# Patient Record
Sex: FEMALE | Race: WHITE | NOT HISPANIC OR LATINO | Employment: OTHER | URBAN - METROPOLITAN AREA
[De-identification: names, ages, dates, MRNs, and addresses within clinical notes are randomized per-mention and may not be internally consistent; named-entity substitution may affect disease eponyms.]

---

## 2017-01-03 ENCOUNTER — LAB (OUTPATIENT)
Dept: INTERNAL MEDICINE | Facility: CLINIC | Age: 77
End: 2017-01-03

## 2017-01-03 DIAGNOSIS — E83.52 HYPERCALCEMIA: ICD-10-CM

## 2017-01-03 LAB
ALBUMIN SERPL-MCNC: 4.3 G/DL (ref 3.2–4.8)
ANION GAP SERPL CALCULATED.3IONS-SCNC: 5 MMOL/L (ref 3–11)
BUN BLD-MCNC: 23 MG/DL (ref 9–23)
BUN/CREAT SERPL: 28.8 (ref 7–25)
CALCIUM SPEC-SCNC: 11.2 MG/DL (ref 8.7–10.4)
CHLORIDE SERPL-SCNC: 104 MMOL/L (ref 99–109)
CO2 SERPL-SCNC: 30 MMOL/L (ref 20–31)
CREAT BLD-MCNC: 0.8 MG/DL (ref 0.6–1.3)
GFR SERPL CREATININE-BSD FRML MDRD: 70 ML/MIN/1.73
GLUCOSE BLD-MCNC: 102 MG/DL (ref 70–100)
PHOSPHATE SERPL-MCNC: 3.2 MG/DL (ref 2.4–5.1)
POTASSIUM BLD-SCNC: 4.6 MMOL/L (ref 3.5–5.5)
SODIUM BLD-SCNC: 139 MMOL/L (ref 132–146)

## 2017-01-03 PROCEDURE — 83970 ASSAY OF PARATHORMONE: CPT | Performed by: INTERNAL MEDICINE

## 2017-01-03 PROCEDURE — 80069 RENAL FUNCTION PANEL: CPT | Performed by: INTERNAL MEDICINE

## 2017-01-05 LAB — PTH-INTACT SERPL-MCNC: 78 PG/ML (ref 15–65)

## 2017-01-06 DIAGNOSIS — E03.9 ACQUIRED HYPOTHYROIDISM: Primary | ICD-10-CM

## 2017-01-06 DIAGNOSIS — D35.1 PARATHYROID ADENOMA: ICD-10-CM

## 2017-01-06 DIAGNOSIS — E21.0 PRIMARY HYPERPARATHYROIDISM (HCC): ICD-10-CM

## 2017-01-23 ENCOUNTER — PROCEDURE VISIT (OUTPATIENT)
Dept: GYNECOLOGIC ONCOLOGY | Facility: CLINIC | Age: 77
End: 2017-01-23

## 2017-01-23 VITALS
OXYGEN SATURATION: 98 % | HEIGHT: 60 IN | BODY MASS INDEX: 28.86 KG/M2 | RESPIRATION RATE: 16 BRPM | TEMPERATURE: 98.3 F | HEART RATE: 80 BPM | DIASTOLIC BLOOD PRESSURE: 68 MMHG | SYSTOLIC BLOOD PRESSURE: 133 MMHG | WEIGHT: 147 LBS

## 2017-01-23 DIAGNOSIS — N81.6 RECTOCELE: ICD-10-CM

## 2017-01-23 DIAGNOSIS — Z12.31 ENCOUNTER FOR SCREENING MAMMOGRAM FOR BREAST CANCER: ICD-10-CM

## 2017-01-23 DIAGNOSIS — Z01.419 WOMEN'S ANNUAL ROUTINE GYNECOLOGICAL EXAMINATION: Primary | ICD-10-CM

## 2017-01-23 PROCEDURE — G0101 CA SCREEN;PELVIC/BREAST EXAM: HCPCS | Performed by: NURSE PRACTITIONER

## 2017-01-23 NOTE — MR AVS SNAPSHOT
Leslee Wu   1/23/2017 10:00 AM   Procedure visit    Dept Phone:  [phone]   Encounter #:  [id number]    Provider:  SANDOVAL Nagy   Department:  [facility] GYNECOLOGIC ONCOLOGY                Your Full Care Plan              Your Updated Medication List          This list is accurate as of: 1/23/17 10:50 AM.  Always use your most recent med list.                acyclovir 200 MG capsule   Commonly known as:  ZOVIRAX       * amLODIPine 10 MG tablet   Commonly known as:  NORVASC   Take 1 tablet by mouth daily.       * amLODIPine 10 MG tablet   Commonly known as:  NORVASC   Take 1 tablet by mouth Daily.       * aspirin 325 MG tablet       * aspirin 325 MG tablet       * benazepril 20 MG tablet   Commonly known as:  LOTENSIN   Take 1 tablet by mouth daily.       * benazepril 20 MG tablet   Commonly known as:  LOTENSIN       diclofenac 75 MG EC tablet   Commonly known as:  VOLTAREN   Take 1 tablet by mouth 2 (Two) Times a Day.       * LORazepam 0.5 MG tablet   Commonly known as:  ATIVAN   Take 1 tablet by mouth Every 8 (Eight) Hours As Needed for anxiety.       * LORazepam 0.5 MG tablet   Commonly known as:  ATIVAN       * misoprostol 200 MCG tablet   Commonly known as:  CYTOTEC   TAKE ONE TABLET BY MOUTH TWICE DAILY       * misoprostol 200 MCG tablet   Commonly known as:  CYTOTEC   Take 1 tablet by mouth 2 (Two) Times a Day.       * OSTEO BI-FLEX JOINT SHIELD tablet       * OSTEO BI-FLEX JOINT SHIELD PO       * pantoprazole 40 MG EC tablet   Commonly known as:  PROTONIX       * pantoprazole 40 MG EC tablet   Commonly known as:  PROTONIX   Take 1 tablet by mouth Daily.       * SYNTHROID 75 MCG tablet   Generic drug:  levothyroxine   TAKE ONE TABLET BY MOUTH ONCE DAILY       * levothyroxine 75 MCG tablet   Commonly known as:  SYNTHROID, LEVOTHROID       * vitamin D3 5000 UNITS capsule capsule       * vitamin D3 5000 UNITS capsule capsule       * Notice:  This list has  18 medication(s) that are the same as other medications prescribed for you. Read the directions carefully, and ask your doctor or other care provider to review them with you.            You Were Diagnosed With        Codes Comments    Women's annual routine gynecological examination    -  Primary ICD-10-CM: Z01.419  ICD-9-CM: V72.31     Rectocele     ICD-10-CM: N81.6  ICD-9-CM: 618.04     Encounter for screening mammogram for breast cancer     ICD-10-CM: Z12.31  ICD-9-CM: V76.12       Instructions     None    Patient Instructions History      Upcoming Appointments     Visit Type Date Time Department    PAP SMEAR/PELVIC EXAM 2017 10:00 AM [facility]    FOLLOW UP + US 2017 10:30 AM [facility] END [facility]    FOLLOW UP 2017 10:00 AM [facility]    FOLLOW UP 10/10/2017 11:15 AM [facility]ARD [facility]      [employer] Signup     [employer] allows you to send messages to your doctor, view your test results, renew your prescriptions, schedule appointments, and more. To sign up, go to [email or web address] and click on the Sign Up Now link in the New User? box. Enter your [employer] Activation Code exactly as it appears below along with the last four digits of your Social Security Number and your Date of Birth () to complete the sign-up process. If you do not sign up before the expiration date, you must request a new code.    [employer] Activation Code: [email or web address]  Expires: 2017 10:48 AM    If you have questions, you can email [email or web address] or call [phone] to talk to our [employer] staff. Remember, [employer] is NOT to be used for urgent needs. For medical emergencies, dial 911.               Other Info from Your Visit           Your Appointments     2017 10:30 AM EDT   Follow up + US with Salima LONG MD   [facility] INTERNAL MEDICINE AND ENDOCRINOLOGY (--)    [address] [place] [id number]   [phone]            Kevin  "12, 2017 10:00 AM EDT   Follow Up with Eric Angulo MD   [facility] INTERNAL MEDICINE AND PEDIATRICS (--)    [address]  [facility] [address] [phone]           Arrive 15 minutes prior to appointment.            Oct 10, 2017 11:15 AM EDT   Follow Up with Zeb Solis MD   [facility] CARDIOLOGY (--)    [address]  [facility] [address] [phone]           Arrive 15 minutes prior to appointment.            Jan 24, 2018 10:00 AM EST   Pap Smear/Pelvic Exam with SANDOVAL Nagy   [facility] GYNECOLOGIC ONCOLOGY (--)    [address]  [facility] [address] [phone]              Allergies     Codeine      Lipitor [Atorvastatin]  Myalgia    Lortab  [Hydrocodone-acetaminophen]        Reason for Visit     Gynecologic Exam WITH NO COMPLAINTS      Vital Signs     Blood Pressure Pulse Temperature Respirations Height Weight    133/68 80 98.3 °F (36.8 °C) (Temporal Artery ) 16 60\" (152.4 cm) 147 lb (66.7 kg)    Oxygen Saturation Body Mass Index Smoking Status             98% 28.71 kg/m2 Former Smoker         Problems and Diagnoses Noted     Rectocele    Women's annual routine gynecological examination    Encounter for screening mammogram for breast cancer            "

## 2017-01-23 NOTE — PROGRESS NOTES
GYN ONCOLOGY ANNUAL WELL WOMAN VISIT      Leslee Wu  [id number]  1940      Chief Complaint: Gynecologic Exam (WITH NO COMPLAINTS)        History of present illness:  Leslee Wu is a 76 y.o. year old female who is here today for an annual exam.  The patient has a history of abnormal uterine bleeding as well as rectocele.  She has been doing well since her last visit with no significant changes in health history.  She continues to be very active including participating in water aerobics and using a stationary bike about 4 times per week.  She has had no vaginal bleeding or pelvic pain.  She her bowels and bladder function well.  She has no worsening symptoms of prolapse.  She has no difficulty with bowel movements or pelvic pressure.  She received notification from Dr. Khanna regarding the need for repeat screening colonoscopy.  She plans to schedule this next year.  Her mammogram is due to be repeated in April.  She sees Dr. Angulo for primary care.  Overall she feels well and has had a good healthy year since her last visit.    Cancer History:    No history exists.       Obstetric History:  OB History     No data available         Menstrual History:     No LMP recorded.          Past Medical History   Diagnosis Date   • Anemia    • Anxiety    • Atypical chest pain      Normal exercise Cardiolite stress test and echocardiogram in .   • Atypical chest pain    • Diverticulosis    • Diverticulosis      History of diverticulosis.   • Dyslipidemia    • GERD (gastroesophageal reflux disease)    • GERD without esophagitis    • Gout    • Gout      History of gout.   •  4 para 4    • Hypertension    • Hypothyroidism      On chronic replacement therapy.   • Hypothyroidism      Hypothyroidism/chronic replacement.   • Iron deficiency anemia      Iron deficiency anemia.   • Joint pain, knee    • Mild anxiety      Mild chronic anxiety.   • Nontoxic single thyroid nodule    • Osteoarthritis    • Pain, lower leg     • Parathyroid adenoma    • Pharyngitis    • Pulmonary nodule      CT scan of the chest of 08/14/2013 reports new noncalcified nodule in the right lung base measuring 8 x 5 mm and a stable noncalcified nodule in the left mid lung field measuring 4-5 mm, follow-up CT scan of 08/06/2014 reports interval resolution of the right basilar lung nodule and stable calcified nodule in the left upper lobe.   • Spondylolisthesis      Spondylolisthesis/degenerative disc disease.   • Tobacco use      History of brief tobacco use, in the patient’s 20s:    • Urinary tract infection    • Vaginal candidiasis        Past Surgical History   Procedure Laterality Date   • Total hip arthroplasty     • Tonsillectomy and adenoidectomy     • Back surgery     • Total knee arthroplasty Right    • Tonsillectomy and adenoidectomy     • Total hip arthroplasty Right      Right hip replacement.   • Total knee arthroplasty Left        MEDICATIONS: The current medication list was reviewed and reconciled.     Allergies:  is allergic to codeine; lipitor [atorvastatin]; and lortab  [hydrocodone-acetaminophen].    Family History   Problem Relation Age of Onset   • Coronary artery disease Mother    • Heart attack Mother    • Aortic aneurysm Father    • Heart disease Sister      Stent placement   • Heart disease Brother      CABG   • Heart disease Sister      Stent placement   • Other Father      ruptured AAA   • Other Sister      2 sisters who have had cardiac stent placement   • Other Brother      CABG       Health Maintenance:  Last mammogram was 4/21/2016. Last colonoscopy was 2008, with recommended follow-up in 7 year(s). Last DEXA was 4/2016. Last pap smear was 12/2013, results were  normal PAP..    Review of Systems   Constitutional: Negative for fatigue, fever and unexpected weight change.   HENT: Positive for postnasal drip. Negative for congestion, ear pain, hearing loss, sinus pressure and trouble swallowing.    Eyes: Negative for visual  disturbance.   Respiratory: Positive for cough. Negative for chest tightness, shortness of breath and wheezing.    Cardiovascular: Negative for chest pain, palpitations and leg swelling.   Gastrointestinal: Negative for abdominal distention, abdominal pain, constipation, diarrhea, nausea and vomiting.   Endocrine: Negative for cold intolerance, heat intolerance, polydipsia, polyphagia and polyuria.   Genitourinary: Negative for difficulty urinating, dyspareunia, dysuria, frequency, hematuria, pelvic pain, urgency, vaginal bleeding, vaginal discharge and vaginal pain.   Musculoskeletal: Positive for arthralgias. Negative for gait problem, joint swelling and myalgias.   Skin: Negative for color change, pallor and rash.   Neurological: Negative for dizziness, seizures, syncope, weakness, light-headedness, numbness and headaches.   Hematological: Negative for adenopathy. Does not bruise/bleed easily.   Psychiatric/Behavioral: Negative for agitation, confusion, sleep disturbance and suicidal ideas. The patient is not nervous/anxious.        Physical Exam  General Appearance:  alert, cooperative, no apparent distress, appears stated age and normal weight   Neurologic/Psychiatric: A&O x 3, gait steady, appropriate affect   HEENT:  Normocephalic, without obvious abnormality, mucous membranes moist   Neck: Supple, symmetrical, trachea midline, no adenopathy;  No thyromegaly, masses, or tenderness   Back:   Symmetric, no curvature, ROM normal, no CVA tenderness   Lungs:   Clear to auscultation bilaterally; respirations regular, even, and unlabored bilaterally   Heart:  Regular rate and rhythm, no murmurs appreciated   Breasts:  Symmetrical, no masses, no lesions and no nipple discharge   Abdomen:   Soft, non-tender, non-distended and no organomegaly   Lymph nodes: No cervical, supraclavicular, inguinal or axillary adenopathy noted   Extremities: Normal, atraumatic; no clubbing, cyanosis, or edema    Skin: No rashes, ulcers,  or suspicious lesions noted   Pelvic: External Genitalia  atrophic, without lesions, gapin introitus with bulge noted consistent with rectocele  Vagina  is pale, atrophic.   Cervix  without lesions, no discharge and prolapses with valsalva, small and stenotic  Uterus  normal size, mobile and nontender  Ovaries  without palpable masses or fullness  Parametria  smooth  Rectovaginal  Female rectovaginal: confirms no masses or bleeding and Hemoccult negative     ECOG Performance Status: 1 - Symptomatic but completely ambulatory    Procedure Note:  No notes on file    Assessment and Plan:    Leslee was seen today for gynecologic exam.    Diagnoses and all orders for this visit:    Women's annual routine gynecological examination    Rectocele    Encounter for screening mammogram for breast cancer  -     Mammo Screening Digital Tomosynthesis Bilateral With CAD; Future        The patient was instructed to call with vaginal bleeding, discharge, pelvic pain, change in bowel or bladder function, or any new symptoms for evaluation of her complaints.     Mamm due 4/2017. Screening exam ordered.  Pt received letter from Dr. Winn regarding scheduling repeat colonoscopy. She plans to do so in the next year.   Primary care follow up with Dr. Angulo  Encouraged the patient to stay active with water aerobics and stationary bike.  Pt is currently asymptomatic from prolapse/rectocele. Encouraged her to keep her bowels moving regularly.     Return in about 1 year (around 1/23/2018) for Annual Exam.      Rhea Benitez, SANDOVAL      Note: Speech recognition transcription software was used to dictate portions of this document.  An attempt at proofreading has been made though minor errors in transcription may still be present.  Please do not hesitate to call our office with any questions.

## 2017-04-06 ENCOUNTER — LAB (OUTPATIENT)
Dept: INTERNAL MEDICINE | Facility: CLINIC | Age: 77
End: 2017-04-06

## 2017-04-06 DIAGNOSIS — E21.0 PRIMARY HYPERPARATHYROIDISM (HCC): ICD-10-CM

## 2017-04-06 DIAGNOSIS — D35.1 PARATHYROID ADENOMA: ICD-10-CM

## 2017-04-06 DIAGNOSIS — E03.9 ACQUIRED HYPOTHYROIDISM: ICD-10-CM

## 2017-04-06 LAB
25(OH)D3 SERPL-MCNC: 45.3 NG/ML
ALBUMIN SERPL-MCNC: 4.3 G/DL (ref 3.2–4.8)
ANION GAP SERPL CALCULATED.3IONS-SCNC: 4 MMOL/L (ref 3–11)
BUN BLD-MCNC: 19 MG/DL (ref 9–23)
BUN/CREAT SERPL: 23.8 (ref 7–25)
CALCIUM SPEC-SCNC: 11.2 MG/DL (ref 8.7–10.4)
CHLORIDE SERPL-SCNC: 106 MMOL/L (ref 99–109)
CO2 SERPL-SCNC: 29 MMOL/L (ref 20–31)
CREAT BLD-MCNC: 0.8 MG/DL (ref 0.6–1.3)
GFR SERPL CREATININE-BSD FRML MDRD: 70 ML/MIN/1.73
GLUCOSE BLD-MCNC: 94 MG/DL (ref 70–100)
PHOSPHATE SERPL-MCNC: 3.4 MG/DL (ref 2.4–5.1)
POTASSIUM BLD-SCNC: 5.1 MMOL/L (ref 3.5–5.5)
SODIUM BLD-SCNC: 139 MMOL/L (ref 132–146)
T4 FREE SERPL-MCNC: 1.49 NG/DL (ref 0.89–1.76)
TSH SERPL DL<=0.05 MIU/L-ACNC: 0.82 MIU/ML (ref 0.35–5.35)

## 2017-04-06 PROCEDURE — 84439 ASSAY OF FREE THYROXINE: CPT | Performed by: INTERNAL MEDICINE

## 2017-04-06 PROCEDURE — 83970 ASSAY OF PARATHORMONE: CPT | Performed by: INTERNAL MEDICINE

## 2017-04-06 PROCEDURE — 84443 ASSAY THYROID STIM HORMONE: CPT | Performed by: INTERNAL MEDICINE

## 2017-04-06 PROCEDURE — 80069 RENAL FUNCTION PANEL: CPT | Performed by: INTERNAL MEDICINE

## 2017-04-06 PROCEDURE — 82306 VITAMIN D 25 HYDROXY: CPT | Performed by: INTERNAL MEDICINE

## 2017-04-09 LAB — PTH-INTACT SERPL-MCNC: 71 PG/ML (ref 15–65)

## 2017-04-26 ENCOUNTER — OFFICE VISIT (OUTPATIENT)
Dept: ENDOCRINOLOGY | Facility: CLINIC | Age: 77
End: 2017-04-26

## 2017-04-26 VITALS
WEIGHT: 146.4 LBS | HEIGHT: 60 IN | BODY MASS INDEX: 28.74 KG/M2 | SYSTOLIC BLOOD PRESSURE: 138 MMHG | OXYGEN SATURATION: 98 % | DIASTOLIC BLOOD PRESSURE: 76 MMHG | HEART RATE: 66 BPM

## 2017-04-26 DIAGNOSIS — M81.8 OTHER OSTEOPOROSIS WITHOUT CURRENT PATHOLOGICAL FRACTURE: ICD-10-CM

## 2017-04-26 DIAGNOSIS — E03.9 ACQUIRED HYPOTHYROIDISM: ICD-10-CM

## 2017-04-26 DIAGNOSIS — E04.1 SOLITARY THYROID NODULE: Primary | ICD-10-CM

## 2017-04-26 DIAGNOSIS — E04.1 NONTOXIC SINGLE THYROID NODULE: ICD-10-CM

## 2017-04-26 DIAGNOSIS — D35.1 PARATHYROID ADENOMA: ICD-10-CM

## 2017-04-26 PROCEDURE — 76536 US EXAM OF HEAD AND NECK: CPT | Performed by: INTERNAL MEDICINE

## 2017-04-26 PROCEDURE — 99213 OFFICE O/P EST LOW 20 MIN: CPT | Performed by: INTERNAL MEDICINE

## 2017-04-26 RX ORDER — LEVOTHYROXINE SODIUM 75 MCG
75 TABLET ORAL DAILY
Qty: 90 TABLET | Refills: 3
Start: 2017-04-26 | End: 2017-04-26 | Stop reason: SDUPTHER

## 2017-04-26 RX ORDER — LEVOTHYROXINE SODIUM 75 MCG
75 TABLET ORAL DAILY
Qty: 90 TABLET | Refills: 3 | Status: SHIPPED | OUTPATIENT
Start: 2017-04-26 | End: 2018-06-02 | Stop reason: SDUPTHER

## 2017-04-26 NOTE — PROGRESS NOTES
Thyroid Problem (F/u for thyoid nodule, ultrasound today. Pt stated no sx or concerns. )    Subjective   Leslee Wu is a 76 y.o. female is here today for follow-up and ultrasound.  Hypercalcemia and primary hyperparathyroidism. She has seen parathyroid surgeon and endocrinologist in 2000 and deferred surgery at that time.    BMD: T-score -1 at the hip and - 1.4 at the femoral neck, 1.5 at the spine (she is s/p spinal fusion). statistically significant 11.5% decrease in the bone mineral density of the left hip compared to the prior 2011 exam and a  23.6% decrease compared to the baseline 1998 exam. Imaging reviewed and discussed with the patient   The ten year fracture risk assessment is calculated at 12% for a major  osteoporotic fracture and 2.4% for a hip fracture.      Calcium and PTH remained stable over the last several years. Calcium is 11.2. Patient is asymptomatic.     History of thyroid nodules since 1995 and 2000, s/p 2 benign biopsies. She was seeing Dr Argueta who ordered yearly u/s.FNA in 1996 - hypocellular specimen      Hypothyroidism dx in 1995, on synthroid 75 mcg.       Patient denies having any new complaints. She is exercising regularly on the [place] bycycle. Eats healthy, takes Vit D 5000 every other day.   Had labs last week and presented for review.      History of Present Illness  Medications:    Current Outpatient Prescriptions:   •  acyclovir (ZOVIRAX) 200 MG capsule, , Disp: , Rfl:   •  amLODIPine (NORVASC) 10 MG tablet, Take 1 tablet by mouth Daily., Disp: 90 tablet, Rfl: 3  •  aspirin 325 MG tablet, Take 325 mg by mouth Daily., Disp: , Rfl:   •  benazepril (LOTENSIN) 20 MG tablet, Take 20 mg by mouth Daily., Disp: , Rfl:   •  Cholecalciferol (VITAMIN D3) 5000 UNITS capsule capsule, Take 5,000 Units by mouth Daily., Disp: , Rfl:   •  diclofenac (VOLTAREN) 75 MG EC tablet, Take 1 tablet by mouth 2 (Two) Times a Day., Disp: 180 tablet, Rfl: 3  •  LORazepam (ATIVAN) 0.5 MG tablet, Take 1  "tablet by mouth Every 8 (Eight) Hours As Needed for anxiety., Disp: 30 tablet, Rfl: 2  •  Misc Natural Products (OSTEO BI-FLEX JOINT SHIELD PO), Take  by mouth 2 (Two) Times a Day., Disp: , Rfl:   •  misoprostol (CYTOTEC) 200 MCG tablet, Take 1 tablet by mouth 2 (Two) Times a Day., Disp: 90 tablet, Rfl: 3  •  pantoprazole (PROTONIX) 40 MG EC tablet, Take 1 tablet by mouth daily., Disp: , Rfl:   •  SYNTHROID 75 MCG tablet, Take 1 tablet by mouth Daily., Disp: 90 tablet, Rfl: 3    The following portions of the patient's history were reviewed and updated as appropriate: allergies, current medications, past family history, past medical history, past social history, past surgical history and problem list.    Review of Systems   Constitutional: Positive for fatigue.   Cardiovascular: Positive for leg swelling (L>R).   Musculoskeletal: Positive for arthralgias (left knee pain) and back pain.   All other systems reviewed and are negative.      Objective   Blood pressure 138/76, pulse 66, height 60\" (152.4 cm), weight 146 lb 6.4 oz (66.4 kg), SpO2 98 %.   Physical Exam   Constitutional: She is oriented to person, place, and time. She appears well-developed and well-nourished.   HENT:   Head: Normocephalic and atraumatic.   Eyes: Conjunctivae are normal.   Neck: Normal range of motion. No muscular tenderness present. Carotid bruit is not present. Thyromegaly (left 1 cm hard easy movable nodule. ) present. No thyroid mass present.   The neck is supple and no assimetry visualized   Cardiovascular: Normal rate, regular rhythm and normal heart sounds.    Pulmonary/Chest: Effort normal and breath sounds normal.   Lymphadenopathy:     She has no cervical adenopathy.   Neurological: She is alert and oriented to person, place, and time.   Psychiatric: She has a normal mood and affect. Thought content normal.   Vitals reviewed.        Results for orders placed or performed in visit on 04/06/17   Renal Function Panel   Result Value Ref " Range    Glucose 94 70 - 100 mg/dL    BUN 19 9 - 23 mg/dL    Creatinine 0.80 0.60 - 1.30 mg/dL    Sodium 139 132 - 146 mmol/L    Potassium 5.1 3.5 - 5.5 mmol/L    Chloride 106 99 - 109 mmol/L    CO2 29.0 20.0 - 31.0 mmol/L    Calcium 11.2 (H) 8.7 - 10.4 mg/dL    Albumin 4.30 3.20 - 4.80 g/dL    Phosphorus 3.4 2.4 - 5.1 mg/dL    Anion Gap 4.0 3.0 - 11.0 mmol/L    BUN/Creatinine Ratio 23.8 7.0 - 25.0    eGFR Non African Amer 70 >60 mL/min/1.73   PTH, Intact   Result Value Ref Range    PTH, Intact 71 (H) 15 - 65 pg/mL   Vitamin D 25 Hydroxy   Result Value Ref Range    25 Hydroxy, Vitamin D 45.3 ng/ml   TSH   Result Value Ref Range    TSH 0.820 0.350 - 5.350 mIU/mL   T4, Free   Result Value Ref Range    Free T4 1.49 0.89 - 1.76 ng/dL             Thyroid ultrasound              Real time high resolution imaging of the thyroid gland was performed in transverse and longitudinal planes.      The right lobe measured 4.09 cm L x 1.67 cm AP x 1.47 cm in TV dimension.    The isthmus measured 0.25 cm in thickness.    The left thyroid lobe measured 4.16 cm L x 1.01 cm AP x 1.45 cm in TV dimension.    Thyroid gland is homogeneous and contains single nodule in the left lobe and small nodule  On the right .    Nodule 1   is located in the left mid lobe and measures 1.67 x 1.2 x 1.3 cm (L X AP X TV).  This nodule is cystic, homogeneous, hypoechoic with well-defined borders and egg-shell calcifications, and Grade I vascularity on Color Flow Doppler.  It has artifacts:  posterior acoustic enhancement    Nodule 2  is located in the right upper lobe and measures 0.33 x 0.23 x 0.31 cm (L X AP X TV).  This nodule is solid, homogeneous, hypoechoic with well-defined margins, and Grade II vascularity on Color Flow Doppler.  It has no artifacts    Posterior to the right thyroid lobe hypoechoic extrathyroidal structure is located inferiorly, representing parathyroid gland and measures 1.07 x 0.44 x and 0.61 L x AP x TV cm.  Doppler flow  representing polar artery is seen.       No pathologic lymph nodes were seen.      Assessment: Stable left calcified nodule. Slightly enlarged right inferior parathyroid adenoma.   Repeat ultrasound in 18 months.       Assessment/Plan:    Problem List Items Addressed This Visit        Endocrine    Acquired hypothyroidism    Overview     Description: A.  On chronic replacement therapy.         Relevant Medications    SYNTHROID 75 MCG tablet    Parathyroid adenoma    Nontoxic single thyroid nodule    Relevant Medications    SYNTHROID 75 MCG tablet      Other Visit Diagnoses     Solitary thyroid nodule    -  Primary    Relevant Medications    SYNTHROID 75 MCG tablet    Other Relevant Orders    US Thyroid (Completed)      Primary hyperparathyroidism - stable calcium and PTH over the last year 62-->71 in 1 year, calcium 10.8 --> 11.2 over the last 2 years. Parathyroid adenoma is slowly growing according to the u/s.    Patient is asymptomatic, no dx of osteoporosis (osteoprnia in BMD 4/2016), kidney stones or renal impairment.   I would continue to follow conservatively.   Ultrasound showed 1 cm right inferior parathyroid adenoma.       Continue with increased fluid intake, Vit D supplements 2000 daily . Conservative management is appropriate. If calcium increasing, will refer to surgery.       cont same Synthroid 75 µg dose, labs reviewed with the patient       Repeat u/s in fall 2018    F/u in 6-9 months with labs

## 2017-06-12 ENCOUNTER — OFFICE VISIT (OUTPATIENT)
Dept: INTERNAL MEDICINE | Facility: CLINIC | Age: 77
End: 2017-06-12

## 2017-06-12 VITALS
WEIGHT: 145 LBS | RESPIRATION RATE: 21 BRPM | HEART RATE: 74 BPM | DIASTOLIC BLOOD PRESSURE: 72 MMHG | SYSTOLIC BLOOD PRESSURE: 130 MMHG | BODY MASS INDEX: 28.32 KG/M2

## 2017-06-12 DIAGNOSIS — M19.90 ARTHRITIS: ICD-10-CM

## 2017-06-12 DIAGNOSIS — D35.1 PARATHYROID ADENOMA: ICD-10-CM

## 2017-06-12 DIAGNOSIS — I10 ESSENTIAL HYPERTENSION: Primary | ICD-10-CM

## 2017-06-12 DIAGNOSIS — E03.9 ACQUIRED HYPOTHYROIDISM: ICD-10-CM

## 2017-06-12 DIAGNOSIS — E21.3 HYPERPARATHYROIDISM (HCC): ICD-10-CM

## 2017-06-12 DIAGNOSIS — K21.9 GASTROESOPHAGEAL REFLUX DISEASE WITHOUT ESOPHAGITIS: ICD-10-CM

## 2017-06-12 PROCEDURE — 99214 OFFICE O/P EST MOD 30 MIN: CPT | Performed by: INTERNAL MEDICINE

## 2017-06-12 NOTE — PROGRESS NOTES
Subjective   Leslee Wu is a 76 y.o. female.     History of Present Illness   For follow up of  CAD no chest pain no sob.  HTN on meds, no headaches.  DJD is doing ok.  GERD is stable.  Hypothyroid is stable followed by mylene which is also following elevated calcium.      The following portions of the patient's history were reviewed and updated as appropriate: allergies, current medications, past medical history and problem list.    Review of Systems   Constitutional: Negative.    Respiratory: Negative.  Negative for cough, chest tightness and wheezing.    Cardiovascular: Negative.  Negative for chest pain, palpitations and leg swelling.   Gastrointestinal: Negative for abdominal distention and abdominal pain.   Musculoskeletal: Positive for arthralgias.       Objective   Physical Exam   Constitutional: She appears well-developed and well-nourished.   Neck: Normal range of motion. Neck supple.   Cardiovascular: Normal rate, regular rhythm and normal heart sounds.  Exam reveals no gallop and no friction rub.    No murmur heard.  Pulmonary/Chest: Effort normal and breath sounds normal. No respiratory distress. She has no wheezes. She has no rales. She exhibits no tenderness.   Abdominal: Soft. Bowel sounds are normal. She exhibits no distension and no mass. There is no tenderness.   Musculoskeletal: Normal range of motion.   Nursing note and vitals reviewed.      Assessment/Plan   Leslee was seen today for hypertension.    Diagnoses and all orders for this visit:    Essential hypertension    Gastroesophageal reflux disease without esophagitis    Acquired hypothyroidism    Hyperparathyroidism    Parathyroid adenoma    Arthritis     All problems are stable.  Same meds.  Recheck 6 months.

## 2017-09-05 RX ORDER — BENAZEPRIL HYDROCHLORIDE 20 MG/1
TABLET ORAL
Qty: 90 TABLET | Refills: 3 | Status: SHIPPED | OUTPATIENT
Start: 2017-09-05 | End: 2017-10-17 | Stop reason: SDUPTHER

## 2017-09-20 ENCOUNTER — HOSPITAL ENCOUNTER (OUTPATIENT)
Dept: MAMMOGRAPHY | Facility: HOSPITAL | Age: 77
Discharge: HOME | End: 2017-09-20
Admitting: NURSE PRACTITIONER

## 2017-09-20 DIAGNOSIS — Z12.31 ENCOUNTER FOR SCREENING MAMMOGRAM FOR BREAST CANCER: ICD-10-CM

## 2017-09-20 PROCEDURE — 77063 BREAST TOMOSYNTHESIS BI: CPT | Performed by: RADIOLOGY

## 2017-09-20 PROCEDURE — G0202 SCR MAMMO BI INCL CAD: HCPCS | Performed by: RADIOLOGY

## 2017-09-20 PROCEDURE — G0202 SCR MAMMO BI INCL CAD: HCPCS

## 2017-09-20 PROCEDURE — 77063 BREAST TOMOSYNTHESIS BI: CPT

## 2017-10-17 ENCOUNTER — OFFICE VISIT (OUTPATIENT)
Dept: CARDIOLOGY | Facility: CLINIC | Age: 77
End: 2017-10-17

## 2017-10-17 VITALS
HEART RATE: 84 BPM | DIASTOLIC BLOOD PRESSURE: 70 MMHG | SYSTOLIC BLOOD PRESSURE: 109 MMHG | HEIGHT: 61 IN | BODY MASS INDEX: 27.9 KG/M2 | WEIGHT: 147.8 LBS

## 2017-10-17 DIAGNOSIS — R07.89 ATYPICAL CHEST PAIN: ICD-10-CM

## 2017-10-17 DIAGNOSIS — I10 ESSENTIAL HYPERTENSION: Primary | ICD-10-CM

## 2017-10-17 PROCEDURE — 99212 OFFICE O/P EST SF 10 MIN: CPT | Performed by: INTERNAL MEDICINE

## 2017-10-17 RX ORDER — ASPIRIN 81 MG/1
81 TABLET ORAL DAILY
COMMUNITY

## 2017-10-17 RX ORDER — AMLODIPINE BESYLATE 10 MG/1
10 TABLET ORAL DAILY
Qty: 90 TABLET | Refills: 3 | Status: SHIPPED | OUTPATIENT
Start: 2017-10-17 | End: 2018-11-26 | Stop reason: SDUPTHER

## 2017-10-17 RX ORDER — BENAZEPRIL HYDROCHLORIDE 20 MG/1
20 TABLET ORAL DAILY
Qty: 90 TABLET | Refills: 3 | Status: SHIPPED | OUTPATIENT
Start: 2017-10-17 | End: 2018-11-26 | Stop reason: SDUPTHER

## 2017-10-17 NOTE — PROGRESS NOTES
"  OFFICE FOLLOW UP     Date of Encounter:10/17/2017     Name: Leslee Wu  : 1940  Address: [facility] [address] [facility] [address]  Phone: [phone]    PCP: Eric Angulo MD  [address] [place] [address] [address]  [facility] [address]    Leslee Wu is a 76 y.o. female.    Chief Complaint: f/u HTN, atypical chest pain     Problem List:   1. History of atypical chest pain:                        A. Echocardiogram, : LVEF 90%                        B. Abnormal calcium score, 2013.                        C. MPS, 2013: No inducible ischemia, LVEF 70%  2. Hypertension  3. Hypothyroidism/chronmic replacement  4. GERD  5. History of brief tobacco use, in patient's 20s:                        A. Noncalcified lung nodule found on chest CT, , at the left lung base and the mid left lung field.                        B. Evaluation by Dr. Ivan Mota, 2013 with serial CT scans.                        C. Chest CT, 2013: Noncalcified nodules smaller than prior studies.                        D. Essentially \"noaml\" CT of the chest, 2014.  6. Osteoarthrosis  7. Iron deficiency anemia.  8. Spondylolisthesis/degenerative disc disease.  9. Mild chronic anxiety.  10. History of diverticulosis  11. History of gout.  12. Surgical History:                        A. Back surgery/                        B. Right TKR.                        C. T&A.                        D. Right hip replacement.                        E. Left TKR.  Allergies:  Allergies   Allergen Reactions   • Codeine    • Lipitor [Atorvastatin] Myalgia   • Lortab  [Hydrocodone-Acetaminophen]      Current Medications:  •  acyclovir 200 MG capsule, As Needed.  •  amLODIPine 10 MG tablet,  Daily.  •  aspirin 81 MG EC tablet,  Daily.  •  benazepril 20 MG tablet, DAILY  •  diclofenac 75 MG EC tablet,  2 Times a Day  •  LORazepam 0.5 MG tablet, Take 1 tablet by mouth Every 8 Hours As Needed for anxiety  •  misoprostol 200 MCG " "tablet,  2 Times a Day.  •  pantoprazole 40 MG EC tablet, Take 1 tablet by mouth daily  •  SYNTHROID 75 MCG tablet, Daily.    History of Present Illness:         Mrs. Wu returns today for yearly follow-up. Since her last visit she reports she has been doing well and has remained asymptomatic from a cardiovascular standpoint. She reports her left knee is bothering her for some time and one of her \"screws is loose.\" She had a TKR in 2014 and notes she has had difficulty with her knee since that time. She is scheduled to see an orthopedist in November for further evaluation and possible need for surgical repair. She denies any chest pain, ZARAGOZA or syncope. Her blood pressure is well controlled. Dr. Pretty is following her for elevated calcium and PTH levels. She still exercises 4 times per week (water aerobics and bicycle) and reports no symptoms.      The following portions of the patient's history were reviewed and updated as appropriate: allergies, current medications and problem list.    HPI: Pertinent positives as listed in the HPI.  All other systems reviewed and negative.    Objective:  Vitals:    10/17/17 1134 10/17/17 1135   BP: 125/79 109/70   BP Location: Left arm Left arm   Patient Position: Sitting Standing   Pulse: 78 84   Weight: 147 lb 12.8 oz (67 kg)    Height: 61\" (154.9 cm)        Physical Exam:  GENERAL: Alert, cooperative, in no acute distress.   HEENT: Fundoscopic deferred, otherwise unremarkable.  NECK: No Jugular venous distention, adenopathy, or thyromegaly noted.   HEART: Regular rhythm, normal rate, and no murmurs, gallops, or rubs.   LUNGS: Clear to auscultation bilaterally. No wheezing, rales or ronchi.  ABDOMEN: Flat without evidence of organomegaly, masses, or tenderness.  NEUROLOGIC: No focal abnormalities involving strength or sensation are noted.   EXTREMITIES: No clubbing, cyanosis, or edema noted.     Diagnostic Data:    Lab Results   Component Value Date    GLUCOSE 94 04/06/2017    " CALCIUM 11.2 (H) 04/06/2017     04/06/2017    K 5.1 04/06/2017    CO2 29.0 04/06/2017     04/06/2017    BUN 19 04/06/2017    CREATININE 0.80 04/06/2017    EGFRIFNONA 70 04/06/2017    BCR 23.8 04/06/2017    ANIONGAP 4.0 04/06/2017     Lab Results   Component Value Date    TSH 0.820 04/06/2017       Procedures: N/A    Assessment and Plan:     1. She is doing well from a cardiovascular standpoint, and is not having any symptoms to suggest angina.   2. Her blood pressure is well controlled on current regimen.  3. She needs to see an orthopedic physician regarding her left knee and states she is scheduled for next month.  4. Return for follow-up in 1 year or sooner as needed.     Scribed for Zeb Solis MD by Gladis Pinon PA-C. 10/17/2017 12:09 PM.    I, Zeb Solis MD, [facility], personally performed the services described in this documentation as scribed by the above named individual in my presence, and it is both accurate and complete. At 5:40 PM on 10/17/2017

## 2017-10-26 ENCOUNTER — OFFICE VISIT (OUTPATIENT)
Dept: ENDOCRINOLOGY | Facility: CLINIC | Age: 77
End: 2017-10-26

## 2017-10-26 VITALS
DIASTOLIC BLOOD PRESSURE: 76 MMHG | WEIGHT: 147.2 LBS | HEIGHT: 61 IN | SYSTOLIC BLOOD PRESSURE: 122 MMHG | HEART RATE: 82 BPM | BODY MASS INDEX: 27.79 KG/M2 | OXYGEN SATURATION: 98 %

## 2017-10-26 DIAGNOSIS — E21.3 HYPERPARATHYROIDISM (HCC): ICD-10-CM

## 2017-10-26 DIAGNOSIS — D35.1 PARATHYROID ADENOMA: ICD-10-CM

## 2017-10-26 DIAGNOSIS — E04.1 NONTOXIC SINGLE THYROID NODULE: ICD-10-CM

## 2017-10-26 DIAGNOSIS — E04.1 SOLITARY THYROID NODULE: ICD-10-CM

## 2017-10-26 DIAGNOSIS — M81.8 OTHER OSTEOPOROSIS WITHOUT CURRENT PATHOLOGICAL FRACTURE: ICD-10-CM

## 2017-10-26 DIAGNOSIS — E03.9 ACQUIRED HYPOTHYROIDISM: Primary | ICD-10-CM

## 2017-10-26 LAB
25(OH)D3 SERPL-MCNC: 67.4 NG/ML
ALBUMIN SERPL-MCNC: 4.1 G/DL (ref 3.2–4.8)
ALBUMIN/GLOB SERPL: 1.6 G/DL (ref 1.5–2.5)
ALP SERPL-CCNC: 116 U/L (ref 25–100)
ALT SERPL W P-5'-P-CCNC: 16 U/L (ref 7–40)
ANION GAP SERPL CALCULATED.3IONS-SCNC: 8 MMOL/L (ref 3–11)
AST SERPL-CCNC: 19 U/L (ref 0–33)
BILIRUB SERPL-MCNC: 0.4 MG/DL (ref 0.3–1.2)
BUN BLD-MCNC: 17 MG/DL (ref 9–23)
BUN/CREAT SERPL: 21.3 (ref 7–25)
CALCIUM SPEC-SCNC: 10.7 MG/DL (ref 8.7–10.4)
CHLORIDE SERPL-SCNC: 104 MMOL/L (ref 99–109)
CO2 SERPL-SCNC: 26 MMOL/L (ref 20–31)
CREAT BLD-MCNC: 0.8 MG/DL (ref 0.6–1.3)
GFR SERPL CREATININE-BSD FRML MDRD: 70 ML/MIN/1.73
GLOBULIN UR ELPH-MCNC: 2.6 GM/DL
GLUCOSE BLD-MCNC: 88 MG/DL (ref 70–100)
POTASSIUM BLD-SCNC: 4.8 MMOL/L (ref 3.5–5.5)
PROT SERPL-MCNC: 6.7 G/DL (ref 5.7–8.2)
SODIUM BLD-SCNC: 138 MMOL/L (ref 132–146)
TSH SERPL DL<=0.05 MIU/L-ACNC: 0.85 MIU/ML (ref 0.35–5.35)

## 2017-10-26 PROCEDURE — 82306 VITAMIN D 25 HYDROXY: CPT | Performed by: INTERNAL MEDICINE

## 2017-10-26 PROCEDURE — 90686 IIV4 VACC NO PRSV 0.5 ML IM: CPT | Performed by: INTERNAL MEDICINE

## 2017-10-26 PROCEDURE — 83970 ASSAY OF PARATHORMONE: CPT | Performed by: INTERNAL MEDICINE

## 2017-10-26 PROCEDURE — 80053 COMPREHEN METABOLIC PANEL: CPT | Performed by: INTERNAL MEDICINE

## 2017-10-26 PROCEDURE — 84443 ASSAY THYROID STIM HORMONE: CPT | Performed by: INTERNAL MEDICINE

## 2017-10-26 PROCEDURE — 99214 OFFICE O/P EST MOD 30 MIN: CPT | Performed by: INTERNAL MEDICINE

## 2017-10-26 PROCEDURE — G0008 ADMIN INFLUENZA VIRUS VAC: HCPCS | Performed by: INTERNAL MEDICINE

## 2017-10-26 NOTE — PROGRESS NOTES
Hypothyroidism (F/u for hypothyroidism and pituitary adenoma. No new sx's or concerns) and Pituitary Adenoma    Subjective   Leslee Wu is a 76 y.o. female is here today for follow-up and ultrasound.  Hypercalcemia and primary hyperparathyroidism. She has seen parathyroid surgeon and endocrinologist in 2000 and deferred surgery at that time.    BMD: T-score -1 at the hip and - 1.4 at the femoral neck, 1.5 at the spine (she is s/p spinal fusion). statistically significant 11.5% decrease in the bone mineral density of the left hip compared to the prior 2011 exam and a  23.6% decrease compared to the baseline 1998 exam. Imaging reviewed and discussed with the patient   The ten year fracture risk assessment is calculated at 12% for a major  osteoporotic fracture and 2.4% for a hip fracture.      Calcium and PTH remained stable over the last several years. stable calcium and PTH over the last year 62-->71 in 1 year, calcium 10.8 --> 11.2 over the last 2 years. Parathyroid adenoma is slowly growing according to the u/s.  Parathyroid adenoma is seen by u/s - 1 cm right inferior parathyroid adenoma. Patient is asymptomatic.     History of thyroid nodules since 1995 and 2000, s/p 2 benign biopsies. She was seeing Dr Argueta who ordered yearly u/s.FNA in 1996 - hypocellular specimen      Hypothyroidism dx in 1995, on synthroid 75 mcg.       Patient denies having any new complaints. She is exercising regularly on the stationary bycycle. Eats healthy, takes Vit D 2000 every day. Left knee pain - planned for knee surgery         History of Present Illness  Medications:    Current Outpatient Prescriptions:   •  acyclovir (ZOVIRAX) 200 MG capsule, Take 200 mg by mouth As Needed., Disp: , Rfl:   •  amLODIPine (NORVASC) 10 MG tablet, Take 1 tablet by mouth Daily., Disp: 90 tablet, Rfl: 3  •  aspirin 81 MG EC tablet, Take 81 mg by mouth Daily., Disp: , Rfl:   •  benazepril (LOTENSIN) 20 MG tablet, Take 1 tablet by mouth Daily., Disp:  "90 tablet, Rfl: 3  •  Cholecalciferol (VITAMIN D3) 5000 UNITS capsule capsule, Take 5,000 Units by mouth Daily., Disp: , Rfl:   •  diclofenac (VOLTAREN) 75 MG EC tablet, Take 1 tablet by mouth 2 (Two) Times a Day., Disp: 180 tablet, Rfl: 3  •  LORazepam (ATIVAN) 0.5 MG tablet, Take 1 tablet by mouth Every 8 (Eight) Hours As Needed for anxiety., Disp: 30 tablet, Rfl: 2  •  Misc Natural Products (OSTEO BI-FLEX JOINT SHIELD PO), Take  by mouth 2 (Two) Times a Day., Disp: , Rfl:   •  misoprostol (CYTOTEC) 200 MCG tablet, Take 1 tablet by mouth 2 (Two) Times a Day., Disp: 90 tablet, Rfl: 3  •  pantoprazole (PROTONIX) 40 MG EC tablet, Take 1 tablet by mouth daily., Disp: , Rfl:   •  SYNTHROID 75 MCG tablet, Take 1 tablet by mouth Daily., Disp: 90 tablet, Rfl: 3    The following portions of the patient's history were reviewed and updated as appropriate: allergies, current medications, past family history, past medical history, past social history, past surgical history and problem list.    Review of Systems   Cardiovascular: Leg swelling: L>R.   Musculoskeletal: Positive for back pain.   All other systems reviewed and are negative.      Objective   Blood pressure 122/76, pulse 82, height 61\" (154.9 cm), weight 147 lb 3.2 oz (66.8 kg), SpO2 98 %, not currently breastfeeding.   Physical Exam   Constitutional: She is oriented to person, place, and time. She appears well-developed and well-nourished.   HENT:   Head: Normocephalic and atraumatic.   Eyes: Conjunctivae are normal.   Neck: Normal range of motion. No muscular tenderness present. Carotid bruit is not present. Thyromegaly (left 1 cm hard easy movable nodule. ) present. No thyroid mass present.   The neck is supple and no assimetry visualized   Cardiovascular: Normal rate, regular rhythm and normal heart sounds.    Pulmonary/Chest: Effort normal and breath sounds normal.   Lymphadenopathy:     She has no cervical adenopathy.   Neurological: She is alert and oriented " to person, place, and time.   Psychiatric: She has a normal mood and affect. Thought content normal.   Vitals reviewed.        Results for orders placed or performed in visit on 04/06/17   Renal Function Panel   Result Value Ref Range    Glucose 94 70 - 100 mg/dL    BUN 19 9 - 23 mg/dL    Creatinine 0.80 0.60 - 1.30 mg/dL    Sodium 139 132 - 146 mmol/L    Potassium 5.1 3.5 - 5.5 mmol/L    Chloride 106 99 - 109 mmol/L    CO2 29.0 20.0 - 31.0 mmol/L    Calcium 11.2 (H) 8.7 - 10.4 mg/dL    Albumin 4.30 3.20 - 4.80 g/dL    Phosphorus 3.4 2.4 - 5.1 mg/dL    Anion Gap 4.0 3.0 - 11.0 mmol/L    BUN/Creatinine Ratio 23.8 7.0 - 25.0    eGFR Non African Amer 70 >60 mL/min/1.73   PTH, Intact   Result Value Ref Range    PTH, Intact 71 (H) 15 - 65 pg/mL   Vitamin D 25 Hydroxy   Result Value Ref Range    25 Hydroxy, Vitamin D 45.3 ng/ml   TSH   Result Value Ref Range    TSH 0.820 0.350 - 5.350 mIU/mL   T4, Free   Result Value Ref Range    Free T4 1.49 0.89 - 1.76 ng/dL             Thyroid ultrasound 4/2017              Real time high resolution imaging of the thyroid gland was performed in transverse and longitudinal planes.      The right lobe measured 4.09 cm L x 1.67 cm AP x 1.47 cm in TV dimension.    The isthmus measured 0.25 cm in thickness.    The left thyroid lobe measured 4.16 cm L x 1.01 cm AP x 1.45 cm in TV dimension.    Thyroid gland is homogeneous and contains single nodule in the left lobe and small nodule  On the right .    Nodule 1   is located in the left mid lobe and measures 1.67 x 1.2 x 1.3 cm (L X AP X TV).  This nodule is cystic, homogeneous, hypoechoic with well-defined borders and egg-shell calcifications, and Grade I vascularity on Color Flow Doppler.  It has artifacts:  posterior acoustic enhancement    Nodule 2  is located in the right upper lobe and measures 0.33 x 0.23 x 0.31 cm (L X AP X TV).  This nodule is solid, homogeneous, hypoechoic with well-defined margins, and Grade II vascularity on Color  Flow Doppler.  It has no artifacts    Posterior to the right thyroid lobe hypoechoic extrathyroidal structure is located inferiorly, representing parathyroid gland and measures 1.07 x 0.44 x and 0.61 L x AP x TV cm.  Doppler flow representing polar artery is seen.       No pathologic lymph nodes were seen.      Assessment: Stable left calcified nodule. Slightly enlarged right inferior parathyroid adenoma.   Repeat ultrasound in 18 months.       Assessment/Plan:    Problem List Items Addressed This Visit        Endocrine    Hyperparathyroidism    Acquired hypothyroidism - Primary    Overview     Description: A.  On chronic replacement therapy.         Parathyroid adenoma    Nontoxic single thyroid nodule      Primary hyperparathyroidism - stable calcium and PTH over the last year 62-->71 in 1 year, calcium 10.8 --> 11.2 over the last 2 years. Parathyroid adenoma is slowly growing according to the u/s, 1 cm in size.      Patient is asymptomatic, no dx of osteoporosis (osteoprnia in BMD 4/2016), kidney stones or renal impairment.   Ultrasound showed 1 cm right inferior parathyroid adenoma.       Continue with increased fluid intake, Vit D supplements 2000 daily . Conservative management is appropriate. If calcium increasing, will refer to surgery.       cont same Synthroid 75 µg dose, labs reviewed with the patient       Repeat u/s in fall 2018    Flu vaccine administered today.     F/u in 6-9 months with labs

## 2017-10-27 LAB — PTH-INTACT SERPL-MCNC: 76 PG/ML (ref 15–65)

## 2017-12-12 ENCOUNTER — OFFICE VISIT (OUTPATIENT)
Dept: INTERNAL MEDICINE | Facility: CLINIC | Age: 77
End: 2017-12-12

## 2017-12-12 VITALS
WEIGHT: 156 LBS | SYSTOLIC BLOOD PRESSURE: 124 MMHG | DIASTOLIC BLOOD PRESSURE: 74 MMHG | HEIGHT: 61 IN | BODY MASS INDEX: 29.45 KG/M2 | RESPIRATION RATE: 17 BRPM | HEART RATE: 76 BPM

## 2017-12-12 DIAGNOSIS — Z79.899 DRUG THERAPY: ICD-10-CM

## 2017-12-12 DIAGNOSIS — E03.9 ACQUIRED HYPOTHYROIDISM: ICD-10-CM

## 2017-12-12 DIAGNOSIS — E21.3 HYPERPARATHYROIDISM (HCC): ICD-10-CM

## 2017-12-12 DIAGNOSIS — E78.5 DYSLIPIDEMIA: ICD-10-CM

## 2017-12-12 DIAGNOSIS — I10 ESSENTIAL HYPERTENSION: Primary | ICD-10-CM

## 2017-12-12 DIAGNOSIS — M15.9 PRIMARY OSTEOARTHRITIS INVOLVING MULTIPLE JOINTS: ICD-10-CM

## 2017-12-12 DIAGNOSIS — D35.1 PARATHYROID ADENOMA: ICD-10-CM

## 2017-12-12 DIAGNOSIS — F41.9 ANXIETY: ICD-10-CM

## 2017-12-12 LAB
ARTICHOKE IGE QN: 97 MG/DL (ref 0–130)
CHOLEST SERPL-MCNC: 156 MG/DL (ref 0–200)
DEPRECATED RDW RBC AUTO: 46.4 FL (ref 37–54)
ERYTHROCYTE [DISTWIDTH] IN BLOOD BY AUTOMATED COUNT: 14.1 % (ref 11.3–14.5)
HCT VFR BLD AUTO: 40.6 % (ref 34.5–44)
HDLC SERPL-MCNC: 44 MG/DL (ref 40–60)
HGB BLD-MCNC: 12.7 G/DL (ref 11.5–15.5)
MCH RBC QN AUTO: 28.1 PG (ref 27–31)
MCHC RBC AUTO-ENTMCNC: 31.3 G/DL (ref 32–36)
MCV RBC AUTO: 89.8 FL (ref 80–99)
PLATELET # BLD AUTO: 341 10*3/MM3 (ref 150–450)
PMV BLD AUTO: 11.2 FL (ref 6–12)
RBC # BLD AUTO: 4.52 10*6/MM3 (ref 3.89–5.14)
TRIGL SERPL-MCNC: 123 MG/DL (ref 0–150)
WBC NRBC COR # BLD: 6.1 10*3/MM3 (ref 3.5–10.8)

## 2017-12-12 PROCEDURE — 99214 OFFICE O/P EST MOD 30 MIN: CPT | Performed by: INTERNAL MEDICINE

## 2017-12-12 PROCEDURE — 80061 LIPID PANEL: CPT | Performed by: INTERNAL MEDICINE

## 2017-12-12 PROCEDURE — 85027 COMPLETE CBC AUTOMATED: CPT | Performed by: INTERNAL MEDICINE

## 2017-12-12 PROCEDURE — 36415 COLL VENOUS BLD VENIPUNCTURE: CPT | Performed by: INTERNAL MEDICINE

## 2017-12-12 RX ORDER — LORAZEPAM 0.5 MG/1
0.5 TABLET ORAL EVERY 8 HOURS PRN
Qty: 30 TABLET | Refills: 2 | Status: SHIPPED | OUTPATIENT
Start: 2017-12-12 | End: 2019-06-20 | Stop reason: SDUPTHER

## 2017-12-12 NOTE — PROGRESS NOTES
Subjective   Leslee Wu is a 77 y.o. female.     History of Present Illness   For follow up of  HTN on meds, no headache or sob.  CAD no chest pain.  Hyperlipidemia on meds, no muscle aches.  Hypothyroid stable on meds and hyperparathyroidism stable followed by endo.  Labs were good.  Anxiety on meds doing well.  Having trouble with left knee.  Will need replacement of prosthesis.    The following portions of the patient's history were reviewed and updated as appropriate: allergies, current medications, past family history, past medical history, past social history, past surgical history and problem list.    Review of Systems   Constitutional: Negative.  Negative for appetite change, fatigue and fever.   HENT: Negative.  Negative for congestion, ear pain, hearing loss, rhinorrhea, sinus pressure, sore throat, tinnitus and trouble swallowing.    Eyes: Negative.  Negative for redness and visual disturbance.   Respiratory: Negative.  Negative for cough, chest tightness, shortness of breath and wheezing.    Cardiovascular: Negative.  Negative for chest pain, palpitations and leg swelling.   Gastrointestinal: Negative.  Negative for abdominal distention, abdominal pain, blood in stool, constipation, diarrhea, nausea and vomiting.   Endocrine: Negative.  Negative for polydipsia, polyphagia and polyuria.   Genitourinary: Negative.  Negative for difficulty urinating, dysuria, flank pain, frequency, menstrual problem, pelvic pain, vaginal bleeding and vaginal discharge.   Musculoskeletal: Negative.  Negative for arthralgias, back pain, gait problem, joint swelling and neck pain.        Knee pain as noted.   Skin: Negative for rash.   Allergic/Immunologic: Negative.  Negative for environmental allergies.   Neurological: Negative.  Negative for dizziness, tremors, seizures, weakness, numbness and headaches.   Hematological: Negative.  Negative for adenopathy.   Psychiatric/Behavioral: Negative for agitation, confusion,  dysphoric mood and sleep disturbance. The patient is nervous/anxious.        Objective   Physical Exam   Constitutional: She is oriented to person, place, and time. She appears well-developed and well-nourished.   HENT:   Head: Normocephalic and atraumatic.   Right Ear: External ear normal.   Left Ear: External ear normal.   Nose: Nose normal.   Mouth/Throat: Oropharynx is clear and moist.   Eyes: Conjunctivae and EOM are normal. Pupils are equal, round, and reactive to light.   Fundoscopic exam:       The right eye shows no AV nicking and no hemorrhage.        The left eye shows no AV nicking and no hemorrhage.   Neck: Normal range of motion. Neck supple. No thyromegaly present.   Cardiovascular: Normal rate, regular rhythm, normal heart sounds and intact distal pulses.  Exam reveals no gallop and no friction rub.    No murmur heard.  Carotids normal.   Pulmonary/Chest: Effort normal and breath sounds normal. No respiratory distress. She has no wheezes. She has no rales. She exhibits no tenderness. Right breast exhibits no inverted nipple, no mass, no skin change and no tenderness. Left breast exhibits no inverted nipple, no mass, no skin change and no tenderness. Breasts are symmetrical.   Abdominal: Soft. Bowel sounds are normal. She exhibits no distension and no mass. There is no tenderness.   Musculoskeletal: Normal range of motion. She exhibits no edema.   Needs cane to walk because of knee.   Lymphadenopathy:     She has no cervical adenopathy.   Neurological: She is alert and oriented to person, place, and time. She has normal reflexes. No cranial nerve deficit.   Skin: Skin is warm and dry.   Psychiatric: She has a normal mood and affect. Her behavior is normal. Judgment and thought content normal.   Nursing note and vitals reviewed.      Assessment/Plan   Leslee was seen today for hypertension.    Diagnoses and all orders for this visit:    Essential hypertension    Acquired  hypothyroidism    Hyperparathyroidism    Parathyroid adenoma    Primary osteoarthritis involving multiple joints    Anxiety  -     LORazepam (ATIVAN) 0.5 MG tablet; Take 1 tablet by mouth Every 8 (Eight) Hours As Needed for Anxiety.    Dyslipidemia  -     Lipid Panel    Drug therapy  -     CBC (No Diff)    All problems are stable.  Labs as ordered.  Same meds.  Recheck 6 months.

## 2018-01-22 RX ORDER — PANTOPRAZOLE SODIUM 40 MG/1
TABLET, DELAYED RELEASE ORAL
Qty: 90 TABLET | Refills: 3 | OUTPATIENT
Start: 2018-01-22

## 2018-02-01 ENCOUNTER — TELEPHONE (OUTPATIENT)
Dept: INTERNAL MEDICINE | Facility: CLINIC | Age: 78
End: 2018-02-01

## 2018-02-01 ENCOUNTER — OFFICE VISIT (OUTPATIENT)
Dept: INTERNAL MEDICINE | Facility: CLINIC | Age: 78
End: 2018-02-01

## 2018-02-01 VITALS
DIASTOLIC BLOOD PRESSURE: 64 MMHG | HEART RATE: 80 BPM | BODY MASS INDEX: 29.48 KG/M2 | SYSTOLIC BLOOD PRESSURE: 116 MMHG | RESPIRATION RATE: 17 BRPM | TEMPERATURE: 99 F | WEIGHT: 156 LBS

## 2018-02-01 DIAGNOSIS — A08.4 VIRAL GASTROENTERITIS: Primary | ICD-10-CM

## 2018-02-01 PROCEDURE — 99213 OFFICE O/P EST LOW 20 MIN: CPT | Performed by: INTERNAL MEDICINE

## 2018-02-01 NOTE — PROGRESS NOTES
Subjective   Leslee Wu is a 77 y.o. female.     History of Present Illness   3 day history of diarrhea and cramping.  No recent antibiotics, no fever no nausea, no pain but for cramping.  She though she was better than recurrent last night.]    The following portions of the patient's history were reviewed and updated as appropriate: allergies, current medications, past medical history and problem list.    Review of Systems   Constitutional: Positive for fatigue. Negative for fever.   Respiratory: Negative.  Negative for cough, chest tightness, shortness of breath and wheezing.    Cardiovascular: Negative.  Negative for chest pain, palpitations and leg swelling.   Gastrointestinal: Negative for abdominal distention, abdominal pain and diarrhea.   Genitourinary: Negative.        Objective   Physical Exam   Constitutional: She appears well-developed and well-nourished.   Cardiovascular: Normal rate and regular rhythm.    Abdominal: Soft. She exhibits no distension. There is no tenderness.   Increased bowel sounds.   Nursing note and vitals reviewed.      Assessment/Plan   Leslee was seen today for diarrhea.    Diagnoses and all orders for this visit:    Viral gastroenteritis    Recommended bland diet.  Immodium A/D after each diarrheal stool.  Call if not better in a few days.

## 2018-02-01 NOTE — TELEPHONE ENCOUNTER
----- Message from Huong Gan sent at 2/1/2018 11:40 AM EST -----  [phone]  PT HAS HAD DIARRHEA SINCE Monday AND WOULD LIKE TO BE SEEN TODAY CALL PT TO DISCUSS   [facility]

## 2018-02-26 ENCOUNTER — TELEPHONE (OUTPATIENT)
Dept: INTERNAL MEDICINE | Facility: CLINIC | Age: 78
End: 2018-02-26

## 2018-02-26 NOTE — TELEPHONE ENCOUNTER
----- Message from Annita Landry V sent at 2/26/2018 11:38 AM EST -----  PT HAS A REALLY BAD COLD THAT ISN'T GOING AWAY. ASKING IF A ZPACK CAN BE CALL IN FOR HER.    SHE CAN BE REACHED [phone]    [address] [phone] [facility] [phone] FX

## 2018-03-24 DIAGNOSIS — M15.9 PRIMARY OSTEOARTHRITIS INVOLVING MULTIPLE JOINTS: ICD-10-CM

## 2018-03-26 RX ORDER — DICLOFENAC SODIUM 75 MG/1
TABLET, DELAYED RELEASE ORAL
Qty: 180 TABLET | Refills: 3 | Status: SHIPPED | OUTPATIENT
Start: 2018-03-26 | End: 2018-07-30 | Stop reason: ALTCHOICE

## 2018-03-26 RX ORDER — MISOPROSTOL 200 UG/1
TABLET ORAL
Qty: 90 TABLET | Refills: 3 | Status: SHIPPED | OUTPATIENT
Start: 2018-03-26 | End: 2018-07-30 | Stop reason: ALTCHOICE

## 2018-05-01 ENCOUNTER — HOSPITAL ENCOUNTER (OUTPATIENT)
Dept: GENERAL RADIOLOGY | Facility: HOSPITAL | Age: 78
Discharge: HOME | End: 2018-05-01
Attending: INTERNAL MEDICINE | Admitting: INTERNAL MEDICINE

## 2018-05-01 ENCOUNTER — OFFICE VISIT (OUTPATIENT)
Dept: INTERNAL MEDICINE | Facility: CLINIC | Age: 78
End: 2018-05-01

## 2018-05-01 VITALS
RESPIRATION RATE: 19 BRPM | HEART RATE: 84 BPM | WEIGHT: 144 LBS | DIASTOLIC BLOOD PRESSURE: 68 MMHG | SYSTOLIC BLOOD PRESSURE: 122 MMHG | BODY MASS INDEX: 27.21 KG/M2

## 2018-05-01 DIAGNOSIS — Z79.899 DRUG THERAPY: ICD-10-CM

## 2018-05-01 DIAGNOSIS — I10 ESSENTIAL HYPERTENSION: Primary | ICD-10-CM

## 2018-05-01 DIAGNOSIS — I25.10 CORONARY ARTERY DISEASE INVOLVING NATIVE CORONARY ARTERY OF NATIVE HEART WITHOUT ANGINA PECTORIS: ICD-10-CM

## 2018-05-01 DIAGNOSIS — K21.9 GASTROESOPHAGEAL REFLUX DISEASE WITHOUT ESOPHAGITIS: ICD-10-CM

## 2018-05-01 DIAGNOSIS — R07.89 ATYPICAL CHEST PAIN: ICD-10-CM

## 2018-05-01 DIAGNOSIS — R31.9 URINARY TRACT INFECTION WITH HEMATURIA, SITE UNSPECIFIED: ICD-10-CM

## 2018-05-01 DIAGNOSIS — Z86.2 PERSONAL HISTORY OF DISEASES OF THE BLOOD AND BLOOD-FORMING ORGANS AND CERTAIN DISORDERS INVOLVING THE IMMUNE MECHANISM (CODE): ICD-10-CM

## 2018-05-01 DIAGNOSIS — E03.9 ACQUIRED HYPOTHYROIDISM: ICD-10-CM

## 2018-05-01 DIAGNOSIS — Z01.818 PRE-OPERATIVE EXAM: ICD-10-CM

## 2018-05-01 DIAGNOSIS — M15.9 PRIMARY OSTEOARTHRITIS INVOLVING MULTIPLE JOINTS: ICD-10-CM

## 2018-05-01 DIAGNOSIS — N39.0 URINARY TRACT INFECTION WITH HEMATURIA, SITE UNSPECIFIED: ICD-10-CM

## 2018-05-01 DIAGNOSIS — M19.90 ARTHRITIS: ICD-10-CM

## 2018-05-01 DIAGNOSIS — Z86.79 PERSONAL HISTORY OF OTHER DISEASES OF THE CIRCULATORY SYSTEM (CODE): ICD-10-CM

## 2018-05-01 DIAGNOSIS — R73.9 ELEVATED BLOOD SUGAR: ICD-10-CM

## 2018-05-01 DIAGNOSIS — F41.9 ANXIETY: ICD-10-CM

## 2018-05-01 LAB
ANION GAP SERPL CALCULATED.3IONS-SCNC: 4 MMOL/L (ref 3–11)
APTT PPP: 29.1 SECONDS (ref 24–31)
BUN BLD-MCNC: 17 MG/DL (ref 9–23)
BUN/CREAT SERPL: 21.3 (ref 7–25)
CALCIUM SPEC-SCNC: 10.6 MG/DL (ref 8.7–10.4)
CHLORIDE SERPL-SCNC: 103 MMOL/L (ref 99–109)
CLARITY, POC: ABNORMAL
CO2 SERPL-SCNC: 28 MMOL/L (ref 20–31)
COLOR UR: YELLOW
CREAT BLD-MCNC: 0.8 MG/DL (ref 0.6–1.3)
DEPRECATED RDW RBC AUTO: 47.4 FL (ref 37–54)
ERYTHROCYTE [DISTWIDTH] IN BLOOD BY AUTOMATED COUNT: 14.4 % (ref 11.3–14.5)
EXPIRATION DATE: ABNORMAL
GFR SERPL CREATININE-BSD FRML MDRD: 69 ML/MIN/1.73
GLUCOSE BLD-MCNC: 99 MG/DL (ref 70–100)
GLUCOSE UR STRIP-MCNC: NEGATIVE MG/DL
HBA1C MFR BLD: 5.8 % (ref 4.8–5.6)
HCT VFR BLD AUTO: 39.3 % (ref 34.5–44)
HGB BLD-MCNC: 12.3 G/DL (ref 11.5–15.5)
INR PPP: 1.02 (ref 0.91–1.09)
KETONES UR QL: NEGATIVE
LEUKOCYTE EST, POC: ABNORMAL
Lab: ABNORMAL
MCH RBC QN AUTO: 27.7 PG (ref 27–31)
MCHC RBC AUTO-ENTMCNC: 31.3 G/DL (ref 32–36)
MCV RBC AUTO: 88.5 FL (ref 80–99)
NITRITE UR-MCNC: POSITIVE MG/ML
PH UR: 6 [PH] (ref 5–8)
PLATELET # BLD AUTO: 323 10*3/MM3 (ref 150–450)
PMV BLD AUTO: 11.2 FL (ref 6–12)
POTASSIUM BLD-SCNC: 4.6 MMOL/L (ref 3.5–5.5)
PROT UR STRIP-MCNC: NEGATIVE MG/DL
PROT/CREAT UR: 50 MG/G CREA
PROTHROMBIN TIME: 10.7 SECONDS (ref 9.6–11.5)
RBC # BLD AUTO: 4.44 10*6/MM3 (ref 3.89–5.14)
RBC # UR STRIP: ABNORMAL /UL
SODIUM BLD-SCNC: 135 MMOL/L (ref 132–146)
SP GR UR: 1 (ref 1–1.03)
WBC NRBC COR # BLD: 6.16 10*3/MM3 (ref 3.5–10.8)

## 2018-05-01 PROCEDURE — 81002 URINALYSIS NONAUTO W/O SCOPE: CPT | Performed by: INTERNAL MEDICINE

## 2018-05-01 PROCEDURE — 93000 ELECTROCARDIOGRAM COMPLETE: CPT | Performed by: INTERNAL MEDICINE

## 2018-05-01 PROCEDURE — 85027 COMPLETE CBC AUTOMATED: CPT | Performed by: INTERNAL MEDICINE

## 2018-05-01 PROCEDURE — 85730 THROMBOPLASTIN TIME PARTIAL: CPT | Performed by: INTERNAL MEDICINE

## 2018-05-01 PROCEDURE — 80048 BASIC METABOLIC PNL TOTAL CA: CPT | Performed by: INTERNAL MEDICINE

## 2018-05-01 PROCEDURE — 83036 HEMOGLOBIN GLYCOSYLATED A1C: CPT | Performed by: INTERNAL MEDICINE

## 2018-05-01 PROCEDURE — 36415 COLL VENOUS BLD VENIPUNCTURE: CPT | Performed by: INTERNAL MEDICINE

## 2018-05-01 PROCEDURE — 99214 OFFICE O/P EST MOD 30 MIN: CPT | Performed by: INTERNAL MEDICINE

## 2018-05-01 PROCEDURE — 85610 PROTHROMBIN TIME: CPT | Performed by: INTERNAL MEDICINE

## 2018-05-01 PROCEDURE — 71046 X-RAY EXAM CHEST 2 VIEWS: CPT

## 2018-05-01 PROCEDURE — 87086 URINE CULTURE/COLONY COUNT: CPT | Performed by: INTERNAL MEDICINE

## 2018-05-01 PROCEDURE — 87186 SC STD MICRODIL/AGAR DIL: CPT | Performed by: INTERNAL MEDICINE

## 2018-05-01 PROCEDURE — 87077 CULTURE AEROBIC IDENTIFY: CPT | Performed by: INTERNAL MEDICINE

## 2018-05-01 NOTE — PROGRESS NOTES
Subjective   Leslee Wu is a 77 y.o. female.     History of Present Illness   For preoperative for left knee redo replacement.  And for follow up of  CAD no chest pain of significance, sob or palpitations.  HTN on meds, no headaches.  Hyperlipidemia on meds, no muscle aches.  Hypothyroid on replacement no sx.  GERD is stable on meds.  Anxiety is stable.        The following portions of the patient's history were reviewed and updated as appropriate: allergies, current medications, past medical history and problem list.    Review of Systems   Constitutional: Negative.    HENT: Negative.    Eyes: Negative.    Respiratory: Negative.  Negative for cough, chest tightness and shortness of breath.    Cardiovascular: Negative.  Negative for chest pain (rare fleeting atypical pains.), palpitations and leg swelling.   Gastrointestinal: Negative.  Negative for abdominal distention and abdominal pain.   Genitourinary: Negative.    Musculoskeletal:        Left knee pain as noted.       Objective   Physical Exam   Constitutional: She appears well-developed and well-nourished.   Neck: Normal range of motion. Neck supple.   Cardiovascular: Normal rate, regular rhythm and normal heart sounds.  Exam reveals no gallop and no friction rub.    No murmur heard.  Pulmonary/Chest: Effort normal and breath sounds normal. No respiratory distress. She has no wheezes. She has no rales.   Abdominal: Soft. Bowel sounds are normal. She exhibits no distension. There is no tenderness.   Musculoskeletal: She exhibits no edema.   Nursing note and vitals reviewed.        Assessment/Plan   Leslee was seen today for pre-op exam.    Diagnoses and all orders for this visit:    Essential hypertension  -     Basic Metabolic Panel    Coronary artery disease involving native coronary artery of native heart without angina pectoris    Gastroesophageal reflux disease without esophagitis    Acquired hypothyroidism    Atypical chest pain    Arthritis    Primary  osteoarthritis involving multiple joints    Anxiety    Pre-operative exam  -     Protime-INR  -     aPTT  -     POC Urinalysis Dipstick, Multipro  -     XR Chest PA & Lateral    Drug therapy  -     CBC (No Diff)    Personal history of other diseases of the circulatory system (CODE)   -     Protime-INR    Personal history of diseases of the blood and blood-forming organs and certain disorders involving the immune mechanism (CODE)   -     aPTT    Elevated blood sugar  -     Hemoglobin A1c    Urinary tract infection with hematuria, site unspecified  -     Urine Culture - Urine, Urine, Clean Catch    All problems are stable  Apparently had an abnormal urinalysis and will check the culture.  ECG was normal.  Labs as ordered.  Same meds.  She is cleared for surgery under general anesthesia if urine culture is ok.

## 2018-05-01 NOTE — PROGRESS NOTES
Procedure     ECG 12 Lead  Date/Time: 5/1/2018 2:51 PM  Performed by: DEIRDRE VIDALES  Authorized by: DEIRDRE VIDALES   Comparison: not compared with previous ECG   Rhythm: sinus rhythm  Rate: normal  Conduction: conduction normal  ST Segments: ST segments normal  T Waves: T waves normal  QRS axis: normal  Other: no other findings  Clinical impression: normal ECG

## 2018-05-02 ENCOUNTER — TELEPHONE (OUTPATIENT)
Dept: CARDIOLOGY | Facility: CLINIC | Age: 78
End: 2018-05-02

## 2018-05-02 ENCOUNTER — TELEPHONE (OUTPATIENT)
Dept: INTERNAL MEDICINE | Facility: CLINIC | Age: 78
End: 2018-05-02

## 2018-05-02 NOTE — TELEPHONE ENCOUNTER
PATIENT IS SCHEDULED TO HAVE KNEE REPLACEMENT SURGERY ON MAY 29 2018 AND SHE HAS A SCHEDULED APPOINTMENT ON MAY 30 2018 TO SEE DR MEHTA AND WOULD LIKE TO KNOW IF SHE COULD COME IN BEFORE MAY 29 2018 TO SEE DR MEHTA INSTEAD? THE PATIENT WOULD LIKE TO GET A CALL BACK IN REGARDS TO THIS MATTER [phone]

## 2018-05-02 NOTE — TELEPHONE ENCOUNTER
05/02/18    Leslee Wu  1940    **Needs cardiac clearance**    Doctor:  Jasiel Wells MD  Procedure/surgery:  Knee replacement    Date of procedure/surgery:  5/29/18    Cardiovascular symptoms: Pt reports sharp, comes and goes quickly chest pain in left side of chest - atypical    Hold:   ASA -  DO NOT HOLD    EF: 70% - 2013 by MPS  Last Echo/stress:  2013  Last stents:   N/a - abnormal calcium score with normal MPS    Device if present: n/a     Provider phone: [phone]  Provider fax:  [phone]     Pt requesting appt with [facility] prior to clearance. Pt aware she will be placed on the cancellation list. [place]

## 2018-05-03 LAB
BACTERIA SPEC AEROBE CULT: ABNORMAL
BACTERIA SPEC AEROBE CULT: ABNORMAL

## 2018-05-04 DIAGNOSIS — N30.90 CYSTITIS: Primary | ICD-10-CM

## 2018-05-04 RX ORDER — SULFAMETHOXAZOLE AND TRIMETHOPRIM 800; 160 MG/1; MG/1
1 TABLET ORAL 2 TIMES DAILY
Qty: 10 TABLET | Refills: 0 | Status: SHIPPED | OUTPATIENT
Start: 2018-05-04 | End: 2018-05-09 | Stop reason: SDUPTHER

## 2018-05-09 DIAGNOSIS — N30.90 CYSTITIS: ICD-10-CM

## 2018-05-09 RX ORDER — SULFAMETHOXAZOLE AND TRIMETHOPRIM 800; 160 MG/1; MG/1
TABLET ORAL
Qty: 10 TABLET | Refills: 0 | Status: SHIPPED | OUTPATIENT
Start: 2018-05-09 | End: 2018-07-30

## 2018-05-14 ENCOUNTER — LAB (OUTPATIENT)
Dept: INTERNAL MEDICINE | Facility: CLINIC | Age: 78
End: 2018-05-14

## 2018-05-14 DIAGNOSIS — N30.90 CYSTITIS: ICD-10-CM

## 2018-05-14 LAB
BACTERIA UR QL AUTO: NORMAL /HPF
HYALINE CASTS UR QL AUTO: NORMAL /LPF
RBC # UR: NORMAL /HPF
REF LAB TEST METHOD: NORMAL
SQUAMOUS #/AREA URNS HPF: NORMAL /HPF
WBC UR QL AUTO: NORMAL /HPF

## 2018-05-14 PROCEDURE — 87086 URINE CULTURE/COLONY COUNT: CPT | Performed by: INTERNAL MEDICINE

## 2018-05-14 PROCEDURE — 81015 MICROSCOPIC EXAM OF URINE: CPT | Performed by: INTERNAL MEDICINE

## 2018-05-15 ENCOUNTER — TELEPHONE (OUTPATIENT)
Dept: INTERNAL MEDICINE | Facility: CLINIC | Age: 78
End: 2018-05-15

## 2018-05-15 NOTE — TELEPHONE ENCOUNTER
Called and obtain the fax number for dr. valentin office. I faxed over  results for pt.  Fax # [phone].

## 2018-05-16 LAB — BACTERIA SPEC AEROBE CULT: NORMAL

## 2018-06-02 RX ORDER — LEVOTHYROXINE SODIUM 75 MCG
TABLET ORAL
Qty: 90 TABLET | Refills: 3 | Status: SHIPPED | OUTPATIENT
Start: 2018-06-02 | End: 2018-09-20

## 2018-07-30 ENCOUNTER — OFFICE VISIT (OUTPATIENT)
Dept: INTERNAL MEDICINE | Facility: CLINIC | Age: 78
End: 2018-07-30

## 2018-07-30 VITALS
DIASTOLIC BLOOD PRESSURE: 74 MMHG | WEIGHT: 137 LBS | HEART RATE: 70 BPM | TEMPERATURE: 98 F | RESPIRATION RATE: 22 BRPM | SYSTOLIC BLOOD PRESSURE: 114 MMHG | BODY MASS INDEX: 25.89 KG/M2

## 2018-07-30 DIAGNOSIS — K57.30 DIVERTICULOSIS OF LARGE INTESTINE WITHOUT HEMORRHAGE: ICD-10-CM

## 2018-07-30 DIAGNOSIS — K57.33 DIVERTICULITIS OF LARGE INTESTINE WITH BLEEDING, UNSPECIFIED COMPLICATION STATUS: ICD-10-CM

## 2018-07-30 DIAGNOSIS — M15.9 PRIMARY OSTEOARTHRITIS INVOLVING MULTIPLE JOINTS: Primary | ICD-10-CM

## 2018-07-30 PROCEDURE — 99214 OFFICE O/P EST MOD 30 MIN: CPT | Performed by: INTERNAL MEDICINE

## 2018-07-30 RX ORDER — CIPROFLOXACIN 500 MG/1
500 TABLET, FILM COATED ORAL EVERY 12 HOURS SCHEDULED
Qty: 14 TABLET | Refills: 0 | Status: SHIPPED | OUTPATIENT
Start: 2018-07-30 | End: 2018-09-20

## 2018-07-30 RX ORDER — MELOXICAM 7.5 MG/1
7.5 TABLET ORAL DAILY
Qty: 30 TABLET | Refills: 5 | Status: SHIPPED | OUTPATIENT
Start: 2018-07-30 | End: 2019-01-26 | Stop reason: SDUPTHER

## 2018-07-30 NOTE — PROGRESS NOTES
Subjective   Leslee Wu is a 77 y.o. female.     History of Present Illness   She was coming home from [place] yesterday and became bloated and sick to her stomach.  She became nauseated and vomited.  When she got home she began having diarrhea and had some bright red blood.  This diarrhea continued for one day and the bleeding continued as well.  Now the diarrhea has lessened but when she has a bowel movement is still with some blood.  No dizziness or vomiting.  Still has decreased appetite.    The following portions of the patient's history were reviewed and updated as appropriate: allergies, current medications, past medical history and problem list.    Review of Systems   Constitutional: Negative for fatigue and fever.   Respiratory: Negative.  Negative for cough, chest tightness, shortness of breath and wheezing.    Cardiovascular: Negative.  Negative for chest pain, palpitations and leg swelling.   Gastrointestinal: Positive for abdominal distention, abdominal pain (mild yesterday, not today.), blood in stool and nausea.   Neurological: Negative for dizziness and light-headedness.       Objective   Physical Exam   Constitutional: She appears well-developed and well-nourished.   No orthostasis.   Cardiovascular: Normal rate, regular rhythm and normal heart sounds.    Pulmonary/Chest: Effort normal and breath sounds normal.   Abdominal: Soft. Bowel sounds are normal. She exhibits no distension and no mass. There is no tenderness. There is no guarding.   Nursing note and vitals reviewed.        Assessment/Plan   Leslee was seen today for rectal bleeding.    Diagnoses and all orders for this visit:    Primary osteoarthritis involving multiple joints  -     meloxicam (MOBIC) 7.5 MG tablet; Take 1 tablet by mouth Daily.    Diverticulosis of large intestine without hemorrhage    Diverticulitis of large intestine with bleeding, unspecified complication status    Other orders  -     ciprofloxacin (CIPRO) 500 MG  tablet; Take 1 tablet by mouth Every 12 (Twelve) Hours.    Most likely this is acute diverticulitis with some bleeding.  Will treat with cipro.  Told her to call if not better in 48 hours. If bleeding becomes worse, she will go to the ER.

## 2018-07-31 ENCOUNTER — TELEPHONE (OUTPATIENT)
Dept: INTERNAL MEDICINE | Facility: CLINIC | Age: 78
End: 2018-07-31

## 2018-07-31 RX ORDER — PROMETHAZINE HYDROCHLORIDE 25 MG/1
25 TABLET ORAL 4 TIMES DAILY PRN
Qty: 30 TABLET | Refills: 1 | Status: SHIPPED | OUTPATIENT
Start: 2018-07-31 | End: 2018-10-18

## 2018-07-31 NOTE — TELEPHONE ENCOUNTER
----- Message from Kay Roche sent at 7/30/2018  4:15 PM EDT -----  Contact: SELF  MANUEL FREDRICK CALLING TO SEE IF SHE CAN GET PHENERGAN CALLED IN TO THE [facility]. MANUEL CAN BE REACHED -[phone]

## 2018-09-20 ENCOUNTER — OFFICE VISIT (OUTPATIENT)
Dept: ENDOCRINOLOGY | Facility: CLINIC | Age: 78
End: 2018-09-20

## 2018-09-20 VITALS
WEIGHT: 139 LBS | BODY MASS INDEX: 27.29 KG/M2 | DIASTOLIC BLOOD PRESSURE: 60 MMHG | HEART RATE: 81 BPM | SYSTOLIC BLOOD PRESSURE: 100 MMHG | OXYGEN SATURATION: 98 % | HEIGHT: 60 IN

## 2018-09-20 DIAGNOSIS — D35.1 PARATHYROID ADENOMA: ICD-10-CM

## 2018-09-20 DIAGNOSIS — E83.52 HYPERCALCEMIA: ICD-10-CM

## 2018-09-20 DIAGNOSIS — E21.3 HYPERPARATHYROIDISM (HCC): Primary | ICD-10-CM

## 2018-09-20 DIAGNOSIS — E03.9 ACQUIRED HYPOTHYROIDISM: ICD-10-CM

## 2018-09-20 LAB
25(OH)D3 SERPL-MCNC: 64.2 NG/ML
ALBUMIN SERPL-MCNC: 4.1 G/DL (ref 3.2–4.8)
ANION GAP SERPL CALCULATED.3IONS-SCNC: 7 MMOL/L (ref 3–11)
BUN BLD-MCNC: 15 MG/DL (ref 9–23)
BUN/CREAT SERPL: 17.2 (ref 7–25)
CALCIUM SPEC-SCNC: 10.7 MG/DL (ref 8.7–10.4)
CHLORIDE SERPL-SCNC: 103 MMOL/L (ref 99–109)
CO2 SERPL-SCNC: 25 MMOL/L (ref 20–31)
CREAT BLD-MCNC: 0.87 MG/DL (ref 0.6–1.3)
GFR SERPL CREATININE-BSD FRML MDRD: 63 ML/MIN/1.73
GLUCOSE BLD-MCNC: 98 MG/DL (ref 70–100)
PHOSPHATE SERPL-MCNC: 2.9 MG/DL (ref 2.4–5.1)
POTASSIUM BLD-SCNC: 4.9 MMOL/L (ref 3.5–5.5)
PTH-INTACT SERPL-MCNC: 109.8 PG/ML (ref 11–80)
SODIUM BLD-SCNC: 135 MMOL/L (ref 132–146)
TSH SERPL DL<=0.05 MIU/L-ACNC: 0.65 MIU/ML (ref 0.35–5.35)

## 2018-09-20 PROCEDURE — 83970 ASSAY OF PARATHORMONE: CPT | Performed by: INTERNAL MEDICINE

## 2018-09-20 PROCEDURE — 80069 RENAL FUNCTION PANEL: CPT | Performed by: INTERNAL MEDICINE

## 2018-09-20 PROCEDURE — 99213 OFFICE O/P EST LOW 20 MIN: CPT | Performed by: INTERNAL MEDICINE

## 2018-09-20 PROCEDURE — 84443 ASSAY THYROID STIM HORMONE: CPT | Performed by: INTERNAL MEDICINE

## 2018-09-20 PROCEDURE — 82306 VITAMIN D 25 HYDROXY: CPT | Performed by: INTERNAL MEDICINE

## 2018-09-20 RX ORDER — LEVOTHYROXINE SODIUM 0.07 MG/1
75 TABLET ORAL DAILY
Qty: 90 TABLET | Refills: 3 | Status: SHIPPED | OUTPATIENT
Start: 2018-09-20 | End: 2019-12-06 | Stop reason: SDUPTHER

## 2018-09-20 NOTE — PROGRESS NOTES
Hypothyroidism (Follow UP)    Subjective   Leslee Wu is a 77 y.o. female is here today for follow-up and ultrasound.  Hypercalcemia and primary hyperparathyroidism. She has seen parathyroid surgeon and endocrinologist in 2000 and deferred surgery at that time.    BMD: T-score -1 at the hip and - 1.4 at the femoral neck, 1.5 at the spine (she is s/p spinal fusion). statistically significant 11.5% decrease in the bone mineral density of the left hip compared to the prior 2011 exam and a  23.6% decrease compared to the baseline 1998 exam. Imaging reviewed and discussed with the patient   The ten year fracture risk assessment is calculated at 12% for a major  osteoporotic fracture and 2.4% for a hip fracture.      Calcium and PTH remained stable over the last several years. stable calcium and PTH over the last year 62-->71 in 1 year, calcium 10.8 --> 11.2 over the last 2 years. Parathyroid adenoma is slowly growing according to the u/s.  Parathyroid adenoma is seen by u/s - 1 cm right inferior parathyroid adenoma. Patient is asymptomatic.     History of thyroid nodules since 1995 and 2000, s/p 2 benign biopsies. She was seeing Dr Argueta who ordered yearly u/s.FNA in 1996 - hypocellular specimen      Hypothyroidism dx in 1995, on synthroid 75 mcg.       Patient denies having any new complaints. She is exercising regularly on the stationary bycycle. Eats healthy, takes Vit D 2000 every day. Left knee surgery went well. Calcium inpatient was 10.6.         History of Present Illness  Medications:    Current Outpatient Prescriptions:   •  acyclovir (ZOVIRAX) 200 MG capsule, Take 200 mg by mouth As Needed., Disp: , Rfl:   •  amLODIPine (NORVASC) 10 MG tablet, Take 1 tablet by mouth Daily., Disp: 90 tablet, Rfl: 3  •  aspirin 81 MG EC tablet, Take 81 mg by mouth Daily., Disp: , Rfl:   •  benazepril (LOTENSIN) 20 MG tablet, Take 1 tablet by mouth Daily., Disp: 90 tablet, Rfl: 3  •  Cholecalciferol (VITAMIN D3) 5000 UNITS  "capsule capsule, Take 2,000 Units by mouth Daily., Disp: , Rfl:   •  LORazepam (ATIVAN) 0.5 MG tablet, Take 1 tablet by mouth Every 8 (Eight) Hours As Needed for Anxiety., Disp: 30 tablet, Rfl: 2  •  meloxicam (MOBIC) 7.5 MG tablet, Take 1 tablet by mouth Daily., Disp: 30 tablet, Rfl: 5  •  Misc Natural Products (OSTEO BI-FLEX JOINT SHIELD PO), Take  by mouth 2 (Two) Times a Day., Disp: , Rfl:   •  pantoprazole (PROTONIX) 40 MG EC tablet, Take 1 tablet by mouth daily., Disp: , Rfl:   •  promethazine (PHENERGAN) 25 MG tablet, Take 1 tablet by mouth 4 (Four) Times a Day As Needed for Nausea or Vomiting., Disp: 30 tablet, Rfl: 1  •  SYNTHROID 75 MCG tablet, TAKE ONE TABLET BY MOUTH ONCE DAILY, Disp: 90 tablet, Rfl: 3    The following portions of the patient's history were reviewed and updated as appropriate: allergies, current medications, past family history, past medical history, past social history, past surgical history and problem list.    Review of Systems   Musculoskeletal: Positive for arthralgias (knee pain ) and back pain.   All other systems reviewed and are negative.      Objective   Blood pressure 100/60, pulse 81, height 152.4 cm (60\"), weight 63 kg (139 lb), SpO2 98 %, not currently breastfeeding.   Physical Exam   Constitutional: She is oriented to person, place, and time. She appears well-developed and well-nourished.   HENT:   Head: Normocephalic and atraumatic.   Eyes: Conjunctivae are normal.   Neck: Normal range of motion. No muscular tenderness present. Carotid bruit is not present. Thyromegaly (left 1 cm hard easy movable nodule. ) present. No thyroid mass present.   The neck is supple and no assimetry visualized   Cardiovascular: Normal rate, regular rhythm and normal heart sounds.    Pulmonary/Chest: Effort normal and breath sounds normal.   Lymphadenopathy:     She has no cervical adenopathy.   Neurological: She is alert and oriented to person, place, and time.   Psychiatric: She has a normal " mood and affect. Thought content normal.   Vitals reviewed.        Results for orders placed or performed in visit on 05/14/18   Urine Culture - Urine, Urine, Clean Catch   Result Value Ref Range    Urine Culture 40,000-50,000 CFU/mL Normal Urogenital Janki    Urinalysis, Microscopic Only - Urine, Clean Catch   Result Value Ref Range    RBC, UA 0-2 None Seen, 0-2 /HPF    WBC, UA 0-2 None Seen, 0-2 /HPF    Bacteria, UA None Seen None Seen, Trace /HPF    Squamous Epithelial Cells, UA 0-2 None Seen, 0-2 /HPF    Hyaline Casts, UA 0-6 0 - 6 /LPF    Methodology Automated Microscopy              Thyroid ultrasound 4/2017              Real time high resolution imaging of the thyroid gland was performed in transverse and longitudinal planes.      The right lobe measured 4.09 cm L x 1.67 cm AP x 1.47 cm in TV dimension.    The isthmus measured 0.25 cm in thickness.    The left thyroid lobe measured 4.16 cm L x 1.01 cm AP x 1.45 cm in TV dimension.    Thyroid gland is homogeneous and contains single nodule in the left lobe and small nodule  On the right .    Nodule 1   is located in the left mid lobe and measures 1.67 x 1.2 x 1.3 cm (L X AP X TV).  This nodule is cystic, homogeneous, hypoechoic with well-defined borders and egg-shell calcifications, and Grade I vascularity on Color Flow Doppler.  It has artifacts:  posterior acoustic enhancement    Nodule 2  is located in the right upper lobe and measures 0.33 x 0.23 x 0.31 cm (L X AP X TV).  This nodule is solid, homogeneous, hypoechoic with well-defined margins, and Grade II vascularity on Color Flow Doppler.  It has no artifacts    Posterior to the right thyroid lobe hypoechoic extrathyroidal structure is located inferiorly, representing parathyroid gland and measures 1.07 x 0.44 x and 0.61 L x AP x TV cm.  Doppler flow representing polar artery is seen.       No pathologic lymph nodes were seen.      Assessment: Stable left calcified nodule. Slightly enlarged right  inferior parathyroid adenoma.   Repeat ultrasound in 18 months.       Assessment/Plan:    Problem List Items Addressed This Visit        Endocrine    Hyperparathyroidism (CMS/[facility]) - Primary    Acquired hypothyroidism    Overview     Description: A.  On chronic replacement therapy.            Other    Hypercalcemia      Primary hyperparathyroidism - stable calcium and PTH over the last year 62-->71 in 1 year, calcium 10.8 --> 11.2 -->10.6 over the last 2 years. Parathyroid adenoma is slowly growing according to the u/s, 1 cm in size.      Patient is asymptomatic, no dx of osteoporosis (osteopenia in BMD 4/2016), kidney stones or renal impairment.   Ultrasound showed 1 cm right inferior parathyroid adenoma.       Continue with increased fluid intake, Vit D supplements 2000 daily . Conservative management is appropriate. If calcium increasing, will refer to surgery. Patient is agreeable to the plan.       cont same Synthroid 75 µg dose, change to levothyroxine 75 and reassess the symptoms on generic.     Labs today.     Repeat u/s in 5 months      F/u in 5-6 months with labs

## 2018-09-21 ENCOUNTER — TRANSCRIBE ORDERS (OUTPATIENT)
Dept: ADMINISTRATIVE | Facility: HOSPITAL | Age: 78
End: 2018-09-21

## 2018-09-21 DIAGNOSIS — Z12.31 VISIT FOR SCREENING MAMMOGRAM: Primary | ICD-10-CM

## 2018-09-25 DIAGNOSIS — E04.1 NONTOXIC SINGLE THYROID NODULE: ICD-10-CM

## 2018-09-25 DIAGNOSIS — D35.1 PARATHYROID ADENOMA: ICD-10-CM

## 2018-09-25 DIAGNOSIS — E83.52 HYPERCALCEMIA: ICD-10-CM

## 2018-09-25 DIAGNOSIS — E21.3 HYPERPARATHYROIDISM (HCC): Primary | ICD-10-CM

## 2018-09-25 DIAGNOSIS — E55.9 VITAMIN D DEFICIENCY: ICD-10-CM

## 2018-09-25 DIAGNOSIS — E03.9 ACQUIRED HYPOTHYROIDISM: ICD-10-CM

## 2018-10-18 ENCOUNTER — OFFICE VISIT (OUTPATIENT)
Dept: CARDIOLOGY | Facility: CLINIC | Age: 78
End: 2018-10-18

## 2018-10-18 VITALS
HEIGHT: 61 IN | SYSTOLIC BLOOD PRESSURE: 114 MMHG | WEIGHT: 141 LBS | HEART RATE: 96 BPM | BODY MASS INDEX: 26.62 KG/M2 | DIASTOLIC BLOOD PRESSURE: 70 MMHG

## 2018-10-18 DIAGNOSIS — I10 ESSENTIAL HYPERTENSION: Primary | ICD-10-CM

## 2018-10-18 PROCEDURE — 99212 OFFICE O/P EST SF 10 MIN: CPT | Performed by: INTERNAL MEDICINE

## 2018-10-18 NOTE — PROGRESS NOTES
"  OFFICE FOLLOW UP     Date of Encounter:10/18/2018     Name: Leslee Wu  : 1940  Address: [facility] [address] [facility] [address]  Home Phone:[phone]    PCP: Eric Angulo MD  [address]  [facility] [address]    Leslee Wu is a 77 y.o. female.      Chief Complaint: Follow up of HTN    Problem List:   1.   History of atypical chest pain:                        A. Echocardiogram, : LVEF 90%                        B. Abnormal calcium score, 2013.                        C. MPS, 2013: No inducible ischemia, LVEF 70%  2.   Hypertension  3.   Hypothyroidism/chronmic replacement  4.   GERD  5.   History of brief tobacco use, in patient's 20s:                        A. Noncalcified lung nodule found on chest CT, , at the left lung base and the mid left lung field.                        B. Evaluation by Dr. Ivan Mota, 2013 with serial CT scans.                        C. Chest CT, 2013: Noncalcified nodules smaller than prior studies.                        D. Essentially \"normal\" CT of the chest, 2014.  6.   Osteoarthrosis  7.   Iron deficiency anemia.  8.   Spondylolisthesis/degenerative disc disease.  9.   Mild chronic anxiety.  10. History of diverticulosis  11. History of gout.  12. Surgical History:                        A. Back surgery/                        B. Right TKR.                        C. T&A.                        D. Right hip replacement.                        E. Left TKR.    Allergies   Allergen Reactions   • Codeine    • Lipitor [Atorvastatin] Myalgia   • Lortab  [Hydrocodone-Acetaminophen]        Current Medications:  •  acyclovir 200 mg by mouth As Needed  •  amLODIPine 10 MG by mouth Daily.  •  aspirin 81 MG EC by mouth Daily  •  benazepril 20 MG by mouth Daily.  •  Cholecalciferol 2,000 Units by mouth Daily.  •  levothyroxine 75 MCG by mouth Daily.  •  LORazepam 0.5 mg by mouth As Needed for Anxiety.  •  meloxicam " "7.5 MG by mouth Daily.  •  OSTEO BI-FLEX JOINT SHIELD by mouth Daily  •  pantoprazole 40 MG EC by mouth daily.    History of Present Illness:   Leslee remains active and asymptomatic.              The following portions of the patient's history were reviewed and updated as appropriate: allergies, current medications and problem list.    ROS: Pertinent positives as listed in the HPI.  All other systems reviewed and negative.    Objective:    Vitals:    10/18/18 1125 10/18/18 1127   BP: 125/78 114/70   BP Location: Right arm Right arm   Patient Position: Sitting Standing   Pulse: 93 96   Weight: 64 kg (141 lb)    Height: 154.9 cm (61\")        Physical Exam:  GENERAL: Alert, cooperative, in no acute distress.   HEENT: Normocephalic, no adenopathy, no jugular venous distention  HEART: No discrete PMI is noted. Regular rhythm, normal rate, and no murmurs, gallops, or rubs.   LUNGS: Clear to auscultation bilaterally. No wheezing, rales or ronchi.  ABDOMEN: Soft, bowel sounds present, non-tender   NEUROLOGIC: No focal abnormalities involving strength or sensation are noted.   EXTREMITIES: No clubbing, cyanosis, or edema noted. Peripheral pulses are present.    Diagnostic Data:    Lab Results   Component Value Date    TSH 0.651 09/20/2018     Lab Results   Component Value Date    GLUCOSE 98 09/20/2018    BUN 15 09/20/2018    CREATININE 0.87 09/20/2018    EGFRIFNONA 63 09/20/2018    BCR 17.2 09/20/2018    K 4.9 09/20/2018    CO2 25.0 09/20/2018    CALCIUM 10.7 (H) 09/20/2018    ALBUMIN 4.10 09/20/2018    AST 19 10/26/2017    ALT 16 10/26/2017     Lab Results   Component Value Date    HGBA1C 5.80 (H) 05/01/2018     Lab Results   Component Value Date    CHOL 156 12/12/2017    TRIG 123 12/12/2017    HDL 44 12/12/2017    LDL 97 12/12/2017       Procedures      Assessment and Plan:   1.  HTN: At 2017 AHA target.  2.  Cholesterol is \"OK\" considering risk.    She requests followup with me on an annual basis.    I, Zeb Solis, " MD, FAC, [facility], personally performed the services described in this documentation as scribed by the above named individual in my presence, and it is both accurate and complete. At 12:53 PM on 10/18/2018       I will see Leslee Wu back in one year or sooner on an as needed basis.        Scribed for Zeb Solis MD by Maribell Hernández RN. 10/18/2018 11:37 AM.        EMR Dragon/Transcription Disclaimer:  Much of this encounter note is an electronic transcription/translation of spoken language to printed text.  The electronic translation of spoken language may permit erroneous, or at times, nonsensical words or phrases to be inadvertently transcribed.  Although I have reviewed the note for such errors, some may still exist.

## 2018-10-19 RX ORDER — ACYCLOVIR 200 MG/1
CAPSULE ORAL
Qty: 20 CAPSULE | Refills: 1 | Status: SHIPPED | OUTPATIENT
Start: 2018-10-19 | End: 2019-06-20

## 2018-11-02 ENCOUNTER — HOSPITAL ENCOUNTER (OUTPATIENT)
Dept: MAMMOGRAPHY | Facility: HOSPITAL | Age: 78
Discharge: HOME | End: 2018-11-02
Attending: INTERNAL MEDICINE | Admitting: INTERNAL MEDICINE

## 2018-11-02 DIAGNOSIS — Z12.31 VISIT FOR SCREENING MAMMOGRAM: ICD-10-CM

## 2018-11-02 PROCEDURE — 77067 SCR MAMMO BI INCL CAD: CPT | Performed by: RADIOLOGY

## 2018-11-02 PROCEDURE — 77063 BREAST TOMOSYNTHESIS BI: CPT | Performed by: RADIOLOGY

## 2018-11-02 PROCEDURE — 77067 SCR MAMMO BI INCL CAD: CPT

## 2018-11-02 PROCEDURE — 77063 BREAST TOMOSYNTHESIS BI: CPT

## 2018-11-26 RX ORDER — BENAZEPRIL HYDROCHLORIDE 20 MG/1
TABLET ORAL
Qty: 90 TABLET | Refills: 3 | Status: SHIPPED | OUTPATIENT
Start: 2018-11-26 | End: 2019-09-02 | Stop reason: SDUPTHER

## 2018-11-26 RX ORDER — AMLODIPINE BESYLATE 10 MG/1
TABLET ORAL
Qty: 90 TABLET | Refills: 3 | Status: SHIPPED | OUTPATIENT
Start: 2018-11-26 | End: 2019-12-06 | Stop reason: SDUPTHER

## 2018-12-18 ENCOUNTER — OFFICE VISIT (OUTPATIENT)
Dept: INTERNAL MEDICINE | Facility: CLINIC | Age: 78
End: 2018-12-18

## 2018-12-18 VITALS
RESPIRATION RATE: 17 BRPM | HEART RATE: 86 BPM | SYSTOLIC BLOOD PRESSURE: 124 MMHG | WEIGHT: 140 LBS | DIASTOLIC BLOOD PRESSURE: 68 MMHG | BODY MASS INDEX: 26.45 KG/M2

## 2018-12-18 DIAGNOSIS — I10 ESSENTIAL HYPERTENSION: ICD-10-CM

## 2018-12-18 DIAGNOSIS — E78.5 DYSLIPIDEMIA: ICD-10-CM

## 2018-12-18 DIAGNOSIS — M15.9 PRIMARY OSTEOARTHRITIS INVOLVING MULTIPLE JOINTS: ICD-10-CM

## 2018-12-18 DIAGNOSIS — E03.9 ACQUIRED HYPOTHYROIDISM: ICD-10-CM

## 2018-12-18 DIAGNOSIS — I25.10 CORONARY ARTERY DISEASE INVOLVING NATIVE CORONARY ARTERY OF NATIVE HEART WITHOUT ANGINA PECTORIS: Primary | ICD-10-CM

## 2018-12-18 DIAGNOSIS — K21.9 GASTROESOPHAGEAL REFLUX DISEASE WITHOUT ESOPHAGITIS: ICD-10-CM

## 2018-12-18 LAB
ALBUMIN SERPL-MCNC: 4.42 G/DL (ref 3.2–4.8)
ALBUMIN/GLOB SERPL: 2.1 G/DL (ref 1.5–2.5)
ALP SERPL-CCNC: 115 U/L (ref 25–100)
ALT SERPL W P-5'-P-CCNC: 13 U/L (ref 7–40)
ANION GAP SERPL CALCULATED.3IONS-SCNC: 7 MMOL/L (ref 3–11)
ARTICHOKE IGE QN: 83 MG/DL (ref 0–130)
AST SERPL-CCNC: 21 U/L (ref 0–33)
BILIRUB SERPL-MCNC: 0.4 MG/DL (ref 0.3–1.2)
BUN BLD-MCNC: 17 MG/DL (ref 9–23)
BUN/CREAT SERPL: 20.7 (ref 7–25)
CALCIUM SPEC-SCNC: 11 MG/DL (ref 8.7–10.4)
CHLORIDE SERPL-SCNC: 107 MMOL/L (ref 99–109)
CHOLEST SERPL-MCNC: 148 MG/DL (ref 0–200)
CO2 SERPL-SCNC: 26 MMOL/L (ref 20–31)
CREAT BLD-MCNC: 0.82 MG/DL (ref 0.6–1.3)
GFR SERPL CREATININE-BSD FRML MDRD: 67 ML/MIN/1.73
GLOBULIN UR ELPH-MCNC: 2.1 GM/DL
GLUCOSE BLD-MCNC: 92 MG/DL (ref 70–100)
HDLC SERPL-MCNC: 58 MG/DL (ref 40–60)
POTASSIUM BLD-SCNC: 4.5 MMOL/L (ref 3.5–5.5)
PROT SERPL-MCNC: 6.5 G/DL (ref 5.7–8.2)
SODIUM BLD-SCNC: 140 MMOL/L (ref 132–146)
TRIGL SERPL-MCNC: 80 MG/DL (ref 0–150)

## 2018-12-18 PROCEDURE — 80053 COMPREHEN METABOLIC PANEL: CPT | Performed by: INTERNAL MEDICINE

## 2018-12-18 PROCEDURE — 80061 LIPID PANEL: CPT | Performed by: INTERNAL MEDICINE

## 2018-12-18 PROCEDURE — 36415 COLL VENOUS BLD VENIPUNCTURE: CPT | Performed by: INTERNAL MEDICINE

## 2018-12-18 PROCEDURE — 99214 OFFICE O/P EST MOD 30 MIN: CPT | Performed by: INTERNAL MEDICINE

## 2018-12-18 NOTE — PROGRESS NOTES
Subjective   Leslee Wu is a 78 y.o. female.     History of Present Illness   For follow up of  CAD no chest pain, sob or palpitations.  HTN no headaches.  DJD is about the same and improving in her knee.  Hyperlipidemia on meds,no muscle aches.  Hypothyroid on replacement.    The following portions of the patient's history were reviewed and updated as appropriate: allergies, current medications, past medical history and problem list.    Review of Systems   Constitutional: Negative.  Negative for fatigue and fever.   Respiratory: Negative.  Negative for cough, chest tightness, shortness of breath and wheezing.    Cardiovascular: Negative.  Negative for chest pain, palpitations and leg swelling.   Gastrointestinal: Negative.  Negative for abdominal distention and abdominal pain.   Musculoskeletal: Positive for arthralgias.       Objective   Physical Exam   Constitutional: She appears well-developed and well-nourished.   Neck: Normal range of motion.   Cardiovascular: Normal rate, regular rhythm and normal heart sounds. Exam reveals no gallop and no friction rub.   No murmur heard.  Pulmonary/Chest: Effort normal and breath sounds normal. No stridor. No respiratory distress. She has no wheezes. She has no rales.   Abdominal: Soft. Bowel sounds are normal. She exhibits no distension and no mass. There is no tenderness. There is no guarding.   Musculoskeletal: She exhibits no edema.   Nursing note and vitals reviewed.        Assessment/Plan   Leslee was seen today for essential hypertension.    Diagnoses and all orders for this visit:    Coronary artery disease involving native coronary artery of native heart without angina pectoris    Essential hypertension    Gastroesophageal reflux disease without esophagitis    Acquired hypothyroidism    Primary osteoarthritis involving multiple joints    Dyslipidemia  -     Comprehensive Metabolic Panel  -     Lipid Panel    All problems are stable.  Labs as ordered.  Same  meds.  Recheck 6 months.

## 2019-01-26 DIAGNOSIS — M15.9 PRIMARY OSTEOARTHRITIS INVOLVING MULTIPLE JOINTS: ICD-10-CM

## 2019-01-28 RX ORDER — PANTOPRAZOLE SODIUM 40 MG/1
TABLET, DELAYED RELEASE ORAL
Qty: 90 TABLET | Refills: 3 | Status: SHIPPED | OUTPATIENT
Start: 2019-01-28 | End: 2020-01-24

## 2019-01-28 RX ORDER — MELOXICAM 7.5 MG/1
TABLET ORAL
Qty: 30 TABLET | Refills: 5 | Status: SHIPPED | OUTPATIENT
Start: 2019-01-28 | End: 2019-07-31 | Stop reason: SDUPTHER

## 2019-03-27 ENCOUNTER — OFFICE VISIT (OUTPATIENT)
Dept: ENDOCRINOLOGY | Facility: CLINIC | Age: 79
End: 2019-03-27

## 2019-03-27 VITALS
DIASTOLIC BLOOD PRESSURE: 70 MMHG | SYSTOLIC BLOOD PRESSURE: 110 MMHG | BODY MASS INDEX: 27.17 KG/M2 | OXYGEN SATURATION: 98 % | HEIGHT: 61 IN | WEIGHT: 143.9 LBS | HEART RATE: 82 BPM

## 2019-03-27 DIAGNOSIS — E55.9 VITAMIN D DEFICIENCY: ICD-10-CM

## 2019-03-27 DIAGNOSIS — E04.1 NONTOXIC SINGLE THYROID NODULE: Primary | ICD-10-CM

## 2019-03-27 DIAGNOSIS — E21.3 HYPERPARATHYROIDISM (HCC): ICD-10-CM

## 2019-03-27 DIAGNOSIS — E83.52 HYPERCALCEMIA: ICD-10-CM

## 2019-03-27 DIAGNOSIS — E03.9 ACQUIRED HYPOTHYROIDISM: ICD-10-CM

## 2019-03-27 DIAGNOSIS — D35.1 PARATHYROID ADENOMA: ICD-10-CM

## 2019-03-27 LAB
25(OH)D3 SERPL-MCNC: 66.4 NG/ML
ALBUMIN SERPL-MCNC: 4.17 G/DL (ref 3.2–4.8)
ANION GAP SERPL CALCULATED.3IONS-SCNC: 6 MMOL/L (ref 3–11)
BUN BLD-MCNC: 15 MG/DL (ref 9–23)
BUN/CREAT SERPL: 17.2 (ref 7–25)
CALCIUM SPEC-SCNC: 11.2 MG/DL (ref 8.7–10.4)
CHLORIDE SERPL-SCNC: 108 MMOL/L (ref 99–109)
CO2 SERPL-SCNC: 27 MMOL/L (ref 20–31)
CREAT BLD-MCNC: 0.87 MG/DL (ref 0.6–1.3)
GFR SERPL CREATININE-BSD FRML MDRD: 63 ML/MIN/1.73
GLUCOSE BLD-MCNC: 72 MG/DL (ref 70–100)
PHOSPHATE SERPL-MCNC: 2.7 MG/DL (ref 2.4–5.1)
POTASSIUM BLD-SCNC: 5.3 MMOL/L (ref 3.5–5.5)
SODIUM BLD-SCNC: 141 MMOL/L (ref 132–146)
TSH SERPL DL<=0.05 MIU/L-ACNC: 0.95 MIU/ML (ref 0.35–5.35)

## 2019-03-27 PROCEDURE — 84443 ASSAY THYROID STIM HORMONE: CPT | Performed by: INTERNAL MEDICINE

## 2019-03-27 PROCEDURE — 83970 ASSAY OF PARATHORMONE: CPT | Performed by: INTERNAL MEDICINE

## 2019-03-27 PROCEDURE — 99213 OFFICE O/P EST LOW 20 MIN: CPT | Performed by: INTERNAL MEDICINE

## 2019-03-27 PROCEDURE — 76536 US EXAM OF HEAD AND NECK: CPT | Performed by: INTERNAL MEDICINE

## 2019-03-27 PROCEDURE — 80069 RENAL FUNCTION PANEL: CPT | Performed by: INTERNAL MEDICINE

## 2019-03-27 PROCEDURE — 82310 ASSAY OF CALCIUM: CPT | Performed by: INTERNAL MEDICINE

## 2019-03-27 PROCEDURE — 82306 VITAMIN D 25 HYDROXY: CPT | Performed by: INTERNAL MEDICINE

## 2019-03-27 NOTE — PROGRESS NOTES
THyroid Nodule (Follow UP) and Hyperparathyroidism    Subjective   Leslee Wu is a 78 y.o. female is here today for follow-up and ultrasound.  Hypercalcemia and primary hyperparathyroidism. She has seen parathyroid surgeon and endocrinologist in 2000 and deferred surgery at that time.    BMD: T-score -1 at the hip and - 1.4 at the femoral neck, 1.5 at the spine (she is s/p spinal fusion). statistically significant 11.5% decrease in the bone mineral density of the left hip compared to the prior 2011 exam and a  23.6% decrease compared to the baseline 1998 exam. Imaging reviewed and discussed with the patient   The ten year fracture risk assessment is calculated at 12% for a major  osteoporotic fracture and 2.4% for a hip fracture.   Patient denies having any new complaints. She is exercising regularly on the stationary bycycle. Eats healthy, takes Vit D 2000 every day.        Calcium and PTH remained stable over the last several years. stable calcium and PTH over the last year 62-->71 in 1 year, calcium 10.8 --> 11.2 over the last 2 years. Parathyroid adenoma is slowly growing according to the u/s.  Parathyroid adenoma is seen by u/s - 1 cm right inferior parathyroid adenoma. Patient is asymptomatic.     History of thyroid nodules since 1995 and 2000, s/p 2 benign biopsies. She was seeing Dr Argueta who ordered yearly u/s.FNA in 1996 - hypocellular specimen      Hypothyroidism dx in 1995, on synthroid 75 mcg.         Medications:    Current Outpatient Medications:   •  acyclovir (ZOVIRAX) 200 MG capsule, TAKE ONE CAPSULE FIVE TIMES A DAY FOR 4 DAYS, Disp: 20 capsule, Rfl: 1  •  amLODIPine (NORVASC) 10 MG tablet, TAKE ONE TABLET BY MOUTH ONCE DAILY, Disp: 90 tablet, Rfl: 3  •  aspirin 81 MG EC tablet, Take 81 mg by mouth Daily., Disp: , Rfl:   •  benazepril (LOTENSIN) 20 MG tablet, TAKE ONE TABLET BY MOUTH ONCE DAILY, Disp: 90 tablet, Rfl: 3  •  Cholecalciferol (VITAMIN D3) 5000 UNITS capsule capsule, Take 2,000 Units  "by mouth Daily., Disp: , Rfl:   •  levothyroxine (SYNTHROID, LEVOTHROID) 75 MCG tablet, Take 1 tablet by mouth Daily., Disp: 90 tablet, Rfl: 3  •  LORazepam (ATIVAN) 0.5 MG tablet, Take 1 tablet by mouth Every 8 (Eight) Hours As Needed for Anxiety. (Patient taking differently: Take 0.5 mg by mouth As Needed for Anxiety.), Disp: 30 tablet, Rfl: 2  •  meloxicam (MOBIC) 7.5 MG tablet, TAKE 1 TABLET BY MOUTH ONCE DAILY, Disp: 30 tablet, Rfl: 5  •  Misc Natural Products (OSTEO BI-FLEX JOINT SHIELD PO), Take  by mouth Daily., Disp: , Rfl:   •  pantoprazole (PROTONIX) 40 MG EC tablet, TAKE ONE TABLET BY MOUTH ONCE DAILY, Disp: 90 tablet, Rfl: 3    The following portions of the patient's history were reviewed and updated as appropriate: allergies, current medications, past family history, past medical history, past social history, past surgical history and problem list.    Review of Systems   HENT: Positive for postnasal drip.    Musculoskeletal: Positive for arthralgias (knee pain ) and back pain.   All other systems reviewed and are negative.      Objective   Blood pressure 110/70, pulse 82, height 154.9 cm (61\"), weight 65.3 kg (143 lb 14.4 oz), SpO2 98 %, not currently breastfeeding.   Physical Exam   Constitutional: She is oriented to person, place, and time. She appears well-developed and well-nourished.   HENT:   Head: Normocephalic and atraumatic.   Eyes: Conjunctivae are normal.   Neck: No muscular tenderness present. Carotid bruit is not present. Thyromegaly (left 1 cm hard easy movable nodule. ) present. No thyroid mass present.   The neck is supple and no assimetry visualized   Cardiovascular: Normal rate, regular rhythm and normal heart sounds.   Pulmonary/Chest: Effort normal and breath sounds normal.   Musculoskeletal: She exhibits no deformity.   Lymphadenopathy:     She has no cervical adenopathy.   Neurological: She is alert and oriented to person, place, and time.   Psychiatric: She has a normal mood and " affect. Thought content normal.   Vitals reviewed.        Results for orders placed or performed in visit on 12/18/18   Comprehensive Metabolic Panel   Result Value Ref Range    Glucose 92 70 - 100 mg/dL    BUN 17 9 - 23 mg/dL    Creatinine 0.82 0.60 - 1.30 mg/dL    Sodium 140 132 - 146 mmol/L    Potassium 4.5 3.5 - 5.5 mmol/L    Chloride 107 99 - 109 mmol/L    CO2 26.0 20.0 - 31.0 mmol/L    Calcium 11.0 (H) 8.7 - 10.4 mg/dL    Total Protein 6.5 5.7 - 8.2 g/dL    Albumin 4.42 3.20 - 4.80 g/dL    ALT (SGPT) 13 7 - 40 U/L    AST (SGOT) 21 0 - 33 U/L    Alkaline Phosphatase 115 (H) 25 - 100 U/L    Total Bilirubin 0.4 0.3 - 1.2 mg/dL    eGFR Non African Amer 67 >60 mL/min/1.73    Globulin 2.1 gm/dL    A/G Ratio 2.1 1.5 - 2.5 g/dL    BUN/Creatinine Ratio 20.7 7.0 - 25.0    Anion Gap 7.0 3.0 - 11.0 mmol/L   Lipid Panel   Result Value Ref Range    Total Cholesterol 148 0 - 200 mg/dL    Triglycerides 80 0 - 150 mg/dL    HDL Cholesterol 58 40 - 60 mg/dL    LDL Cholesterol  83 0 - 130 mg/dL             Thyroid ultrasound 03/27/19              Real time high resolution imaging of the thyroid gland was performed in transverse and longitudinal planes.      The right lobe measured 3.96 cm L x 1.16 cm AP x 1.58 cm in TV dimension.    The isthmus measured 0.23 cm in thickness.    The left thyroid lobe measured 3.89 cm L x 0.94 cm AP x 1.55 cm in TV dimension.    Thyroid gland is homogeneous and contains single nodule in the left lobe and small nodule  on the right .    Nodule 1   is located in the left mid lobe and measures 1.72 x 1.2 x 1.26 cm (L X AP X TV).  This nodule is cystic, homogeneous, hypoechoic with well-defined borders and egg-shell calcifications, and Grade I vascularity on Color Flow Doppler.  It has artifacts:  posterior acoustic enhancement    Nodule 2  is located in the right upper lobe and measures 0.51 x 0.33 x 0.4 cm (L X AP X TV).  This nodule is solid, homogeneous, hypoechoic with well-defined margins, and  Grade II vascularity on Color Flow Doppler.  It has no artifacts    Posterior to the right thyroid lobe hypoechoic extrathyroidal structure is located inferiorly, representing parathyroid gland and measures 1.04 x 0.55 x and 0.54 L x AP x TV cm.  Doppler flow representing polar artery is seen.       No pathologic lymph nodes were seen.      Assessment: Stable left calcified nodule. right inferior parathyroid adenoma.   Repeat ultrasound in 18 months.       Assessment/Plan:    Problem List Items Addressed This Visit        Endocrine    Hyperparathyroidism (CMS/HCC)    Acquired hypothyroidism    Overview     Description: A.  On chronic replacement therapy.         Parathyroid adenoma    Nontoxic single thyroid nodule - Primary    Relevant Orders    US Thyroid (Completed)       Other    Hypercalcemia      Primary hyperparathyroidism - stable calcium and PTH over the last year 62-->71 in 1 year, calcium 10.8 --> 11 over the last 2 years. Parathyroid adenoma is slowly growing according to the u/s, 1 cm in size.    Ultrasound showed 1 cm right inferior parathyroid adenoma.     Patient is asymptomatic, has osteopenia, no kidney stones or renal impairment. She prefers conservative approach, but agreeable to surgery if indicated    Repeat labs today     Continue with conservative management: increased fluid intake, Vit D supplements 2000 daily . If calcium increasing, will refer to surgery. Patient is agreeable to the plan.       Cont same Synthroid 75 µg dose, change to levothyroxine 75 and reassess the symptoms on generic.     Labs today.  Ultrasound performed and compared to previous images.      F/u in 5-6 months with labs

## 2019-03-29 LAB
CALCIUM SERPL-MCNC: 11.2 MG/DL (ref 8.7–10.3)
INTACT PTH: ABNORMAL
PTH-INTACT SERPL-MCNC: 87 PG/ML (ref 15–65)

## 2019-03-30 PROCEDURE — 99283 EMERGENCY DEPT VISIT LOW MDM: CPT

## 2019-03-31 ENCOUNTER — APPOINTMENT (OUTPATIENT)
Dept: CT IMAGING | Facility: HOSPITAL | Age: 79
End: 2019-03-31

## 2019-03-31 ENCOUNTER — HOSPITAL ENCOUNTER (EMERGENCY)
Facility: HOSPITAL | Age: 79
Discharge: HOME | End: 2019-03-31
Attending: EMERGENCY MEDICINE | Admitting: EMERGENCY MEDICINE

## 2019-03-31 VITALS
OXYGEN SATURATION: 99 % | DIASTOLIC BLOOD PRESSURE: 74 MMHG | BODY MASS INDEX: 26.43 KG/M2 | WEIGHT: 140 LBS | SYSTOLIC BLOOD PRESSURE: 129 MMHG | HEIGHT: 61 IN | TEMPERATURE: 98.1 F | RESPIRATION RATE: 18 BRPM | HEART RATE: 74 BPM

## 2019-03-31 DIAGNOSIS — S09.90XA INJURY OF HEAD, INITIAL ENCOUNTER: ICD-10-CM
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
## 2019-03-31 DIAGNOSIS — S01.01XA SCALP LACERATION, INITIAL ENCOUNTER: ICD-10-CM

## 2019-03-31 PROCEDURE — 70450 CT HEAD/BRAIN W/O DYE: CPT

## 2019-03-31 RX ORDER — LIDOCAINE HYDROCHLORIDE AND EPINEPHRINE 10; 10 MG/ML; UG/ML
10 INJECTION, SOLUTION INFILTRATION; PERINEURAL ONCE
Status: COMPLETED | OUTPATIENT
Start: 2019-03-31 | End: 2019-03-31

## 2019-03-31 RX ADMIN — LIDOCAINE HYDROCHLORIDE,EPINEPHRINE BITARTRATE 10 ML: 10; .01 INJECTION, SOLUTION INFILTRATION; PERINEURAL at 01:35

## 2019-04-07 NOTE — PROGRESS NOTES
Chief Complaint   Patient presents with   • Fall     Patient had a fall a week ago.She had to get staples put in the side of her head.        Subjective     History of Present Illness   Patient comes to clinic today for follow-up after having a fall at home.  Patient was seen at [facility] emergency room 3/31/19 after she fell off of a step stool while reaching up into a cabinet.  She fell backwards into the floor but did not lose consciousness.  Patient received 3 staples in the emergency room and was to follow-up for removal of her staples.  Patient is done well overall.  She has not had any headache, but she has felt fatigued.  She relates the fatigue to recent fall.    [facility] patient is here today follow-up on recent ER visit and [facility]      3/30/19 the pt stepped up on stool to change time of microwave then stepped down and hit bottom then hit head when fell on the way down.  ER note and CT head    She was seen at the [facility] ER. She received 3 staples of head and ct head normal.  She has a bruise on R hip.  She is concerned about Na+level.  She had fall years ago and this was because her Na+ was low.       Has had R hip replacement and has bruising noted on her right hip from the fall.    Patient request gynecology for cystocele.    The following portions of the patient's history were reviewed and updated as appropriate: allergies, current medications, past family history, past medical history, past social history, past surgical history and problem list.    Review of Systems   Constitutional: Negative for activity change, appetite change, chills, fatigue and fever.   HENT: Negative for congestion, postnasal drip and sore throat.    Eyes: Negative for visual disturbance.   Respiratory: Negative for cough and shortness of breath.    Cardiovascular: Negative for chest pain, palpitations and leg swelling.   Gastrointestinal: Negative for abdominal pain, constipation, diarrhea, nausea and GERD.    Genitourinary:        Cystocele   Musculoskeletal: Negative for arthralgias and myalgias.        Right hip bruise   Skin: Negative for rash.        Laceration with staples to her scalp   Allergic/Immunologic: Negative for environmental allergies.   Neurological: Negative for dizziness.   Psychiatric/Behavioral: Negative for sleep disturbance.   All other systems reviewed and are negative.      Objective   Physical Exam   Constitutional: She is oriented to person, place, and time. She appears well-developed and well-nourished.   HENT:   Head: Normocephalic and atraumatic.   Right Ear: External ear normal.   Left Ear: External ear normal.   Nose: Nose normal.   Mouth/Throat: Oropharynx is clear and moist.   Neck: No thyromegaly present.   Cardiovascular: Normal rate, regular rhythm and intact distal pulses.   Pulmonary/Chest: Effort normal and breath sounds normal.   Abdominal: Soft. Bowel sounds are normal. She exhibits no distension. There is no tenderness.   Musculoskeletal: She exhibits no edema.   Lymphadenopathy:     She has no cervical adenopathy.   Neurological: She is alert and oriented to person, place, and time.   Skin: Skin is warm and dry. Capillary refill takes 2 to 3 seconds.   Staples x3 noted at the scalp crown of her head healing without sign of infection including erythema discharge swelling or tenderness.  Right hip with mild purple bruising noted   Psychiatric: She has a normal mood and affect. Her behavior is normal.   Nursing note and vitals reviewed.           Assessment/Plan   Leslee was seen today for fall.    Diagnoses and all orders for this visit:    Cystocele with prolapse  -     Ambulatory Referral to Gynecology    Fatigue, unspecified type  -     POC Urinalysis Dipstick, Automated  -     Basic Metabolic Panel    Falls, subsequent encounter  -     POC Urinalysis Dipstick, Automated  -     Basic Metabolic Panel          Return monday, for 2 appts- fri for staple removal with  wellness.  RTC/call  If symptoms worsen  Meds MOA and SE's reviewed and pt v/u

## 2019-04-09 ENCOUNTER — OFFICE VISIT (OUTPATIENT)
Dept: INTERNAL MEDICINE | Facility: CLINIC | Age: 79
End: 2019-04-09

## 2019-04-09 VITALS
HEART RATE: 79 BPM | BODY MASS INDEX: 27.21 KG/M2 | SYSTOLIC BLOOD PRESSURE: 110 MMHG | OXYGEN SATURATION: 96 % | TEMPERATURE: 97.9 F | WEIGHT: 144 LBS | DIASTOLIC BLOOD PRESSURE: 72 MMHG

## 2019-04-09 DIAGNOSIS — R82.998 LEUKOCYTES IN URINE: ICD-10-CM

## 2019-04-09 DIAGNOSIS — R53.83 FATIGUE, UNSPECIFIED TYPE: ICD-10-CM

## 2019-04-09 DIAGNOSIS — N81.4 CYSTOCELE WITH PROLAPSE: Primary | ICD-10-CM
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
## 2019-04-09 LAB
ANION GAP SERPL CALCULATED.3IONS-SCNC: 9.9 MMOL/L
BILIRUB BLD-MCNC: NEGATIVE MG/DL
BUN BLD-MCNC: 21 MG/DL (ref 8–23)
BUN/CREAT SERPL: 25.6 (ref 7–25)
CALCIUM SPEC-SCNC: 10.9 MG/DL (ref 8.6–10.5)
CHLORIDE SERPL-SCNC: 103 MMOL/L (ref 98–107)
CLARITY, POC: CLEAR
CO2 SERPL-SCNC: 27.1 MMOL/L (ref 22–29)
COLOR UR: YELLOW
CREAT BLD-MCNC: 0.82 MG/DL (ref 0.57–1)
EXPIRATION DATE: ABNORMAL
GFR SERPL CREATININE-BSD FRML MDRD: 67 ML/MIN/1.73
GLUCOSE BLD-MCNC: 99 MG/DL (ref 65–99)
GLUCOSE UR STRIP-MCNC: NEGATIVE MG/DL
KETONES UR QL: NEGATIVE
LEUKOCYTE EST, POC: ABNORMAL
Lab: ABNORMAL
NITRITE UR-MCNC: NEGATIVE MG/ML
PH UR: 6 [PH] (ref 5–8)
POTASSIUM BLD-SCNC: 5 MMOL/L (ref 3.5–5.2)
PROT UR STRIP-MCNC: NEGATIVE MG/DL
RBC # UR STRIP: NEGATIVE /UL
SODIUM BLD-SCNC: 140 MMOL/L (ref 136–145)
SP GR UR: 1.01 (ref 1–1.03)
UROBILINOGEN UR QL: NORMAL

## 2019-04-09 PROCEDURE — 87086 URINE CULTURE/COLONY COUNT: CPT | Performed by: NURSE PRACTITIONER

## 2019-04-09 PROCEDURE — 80048 BASIC METABOLIC PNL TOTAL CA: CPT | Performed by: NURSE PRACTITIONER

## 2019-04-09 PROCEDURE — 81003 URINALYSIS AUTO W/O SCOPE: CPT | Performed by: NURSE PRACTITIONER

## 2019-04-09 PROCEDURE — 99213 OFFICE O/P EST LOW 20 MIN: CPT | Performed by: NURSE PRACTITIONER

## 2019-04-10 LAB — BACTERIA SPEC AEROBE CULT: NORMAL

## 2019-04-15 ENCOUNTER — OFFICE VISIT (OUTPATIENT)
Dept: INTERNAL MEDICINE | Facility: CLINIC | Age: 79
End: 2019-04-15

## 2019-04-15 VITALS
BODY MASS INDEX: 29.03 KG/M2 | HEART RATE: 82 BPM | SYSTOLIC BLOOD PRESSURE: 128 MMHG | RESPIRATION RATE: 18 BRPM | TEMPERATURE: 97 F | DIASTOLIC BLOOD PRESSURE: 70 MMHG | WEIGHT: 144 LBS | HEIGHT: 59 IN | OXYGEN SATURATION: 98 %

## 2019-04-15 DIAGNOSIS — Z48.02: ICD-10-CM

## 2019-04-15 DIAGNOSIS — Z00.00 INITIAL MEDICARE ANNUAL WELLNESS VISIT: Primary | ICD-10-CM

## 2019-04-15 DIAGNOSIS — S01.01XD SCALP LACERATION, SUBSEQUENT ENCOUNTER: ICD-10-CM

## 2019-04-15 DIAGNOSIS — Z78.0 POST-MENOPAUSE: ICD-10-CM

## 2019-04-15 PROCEDURE — G0438 PPPS, INITIAL VISIT: HCPCS | Performed by: NURSE PRACTITIONER

## 2019-04-15 NOTE — PATIENT INSTRUCTIONS
MyPlate from USDA  MyPlate is an outline of a general healthy diet based on the 2010 Dietary Guidelines for Americans, from the U.S. Department of Agriculture (USDA). It sets guidelines for how much food you should eat from each food group based on your age, sex, and level of physical activity.  What are tips for following MyPlate?  To follow MyPlate recommendations:  · Eat a wide variety of fruits and vegetables, grains, and protein foods.  · Serve smaller portions and eat less food throughout the day.  · Limit portion sizes to avoid overeating.  · Enjoy your food.  · Get at least 150 minutes of exercise every week. This is about 30 minutes each day, 5 or more days per week.    It can be difficult to have every meal look like MyPlate. Think about MyPlate as eating guidelines for an entire day, rather than each individual meal.  Fruits and Vegetables  · Make half of your plate fruits and vegetables.  · Eat many different colors of fruits and vegetables each day.  · For a 2,000 calorie daily food plan, eat:  ? 2½ cups of vegetables every day.  ? 2 cups of fruit every day.  · 1 cup is equal to:  ? 1 cup raw or cooked vegetables.  ? 1 cup raw fruit.  ? 1 medium-sized orange, apple, or banana.  ? 1 cup 100% fruit or vegetable juice.  ? 2 cups raw leafy greens, such as lettuce, spinach, or kale.  ? ½ cup dried fruit.  Grains  · One fourth of your plate should be grains.  · Make at least half of the grains you eat each day whole grains.  · For a 2,000 calorie daily food plan, eat 6 oz of grains every day.  · 1 oz is equal to:  ? 1 slice bread.  ? 1 cup cereal.  ? ½ cup cooked rice, cereal, or pasta.  Protein  · One fourth of your plate should be protein.  · Eat a wide variety of protein foods, including meat, poultry, fish, eggs, beans, nuts, and tofu.  · For a 2,000 calorie daily food plan, eat 5½ oz of protein every day.  · 1 oz is equal to:  ? 1 oz meat, poultry, or fish.  ? ¼ cup cooked beans.  ? 1 egg.  ? ½ oz nuts  or seeds.  ? 1 Tbsp peanut butter.  Dairy  · Drink fat-free or low-fat (1%) milk.  · Eat or drink dairy as a side to meals.  · For a 2,000 calorie daily food plan, eat or drink 3 cups of dairy every day.  · 1 cup is equal to:  ? 1 cup milk, yogurt, cottage cheese, or soy milk (soy beverage).  ? 2 oz processed cheese.  ? 1½ oz natural cheese.  Fats, oils, salt, and sugars  · Only small amounts of oils are recommended.  · Avoid foods that are high in calories and low in nutritional value (empty calories), like foods high in fat or added sugars.  · Choose foods that are low in salt (sodium). Choose foods that have less than 140 milligrams (mg) of sodium per serving.  · Drink water instead of sugary drinks. Drink enough water each day to keep your urine pale yellow.  Where to find support  · Work with your health care provider or a nutrition specialist (dietitian) to develop a customized eating plan that is right for you.  · Download an eduardo (mobile application) to help you track your daily food intake.  Where to find more information  · Go to ChooseMyPlate.gov for more information.  · Learn more and log your daily food intake according to MyPlate using USDA's SuperTracker: www.[employer].usda.gov  Summary  · MyPlate is a general guideline for healthy eating from the USDA. It is based on the 2010 Dietary Guidelines for Americans.  · In general, fruits and vegetables should take up ½ of your plate, grains should take up ¼ of your plate, and protein should take up ¼ of your plate.  This information is not intended to replace advice given to you by your health care provider. Make sure you discuss any questions you have with your health care provider.  Document Released: 01/06/2009 Document Revised: 03/19/2018 Document Reviewed: 03/19/2018  [employer] Interactive Patient Education © 2019 [employer]    Medicare Wellness  Personal Prevention Plan of Service     Date of Office Visit:  04/15/2019  Encounter Provider:  Josephine  SANDOVAL Reina  Place of Service:  [facility]  Patient Name: Leslee Wu  :  1940    As part of the Medicare Wellness portion of your visit today, we are providing you with this personalized preventive plan of services (PPPS). This plan is based upon recommendations of the [place] Preventive Services Task Force (USPSTF) and the Advisory Committee on Immunization Practices (ACIP).    This lists the preventive care services that should be considered, and provides dates of when you are due. Items listed as completed are up-to-date and do not require any further intervention.    Health Maintenance   Topic Date Due   • TDAP/TD VACCINES (1 - Tdap) 10/31/1958   • ZOSTER VACCINE (2 of 3) 2011   • PNEUMOCOCCAL VACCINES (65+ LOW/MEDIUM RISK) (2 of 2 - PPSV23) 2016   • INFLUENZA VACCINE  2019   • MEDICARE ANNUAL WELLNESS  04/15/2020   • MAMMOGRAM  2020   • DXA SCAN  2021       Orders Placed This Encounter   Procedures   • DEXA Bone Density Axial     Standing Status:   Future     Number of Occurrences:   1     Standing Expiration Date:   4/15/2020     Order Specific Question:   Reason for Exam:     Answer:   post menopausal       Return in about 1 year (around 4/15/2020) for Medicare Wellness.

## 2019-04-15 NOTE — PROGRESS NOTES
Initial Medicare Wellness Visit   The ABC's of the Annual Wellness Visit    Chief Complaint   Patient presents with   • Medicare Wellness-Initial Visit       HPI:  Leslee Wu, -1940, is a 78 y.o. female who presents for an Initial Medicare Wellness Visit.  Patient also needs staple removal x3 from recent head injury when she fell off a stool.  Stitches placed in the ER at [facility] 2 weeks ago.  Patient was seen in office 1 week ago for a fall and evaluation was completed patient was not due to have staples removed until this week.    Recent Hospitalizations:  No hospitalization(s) within the last year..    Current Medical Providers:  Patient Care Team:  Salima Pretty MD as PCP - General (Endocrinology)  Eric Angulo MD as PCP - Claims Attributed  Eric Angulo MD Christensen, Christian Paul, MD as Consulting Physician (Orthopedic Surgery)    Health Habits and Functional and Cognitive Screening and Depression Screening:  Functional & Cognitive Status 4/15/2019   Do you have difficulty preparing food and eating? No   Do you have difficulty bathing yourself, getting dressed or grooming yourself? No   Do you have difficulty using the toilet? No   Do you have difficulty moving around from place to place? No   Do you have trouble with steps or getting out of a bed or a chair? No   In the past year have you fallen or experienced a near fall? Yes   Current Diet Well Balanced Diet   Dental Exam Up to date   Eye Exam Up to date   Exercise (times per week) 4 times per week   Current Exercise Activities Include Walking   Do you need help using the phone?  No   Are you deaf or do you have serious difficulty hearing?  No   Do you need help with transportation? No   Do you need help shopping? No   Do you need help preparing meals?  No   Do you need help with housework?  No   Do you need help with laundry? No   Do you need help taking your medications? No   Do you need help managing money? No   Do  you ever drive or ride in a car without wearing a seat belt? No   Have you felt unusual stress, anger or loneliness in the last month? No   Who do you live with? Spouse   If you need help, do you have trouble finding someone available to you? No   Have you been bothered in the last four weeks by sexual problems? No   Do you have difficulty concentrating, remembering or making decisions? No       Compared to one year ago, the patient feels her physical health is the same and her mental health is the same.    Depression Screen:  PHQ-2/PHQ-9 Depression Screening 4/15/2019   Little interest or pleasure in doing things 0   Feeling down, depressed, or hopeless 0   Total Score 0         Past Medical/Family/Social History:  The following portions of the patient's history were reviewed and updated as appropriate: allergies, current medications, past family history, past medical history, past social history, past surgical history and problem list.    Allergies   Allergen Reactions   • Codeine    • Lipitor [Atorvastatin] Myalgia   • Lortab  [Hydrocodone-Acetaminophen]          Current Outpatient Medications:   •  acyclovir (ZOVIRAX) 200 MG capsule, TAKE ONE CAPSULE FIVE TIMES A DAY FOR 4 DAYS, Disp: 20 capsule, Rfl: 1  •  amLODIPine (NORVASC) 10 MG tablet, TAKE ONE TABLET BY MOUTH ONCE DAILY, Disp: 90 tablet, Rfl: 3  •  aspirin 81 MG EC tablet, Take 81 mg by mouth Daily., Disp: , Rfl:   •  benazepril (LOTENSIN) 20 MG tablet, TAKE ONE TABLET BY MOUTH ONCE DAILY, Disp: 90 tablet, Rfl: 3  •  Cholecalciferol (VITAMIN D3) 5000 UNITS capsule capsule, Take 2,000 Units by mouth Daily., Disp: , Rfl:   •  levothyroxine (SYNTHROID, LEVOTHROID) 75 MCG tablet, Take 1 tablet by mouth Daily., Disp: 90 tablet, Rfl: 3  •  LORazepam (ATIVAN) 0.5 MG tablet, Take 1 tablet by mouth Every 8 (Eight) Hours As Needed for Anxiety. (Patient taking differently: Take 0.5 mg by mouth As Needed for Anxiety.), Disp: 30 tablet, Rfl: 2  •  meloxicam (MOBIC)  7.5 MG tablet, TAKE 1 TABLET BY MOUTH ONCE DAILY, Disp: 30 tablet, Rfl: 5  •  Misc Natural Products (OSTEO BI-FLEX JOINT SHIELD PO), Take  by mouth Daily., Disp: , Rfl:   •  pantoprazole (PROTONIX) 40 MG EC tablet, TAKE ONE TABLET BY MOUTH ONCE DAILY, Disp: 90 tablet, Rfl: 3    Aspirin use counseling: Taking ASA appropriately as indicated  Reviewed risk of aspirin including gastritis and gastrointestinal bleed.  Symptoms could include coffee ground emesis, dark tarry stools, and abdominal pain.  If symptoms occur was advised to contact the office.  Advised to take encteric coated  aspirin with food.       Current medication list contains high risk medications. No harmful drug interactions have been identified.  Plan of action Reviewed risk of hypotension with blood pressure medications as well as use of Mobic while taking aspirin in regards to gastritis and GI bleed.  Patient verbalizes understanding.    Family History   Problem Relation Age of Onset   • Coronary artery disease Mother    • Heart attack Mother    • Aortic aneurysm Father    • Other Father         ruptured AAA   • Heart disease Sister         Stent placement   • Heart disease Brother         CABG   • Heart disease Sister         Stent placement   • Other Sister         2 sisters who have had cardiac stent placement   • Other Brother         CABG   • Breast cancer Maternal Aunt 35   • Breast cancer Paternal Aunt 70   • Multiple myeloma Brother    • Ovarian cancer Neg Hx        Social History     Tobacco Use   • Smoking status: Former Smoker     Types: Cigarettes     Last attempt to quit: 1960     Years since quittin.3   • Smokeless tobacco: Never Used   • Tobacco comment: Smoked briefly in her 20's   Substance Use Topics   • Alcohol use: No       Past Surgical History:   Procedure Laterality Date   • BACK SURGERY     • TONSILLECTOMY AND ADENOIDECTOMY     • TONSILLECTOMY AND ADENOIDECTOMY     • TOTAL HIP ARTHROPLASTY     • TOTAL HIP ARTHROPLASTY  "Right     Right hip replacement.   • TOTAL KNEE ARTHROPLASTY Right    • TOTAL KNEE ARTHROPLASTY Left        Patient Active Problem List   Diagnosis   • Anxiety   • Dyslipidemia   • Gastroesophageal reflux disease without esophagitis   • Gout   • Hypercalcemia   • Hyperparathyroidism (CMS/HCC)   • Essential hypertension   • Acquired hypothyroidism   • Knee pain   • Parathyroid adenoma   • Lung mass   • Anemia   • Diverticulosis of large intestine without hemorrhage   • Former smoker   • Nontoxic single thyroid nodule   • Sore throat   • Atypical chest pain   • Tobacco use   • Primary osteoarthritis involving multiple joints   • Iron deficiency anemia   • Spondylolisthesis   • Mild anxiety   • Arthritis   • Women's annual routine gynecological examination   • Rectocele   • Coronary artery disease involving native coronary artery of native heart without angina pectoris   • Vitamin D deficiency       Review of Systems   Skin:        Staples right posterior head injury/laceration       Objective     Vitals:    04/15/19 1057   BP: 128/70   BP Location: Right arm   Patient Position: Sitting   Cuff Size: Adult   Pulse: 82   Resp: 18   Temp: 97 °F (36.1 °C)   TempSrc: Temporal   SpO2: 98%   Weight: 65.3 kg (144 lb)   Height: 150.1 cm (59.1\")   PainSc: 0-No pain       Patient's Body mass index is 28.99 kg/m². BMI is above normal parameters. Recommendations include: educational material.      No exam data present    The patient has no evidence of cognitve impairment.     Physical Exam   Skin: Skin is warm and dry.   3 staples removed from the right posterior anterior region of patient's had patient tolerated well.  Laceration well approximated and healed.       Recent Lab Results:     Lab Results   Component Value Date    CHOL 148 12/18/2018    TRIG 80 12/18/2018    HDL 58 12/18/2018         Assessment/Plan   Age-appropriate Screening Schedule:  Refer to the list below for future screening recommendations based on patient's " age, sex and/or medical conditions.      Health Maintenance   Topic Date Due   • TDAP/TD VACCINES (1 - Tdap) 10/31/1958   • ZOSTER VACCINE (2 of 3) 02/23/2011   • PNEUMOCOCCAL VACCINES (65+ LOW/MEDIUM RISK) (2 of 2 - PPSV23) 12/11/2016   • INFLUENZA VACCINE  08/01/2019   • MAMMOGRAM  11/02/2020   • DXA SCAN  04/16/2021       Medicare Risks and Personalized Health Plan:  Fall Screen Questionaire is abnormal suggesting increased fall risk;   Participation in an exercise program for gait training, balance training and strength      CMS-Preventive Services Quick Reference  Medicare Preventive Services Addressed:  Annual Wellness Visit (AWV)  Bone Density Measurements    Advance Care Planning:  Patient has an advance directive - a copy has not been provided. Have asked the patient to send this to us to add to record    Diagnoses and all orders for this visit:    1. Initial Medicare annual wellness visit (Primary)    2. Post-menopause  -     DEXA Bone Density Axial; Future    3. Scalp laceration, subsequent encounter    4. Encounter for removal of staples    Initial Medicare wellness visit completed as noted above.  Patient to return to clinic in 1 year for repeat subsequent wellness.  Scalp laceration with staples intact without signs of infection staples are removed without difficulty.    An After Visit Summary and PPPS with all of these plans were given to the patient.                  Follow Up:  Return in about 1 year (around 4/15/2020) for Medicare Wellness.

## 2019-04-16 ENCOUNTER — HOSPITAL ENCOUNTER (OUTPATIENT)
Dept: BONE DENSITY | Facility: HOSPITAL | Age: 79
Discharge: HOME | End: 2019-04-16
Admitting: NURSE PRACTITIONER

## 2019-04-16 DIAGNOSIS — Z78.0 POST-MENOPAUSE: ICD-10-CM

## 2019-04-16 PROCEDURE — 77080 DXA BONE DENSITY AXIAL: CPT

## 2019-06-20 ENCOUNTER — OFFICE VISIT (OUTPATIENT)
Dept: INTERNAL MEDICINE | Facility: CLINIC | Age: 79
End: 2019-06-20

## 2019-06-20 VITALS
HEART RATE: 86 BPM | SYSTOLIC BLOOD PRESSURE: 122 MMHG | RESPIRATION RATE: 19 BRPM | WEIGHT: 143 LBS | BODY MASS INDEX: 28.78 KG/M2 | DIASTOLIC BLOOD PRESSURE: 76 MMHG

## 2019-06-20 DIAGNOSIS — F41.9 ANXIETY: ICD-10-CM

## 2019-06-20 DIAGNOSIS — E03.9 ACQUIRED HYPOTHYROIDISM: ICD-10-CM

## 2019-06-20 DIAGNOSIS — I25.10 CORONARY ARTERY DISEASE INVOLVING NATIVE CORONARY ARTERY OF NATIVE HEART WITHOUT ANGINA PECTORIS: Primary | ICD-10-CM

## 2019-06-20 DIAGNOSIS — M15.9 PRIMARY OSTEOARTHRITIS INVOLVING MULTIPLE JOINTS: ICD-10-CM

## 2019-06-20 DIAGNOSIS — I10 ESSENTIAL HYPERTENSION: ICD-10-CM

## 2019-06-20 PROCEDURE — 99214 OFFICE O/P EST MOD 30 MIN: CPT | Performed by: INTERNAL MEDICINE

## 2019-06-20 RX ORDER — LORAZEPAM 0.5 MG/1
0.5 TABLET ORAL EVERY 8 HOURS PRN
Qty: 30 TABLET | Refills: 2 | Status: SHIPPED | OUTPATIENT
Start: 2019-06-20 | End: 2019-06-20 | Stop reason: SDUPTHER

## 2019-06-20 RX ORDER — LORAZEPAM 0.5 MG/1
0.5 TABLET ORAL EVERY 8 HOURS PRN
Qty: 30 TABLET | Refills: 2 | Status: SHIPPED | OUTPATIENT
Start: 2019-06-20 | End: 2020-08-04

## 2019-06-20 NOTE — PROGRESS NOTES
Subjective   Leslee Wu is a 78 y.o. female.     History of Present Illness   CAD no chest pain, sob or palpitations.  HTN on meds, no headachers.  Hyperlipidemia on no meds.  GERD stable on meds.  DJD stable right now.  Anxiety not great, her  is not doing well.    The following portions of the patient's history were reviewed and updated as appropriate: allergies, current medications, past medical history, past social history and problem list.       Review of Systems   Constitutional: Negative.  Negative for fatigue and fever.   Eyes: Negative.    Respiratory: Negative.  Negative for cough, chest tightness, shortness of breath and wheezing.    Cardiovascular: Negative.  Negative for chest pain, palpitations and leg swelling.   Gastrointestinal: Negative.  Negative for abdominal distention and abdominal pain.   Genitourinary: Negative.    Musculoskeletal: Positive for arthralgias.       Objective   Physical Exam   Constitutional: She appears well-developed and well-nourished.   Neck: Normal range of motion. Neck supple. No JVD present.   Cardiovascular: Normal rate and regular rhythm. Exam reveals no gallop and no friction rub.   Murmur (small systolic murmur) heard.  Pulmonary/Chest: Effort normal and breath sounds normal. No respiratory distress. She has no wheezes. She has no rales. She exhibits no tenderness.   Abdominal: Soft. Bowel sounds are normal. She exhibits no distension and no mass. There is no guarding.   Musculoskeletal: She exhibits no edema.   Nursing note and vitals reviewed.        Assessment/Plan   Leslee was seen today for essential hypertension.    Diagnoses and all orders for this visit:    Coronary artery disease involving native coronary artery of native heart without angina pectoris  Stable no change.    Essential hypertension  Stable on meds, no change.    Acquired hypothyroidism  Stable on no meds.    Primary osteoarthritis involving multiple joints  Doing better, no  change.    Anxiety  -     Discontinue: LORazepam (ATIVAN) 0.5 MG tablet; Take 1 tablet by mouth Every 8 (Eight) Hours As Needed for Anxiety.  -     LORazepam (ATIVAN) 0.5 MG tablet; Take 1 tablet by mouth Every 8 (Eight) Hours As Needed for Anxiety.  All problems are stable.  Same meds.  Recheck 6 months.

## 2019-07-31 DIAGNOSIS — M15.9 PRIMARY OSTEOARTHRITIS INVOLVING MULTIPLE JOINTS: ICD-10-CM

## 2019-07-31 RX ORDER — MELOXICAM 7.5 MG/1
TABLET ORAL
Qty: 30 TABLET | Refills: 5 | OUTPATIENT
Start: 2019-07-31 | End: 2019-10-17

## 2019-09-03 RX ORDER — BENAZEPRIL HYDROCHLORIDE 20 MG/1
TABLET ORAL
Qty: 90 TABLET | Refills: 0 | Status: SHIPPED | OUTPATIENT
Start: 2019-09-03 | End: 2020-02-06

## 2019-10-07 ENCOUNTER — TRANSCRIBE ORDERS (OUTPATIENT)
Dept: ADMINISTRATIVE | Facility: HOSPITAL | Age: 79
End: 2019-10-07

## 2019-10-07 DIAGNOSIS — Z12.31 VISIT FOR SCREENING MAMMOGRAM: Primary | ICD-10-CM

## 2019-10-09 ENCOUNTER — OFFICE VISIT (OUTPATIENT)
Dept: OBSTETRICS AND GYNECOLOGY | Facility: CLINIC | Age: 79
End: 2019-10-09

## 2019-10-09 VITALS
DIASTOLIC BLOOD PRESSURE: 78 MMHG | SYSTOLIC BLOOD PRESSURE: 130 MMHG | WEIGHT: 147.2 LBS | BODY MASS INDEX: 29.68 KG/M2 | HEIGHT: 59 IN

## 2019-10-09 DIAGNOSIS — Z01.419 ENCOUNTER FOR GYNECOLOGICAL EXAMINATION WITHOUT ABNORMAL FINDING: ICD-10-CM

## 2019-10-09 DIAGNOSIS — Z78.0 MENOPAUSE: Primary | ICD-10-CM

## 2019-10-09 DIAGNOSIS — N81.6 RECTOCELE: ICD-10-CM

## 2019-10-09 PROCEDURE — G0101 CA SCREEN;PELVIC/BREAST EXAM: HCPCS | Performed by: OBSTETRICS & GYNECOLOGY

## 2019-10-09 NOTE — PROGRESS NOTES
Chief Complaint   Patient presents with   • Gynecologic Exam       Leslee Wu is a 78 y.o. year old  presenting to be seen for her annual exam.  This patient is menopausal and does not use estrogen/progestin replacement therapy.  She denies menopausal symptoms.  She has no history of postmenopausal bleeding.  She has had 4 vaginal deliveries.  She has been told that she may have a rectocele.  She does occasionally have to strain with bowel movements.  She does not notice pelvic pressure or bulging.  She denies urinary symptoms.  She has a history of osteopenia of the hip, femoral neck, and radius.  She has had no atraumatic fractures.  Her serum calcium is elevated.  She has osteoarthritis and has had right hip replacement and bilateral knee replacements.    SCREENING TESTS    Year 2012   Age                         PAP                         HPV high risk                         Mammogram       benign                  MJ score                         Breast MRI                         Lipids                         Vitamin D                         Colonoscopy                         DEXA  Frax (hip/any)        osteopenia                 Ovarian Screen                             She exercises regularly: no.  She wears her seat belt: yes.  She has concerns about domestic violence: no.  She has noticed changes in height: no    GYN screening history:  · Last mammogram: was done on approximately 2018 and the result was: Birads I (Normal).  · Last DEXA: was done on approximately 2019 and the results were: osteopenia of hips, osteopenia of spine and osteopenia of the radius.    No Additional Complaints Reported    The following portions of the patient's history were reviewed and updated as appropriate:vital signs and   She  has a past medical history of Anemia, Anxiety, Atypical chest pain,  Atypical chest pain, Diverticulosis, Diverticulosis, Dyslipidemia, GERD (gastroesophageal reflux disease), GERD without esophagitis, Gout, Gout,  4 para 4, Hypertension, Hypothyroidism, Hypothyroidism, Iron deficiency anemia, Joint pain, knee, Mild anxiety, Nontoxic single thyroid nodule, Osteoarthritis, Pain, lower leg, Parathyroid adenoma, Pharyngitis, Pulmonary nodule, Spondylolisthesis, Tobacco use, Urinary tract infection, and Vaginal candidiasis.  She does not have any pertinent problems on file.  She  has a past surgical history that includes Total hip arthroplasty; tonsillectomy and adenoidectomy; Back surgery; Total knee arthroplasty (Right); Tonsillectomy and adenoidectomy; Total hip arthroplasty (Right); Total knee arthroplasty (Left); and Total Knee Arthroplasty Revision (Left).  Her family history includes Aortic aneurysm in her father; Breast cancer (age of onset: 35) in her maternal aunt; Breast cancer (age of onset: 70) in her paternal aunt; Coronary artery disease in her mother; Heart attack in her mother; Heart disease in her brother, sister, and sister; Multiple myeloma in her brother; Other in her brother, father, and sister.  She  reports that she quit smoking about 59 years ago. Her smoking use included cigarettes. She has never used smokeless tobacco. She reports that she does not drink alcohol or use drugs.  Current Outpatient Medications   Medication Sig Dispense Refill   • amLODIPine (NORVASC) 10 MG tablet TAKE ONE TABLET BY MOUTH ONCE DAILY 90 tablet 3   • aspirin 81 MG EC tablet Take 81 mg by mouth Daily.     • benazepril (LOTENSIN) 20 MG tablet TAKE 1 TABLET BY MOUTH ONCE DAILY 90 tablet 0   • Cholecalciferol (VITAMIN D3) 5000 UNITS capsule capsule Take 2,000 Units by mouth Daily.     • levothyroxine (SYNTHROID, LEVOTHROID) 75 MCG tablet Take 1 tablet by mouth Daily. 90 tablet 3   • LORazepam (ATIVAN) 0.5 MG tablet Take 1 tablet by mouth Every 8 (Eight) Hours As Needed for  "Anxiety. 30 tablet 2   • meloxicam (MOBIC) 7.5 MG tablet TAKE 1 TABLET BY MOUTH ONCE DAILY 30 tablet 5   • Misc Natural Products (OSTEO BI-FLEX JOINT SHIELD PO) Take  by mouth Daily.     • pantoprazole (PROTONIX) 40 MG EC tablet TAKE ONE TABLET BY MOUTH ONCE DAILY 90 tablet 3     No current facility-administered medications for this visit.      She is allergic to codeine; lipitor [atorvastatin]; and lortab  [hydrocodone-acetaminophen]..    Review of Systems  A comprehensive review of systems was taken.  Constitutional: negative for fever, chills, activity change, appetite change, fatigue and unexpected weight change.  Respiratory: negative  Cardiovascular: negative  Gastrointestinal: positive for constipation  Genitourinary:negative  Musculoskeletal:positive for back pain, neck pain and stiff joints  Behavioral/Psych: negative       /78   Ht 150.1 cm (59.1\")   Wt 66.8 kg (147 lb 3.2 oz)   LMP  (LMP Unknown)   Breastfeeding? No   BMI 29.63 kg/m²     Physical Exam    General:  alert; cooperative; well developed; well nourished   Skin:  No suspicious lesions seen   Thyroid: normal to inspection and palpation   Lungs:  clear to auscultation bilaterally   Heart:  regular rate and rhythm, S1, S2 normal, no murmur, click, rub or gallop   Breasts:  Examined in supine position  Symmetric without masses or skin dimpling  Nipples normal without inversion, lesions or discharge  There are no palpable axillary nodes   Abdomen: soft, non-tender; no masses  no umbilical or inguinal hernias are present  no hepato-splenomegaly   Pelvis: Clinical staff was present for exam  External genitalia:  normal appearance of the external genitalia including Bartholin's and [place]'s glands.  Vaginal:  atrophic mucosal changes are present;  Cervix:  normal appearance.  Uterus:  normal size, shape and consistency. anteverted;  Adnexa:  non palpable bilaterally.  Rectal:  anus visually normal appearing. recto-vaginal exam unremarkable and " confirms findings;  Cystocele GRADE 1  Rectocele GRADE 2  Uterine GRADE 1     Lab Review   CBC results, CMP results and TSH results    Imaging  Mammogram results         ASSESSMENT  Problems Addressed this Visit        Digestive    Rectocele       Genitourinary    Menopause - Primary      Other Visit Diagnoses     Encounter for gynecological examination without abnormal finding                  Substance History:   reports that she quit smoking about 59 years ago. Her smoking use included cigarettes. She has never used smokeless tobacco.   reports that she does not drink alcohol.   reports that she does not use drugs.    Substance use counseling is not indicated based on patient history.      PLAN    · Medications prescribed this encounter:  No orders of the defined types were placed in this encounter.  · I have given the patient information about fall prevention due to her osteopenia  · I have recommended monthly self breast assessment and annual breast imaging  · I have discussed the pelvic organ prolapse findings with the patient including a grade 2 rectocele, a grade 1 midline cystocele, and grade 1 uterine prolapse.  I have counseled her that if these conditions worsen she will require surgical intervention.  At the present time she is asymptomatic and desires to be observed.  · Calcium, 600 mg/ Vit. D, 400 IU daily; regular weight-bearing exercise  · Follow up: 12 month(s)  *Please note that portions of this documentation may have been completed with a voice recognition program.  Efforts were made to edit this dictation, but occasional words may have been mistranscribed.       This note was electronically signed.    ALLA Felipe MD  October 9, 2019  10:05 AM

## 2019-10-17 ENCOUNTER — TELEPHONE (OUTPATIENT)
Dept: INTERNAL MEDICINE | Facility: CLINIC | Age: 79
End: 2019-10-17

## 2019-10-17 ENCOUNTER — APPOINTMENT (OUTPATIENT)
Dept: CARDIOLOGY | Facility: HOSPITAL | Age: 79
End: 2019-10-17

## 2019-10-17 ENCOUNTER — HOSPITAL ENCOUNTER (EMERGENCY)
Facility: HOSPITAL | Age: 79
Discharge: HOME | End: 2019-10-17
Attending: EMERGENCY MEDICINE | Admitting: EMERGENCY MEDICINE

## 2019-10-17 VITALS
OXYGEN SATURATION: 97 % | SYSTOLIC BLOOD PRESSURE: 144 MMHG | RESPIRATION RATE: 16 BRPM | WEIGHT: 140 LBS | DIASTOLIC BLOOD PRESSURE: 82 MMHG | TEMPERATURE: 98.7 F | HEIGHT: 60 IN | HEART RATE: 89 BPM | BODY MASS INDEX: 27.48 KG/M2

## 2019-10-17 DIAGNOSIS — I10 ELEVATED BLOOD PRESSURE READING WITH DIAGNOSIS OF HYPERTENSION: ICD-10-CM

## 2019-10-17 DIAGNOSIS — I82.492 ACUTE DEEP VEIN THROMBOSIS (DVT) OF OTHER SPECIFIED VEIN OF LEFT LOWER EXTREMITY (HCC): ICD-10-CM

## 2019-10-17 DIAGNOSIS — I82.812 THROMBOSIS OF LEFT SAPHENOUS VEIN: Primary | ICD-10-CM

## 2019-10-17 LAB
BH CV LOW VAS LEFT LESSER SAPH VESSEL: 1
BH CV LOWER VASCULAR LEFT COMMON FEMORAL AUGMENT: NORMAL
BH CV LOWER VASCULAR LEFT COMMON FEMORAL COMPRESS: NORMAL
BH CV LOWER VASCULAR LEFT COMMON FEMORAL PHASIC: NORMAL
BH CV LOWER VASCULAR LEFT COMMON FEMORAL SPONT: NORMAL
BH CV LOWER VASCULAR LEFT DISTAL FEMORAL AUGMENT: NORMAL
BH CV LOWER VASCULAR LEFT DISTAL FEMORAL COMPRESS: NORMAL
BH CV LOWER VASCULAR LEFT DISTAL FEMORAL PHASIC: NORMAL
BH CV LOWER VASCULAR LEFT DISTAL FEMORAL SPONT: NORMAL
BH CV LOWER VASCULAR LEFT GASTRONEMIUS COMPRESS: NORMAL
BH CV LOWER VASCULAR LEFT GREATER SAPH AK COMPRESS: NORMAL
BH CV LOWER VASCULAR LEFT GREATER SAPH BK COMPRESS: NORMAL
BH CV LOWER VASCULAR LEFT LESSER SAPH COMPRESS: NORMAL
BH CV LOWER VASCULAR LEFT MID FEMORAL AUGMENT: NORMAL
BH CV LOWER VASCULAR LEFT MID FEMORAL COMPRESS: NORMAL
BH CV LOWER VASCULAR LEFT MID FEMORAL PHASIC: NORMAL
BH CV LOWER VASCULAR LEFT MID FEMORAL SPONT: NORMAL
BH CV LOWER VASCULAR LEFT PERONEAL COMPRESS: NORMAL
BH CV LOWER VASCULAR LEFT POPLITEAL AUGMENT: NORMAL
BH CV LOWER VASCULAR LEFT POPLITEAL COMPRESS: NORMAL
BH CV LOWER VASCULAR LEFT POPLITEAL PHASIC: NORMAL
BH CV LOWER VASCULAR LEFT POPLITEAL SPONT: NORMAL
BH CV LOWER VASCULAR LEFT POSTERIOR TIBIAL COMPRESS: NORMAL
BH CV LOWER VASCULAR LEFT PROFUNDA FEMORAL AUGMENT: NORMAL
BH CV LOWER VASCULAR LEFT PROFUNDA FEMORAL COMPRESS: NORMAL
BH CV LOWER VASCULAR LEFT PROFUNDA FEMORAL PHASIC: NORMAL
BH CV LOWER VASCULAR LEFT PROFUNDA FEMORAL SPONT: NORMAL
BH CV LOWER VASCULAR LEFT PROXIMAL FEMORAL AUGMENT: NORMAL
BH CV LOWER VASCULAR LEFT PROXIMAL FEMORAL COMPRESS: NORMAL
BH CV LOWER VASCULAR LEFT PROXIMAL FEMORAL PHASIC: NORMAL
BH CV LOWER VASCULAR LEFT PROXIMAL FEMORAL SPONT: NORMAL
BH CV LOWER VASCULAR LEFT SAPHENOFEMORAL JUNCTION AUGMENT: NORMAL
BH CV LOWER VASCULAR LEFT SAPHENOFEMORAL JUNCTION COMPRESS: NORMAL
BH CV LOWER VASCULAR LEFT SAPHENOFEMORAL JUNCTION PHASIC: NORMAL
BH CV LOWER VASCULAR LEFT SAPHENOFEMORAL JUNCTION SPONT: NORMAL
BH CV LOWER VASCULAR RIGHT COMMON FEMORAL AUGMENT: NORMAL
BH CV LOWER VASCULAR RIGHT COMMON FEMORAL COMPRESS: NORMAL
BH CV LOWER VASCULAR RIGHT COMMON FEMORAL PHASIC: NORMAL
BH CV LOWER VASCULAR RIGHT COMMON FEMORAL SPONT: NORMAL

## 2019-10-17 PROCEDURE — 99283 EMERGENCY DEPT VISIT LOW MDM: CPT

## 2019-10-17 PROCEDURE — 93971 EXTREMITY STUDY: CPT | Performed by: INTERNAL MEDICINE

## 2019-10-17 PROCEDURE — 93971 EXTREMITY STUDY: CPT

## 2019-10-17 RX ADMIN — APIXABAN 10 MG: 5 TABLET, FILM COATED ORAL at 18:15

## 2019-10-17 NOTE — TELEPHONE ENCOUNTER
Pt has been having leg/calf pain that has persisted for the last week. She is having trouble walking and would like to know if there is a spot today or tomorrow that we can fit her in. She does not want to see anyone other than Dr. Angulo and his next office visit is 10/22/2019.    Leslee can be reached at [phone].

## 2019-10-17 NOTE — ED PROVIDER NOTES
Subjective   The patient presents to the emergency department with swelling and aching pain of the left lower extremity.  Patient reports the pain started spontaneously within the last 24 hours.  Patient reports pain which is aching in the back of the left knee.  Patient called primary care and was referred here for evaluation for possible DVT.  The patient denies history of DVT.  No recent immobilization, surgeries, coagulopathy, or recent hormone therapy.  Patient denies any recent long travel.  The patient does have a history of multiple joint replacements of the knees and left hip but no recent surgery.  Patient denies any chest pain or shortness of breath.  No other acute symptoms or complaints reported.        History provided by:  Patient and relative      Review of Systems   Constitutional: Negative.    Respiratory: Negative.    Cardiovascular: Positive for leg swelling. Negative for chest pain.   Gastrointestinal: Negative.    Musculoskeletal:        LLE pain/swelling.    Skin: Negative.    Neurological: Negative.    Hematological: Does not bruise/bleed easily.   Psychiatric/Behavioral: Negative.    All other systems reviewed and are negative.      Past Medical History:   Diagnosis Date   • Anemia    • Anxiety    • Atypical chest pain     Normal exercise Cardiolite stress test and echocardiogram in .   • Atypical chest pain    • Diverticulosis    • Diverticulosis     History of diverticulosis.   • Dyslipidemia    • GERD (gastroesophageal reflux disease)    • GERD without esophagitis    • Gout    • Gout     History of gout.   •  4 para 4    • Hypertension    • Hypothyroidism     On chronic replacement therapy.   • Hypothyroidism     Hypothyroidism/chronic replacement.   • Iron deficiency anemia     Iron deficiency anemia.   • Joint pain, knee    • Mild anxiety     Mild chronic anxiety.   • Nontoxic single thyroid nodule    • Osteoarthritis    • Pain, lower leg    • Parathyroid adenoma    •  Pharyngitis    • Pulmonary nodule     CT scan of the chest of 2013 reports new noncalcified nodule in the right lung base measuring 8 x 5 mm and a stable noncalcified nodule in the left mid lung field measuring 4-5 mm, follow-up CT scan of 2014 reports interval resolution of the right basilar lung nodule and stable calcified nodule in the left upper lobe.   • Spondylolisthesis     Spondylolisthesis/degenerative disc disease.   • Tobacco use     History of brief tobacco use, in the patient’s 20s:    • Urinary tract infection    • Vaginal candidiasis        Allergies   Allergen Reactions   • Codeine    • Lipitor [Atorvastatin] Myalgia   • Lortab  [Hydrocodone-Acetaminophen]        Past Surgical History:   Procedure Laterality Date   • BACK SURGERY     • TONSILLECTOMY AND ADENOIDECTOMY     • TONSILLECTOMY AND ADENOIDECTOMY     • TOTAL HIP ARTHROPLASTY     • TOTAL HIP ARTHROPLASTY Right     Right hip replacement.   • TOTAL KNEE ARTHROPLASTY Right    • TOTAL KNEE ARTHROPLASTY Left    • TOTAL KNEE ARTHROPLASTY REVISION Left        Family History   Problem Relation Age of Onset   • Coronary artery disease Mother    • Heart attack Mother    • Aortic aneurysm Father    • Other Father         ruptured AAA   • Heart disease Sister         Stent placement   • Heart disease Brother         CABG   • Heart disease Sister         Stent placement   • Other Sister         2 sisters who have had cardiac stent placement   • Other Brother         CABG   • Breast cancer Maternal Aunt 35   • Breast cancer Paternal Aunt 70   • Multiple myeloma Brother    • Ovarian cancer Neg Hx        Social History     Socioeconomic History   • Marital status:      Spouse name: Not on file   • Number of children: Not on file   • Years of education: Not on file   • Highest education level: Not on file   Tobacco Use   • Smoking status: Former Smoker     Types: Cigarettes     Last attempt to quit: 1960     Years since quittin.8   •  Smokeless tobacco: Never Used   • Tobacco comment: Smoked briefly in her 20's   Substance and Sexual Activity   • Alcohol use: No   • Drug use: No   • Sexual activity: Defer   Social History Narrative    ** Merged History Encounter **                Objective   Physical Exam   Constitutional: She is oriented to person, place, and time. She appears well-developed and well-nourished. No distress.   Cardiovascular: Normal rate, regular rhythm, normal heart sounds and intact distal pulses.   Pulmonary/Chest: Effort normal and breath sounds normal. No respiratory distress.   Musculoskeletal: Normal range of motion. She exhibits edema.   Mild edema noted in the left ankle with mild tenderness in the popliteal region but no significant findings otherwise.  No phlegmasia.  No palpable cords.  Neurovascularly intact.   Neurological: She is alert and oriented to person, place, and time.   Skin: Skin is warm and dry. Capillary refill takes less than 2 seconds.   Psychiatric: She has a normal mood and affect. Her behavior is normal.   Nursing note and vitals reviewed.      Procedures           ED Course  ED Course as of Oct 17 1807   Thu Oct 17, 2019   1725 Per report from echo, the patient has clot of the saphenous vein.  I am currently calling at this time for clarification of location and proximity to the saphenofemoral junction. Duplex Venous Lower Extremity - Left CAR [RS]   1726 Update from the vascular technician advises that the clot was within about 2 cm of the saphenofemoral junction.  Thus, this will be managed as a DVT. Duplex Venous Lower Extremity - Left CAR [RS]   1801 I evaluated talk with the patient.  She is in no distress.  Findings of proximal saphenous DVT.  Patient qualifies for the 10 mg loading doses and then 5 mg twice a day.  I did discuss the potential for bleeding risk as well as follow-up and return instructions with the patient and family that are at bedside. I had a discussion with the  patient/family regarding diagnosis, diagnostic results, treatment plan, and medications.  The patient/family indicated understanding of these instructions.  I spent adequate time at the bedside proceeding discharge necessary to personally discuss the aftercare instructions, giving patient education, providing explanations of the results of our evaluations/findings, and my decision making to assure that the patient/family understand the plan of care.  Time was allotted to answer questions at that time and throughout the ED course.  Emphasis was placed on timely follow-up after discharge.  I also discussed the potential for the development of an acute emergent condition requiring further evaluation, admission, or even surgical intervention. I discussed that we found nothing during the visit today indicating the need for further workup, admission, or the presence of an unstable medical condition.  I encouraged the patient to return to the emergency department immediately for ANY concerns, worsening, new complaints, or if symptoms persist and unable to seek follow-up in a timely fashion.  The patient/family expressed understanding and agreement with this plan.   [RS]      ED Course User Index  [RS] Bryce Cordero MD                  MDM  Number of Diagnoses or Management Options  Acute deep vein thrombosis (DVT) of other specified vein of left lower extremity (CMS/HCC):   Elevated blood pressure reading with diagnosis of hypertension:   Thrombosis of left saphenous vein:      Amount and/or Complexity of Data Reviewed  Tests in the radiology section of CPT®: reviewed  Discussion of test results with the performing providers: yes        Final diagnoses:   Thrombosis of left saphenous vein   Acute deep vein thrombosis (DVT) of other specified vein of left lower extremity (CMS/HCC)   Elevated blood pressure reading with diagnosis of hypertension              Bryce Cordero MD  10/17/19 9513

## 2019-10-17 NOTE — TELEPHONE ENCOUNTER
Pt advised to see ED due to high risk of blood clot per Nelly Carty. Leslee thanked us and had a good verbal understanding.

## 2019-10-20 PROBLEM — N81.10 PELVIC ORGAN PROLAPSE QUANTIFICATION STAGE 1 CYSTOCELE: Status: ACTIVE | Noted: 2019-10-20

## 2019-10-22 ENCOUNTER — OFFICE VISIT (OUTPATIENT)
Dept: INTERNAL MEDICINE | Facility: CLINIC | Age: 79
End: 2019-10-22

## 2019-10-22 VITALS
RESPIRATION RATE: 20 BRPM | BODY MASS INDEX: 28.51 KG/M2 | DIASTOLIC BLOOD PRESSURE: 78 MMHG | SYSTOLIC BLOOD PRESSURE: 128 MMHG | HEART RATE: 82 BPM | WEIGHT: 146 LBS

## 2019-10-22 DIAGNOSIS — I25.10 CORONARY ARTERY DISEASE INVOLVING NATIVE CORONARY ARTERY OF NATIVE HEART WITHOUT ANGINA PECTORIS: Primary | ICD-10-CM

## 2019-10-22 DIAGNOSIS — I80.02 THROMBOPHLEBITIS OF SUPERFICIAL VEINS OF LEFT LOWER EXTREMITY: ICD-10-CM

## 2019-10-22 PROCEDURE — 99213 OFFICE O/P EST LOW 20 MIN: CPT | Performed by: INTERNAL MEDICINE

## 2019-10-22 NOTE — PROGRESS NOTES
Subjective   Leslee Wu is a 78 y.o. female.     History of Present Illness   Went to the ER last week with swelling and pain in the left leg mainly below the knee.  They found she had a superficial thrombophlebitis in the saphenous vein right above the knee.  She has been started on eliquis and is some better.  All the rest is ok.    The following portions of the patient's history were reviewed and updated as appropriate: allergies, current medications, past medical history, past social history and problem list.    Review of Systems   Constitutional: Negative.  Negative for fatigue and fever.   Respiratory: Negative.  Negative for cough, chest tightness, shortness of breath and wheezing.    Cardiovascular: Positive for leg swelling (as noted.). Negative for chest pain and palpitations.   Gastrointestinal: Negative for abdominal distention and abdominal pain.       Objective   Physical Exam   Constitutional: She appears well-developed and well-nourished.   Neck: Normal range of motion. Neck supple.   Cardiovascular: Normal rate, regular rhythm and normal heart sounds. Exam reveals no gallop and no friction rub.   No murmur heard.  Pulmonary/Chest: Effort normal and breath sounds normal. No respiratory distress. She has no wheezes. She has no rales.   Abdominal: She exhibits no distension and no mass. There is no tenderness. There is no guarding.   Musculoskeletal: Tenderness: mild left calf and mildly tender posteriorly.   Neurological: She is alert.   Nursing note and vitals reviewed.        Assessment/Plan   Leslee was seen today for er follow up.    Diagnoses and all orders for this visit:    Coronary artery disease involving native coronary artery of native heart without angina pectoris  Stable, no change.    Thrombophlebitis of superficial veins of left lower extremity  Stable on therapy.  I believe she should remain on eliquis for at least 2 months.  Discussed with her.  Recheck here 2 months.

## 2019-10-24 ENCOUNTER — OFFICE VISIT (OUTPATIENT)
Dept: CARDIOLOGY | Facility: CLINIC | Age: 79
End: 2019-10-24

## 2019-10-24 VITALS
DIASTOLIC BLOOD PRESSURE: 65 MMHG | WEIGHT: 146 LBS | HEIGHT: 60 IN | BODY MASS INDEX: 28.66 KG/M2 | SYSTOLIC BLOOD PRESSURE: 109 MMHG | HEART RATE: 87 BPM

## 2019-10-24 DIAGNOSIS — I10 ESSENTIAL HYPERTENSION: ICD-10-CM

## 2019-10-24 DIAGNOSIS — I80.02 THROMBOPHLEBITIS OF SUPERFICIAL VEINS OF LEFT LOWER EXTREMITY: Primary | ICD-10-CM

## 2019-10-24 PROCEDURE — 99213 OFFICE O/P EST LOW 20 MIN: CPT | Performed by: INTERNAL MEDICINE

## 2019-10-24 NOTE — PROGRESS NOTES
"  OFFICE FOLLOW UP     Date of Encounter:10/24/2019     Name: Leslee Wu  : 1940  Address: [facility] [address] [facility] [address]    PCP: Salima Pretty MD  [address]  [facility] [address]    Leslee Wu is a 78 y.o. female.    Chief Complaint: Follow up of HTN, HLD, LLE DVT     Problem List:   1.   History of atypical chest pain  A. Echocardiogram, : LVEF 90%  B. Abnormal calcium score, 2013.  C. MPS, 2013: No inducible ischemia, LVEF 70%  2.   Hypertension  3.   Hypothyroidism/chronmic replacement  4.   GERD  5.   History of brief tobacco use, in patient's 20s:  A. Noncalcified lung nodule found on chest CT, , at the left lung base and the mid left lung field.  B. Evaluation by Dr. Ivan Mota, 2013 with serial CT scans.  C. Chest CT, 2013: Noncalcified nodules smaller than prior studies.  D. Essentially \"normal\" CT of the chest, 2014.  6.   Osteoarthrosis  7.   Iron deficiency anemia.  8.   Spondylolisthesis/degenerative disc disease.  9.   Mild chronic anxiety.  10. History of diverticulosis  11. History of gout.  12. Left lower extremity ?DVT 2019    A. LE venous duplex 10/17/19: discordant data superficial vs DVT   12. Surgical History:                        A. Back surgery/                        B. Right TKR.                        C. T&A.                        D. Right hip replacement.                        E. Left TKR.     Allergies:  Allergies   Allergen Reactions   • Codeine    • Lipitor [Atorvastatin] Myalgia   • Lortab  [Hydrocodone-Acetaminophen]      Current Medications:  •  amLODIPine (NORVASC) 10 MG tablet, TAKE ONE TABLET BY MOUTH ONCE DAILY  •  apixaban (ELIQUIS) 5 MG tablet tablet, 10mg PO BID for 1 week, then 5mg PO BID  •  aspirin 81 MG EC tablet, Take 81 mg by mouth Daily., Disp: , Rfl:   •  benazepril (LOTENSIN) 20 MG tablet, TAKE 1 TABLET BY MOUTH ONCE DAILY, Disp: 90 tablet, Rfl: 0  •  Cholecalciferol (VITAMIN " "D3) 50 MCG (2000 UT) capsule, Take 2,000 Units by mouth Daily., Disp: , Rfl:   •  levothyroxine (SYNTHROID, LEVOTHROID) 75 MCG tablet, Take 1 tablet by mouth Daily., Disp: 90 tablet, Rfl: 3  •  LORazepam (ATIVAN) 0.5 MG tablet, Take 1 tablet by mouth Every 8 (Eight) Hours As Needed for Anxiety. (Patient taking differently: Take 0.5 mg by mouth As Needed for Anxiety.), Disp: 30 tablet, Rfl: 2  •  Misc Natural Products (OSTEO BI-FLEX JOINT SHIELD PO), Take  by mouth Daily., Disp: , Rfl:   •  pantoprazole (PROTONIX) 40 MG EC tablet, TAKE ONE TABLET BY MOUTH ONCE DAILY, Disp: 90 tablet, Rfl: 3    History of Present Illness:            Mrs. Wu presents for scheduled follow up. About 1 week ago she presented to the ED with left calf pain and swelling in her left leg. She was diagnosed with a DVT and placed on Eliquis. She reports the pain is better, however she continues to have some calf pain and LLE edema. Venous duplex report has been reviewed and this is discordant for DVT vs superficial thrombophlebitis.     The following portions of the patient's history were reviewed and updated as appropriate: allergies, current medications and problem list.    ROS: Pertinent positives as listed in the HPI.  All other systems reviewed and negative.    Objective:  Vitals:    10/24/19 1137 10/24/19 1138   BP: 129/76 109/65   BP Location: Left arm Left arm   Patient Position: Sitting Standing   Pulse: 78 87   Weight: 66.2 kg (146 lb)    Height: 152.4 cm (60\")      Physical Exam:  GENERAL: Alert, cooperative, in no acute distress.   HEENT: Normocephalic, no adenopathy, no jugular venous distention  HEART: No discrete PMI is noted. Regular rhythm, normal rate, and no murmurs, gallops, or rubs.   LUNGS: Clear to auscultation bilaterally. No wheezing, rales or ronchi.  ABDOMEN: Soft, bowel sounds present, non-tender   NEUROLOGIC: No focal abnormalities involving strength or sensation are noted.   EXTREMITIES: No clubbing, cyanosis, LLE " edema noted compared to RLE.     Diagnostic Data:      Procedures      Assessment and Plan:     1. ?LLE DVT: recent venous duplex from 10/17 reviewed and is discordant for DVT vs superficial phlebitis. We will refer her for a LLE venous duplex to be done in Dr. Miles's office. She will remain on Eliquis in the meantime and we will call her with these results.   2. HTN: well controlled. Continue current regimen.   3. Follow up in 1 year or sooner as needed. We will call her with results of her venous duplex in the meantime and further recommendations.     Scribed for Zeb Solis MD by Gladis Adams PA-C. 10/24/2019  4:50 PM

## 2019-11-07 ENCOUNTER — TELEPHONE (OUTPATIENT)
Dept: CARDIOLOGY | Facility: CLINIC | Age: 79
End: 2019-11-07

## 2019-11-11 RX ORDER — ACYCLOVIR 200 MG/1
CAPSULE ORAL
Qty: 20 CAPSULE | Refills: 1 | Status: SHIPPED | OUTPATIENT
Start: 2019-11-11 | End: 2020-08-04

## 2019-11-15 ENCOUNTER — TELEPHONE (OUTPATIENT)
Dept: INTERNAL MEDICINE | Facility: CLINIC | Age: 79
End: 2019-11-15

## 2019-11-15 ENCOUNTER — APPOINTMENT (OUTPATIENT)
Dept: CARDIOLOGY | Facility: HOSPITAL | Age: 79
End: 2019-11-15

## 2019-11-15 ENCOUNTER — HOSPITAL ENCOUNTER (EMERGENCY)
Facility: HOSPITAL | Age: 79
Discharge: HOME | End: 2019-11-15
Attending: EMERGENCY MEDICINE | Admitting: EMERGENCY MEDICINE

## 2019-11-15 VITALS
HEIGHT: 63 IN | SYSTOLIC BLOOD PRESSURE: 145 MMHG | RESPIRATION RATE: 16 BRPM | WEIGHT: 140 LBS | DIASTOLIC BLOOD PRESSURE: 67 MMHG | HEART RATE: 78 BPM | OXYGEN SATURATION: 100 % | TEMPERATURE: 98.2 F | BODY MASS INDEX: 24.8 KG/M2

## 2019-11-15 DIAGNOSIS — S86.812A STRAIN OF CALF MUSCLE, LEFT, INITIAL ENCOUNTER: Primary | ICD-10-CM

## 2019-11-15 PROCEDURE — 93971 EXTREMITY STUDY: CPT | Performed by: INTERNAL MEDICINE

## 2019-11-15 PROCEDURE — 93971 EXTREMITY STUDY: CPT

## 2019-11-15 PROCEDURE — 99282 EMERGENCY DEPT VISIT SF MDM: CPT

## 2019-11-15 RX ORDER — OXYCODONE HYDROCHLORIDE AND ACETAMINOPHEN 5; 325 MG/1; MG/1
1 TABLET ORAL EVERY 6 HOURS PRN
Qty: 8 TABLET | Refills: 0 | Status: SHIPPED | OUTPATIENT
Start: 2019-11-15 | End: 2019-11-20

## 2019-11-15 NOTE — TELEPHONE ENCOUNTER
Tell her it is almost impossible that she has another blood clot since she is on eliquis.  I can see her on Monday,  If she want to be seen today she can go to urgent care.

## 2019-11-15 NOTE — ED PROVIDER NOTES
Subjective   Leslee Wu is an 79 y.o. female who presents to the ED with c/o leg pain. On 10/17/2019 the patient was found to have a blood clot in her left lower extremity via an ultrasound. She was prescribed Eliquis and initially improved but recently she has had increased swelling in her left calf and increased pain in her left lower extremity exacerbated by ambulating. The patient had an ultrasound performed today but she has not gotten the results back yet. She has a past medical history of anemia, dyslipidemia, pulmonary nodule, hypertension, and mild anxiety. The patient has had a total knee arthroplasty on both of her knees and a right sided total hip arthroplasty. There are no other acute complaints at this time.        History provided by:  Patient, parent and relative  Leg Pain   Location:  Leg  Time since incident:  1 week  Injury: no    Leg location:  L leg and L lower leg  Pain details:     Quality:  Aching    Radiates to:  Does not radiate    Severity:  Moderate    Onset quality:  Gradual    Duration:  1 week    Timing:  Constant    Progression:  Worsening  Chronicity:  Recurrent  Dislocation: no    Foreign body present:  No foreign bodies  Tetanus status:  Unknown  Prior injury to area:  No  Relieved by:  Nothing  Worsened by:  Bearing weight (ambulating)  Ineffective treatments:  Rest  Associated symptoms: stiffness and swelling    Associated symptoms: no back pain, no decreased ROM, no fatigue, no fever, no itching, no muscle weakness, no neck pain, no numbness and no tingling    Risk factors: recent illness    Risk factors: no frequent fractures and no obesity    Risk factors comment:  Recently diagnosed with a blood clot in her left lower extremity      Review of Systems   Constitutional: Negative for fatigue and fever.   Cardiovascular: Positive for leg swelling. Negative for chest pain and palpitations.   Musculoskeletal: Positive for stiffness. Negative for back pain and neck pain.         Patient complains of pain in her left lower extremity, more specifically her left calf.   Skin: Negative for itching.   All other systems reviewed and are negative.      Past Medical History:   Diagnosis Date   • Anemia    • Anxiety    • Atypical chest pain     Normal exercise Cardiolite stress test and echocardiogram in .   • Atypical chest pain    • Diverticulosis    • Diverticulosis     History of diverticulosis.   • Dyslipidemia    • GERD (gastroesophageal reflux disease)    • GERD without esophagitis    • Gout    • Gout     History of gout.   •  4 para 4    • Hypertension    • Hypothyroidism     On chronic replacement therapy.   • Hypothyroidism     Hypothyroidism/chronic replacement.   • Iron deficiency anemia     Iron deficiency anemia.   • Joint pain, knee    • Mild anxiety     Mild chronic anxiety.   • Nontoxic single thyroid nodule    • Osteoarthritis    • Pain, lower leg    • Parathyroid adenoma    • Pharyngitis    • Pulmonary nodule     CT scan of the chest of 2013 reports new noncalcified nodule in the right lung base measuring 8 x 5 mm and a stable noncalcified nodule in the left mid lung field measuring 4-5 mm, follow-up CT scan of 2014 reports interval resolution of the right basilar lung nodule and stable calcified nodule in the left upper lobe.   • Spondylolisthesis     Spondylolisthesis/degenerative disc disease.   • Tobacco use     History of brief tobacco use, in the patient’s 20s:    • Urinary tract infection    • Vaginal candidiasis        Allergies   Allergen Reactions   • Codeine    • Lipitor [Atorvastatin] Myalgia   • Lortab  [Hydrocodone-Acetaminophen]        Past Surgical History:   Procedure Laterality Date   • BACK SURGERY     • TONSILLECTOMY AND ADENOIDECTOMY     • TONSILLECTOMY AND ADENOIDECTOMY     • TOTAL HIP ARTHROPLASTY     • TOTAL HIP ARTHROPLASTY Right     Right hip replacement.   • TOTAL KNEE ARTHROPLASTY Right    • TOTAL KNEE ARTHROPLASTY Left    • TOTAL  KNEE ARTHROPLASTY REVISION Left        Family History   Problem Relation Age of Onset   • Coronary artery disease Mother    • Heart attack Mother    • Aortic aneurysm Father    • Other Father         ruptured AAA   • Heart disease Sister         Stent placement   • Heart disease Brother         CABG   • Heart disease Sister         Stent placement   • Other Sister         2 sisters who have had cardiac stent placement   • Other Brother         CABG   • Breast cancer Maternal Aunt 35   • Breast cancer Paternal Aunt 70   • Multiple myeloma Brother    • Ovarian cancer Neg Hx        Social History     Socioeconomic History   • Marital status:      Spouse name: Not on file   • Number of children: Not on file   • Years of education: Not on file   • Highest education level: Not on file   Tobacco Use   • Smoking status: Former Smoker     Types: Cigarettes     Last attempt to quit: 1960     Years since quittin.9   • Smokeless tobacco: Never Used   • Tobacco comment: Smoked briefly in her 20's   Substance and Sexual Activity   • Alcohol use: No   • Drug use: No   • Sexual activity: Defer   Social History Narrative    ** Merged History Encounter **              Objective   Physical Exam   Constitutional: She is oriented to person, place, and time. She appears well-developed and well-nourished. No distress.   HENT:   Head: Normocephalic and atraumatic.   Eyes: Conjunctivae are normal. No scleral icterus.   Neck: Normal range of motion. Neck supple.   Cardiovascular: Normal rate, regular rhythm and normal heart sounds.   No murmur heard.  Distal pulses are palpable.   Pulmonary/Chest: Effort normal and breath sounds normal. No respiratory distress.   Musculoskeletal: Normal range of motion. She exhibits tenderness. She exhibits no edema.        Left lower leg: She exhibits tenderness. She exhibits no deformity.   Mild tenderness to palpation of the left gastrocnemius.  No defect, ecchymosis, or hematoma.    Neurological: She is alert and oriented to person, place, and time.   Skin: Skin is warm and dry. No ecchymosis noted. No erythema.   Well formed scar over the left patella.  No redness or induration of the left lower extremity.   Psychiatric: She has a normal mood and affect. Her behavior is normal.   Nursing note and vitals reviewed.      Procedures         ED Course  ED Course as of Nov 16 0046   Fri Nov 15, 2019   1728 Request # : [id number]  ADELITA query complete. Treatment plan to include limited course of prescribed  controlled substance. Risks including addiction, benefits, and alternatives presented to patient.    [DAVID]      ED Course User Index  [DAVID] Ivan Garcia PA       Recent Results (from the past 24 hour(s))   Duplex Venous Lower Extremity - Left CAR    Collection Time: 11/15/19  4:50 PM   Result Value Ref Range    BSA 1.7 m^2     CV ECHO ISELA - BZI_BMI 24.8 kilograms/m^2     CV ECHO ISELA - BSA(HAYCOCK) 1.7 m^2     CV ECHO ISELA - BZI_METRIC_WEIGHT 63.5 kg     CV ECHO ISLEA - BZI_METRIC_HEIGHT 160.0 cm    Right Profunda Femoral Spont Y     Right Profunda Femoral Phasic Y     Right Profunda Femoral Augment Y     Left Common Femoral Spont Y     Left Common Femoral Phasic Y     Left Common Femoral Augment Y     Left Common Femoral Compress C     Left Saphenofemoral Junction Spont Y     Left Saphenofemoral Junction Compress C     Left Profunda Femoral Spont Y     Left Profunda Femoral Phasic Y     Left Profunda Femoral Augment Y     Left Profunda Femoral Compress C     Left Proximal Femoral Spont Y     Left Proximal Femoral Phasic Y     Left Proximal Femoral Augment Y     Left Proximal Femoral Compress C     Left Mid Femoral Spont Y     Left Mid Femoral Phasic Y     Left Mid Femoral Augment Y     Left Mid Femoral Compress C     Left Distal Femoral Spont Y     Left Distal Femoral Phasic Y     Left Distal Femoral Augment Y     Left Distal Femoral Compress C     Left Popliteal Spont Y     Left  "Popliteal Phasic Y     Left Popliteal Augment Y     Left Popliteal Compress C     Left Posterior Tibial Compress C     Left Peroneal Compress C     Left GastronemiusSoleal Compress C     Left Greater Saph AK Compress C     Left Greater Saph BK Compress C     Left Lesser Saph Compress C     Right Common Femoral Compress C     Right Common Femoral Augment Y     Right Common Femoral Phasic Y     Right Common Femoral Spont Y      Note: In addition to lab results from this visit, the labs listed above may include labs taken at another facility or during a different encounter within the last 24 hours. Please correlate lab times with ED admission and discharge times for further clarification of the services performed during this visit.    No orders to display     Vitals:    11/15/19 1342 11/15/19 1532   BP: 145/67    BP Location: Left arm    Patient Position: Sitting    Pulse: 78    Resp: 16    Temp: 98.2 °F (36.8 °C)    TempSrc: Oral    SpO2: 100%    Weight: 63.5 kg (140 lb) 63.5 kg (140 lb)   Height: 160 cm (63\") 160 cm (63\")     Medications - No data to display  ECG/EMG Results (last 24 hours)     ** No results found for the last 24 hours. **        No orders to display                     MDM  Number of Diagnoses or Management Options  Strain of calf muscle, left, initial encounter: new and requires workup     Amount and/or Complexity of Data Reviewed  Tests in the radiology section of CPT®: ordered and reviewed  Discuss the patient with other providers: yes        Final diagnoses:   Strain of calf muscle, left, initial encounter       Documentation assistance provided by adrianna Abbasi.  Information recorded by the adrianna was done at my direction and has been verified and validated by me.     Shaji Abbasi  11/15/19 [id number]       Ivan Garcia PA  11/16/19 0046    "

## 2019-11-15 NOTE — TELEPHONE ENCOUNTER
Patient called and stated she thinks she has a cyst behind her knee. Patient also has concerns about her leg swelling. Patient is worried she has another blood clot. Patient stated that she had one a few weeks ago. Patient wanted to get in to be seen today. However we have no openings. I spoke with the nurse who recommended the patient to go to the Emergency Room. I told patient our recommendation. Patient understood. Patient still wanted to be scheduled with Dr. Angulo. Patient is now scheduled to see him on Monday.

## 2019-11-16 LAB
BH CV ECHO MEAS - BSA(HAYCOCK): 1.7 M^2
BH CV ECHO MEAS - BSA: 1.7 M^2
BH CV ECHO MEAS - BZI_BMI: 24.8 KILOGRAMS/M^2
BH CV ECHO MEAS - BZI_METRIC_HEIGHT: 160 CM
BH CV ECHO MEAS - BZI_METRIC_WEIGHT: 63.5 KG
BH CV LOWER VASCULAR LEFT COMMON FEMORAL AUGMENT: NORMAL
BH CV LOWER VASCULAR LEFT COMMON FEMORAL COMPRESS: NORMAL
BH CV LOWER VASCULAR LEFT COMMON FEMORAL PHASIC: NORMAL
BH CV LOWER VASCULAR LEFT COMMON FEMORAL SPONT: NORMAL
BH CV LOWER VASCULAR LEFT DISTAL FEMORAL AUGMENT: NORMAL
BH CV LOWER VASCULAR LEFT DISTAL FEMORAL COMPRESS: NORMAL
BH CV LOWER VASCULAR LEFT DISTAL FEMORAL PHASIC: NORMAL
BH CV LOWER VASCULAR LEFT DISTAL FEMORAL SPONT: NORMAL
BH CV LOWER VASCULAR LEFT GASTRONEMIUS COMPRESS: NORMAL
BH CV LOWER VASCULAR LEFT GREATER SAPH AK COMPRESS: NORMAL
BH CV LOWER VASCULAR LEFT GREATER SAPH BK COMPRESS: NORMAL
BH CV LOWER VASCULAR LEFT LESSER SAPH COMPRESS: NORMAL
BH CV LOWER VASCULAR LEFT MID FEMORAL AUGMENT: NORMAL
BH CV LOWER VASCULAR LEFT MID FEMORAL COMPRESS: NORMAL
BH CV LOWER VASCULAR LEFT MID FEMORAL PHASIC: NORMAL
BH CV LOWER VASCULAR LEFT MID FEMORAL SPONT: NORMAL
BH CV LOWER VASCULAR LEFT PERONEAL COMPRESS: NORMAL
BH CV LOWER VASCULAR LEFT POPLITEAL AUGMENT: NORMAL
BH CV LOWER VASCULAR LEFT POPLITEAL COMPRESS: NORMAL
BH CV LOWER VASCULAR LEFT POPLITEAL PHASIC: NORMAL
BH CV LOWER VASCULAR LEFT POPLITEAL SPONT: NORMAL
BH CV LOWER VASCULAR LEFT POSTERIOR TIBIAL COMPRESS: NORMAL
BH CV LOWER VASCULAR LEFT PROFUNDA FEMORAL AUGMENT: NORMAL
BH CV LOWER VASCULAR LEFT PROFUNDA FEMORAL COMPRESS: NORMAL
BH CV LOWER VASCULAR LEFT PROFUNDA FEMORAL PHASIC: NORMAL
BH CV LOWER VASCULAR LEFT PROFUNDA FEMORAL SPONT: NORMAL
BH CV LOWER VASCULAR LEFT PROXIMAL FEMORAL AUGMENT: NORMAL
BH CV LOWER VASCULAR LEFT PROXIMAL FEMORAL COMPRESS: NORMAL
BH CV LOWER VASCULAR LEFT PROXIMAL FEMORAL PHASIC: NORMAL
BH CV LOWER VASCULAR LEFT PROXIMAL FEMORAL SPONT: NORMAL
BH CV LOWER VASCULAR LEFT SAPHENOFEMORAL JUNCTION COMPRESS: NORMAL
BH CV LOWER VASCULAR LEFT SAPHENOFEMORAL JUNCTION SPONT: NORMAL
BH CV LOWER VASCULAR RIGHT COMMON FEMORAL AUGMENT: NORMAL
BH CV LOWER VASCULAR RIGHT COMMON FEMORAL COMPRESS: NORMAL
BH CV LOWER VASCULAR RIGHT COMMON FEMORAL PHASIC: NORMAL
BH CV LOWER VASCULAR RIGHT COMMON FEMORAL SPONT: NORMAL
BH CV LOWER VASCULAR RIGHT PROFUNDA FEMORAL AUGMENT: NORMAL
BH CV LOWER VASCULAR RIGHT PROFUNDA FEMORAL PHASIC: NORMAL
BH CV LOWER VASCULAR RIGHT PROFUNDA FEMORAL SPONT: NORMAL

## 2019-11-18 ENCOUNTER — TELEPHONE (OUTPATIENT)
Dept: ENDOCRINOLOGY | Facility: CLINIC | Age: 79
End: 2019-11-18

## 2019-11-18 ENCOUNTER — OFFICE VISIT (OUTPATIENT)
Dept: INTERNAL MEDICINE | Facility: CLINIC | Age: 79
End: 2019-11-18

## 2019-11-18 VITALS
HEART RATE: 88 BPM | BODY MASS INDEX: 24.8 KG/M2 | DIASTOLIC BLOOD PRESSURE: 68 MMHG | RESPIRATION RATE: 19 BRPM | SYSTOLIC BLOOD PRESSURE: 146 MMHG | WEIGHT: 140 LBS

## 2019-11-18 DIAGNOSIS — I80.02 THROMBOPHLEBITIS OF SUPERFICIAL VEINS OF LEFT LOWER EXTREMITY: Primary | ICD-10-CM

## 2019-11-18 DIAGNOSIS — Z23 NEED FOR INFLUENZA VACCINATION: ICD-10-CM

## 2019-11-18 PROCEDURE — 99213 OFFICE O/P EST LOW 20 MIN: CPT | Performed by: INTERNAL MEDICINE

## 2019-11-18 PROCEDURE — G0008 ADMIN INFLUENZA VIRUS VAC: HCPCS | Performed by: INTERNAL MEDICINE

## 2019-11-18 PROCEDURE — 90653 IIV ADJUVANT VACCINE IM: CPT | Performed by: INTERNAL MEDICINE

## 2019-11-18 RX ORDER — MELOXICAM 7.5 MG/1
7.5 TABLET ORAL DAILY
Refills: 5 | COMMUNITY
Start: 2019-11-07 | End: 2020-01-24

## 2019-11-18 NOTE — TELEPHONE ENCOUNTER
Patient states that she had a missed call from the office but no VM was left. Patient wants call back to discuss what call was in regards to.

## 2019-11-18 NOTE — PROGRESS NOTES
Subjective   Leslee Wu is a 79 y.o. female.     History of Present Illness   A few weeks ago the patient went to the ER because of pain and swelling in her left leg.  She was found to have  Superficial thrombophlebitis and has been on eliquis.  Recently she became concerned that she had recurrence with increased pain and swelling in the left calf.  She went to the ER where a repeat ultrasound showed no clots at all.  She is still hurting some and is to see the orthopedist tomorrow.    The following portions of the patient's history were reviewed and updated as appropriate: allergies, current medications, past medical history, past social history and problem list.    Review of Systems   Constitutional: Negative.    Respiratory: Negative for cough, chest tightness, shortness of breath and wheezing.    Cardiovascular: Positive for leg swelling. Negative for chest pain and palpitations.   Gastrointestinal: Negative.  Negative for abdominal distention and abdominal pain.       Objective   Physical Exam   Cardiovascular: Normal rate.   Pulmonary/Chest: Effort normal and breath sounds normal.   Musculoskeletal: She exhibits edema (trace swelling left leg.  Mild pain in left calf.).   Neurological: She is alert.   Nursing note and vitals reviewed.        Assessment/Plan   Leslee was seen today for pain behind knee.    Diagnoses and all orders for this visit:    Thrombophlebitis of superficial veins of left lower extremity  I discussed the fact that it could take some more time for the pain to subside but that there is no danger since the clot is gone.    Need for influenza vaccination  -     Fluad Tri 65yr+    Reassured.  15 minutes spent in face to face discussion and evaluation.

## 2019-11-20 ENCOUNTER — OFFICE VISIT (OUTPATIENT)
Dept: ENDOCRINOLOGY | Facility: CLINIC | Age: 79
End: 2019-11-20

## 2019-11-20 VITALS
WEIGHT: 148.2 LBS | DIASTOLIC BLOOD PRESSURE: 64 MMHG | SYSTOLIC BLOOD PRESSURE: 110 MMHG | HEIGHT: 63 IN | OXYGEN SATURATION: 99 % | BODY MASS INDEX: 26.26 KG/M2 | HEART RATE: 65 BPM

## 2019-11-20 DIAGNOSIS — E55.9 VITAMIN D DEFICIENCY: Primary | ICD-10-CM

## 2019-11-20 DIAGNOSIS — D35.1 PARATHYROID ADENOMA: ICD-10-CM

## 2019-11-20 DIAGNOSIS — E03.9 ACQUIRED HYPOTHYROIDISM: ICD-10-CM

## 2019-11-20 PROCEDURE — 82306 VITAMIN D 25 HYDROXY: CPT | Performed by: INTERNAL MEDICINE

## 2019-11-20 PROCEDURE — 99213 OFFICE O/P EST LOW 20 MIN: CPT | Performed by: INTERNAL MEDICINE

## 2019-11-20 PROCEDURE — 84443 ASSAY THYROID STIM HORMONE: CPT | Performed by: INTERNAL MEDICINE

## 2019-11-20 PROCEDURE — 84439 ASSAY OF FREE THYROXINE: CPT | Performed by: INTERNAL MEDICINE

## 2019-11-20 PROCEDURE — 80053 COMPREHEN METABOLIC PANEL: CPT | Performed by: INTERNAL MEDICINE

## 2019-11-20 PROCEDURE — 83970 ASSAY OF PARATHORMONE: CPT | Performed by: INTERNAL MEDICINE

## 2019-11-20 NOTE — PROGRESS NOTES
Hypothyroidism (Follow UP) and Hyperparathyroidism    Subjective   Leslee Wu is a 79 y.o. female is here today for follow-up and ultrasound.  Hypercalcemia and primary hyperparathyroidism. She has seen parathyroid surgeon and endocrinologist in 2000 and deferred surgery at that time.    BMD: T-score -1 at the hip and - 1.4 at the femoral neck, 1.5 at the spine (she is s/p spinal fusion). statistically significant 11.5% decrease in the bone mineral density of the left hip compared to the prior 2011 exam and a  23.6% decrease compared to the baseline 1998 exam. Imaging reviewed and discussed with the patient   The ten year fracture risk assessment is calculated at 12% for a major  osteoporotic fracture and 2.4% for a hip fracture.      Calcium and PTH remained stable over the last several years. stable calcium and PTH over the last year 62-->71 in 1 year, calcium 10.8 --> 11.2 over the last 2 years. Parathyroid adenoma is slowly growing according to the u/s.  Parathyroid adenoma is seen by u/s - 1 cm right inferior parathyroid adenoma. Patient is asymptomatic.     History of thyroid nodules since 1995 and 2000, s/p 2 benign biopsies. She was seeing Dr Argueta who ordered yearly u/s.FNA in 1996 - hypocellular specimen      Hypothyroidism dx in 1995, on synthroid 75 mcg.       Patient denies having any new complaints. She is exercising regularly on the stationary bycycle. Eats healthy, takes Vit D 2000 every day. Left knee surgery went well. Calcium inpatient was 10.6.       K/o left leg pain - DVT dx in 10/2019 - started Eliquis.    Hypothyroidism   Associated symptoms include arthralgias (knee pain ).     Medications:    Current Outpatient Medications:   •  acyclovir (ZOVIRAX) 200 MG capsule, TAKE ONE CAPSULE FIVE TIMES A DAY FOR 4 DAYS, Disp: 20 capsule, Rfl: 1  •  amLODIPine (NORVASC) 10 MG tablet, TAKE ONE TABLET BY MOUTH ONCE DAILY, Disp: 90 tablet, Rfl: 3  •  apixaban (ELIQUIS) 5 MG tablet tablet, 10mg PO BID for  "1 week, then 5mg PO BID, Disp: 74 tablet, Rfl: 0  •  aspirin 81 MG EC tablet, Take 81 mg by mouth Daily., Disp: , Rfl:   •  benazepril (LOTENSIN) 20 MG tablet, TAKE 1 TABLET BY MOUTH ONCE DAILY, Disp: 90 tablet, Rfl: 0  •  Cholecalciferol (VITAMIN D3) 50 MCG (2000 UT) capsule, Take 2,000 Units by mouth Daily., Disp: , Rfl:   •  levothyroxine (SYNTHROID, LEVOTHROID) 75 MCG tablet, Take 1 tablet by mouth Daily., Disp: 90 tablet, Rfl: 3  •  LORazepam (ATIVAN) 0.5 MG tablet, Take 1 tablet by mouth Every 8 (Eight) Hours As Needed for Anxiety. (Patient taking differently: Take 0.5 mg by mouth As Needed for Anxiety.), Disp: 30 tablet, Rfl: 2  •  meloxicam (MOBIC) 7.5 MG tablet, Take 7.5 mg by mouth Daily., Disp: , Rfl: 5  •  Misc Natural Products (OSTEO BI-FLEX JOINT SHIELD PO), Take  by mouth Daily., Disp: , Rfl:   •  pantoprazole (PROTONIX) 40 MG EC tablet, TAKE ONE TABLET BY MOUTH ONCE DAILY, Disp: 90 tablet, Rfl: 3    The following portions of the patient's history were reviewed and updated as appropriate: allergies, current medications, past family history, past medical history, past social history, past surgical history and problem list.    Review of Systems   Musculoskeletal: Positive for arthralgias (knee pain ) and back pain.   All other systems reviewed and are negative.      Objective   Blood pressure 110/64, pulse 65, height 160 cm (63\"), weight 67.2 kg (148 lb 3.2 oz), SpO2 99 %, not currently breastfeeding.   Physical Exam   Constitutional: She is oriented to person, place, and time. She appears well-developed and well-nourished.   HENT:   Head: Normocephalic and atraumatic.   Eyes: Conjunctivae are normal.   Neck: Normal range of motion. No muscular tenderness present. Carotid bruit is not present. Thyromegaly (left 1 cm hard easy movable nodule. ) present. No thyroid mass present.   The neck is supple and no assimetry visualized   Cardiovascular: Normal rate, regular rhythm and normal heart sounds. "   Pulmonary/Chest: Effort normal and breath sounds normal.   Lymphadenopathy:     She has no cervical adenopathy.   Neurological: She is alert and oriented to person, place, and time.   Psychiatric: She has a normal mood and affect. Thought content normal.   Vitals reviewed.        Results for orders placed or performed in visit on 11/20/19   TSH   Result Value Ref Range    TSH 1.260 0.270 - 4.200 uIU/mL   Comprehensive Metabolic Panel   Result Value Ref Range    Glucose 83 65 - 99 mg/dL    BUN 14 8 - 23 mg/dL    Creatinine 0.87 0.57 - 1.00 mg/dL    Sodium 140 136 - 145 mmol/L    Potassium 4.7 3.5 - 5.2 mmol/L    Chloride 104 98 - 107 mmol/L    CO2 25.1 22.0 - 29.0 mmol/L    Calcium 11.2 (H) 8.6 - 10.5 mg/dL    Total Protein 7.4 6.0 - 8.5 g/dL    Albumin 4.30 3.50 - 5.20 g/dL    ALT (SGPT) 14 1 - 33 U/L    AST (SGOT) 19 1 - 32 U/L    Alkaline Phosphatase 102 39 - 117 U/L    Total Bilirubin 0.2 0.2 - 1.2 mg/dL    eGFR Non African Amer 63 >60 mL/min/1.73    Globulin 3.1 gm/dL    A/G Ratio 1.4 g/dL    BUN/Creatinine Ratio 16.1 7.0 - 25.0    Anion Gap 10.9 5.0 - 15.0 mmol/L   PTH, Intact   Result Value Ref Range    PTH, Intact 77.2 (H) 15.0 - 65.0 pg/mL   Vitamin D 25 Hydroxy   Result Value Ref Range    25 Hydroxy, Vitamin D 61.1 30.0 - 100.0 ng/ml   T4, Free   Result Value Ref Range    Free T4 1.57 0.93 - 1.70 ng/dL             Thyroid ultrasound 4/2017              Real time high resolution imaging of the thyroid gland was performed in transverse and longitudinal planes.      The right lobe measured 4.09 cm L x 1.67 cm AP x 1.47 cm in TV dimension.    The isthmus measured 0.25 cm in thickness.    The left thyroid lobe measured 4.16 cm L x 1.01 cm AP x 1.45 cm in TV dimension.    Thyroid gland is homogeneous and contains single nodule in the left lobe and small nodule  On the right .    Nodule 1   is located in the left mid lobe and measures 1.67 x 1.2 x 1.3 cm (L X AP X TV).  This nodule is cystic, homogeneous,  hypoechoic with well-defined borders and egg-shell calcifications, and Grade I vascularity on Color Flow Doppler.  It has artifacts:  posterior acoustic enhancement    Nodule 2  is located in the right upper lobe and measures 0.33 x 0.23 x 0.31 cm (L X AP X TV).  This nodule is solid, homogeneous, hypoechoic with well-defined margins, and Grade II vascularity on Color Flow Doppler.  It has no artifacts    Posterior to the right thyroid lobe hypoechoic extrathyroidal structure is located inferiorly, representing parathyroid gland and measures 1.07 x 0.44 x and 0.61 L x AP x TV cm.  Doppler flow representing polar artery is seen.       No pathologic lymph nodes were seen.      Assessment: Stable left calcified nodule. Slightly enlarged right inferior parathyroid adenoma.   Repeat ultrasound in 18 months.       Assessment/Plan:    Problem List Items Addressed This Visit        Digestive    Vitamin D deficiency - Primary    Relevant Orders    Vitamin D 25 Hydroxy (Completed)       Endocrine    Acquired hypothyroidism    Overview     Description: A.  On chronic replacement therapy.         Relevant Orders    TSH (Completed)    T4, Free (Completed)    Parathyroid adenoma    Relevant Orders    Comprehensive Metabolic Panel (Completed)    PTH, Intact (Completed)      Primary hyperparathyroidism - stable calcium and PTH over the last year 62-->71-->77.2 in 1 year, calcium 10.8 --> 11.2 -->10.6-->11.1 over the last 2 years. Parathyroid adenoma is slowly growing according to the u/s, 1 cm in size.      Patient is asymptomatic, no dx of osteoporosis (osteopenia in BMD 4/2016), kidney stones or renal impairment.   Ultrasound showed 1 cm right inferior parathyroid adenoma.       Continue with increased fluid intake, Vit D supplements decreased to 1000 daily . Conservative management is appropriate. If calcium increasing, will refer to surgery. Patient is agreeable to the plan.       Cont  levothyroxine 75 and reassess the  symptoms on generic.     Labs today.         F/u in 6-8 months with labs

## 2019-11-21 LAB
25(OH)D3 SERPL-MCNC: 61.1 NG/ML (ref 30–100)
ALBUMIN SERPL-MCNC: 4.3 G/DL (ref 3.5–5.2)
ALBUMIN/GLOB SERPL: 1.4 G/DL
ALP SERPL-CCNC: 102 U/L (ref 39–117)
ALT SERPL W P-5'-P-CCNC: 14 U/L (ref 1–33)
ANION GAP SERPL CALCULATED.3IONS-SCNC: 10.9 MMOL/L (ref 5–15)
AST SERPL-CCNC: 19 U/L (ref 1–32)
BILIRUB SERPL-MCNC: 0.2 MG/DL (ref 0.2–1.2)
BUN BLD-MCNC: 14 MG/DL (ref 8–23)
BUN/CREAT SERPL: 16.1 (ref 7–25)
CALCIUM SPEC-SCNC: 11.2 MG/DL (ref 8.6–10.5)
CHLORIDE SERPL-SCNC: 104 MMOL/L (ref 98–107)
CO2 SERPL-SCNC: 25.1 MMOL/L (ref 22–29)
CREAT BLD-MCNC: 0.87 MG/DL (ref 0.57–1)
GFR SERPL CREATININE-BSD FRML MDRD: 63 ML/MIN/1.73
GLOBULIN UR ELPH-MCNC: 3.1 GM/DL
GLUCOSE BLD-MCNC: 83 MG/DL (ref 65–99)
POTASSIUM BLD-SCNC: 4.7 MMOL/L (ref 3.5–5.2)
PROT SERPL-MCNC: 7.4 G/DL (ref 6–8.5)
PTH-INTACT SERPL-MCNC: 77.2 PG/ML (ref 15–65)
SODIUM BLD-SCNC: 140 MMOL/L (ref 136–145)
T4 FREE SERPL-MCNC: 1.57 NG/DL (ref 0.93–1.7)
TSH SERPL DL<=0.05 MIU/L-ACNC: 1.26 UIU/ML (ref 0.27–4.2)

## 2019-11-27 ENCOUNTER — TELEPHONE (OUTPATIENT)
Dept: INTERNAL MEDICINE | Facility: CLINIC | Age: 79
End: 2019-11-27

## 2019-12-06 ENCOUNTER — TELEPHONE (OUTPATIENT)
Dept: INTERNAL MEDICINE | Facility: CLINIC | Age: 79
End: 2019-12-06

## 2019-12-06 ENCOUNTER — DOCUMENTATION (OUTPATIENT)
Dept: ENDOCRINOLOGY | Facility: CLINIC | Age: 79
End: 2019-12-06

## 2019-12-06 RX ORDER — AMLODIPINE BESYLATE 10 MG/1
TABLET ORAL
Qty: 90 TABLET | Refills: 3 | Status: SHIPPED | OUTPATIENT
Start: 2019-12-06 | End: 2020-11-12

## 2019-12-06 RX ORDER — LEVOTHYROXINE SODIUM 0.07 MG/1
TABLET ORAL
Qty: 90 TABLET | Refills: 3 | Status: SHIPPED | OUTPATIENT
Start: 2019-12-06 | End: 2020-08-04 | Stop reason: SDUPTHER

## 2019-12-06 NOTE — TELEPHONE ENCOUNTER
Is there a way you can put the results all on one page.  It does not allow me to do that.  I would need to print individually.  If not, I will send them to her individually

## 2019-12-16 ENCOUNTER — OFFICE VISIT (OUTPATIENT)
Dept: INTERNAL MEDICINE | Facility: CLINIC | Age: 79
End: 2019-12-16

## 2019-12-16 VITALS
RESPIRATION RATE: 19 BRPM | DIASTOLIC BLOOD PRESSURE: 62 MMHG | BODY MASS INDEX: 26.57 KG/M2 | HEART RATE: 18 BPM | SYSTOLIC BLOOD PRESSURE: 122 MMHG | WEIGHT: 150 LBS

## 2019-12-16 DIAGNOSIS — E03.9 ACQUIRED HYPOTHYROIDISM: ICD-10-CM

## 2019-12-16 DIAGNOSIS — E78.2 MIXED HYPERLIPIDEMIA: ICD-10-CM

## 2019-12-16 DIAGNOSIS — I25.10 CORONARY ARTERY DISEASE INVOLVING NATIVE CORONARY ARTERY OF NATIVE HEART WITHOUT ANGINA PECTORIS: Primary | ICD-10-CM

## 2019-12-16 DIAGNOSIS — E21.3 HYPERPARATHYROIDISM (HCC): ICD-10-CM

## 2019-12-16 DIAGNOSIS — M15.9 PRIMARY OSTEOARTHRITIS INVOLVING MULTIPLE JOINTS: ICD-10-CM

## 2019-12-16 DIAGNOSIS — K21.9 GASTROESOPHAGEAL REFLUX DISEASE WITHOUT ESOPHAGITIS: ICD-10-CM

## 2019-12-16 DIAGNOSIS — I80.02 THROMBOPHLEBITIS OF SUPERFICIAL VEINS OF LEFT LOWER EXTREMITY: ICD-10-CM

## 2019-12-16 DIAGNOSIS — I10 ESSENTIAL HYPERTENSION: ICD-10-CM

## 2019-12-16 PROCEDURE — 99214 OFFICE O/P EST MOD 30 MIN: CPT | Performed by: INTERNAL MEDICINE

## 2019-12-16 PROCEDURE — 36415 COLL VENOUS BLD VENIPUNCTURE: CPT | Performed by: INTERNAL MEDICINE

## 2019-12-16 PROCEDURE — 80061 LIPID PANEL: CPT | Performed by: INTERNAL MEDICINE

## 2019-12-16 PROCEDURE — 85027 COMPLETE CBC AUTOMATED: CPT | Performed by: INTERNAL MEDICINE

## 2019-12-16 NOTE — PROGRESS NOTES
Subjective   Leslee Wu is a 79 y.o. female.     History of Present Illness   For follow up of  CAD, no chest pain, sob or palpitations.  HTN no headaches.  Recent superficial thrombophlebitis improving.  One more month of eliquis.  Hypothyroid on replacement, no sx.  Hyperparathyroidism followed by endo is stable.  Gerd is stable.    The following portions of the patient's history were reviewed and updated as appropriate: allergies, current medications, past family history, past medical history, past social history, past surgical history and problem list.    Review of Systems   Constitutional: Negative.  Negative for fatigue and fever.   Eyes: Negative.  Negative for visual disturbance.   Respiratory: Negative.  Negative for choking, shortness of breath and wheezing.    Cardiovascular: Negative.  Negative for chest pain, palpitations and leg swelling.   Gastrointestinal: Negative.  Negative for abdominal distention and abdominal pain.   Genitourinary: Negative.    Musculoskeletal: Positive for arthralgias.       Objective   Physical Exam   Constitutional: She appears well-developed and well-nourished.   Neck: Normal range of motion. Neck supple.   Cardiovascular: Normal rate, regular rhythm and normal heart sounds. Exam reveals no gallop and no friction rub.   No murmur heard.  Pulmonary/Chest: Effort normal and breath sounds normal. No respiratory distress. She has no wheezes. She has no rales.   Abdominal: Soft. Bowel sounds are normal. She exhibits no distension and no mass. There is no tenderness. There is no guarding.   Musculoskeletal: Normal range of motion. She exhibits no edema.   Neurological: She is alert.   Nursing note and vitals reviewed.        Assessment/Plan   Leslee was seen today for essential hypertension.    Diagnoses and all orders for this visit:    Coronary artery disease involving native coronary artery of native heart without angina pectoris  Stable, no change.    Hyperparathyroidism  (CMS/HCC)  Followed by endo, no change.    Acquired hypothyroidism  Stable, no change.    Primary osteoarthritis involving multiple joints  Stable, no change.    Mixed hyperlipidemia  -     CBC (No Diff)  -     Lipid Panel    Essential hypertension  Stable, no change.    Thrombophlebitis of superficial veins of left lower extremity  Stable, no change.    All problems are stable.  Labs as noted.  Recheck 6 months.

## 2019-12-17 LAB
CHOLEST SERPL-MCNC: 142 MG/DL (ref 0–200)
DEPRECATED RDW RBC AUTO: 41.6 FL (ref 37–54)
ERYTHROCYTE [DISTWIDTH] IN BLOOD BY AUTOMATED COUNT: 13.2 % (ref 12.3–15.4)
HCT VFR BLD AUTO: 33 % (ref 34–46.6)
HDLC SERPL-MCNC: 59 MG/DL (ref 40–60)
HGB BLD-MCNC: 10.2 G/DL (ref 12–15.9)
LDLC SERPL CALC-MCNC: 72 MG/DL (ref 0–100)
LDLC/HDLC SERPL: 1.21 {RATIO}
MCH RBC QN AUTO: 26.9 PG (ref 26.6–33)
MCHC RBC AUTO-ENTMCNC: 30.9 G/DL (ref 31.5–35.7)
MCV RBC AUTO: 87.1 FL (ref 79–97)
PLATELET # BLD AUTO: 346 10*3/MM3 (ref 140–450)
PMV BLD AUTO: 11.6 FL (ref 6–12)
RBC # BLD AUTO: 3.79 10*6/MM3 (ref 3.77–5.28)
TRIGL SERPL-MCNC: 57 MG/DL (ref 0–150)
VLDLC SERPL-MCNC: 11.4 MG/DL (ref 5–40)
WBC NRBC COR # BLD: 5.59 10*3/MM3 (ref 3.4–10.8)

## 2019-12-20 ENCOUNTER — HOSPITAL ENCOUNTER (OUTPATIENT)
Dept: MAMMOGRAPHY | Facility: HOSPITAL | Age: 79
Discharge: HOME | End: 2019-12-20
Admitting: INTERNAL MEDICINE

## 2019-12-20 DIAGNOSIS — Z12.31 VISIT FOR SCREENING MAMMOGRAM: ICD-10-CM

## 2019-12-20 PROCEDURE — 77067 SCR MAMMO BI INCL CAD: CPT

## 2019-12-20 PROCEDURE — 77067 SCR MAMMO BI INCL CAD: CPT | Performed by: RADIOLOGY

## 2019-12-20 PROCEDURE — 77063 BREAST TOMOSYNTHESIS BI: CPT

## 2019-12-20 PROCEDURE — 77063 BREAST TOMOSYNTHESIS BI: CPT | Performed by: RADIOLOGY

## 2020-01-24 RX ORDER — PANTOPRAZOLE SODIUM 40 MG/1
TABLET, DELAYED RELEASE ORAL
Qty: 90 TABLET | Refills: 0 | Status: SHIPPED | OUTPATIENT
Start: 2020-01-24 | End: 2020-02-06

## 2020-01-24 RX ORDER — MELOXICAM 7.5 MG/1
TABLET ORAL
Qty: 30 TABLET | Refills: 0 | Status: SHIPPED | OUTPATIENT
Start: 2020-01-24 | End: 2020-02-06

## 2020-02-06 RX ORDER — BENAZEPRIL HYDROCHLORIDE 20 MG/1
TABLET ORAL
Qty: 90 TABLET | Refills: 3 | Status: SHIPPED | OUTPATIENT
Start: 2020-02-06 | End: 2021-02-23

## 2020-02-06 RX ORDER — MELOXICAM 7.5 MG/1
TABLET ORAL
Qty: 30 TABLET | Refills: 0 | Status: SHIPPED | OUTPATIENT
Start: 2020-02-06 | End: 2020-03-16

## 2020-02-06 RX ORDER — PANTOPRAZOLE SODIUM 40 MG/1
TABLET, DELAYED RELEASE ORAL
Qty: 90 TABLET | Refills: 0 | Status: SHIPPED | OUTPATIENT
Start: 2020-02-06 | End: 2020-07-13

## 2020-02-24 ENCOUNTER — OFFICE VISIT (OUTPATIENT)
Dept: INTERNAL MEDICINE | Facility: CLINIC | Age: 80
End: 2020-02-24

## 2020-02-24 VITALS
TEMPERATURE: 97.9 F | BODY MASS INDEX: 26.04 KG/M2 | HEART RATE: 75 BPM | WEIGHT: 147 LBS | OXYGEN SATURATION: 100 % | RESPIRATION RATE: 16 BRPM | DIASTOLIC BLOOD PRESSURE: 58 MMHG | SYSTOLIC BLOOD PRESSURE: 100 MMHG

## 2020-02-24 DIAGNOSIS — N30.01 ACUTE CYSTITIS WITH HEMATURIA: Primary | ICD-10-CM

## 2020-02-24 LAB
BILIRUB BLD-MCNC: NEGATIVE MG/DL
CLARITY, POC: ABNORMAL
COLOR UR: YELLOW
EXPIRATION DATE: ABNORMAL
GLUCOSE UR STRIP-MCNC: NEGATIVE MG/DL
KETONES UR QL: NEGATIVE
LEUKOCYTE EST, POC: ABNORMAL
Lab: ABNORMAL
NITRITE UR-MCNC: POSITIVE MG/ML
PH UR: 5 [PH] (ref 5–8)
PROT UR STRIP-MCNC: NEGATIVE MG/DL
RBC # UR STRIP: ABNORMAL /UL
SP GR UR: 1.01 (ref 1–1.03)
UROBILINOGEN UR QL: NORMAL

## 2020-02-24 PROCEDURE — 99213 OFFICE O/P EST LOW 20 MIN: CPT | Performed by: NURSE PRACTITIONER

## 2020-02-24 PROCEDURE — 81003 URINALYSIS AUTO W/O SCOPE: CPT | Performed by: NURSE PRACTITIONER

## 2020-02-24 PROCEDURE — 87077 CULTURE AEROBIC IDENTIFY: CPT | Performed by: NURSE PRACTITIONER

## 2020-02-24 PROCEDURE — 87186 SC STD MICRODIL/AGAR DIL: CPT | Performed by: NURSE PRACTITIONER

## 2020-02-24 PROCEDURE — 87086 URINE CULTURE/COLONY COUNT: CPT | Performed by: NURSE PRACTITIONER

## 2020-02-24 RX ORDER — CEFDINIR 300 MG/1
300 CAPSULE ORAL 2 TIMES DAILY
Qty: 20 CAPSULE | Refills: 0 | Status: SHIPPED | OUTPATIENT
Start: 2020-02-24 | End: 2020-06-23 | Stop reason: SDDI

## 2020-02-24 NOTE — PROGRESS NOTES
Chief Complaint   Patient presents with   • Urinary Tract Infection     x2mth frequent urination/ w odor thinks may be blood thinner        Subjective     History of Present Illness   Patient is here today with complaints of urinary frequency over the last week.  She is also malodorous urine over the last month.  Denies hematuria CVA tenderness/flank plain.  Denies fever nausea vomiting change of appetite.  Bowel movements are regular.    The following portions of the patient's history were reviewed and updated as appropriate: allergies, current medications, past family history, past medical history, past social history, past surgical history and problem list.      Current Outpatient Medications:   •  acyclovir (ZOVIRAX) 200 MG capsule, TAKE ONE CAPSULE FIVE TIMES A DAY FOR 4 DAYS, Disp: 20 capsule, Rfl: 1  •  amLODIPine (NORVASC) 10 MG tablet, TAKE 1 TABLET BY MOUTH ONCE DAILY, Disp: 90 tablet, Rfl: 3  •  apixaban (ELIQUIS) 5 MG tablet tablet, 10mg PO BID for 1 week, then 5mg PO BID, Disp: 74 tablet, Rfl: 0  •  aspirin 81 MG EC tablet, Take 81 mg by mouth Daily., Disp: , Rfl:   •  benazepril (LOTENSIN) 20 MG tablet, TAKE 1 TABLET BY MOUTH ONCE DAILY, Disp: 90 tablet, Rfl: 3  •  Cholecalciferol (VITAMIN D3) 50 MCG (2000 UT) capsule, Take 2,000 Units by mouth Daily., Disp: , Rfl:   •  levothyroxine (SYNTHROID, LEVOTHROID) 75 MCG tablet, TAKE 1 TABLET BY MOUTH ONCE DAILY, Disp: 90 tablet, Rfl: 3  •  LORazepam (ATIVAN) 0.5 MG tablet, Take 1 tablet by mouth Every 8 (Eight) Hours As Needed for Anxiety. (Patient taking differently: Take 0.5 mg by mouth As Needed for Anxiety.), Disp: 30 tablet, Rfl: 2  •  meloxicam (MOBIC) 7.5 MG tablet, TAKE 1 TABLET BY MOUTH ONCE DAILY, Disp: 30 tablet, Rfl: 0  •  Misc Natural Products (OSTEO BI-FLEX JOINT SHIELD PO), Take  by mouth Daily., Disp: , Rfl:   •  pantoprazole (PROTONIX) 40 MG EC tablet, TAKE 1 TABLET BY MOUTH ONCE DAILY, Disp: 90 tablet, Rfl: 0  •  cefdinir (OMNICEF) 300 MG  capsule, Take 1 capsule by mouth 2 (Two) Times a Day., Disp: 20 capsule, Rfl: 0    Vitals:    02/24/20 1530   BP: 100/58   Pulse: 75   Resp: 16   Temp: 97.9 °F (36.6 °C)   SpO2: 100%       Body mass index is 26.04 kg/m².        Review of Systems   Constitutional: Negative.    Gastrointestinal: Negative.    Genitourinary: Positive for dysuria, frequency and urgency. Negative for decreased urine volume, difficulty urinating, hematuria, pelvic pressure, urinary incontinence and vaginal discharge.        Malodorous urine urinary frequency   Musculoskeletal: Negative.    Skin: Negative.    Psychiatric/Behavioral: Negative.        Objective   Physical Exam   Constitutional: She is oriented to person, place, and time. She appears well-developed and well-nourished.   HENT:   Head: Normocephalic and atraumatic.   Cardiovascular: Normal rate and regular rhythm.   Pulmonary/Chest: Effort normal and breath sounds normal.   Abdominal: Soft. Bowel sounds are normal. She exhibits no distension and no mass. There is no tenderness. There is no rebound and no guarding. No hernia.   Neurological: She is alert and oriented to person, place, and time.   Skin: Skin is warm and dry. Capillary refill takes 2 to 3 seconds.   Psychiatric: She has a normal mood and affect. Her behavior is normal.   Nursing note and vitals reviewed.          Results for orders placed or performed in visit on 02/24/20   POC Urinalysis Dipstick, Automated   Result Value Ref Range    Color Yellow Yellow, Straw, Dark Yellow, Amparo    Clarity, UA Cloudy (A) Clear    Specific Gravity  1.010 1.005 - 1.030    pH, Urine 5.0 5.0 - 8.0    Leukocytes 500 Deyanira/ul (A) Negative    Nitrite, UA Positive (A) Negative    Protein, POC Negative Negative mg/dL    Glucose, UA Negative Negative, 1000 mg/dL (3+) mg/dL    Ketones, UA Negative Negative    Urobilinogen, UA Normal Normal    Bilirubin Negative Negative    Blood, UA 50 Harish/ul (A) Negative    Lot Number [id number]      Expiration Date 7-31-20         Assessment/Plan   Leslee was seen today for urinary tract infection.    Diagnoses and all orders for this visit:    Acute cystitis with hematuria  -     POC Urinalysis Dipstick, Automated  -     cefdinir (OMNICEF) 300 MG capsule; Take 1 capsule by mouth 2 (Two) Times a Day.  -     Urine Culture - Urine, Urine, Clean Catch        Return if symptoms worsen or fail to improve.  RTC/call  If symptoms worsen  Meds MOA and SE's reviewed and pt v/u

## 2020-02-27 ENCOUNTER — TELEPHONE (OUTPATIENT)
Dept: ENDOCRINOLOGY | Facility: CLINIC | Age: 80
End: 2020-02-27

## 2020-02-27 LAB — BACTERIA SPEC AEROBE CULT: ABNORMAL

## 2020-02-27 NOTE — TELEPHONE ENCOUNTER
Pt called and stated she is taking medication for a UTI right now and wants to know if its okay to go to [employer]. Please advise [phone]

## 2020-03-16 RX ORDER — MELOXICAM 7.5 MG/1
TABLET ORAL
Qty: 30 TABLET | Refills: 0 | Status: SHIPPED | OUTPATIENT
Start: 2020-03-16 | End: 2020-04-27

## 2020-04-27 RX ORDER — MELOXICAM 7.5 MG/1
TABLET ORAL
Qty: 30 TABLET | Refills: 1 | Status: SHIPPED | OUTPATIENT
Start: 2020-04-27 | End: 2020-06-25

## 2020-06-15 NOTE — PROGRESS NOTES
Adult New Patient Visit:  Patient Care Team:  Lynda Mcclure MD as PCP - General (Internal Medicine)  Salima Pretty MD as PCP - Claims Attributed  Eric Angulo MD Christensen, Christian Paul as Consulting Physician (Orthopedic Surgery)  Hermilo Felipe MD as Consulting Physician (Gynecology)  Salima Pretty MD as Consulting Physician (Endocrinology)    Chief Complaint   Patient presents with   • [facility] Care       Leslee Wu is a 79 y.o. female who presents to [facility]. Previous PCP was Dr. Angulo who will be retiring from this practice.    Overdue for [facility]    HPI Issues discussed today:    1.  Hypertension/DVT:  On amlodipine 10 mg daily, benazepril 20 mg daily, aspirin 81 mg daily.  Had unprovoked DVT versus thrombophlebitis in October 2019, started on Eliquis and stopped in Feb per cardiology.  Sees cardiology.  Next appointment 10/24/2020 (Dr. Solis).  BP sometimes 150s over 90s at home.  She denies headache, chest pain, shortness breath, palpitations, PND, lower extremity edema.    2.  Hyperparathyroidism/hypothyroidism/prediabetes/Vit D deficiency:  Sees Dr. Oliver.  Next 7/28/2020.  On levothyroxine 75 mcg daily and vit d 1000 units daily.  TSH   Date Value Ref Range Status   11/20/2019 1.260 0.270 - 4.200 uIU/mL Final     Free T4   Date Value Ref Range Status   11/20/2019 1.57 0.93 - 1.70 ng/dL Final     Lab Results   Component Value Date    HGBA1C 5.80 (H) 05/01/2018       4.  Chronic pain:  Secondary to arthritis.  Takes meloxicam 7.5mg daily. Sx controlled.    5.  GERD:  Takes Protonix 40 mg daily. Under control.    6.  Anxiety:  On Ativan 0.5 mg written every 8 hours as needed but takes qHS PRN (rare use).  Per EMR, last prescribed 6/20/2019 by Dr. Angulo.    7. Anemia:  Chronic per patient.  Stable.  Not on any supplements.  Denies any symptoms of excess blood loss.  Lab Results   Component Value Date    WBC 5.59 12/16/2019    HGB 10.2 (L) 12/16/2019    HCT 33.0 (L)  2019    MCV 87.1 2019     2019     8. Dysuria:  Mentions a few weeks of intermittent dysuria and vulvovaginal itching without discharge, bleeding.  No fevers or chills.  No suprapubic or flank pain.  She has history of UTI would like to be tested for this again.    Review of Systems   Constitutional: Negative for activity change, appetite change and fever.   HENT: Negative for congestion, sneezing and sore throat.    Eyes: Negative for discharge and visual disturbance.   Respiratory: Negative for cough and shortness of breath.    Cardiovascular: Negative for chest pain and palpitations.   Gastrointestinal: Negative for abdominal distention, abdominal pain, blood in stool, constipation, nausea and vomiting.   Endocrine: Negative for cold intolerance and heat intolerance.   Genitourinary: Positive for dysuria.   Musculoskeletal: Positive for arthralgias (chronic/stable). Negative for neck stiffness.   Skin: Negative for rash.   Allergic/Immunologic: Negative for environmental allergies and food allergies.   Neurological: Negative for headache.   Hematological: Negative for adenopathy.   Psychiatric/Behavioral: Negative for depressed mood.        History  Past Medical History:   Diagnosis Date   • Anemia    • Anxiety    • Atypical chest pain     Normal exercise Cardiolite stress test and echocardiogram in .   • Atypical chest pain    • Atypical chest pain     History of atypical chest pain: Echocardiogram, ; LVEF 90%. Abnormal calcium score, 2013. MPS, 2013: No inducible ischemia, LVEF 70%.   • Diverticulosis    • Diverticulosis     History of diverticulosis.   • Diverticulosis of large intestine without hemorrhage 10/21/2016   • Dyslipidemia    • Former smoker 2016   • GERD (gastroesophageal reflux disease)    • GERD without esophagitis    • Gout    • Gout     History of gout.   •  4 para 4    • Hypertension    • Hypothyroidism     On chronic replacement therapy.    • Hypothyroidism     Hypothyroidism/chronic replacement.   • Iron deficiency anemia     Iron deficiency anemia.   • Joint pain, knee    • Menopause 10/9/2019   • Mild anxiety     Mild chronic anxiety.   • Nontoxic single thyroid nodule    • Osteoarthritis    • Pain, lower leg    • Parathyroid adenoma    • Pelvic organ prolapse quantification stage 1 cystocele 10/20/2019    Follows with Dr. Felipe GYN evaluation 10/9 no specific treatment at this time.   • Pharyngitis    • Pulmonary nodule     CT scan of the chest of 08/14/2013 reports new noncalcified nodule in the right lung base measuring 8 x 5 mm and a stable noncalcified nodule in the left mid lung field measuring 4-5 mm, follow-up CT scan of 08/06/2014 reports interval resolution of the right basilar lung nodule and stable calcified nodule in the left upper lobe.   • Rectocele 1/23/2017   • Spondylolisthesis     Spondylolisthesis/degenerative disc disease.   • Tobacco use     History of brief tobacco use, in the patient’s 20s:    • Tobacco use     History of brief tobacco use, in the patient’s 20s: Noncalcified lung nodule found on chest CT, June 2013, at the left lung base and the mid left lung field.  Evaluation by Dr. Ivan Mota, August 2013 with serial CT scans. Chest CT, November 2013: Noncalcified nodules smaller than prior studies. Essentially “normal” CT of the chest, 08/06/2014.    • Urinary tract infection    • Vaginal candidiasis       Past Surgical History:   Procedure Laterality Date   • BACK SURGERY     • TONSILLECTOMY AND ADENOIDECTOMY     • TONSILLECTOMY AND ADENOIDECTOMY     • TOTAL HIP ARTHROPLASTY     • TOTAL HIP ARTHROPLASTY Right     Right hip replacement.   • TOTAL KNEE ARTHROPLASTY Right    • TOTAL KNEE ARTHROPLASTY Left    • TOTAL KNEE ARTHROPLASTY REVISION Left       Allergies   Allergen Reactions   • Codeine    • Lipitor [Atorvastatin] Myalgia   • Lortab  [Hydrocodone-Acetaminophen]       Family History   Problem Relation Age of Onset    • Coronary artery disease Mother    • Heart attack Mother    • Aortic aneurysm Father    • Other Father         ruptured AAA   • Heart disease Sister         Stent placement   • Heart disease Brother         CABG   • Heart disease Sister         Stent placement   • Other Sister         2 sisters who have had cardiac stent placement   • Other Brother         CABG   • Breast cancer Maternal Aunt 35   • Breast cancer Paternal Aunt 70   • Multiple myeloma Brother    • Ovarian cancer Neg Hx       Social History     Socioeconomic History   • Marital status:      Spouse name: Not on file   • Number of children: Not on file   • Years of education: Not on file   • Highest education level: Not on file   Tobacco Use   • Smoking status: Former Smoker     Types: Cigarettes     Last attempt to quit: 1960     Years since quittin.5   • Smokeless tobacco: Never Used   • Tobacco comment: Smoked briefly in her 20's   Substance and Sexual Activity   • Alcohol use: No   • Drug use: No   • Sexual activity: Defer   Social History Narrative    ** Merged History Encounter **             Current Outpatient Medications:   •  acyclovir (ZOVIRAX) 200 MG capsule, TAKE ONE CAPSULE FIVE TIMES A DAY FOR 4 DAYS, Disp: 20 capsule, Rfl: 1  •  amLODIPine (NORVASC) 10 MG tablet, TAKE 1 TABLET BY MOUTH ONCE DAILY, Disp: 90 tablet, Rfl: 3  •  aspirin 81 MG EC tablet, Take 81 mg by mouth Daily., Disp: , Rfl:   •  benazepril (LOTENSIN) 20 MG tablet, TAKE 1 TABLET BY MOUTH ONCE DAILY, Disp: 90 tablet, Rfl: 3  •  Cholecalciferol (VITAMIN D3) 50 MCG (2000 UT) capsule, Take 2,000 Units by mouth Daily., Disp: , Rfl:   •  levothyroxine (SYNTHROID, LEVOTHROID) 75 MCG tablet, TAKE 1 TABLET BY MOUTH ONCE DAILY, Disp: 90 tablet, Rfl: 3  •  LORazepam (ATIVAN) 0.5 MG tablet, Take 1 tablet by mouth Every 8 (Eight) Hours As Needed for Anxiety. (Patient taking differently: Take 0.5 mg by mouth As Needed for Anxiety.), Disp: 30 tablet, Rfl: 2  •  meloxicam  "(MOBIC) 7.5 MG tablet, Take 1 tablet by mouth once daily, Disp: 30 tablet, Rfl: 1  •  Misc Natural Products (OSTEO BI-FLEX JOINT SHIELD PO), Take  by mouth Daily., Disp: , Rfl:   •  pantoprazole (PROTONIX) 40 MG EC tablet, TAKE 1 TABLET BY MOUTH ONCE DAILY, Disp: 90 tablet, Rfl: 0    Health Maintenance   Topic Date Due   • TDAP/TD VACCINES (1 - Tdap) 10/31/1951   • Pneumococcal Vaccine Once at 65 Years Old  10/31/2004   • ZOSTER VACCINE (2 of 3) 02/23/2011   • MEDICARE ANNUAL WELLNESS  04/15/2020   • INFLUENZA VACCINE  08/01/2020   • LIPID PANEL  12/16/2020   • DXA SCAN  04/16/2021   • MAMMOGRAM  12/20/2021       Immunizations  Td/Tdap(Booster Q 10 yrs):  Not covered unless 2/2 trauma.  Pneumovax: Discuss at [facility] [place]  Shringrix:  Discuss at [facility] [place]  Immunization History   Administered Date(s) Administered   • FLUAD TRI 65YR+ 11/18/2019   • Flu Mist 12/12/2013, 11/04/2014, 12/11/2015   • Flu Vaccine Quad PF >18YRS 10/26/2017   • Flu Vaccine Quad PF >36MO 10/26/2016, 10/26/2017   • Fluzone High Dose =>65 Years ([employer] ONLY) 12/11/2015   • Meningococcal Polysaccharide 03/15/2013   • Pneumococcal Conjugate 13-Valent (PCV13) 12/11/2015   • Zostavax 12/29/2010   • influenza Split 10/26/2016       Hemoglobin A1C   Date Value Ref Range Status   05/01/2018 5.80 (H) 4.80 - 5.60 % Final     Lab Results   Component Value Date    CHOL 142 12/16/2019    CHLPL 177 12/11/2015    TRIG 57 12/16/2019    HDL 59 12/16/2019    LDL 72 12/16/2019       Colorectal Screening:  N/A  Pap:  N/A  Mammogram:  N/A  PSA(Over age 50):  N/A  US Aorta (For male smokers, age 65):  N/A  CT for Smoker (Age 55-75, 30pk yr):  N/A  Bone Density/DEXA:   Hep C:  N/A    Vitals:    06/23/20 1012   BP: 108/62   BP Location: Right arm   Patient Position: Sitting   Cuff Size: Adult   Pulse: 76   Resp: 18   Temp: 97.6 °F (36.4 °C)   TempSrc: Temporal   SpO2: 99%   Weight: 66.1 kg (145 lb 12.8 oz)   Height: 160 cm (63\")   PainSc: 0-No pain         Physical Exam "   Constitutional: She is oriented to person, place, and time. She appears well-developed and well-nourished. No distress.   HENT:   Head: Normocephalic and atraumatic.   Right Ear: Tympanic membrane and external ear normal.   Left Ear: Tympanic membrane and external ear normal.   Nose: Nose normal.   Mouth/Throat: Uvula is midline and oropharynx is clear and moist. No oropharyngeal exudate.   Eyes: Conjunctivae are normal. Right eye exhibits no discharge. Left eye exhibits no discharge.   Neck: Normal range of motion. Neck supple.   Cardiovascular: Normal rate, regular rhythm and normal heart sounds. Exam reveals no gallop and no friction rub.   No murmur heard.  Pulmonary/Chest: Effort normal and breath sounds normal. No stridor. No respiratory distress. She has no wheezes. She has no rales.   Abdominal: Soft. Bowel sounds are normal. She exhibits no distension and no mass. There is no tenderness. There is no rebound and no guarding.   Musculoskeletal: She exhibits no edema.   Ambulates with steady gait.   Neurological: She is alert and oriented to person, place, and time.   Skin: Skin is warm and dry. Capillary refill takes less than 2 seconds. She is not diaphoretic.   Psychiatric: She has a normal mood and affect.   Vitals reviewed.       Assessment and Plan: 79 y.o. female here to establish care  Essential hypertension  Stable. Continue amlodipine, benazepril. F/u cardiology as scheduled.  Continue home BP logs and if still persistently elevated may need med adjustments.    Coronary artery disease involving native coronary artery of native heart without angina pectoris  Stable. F/u w/ cardiology as scheduled.    Mixed hyperlipidemia  Stable w/o meds. Continue ASA 81. Cardiology f/u as scheduled.  FLP today.    Gastroesophageal reflux disease without esophagitis  Stable on Protonix.  Continue. Recommend avoiding trigger foods like acidic or spicy foods, head of bed elevation, do not lie flat immediately after  meals etc.     Vitamin D deficiency  Stable vitamin D.  Continue.    Acquired hypothyroidism  Stable on levothyroxine per endocrinology.  Continue.  TSH and free T4 today.    Hyperparathyroidism (CMS/HCC)  Stable.  Follow-up with endocrinology as scheduled.    Prediabetes  Stable.  Repeat A1c today.    Arthritis  Stable on meloxicam.  Continue.    Iron deficiency anemia  Stable, chronic.  Repeat CBC today as well as iron labs.  Call for any new signs/symptoms of bleeding.    Anxiety  Well-controlled on rare lorazepam use.  Discussed with patient that per current office policy she would need an office visit every 3 months, CSA and UDS yearly in the [facility] every 3 months.  This is regardless of whether or not she uses the medication intermittently.  Patient given CSA to review on her own and she will call me if she wishes to proceed.  Informed her that I would not be able to refill until the above are done.  If she does not feel comfortable following through with this, unfortunately she would need to find another primary care provider and/or get a psychiatry referral.    Dysuria  Given chronicity, intermittent symptoms and not associated with suprapubic, flank pain, fever/chills etc. doubt UTI.  However check UA and send for urine culture per patient request.  If negative and symptoms persist will need to return for further work-up.      Return in about 3 months (around 9/23/2020) for Medicare Wellness Visit, As needed if no improvement or new symptoms.    Lynda Mcclure MD  6/23/2020

## 2020-06-23 ENCOUNTER — OFFICE VISIT (OUTPATIENT)
Dept: INTERNAL MEDICINE | Facility: CLINIC | Age: 80
End: 2020-06-23

## 2020-06-23 VITALS
WEIGHT: 145.8 LBS | OXYGEN SATURATION: 99 % | HEART RATE: 76 BPM | HEIGHT: 63 IN | DIASTOLIC BLOOD PRESSURE: 62 MMHG | RESPIRATION RATE: 18 BRPM | BODY MASS INDEX: 25.83 KG/M2 | SYSTOLIC BLOOD PRESSURE: 108 MMHG | TEMPERATURE: 97.6 F

## 2020-06-23 DIAGNOSIS — R73.03 PREDIABETES: ICD-10-CM

## 2020-06-23 DIAGNOSIS — I25.10 CORONARY ARTERY DISEASE INVOLVING NATIVE CORONARY ARTERY OF NATIVE HEART WITHOUT ANGINA PECTORIS: ICD-10-CM

## 2020-06-23 DIAGNOSIS — E55.9 VITAMIN D DEFICIENCY: ICD-10-CM

## 2020-06-23 DIAGNOSIS — E03.9 ACQUIRED HYPOTHYROIDISM: ICD-10-CM

## 2020-06-23 DIAGNOSIS — K21.9 GASTROESOPHAGEAL REFLUX DISEASE WITHOUT ESOPHAGITIS: ICD-10-CM

## 2020-06-23 DIAGNOSIS — I10 ESSENTIAL HYPERTENSION: Primary | ICD-10-CM

## 2020-06-23 DIAGNOSIS — Z79.899 HIGH RISK MEDICATION USE: ICD-10-CM

## 2020-06-23 DIAGNOSIS — F41.9 ANXIETY: ICD-10-CM

## 2020-06-23 DIAGNOSIS — D50.9 IRON DEFICIENCY ANEMIA, UNSPECIFIED IRON DEFICIENCY ANEMIA TYPE: ICD-10-CM

## 2020-06-23 DIAGNOSIS — E21.3 HYPERPARATHYROIDISM (HCC): ICD-10-CM

## 2020-06-23 DIAGNOSIS — E78.2 MIXED HYPERLIPIDEMIA: ICD-10-CM

## 2020-06-23 DIAGNOSIS — M19.90 ARTHRITIS: ICD-10-CM

## 2020-06-23 DIAGNOSIS — R30.0 DYSURIA: ICD-10-CM

## 2020-06-23 PROBLEM — I80.02 THROMBOPHLEBITIS OF SUPERFICIAL VEINS OF LEFT LOWER EXTREMITY: Status: RESOLVED | Noted: 2019-10-22 | Resolved: 2020-06-23

## 2020-06-23 PROBLEM — N81.6 RECTOCELE: Status: RESOLVED | Noted: 2017-01-23 | Resolved: 2020-06-23

## 2020-06-23 PROBLEM — N81.10 PELVIC ORGAN PROLAPSE QUANTIFICATION STAGE 1 CYSTOCELE: Status: RESOLVED | Noted: 2019-10-20 | Resolved: 2020-06-23

## 2020-06-23 PROBLEM — Z78.0 MENOPAUSE: Status: RESOLVED | Noted: 2019-10-09 | Resolved: 2020-06-23

## 2020-06-23 LAB
ALBUMIN SERPL-MCNC: 3.9 G/DL (ref 3.5–5.2)
ALBUMIN/GLOB SERPL: 1.4 G/DL
ALP SERPL-CCNC: 85 U/L (ref 39–117)
ALT SERPL W P-5'-P-CCNC: 8 U/L (ref 1–33)
ANION GAP SERPL CALCULATED.3IONS-SCNC: 7.4 MMOL/L (ref 5–15)
AST SERPL-CCNC: 15 U/L (ref 1–32)
BILIRUB BLD-MCNC: NEGATIVE MG/DL
BILIRUB SERPL-MCNC: 0.3 MG/DL (ref 0.2–1.2)
BUN BLD-MCNC: 13 MG/DL (ref 8–23)
BUN/CREAT SERPL: 14.6 (ref 7–25)
CALCIUM SPEC-SCNC: 10.7 MG/DL (ref 8.6–10.5)
CHLORIDE SERPL-SCNC: 106 MMOL/L (ref 98–107)
CHOLEST SERPL-MCNC: 128 MG/DL (ref 0–200)
CLARITY, POC: CLEAR
CO2 SERPL-SCNC: 23.6 MMOL/L (ref 22–29)
COLOR UR: YELLOW
CREAT BLD-MCNC: 0.89 MG/DL (ref 0.57–1)
EXPIRATION DATE: ABNORMAL
GFR SERPL CREATININE-BSD FRML MDRD: 61 ML/MIN/1.73
GLOBULIN UR ELPH-MCNC: 2.7 GM/DL
GLUCOSE BLD-MCNC: 94 MG/DL (ref 65–99)
GLUCOSE UR STRIP-MCNC: NEGATIVE MG/DL
HBA1C MFR BLD: 5.61 % (ref 4.8–5.6)
HDLC SERPL-MCNC: 48 MG/DL (ref 40–60)
IRON 24H UR-MRATE: 31 MCG/DL (ref 37–145)
IRON SATN MFR SERPL: 7 % (ref 20–50)
KETONES UR QL: NEGATIVE
LDLC SERPL CALC-MCNC: 65 MG/DL (ref 0–100)
LDLC/HDLC SERPL: 1.36 {RATIO}
LEUKOCYTE EST, POC: ABNORMAL
Lab: ABNORMAL
NITRITE UR-MCNC: NEGATIVE MG/ML
PH UR: 5 [PH] (ref 5–8)
POTASSIUM BLD-SCNC: 4.3 MMOL/L (ref 3.5–5.2)
PROT SERPL-MCNC: 6.6 G/DL (ref 6–8.5)
PROT UR STRIP-MCNC: NEGATIVE MG/DL
RBC # UR STRIP: NEGATIVE /UL
SODIUM BLD-SCNC: 137 MMOL/L (ref 136–145)
SP GR UR: 1.01 (ref 1–1.03)
TIBC SERPL-MCNC: 472 MCG/DL (ref 298–536)
TRANSFERRIN SERPL-MCNC: 317 MG/DL (ref 200–360)
TRIGL SERPL-MCNC: 73 MG/DL (ref 0–150)
UROBILINOGEN UR QL: NORMAL
VLDLC SERPL-MCNC: 14.6 MG/DL (ref 5–40)

## 2020-06-23 PROCEDURE — 82728 ASSAY OF FERRITIN: CPT | Performed by: INTERNAL MEDICINE

## 2020-06-23 PROCEDURE — 84443 ASSAY THYROID STIM HORMONE: CPT | Performed by: INTERNAL MEDICINE

## 2020-06-23 PROCEDURE — 84466 ASSAY OF TRANSFERRIN: CPT | Performed by: INTERNAL MEDICINE

## 2020-06-23 PROCEDURE — 87077 CULTURE AEROBIC IDENTIFY: CPT | Performed by: INTERNAL MEDICINE

## 2020-06-23 PROCEDURE — 99214 OFFICE O/P EST MOD 30 MIN: CPT | Performed by: INTERNAL MEDICINE

## 2020-06-23 PROCEDURE — 80061 LIPID PANEL: CPT | Performed by: INTERNAL MEDICINE

## 2020-06-23 PROCEDURE — 83540 ASSAY OF IRON: CPT | Performed by: INTERNAL MEDICINE

## 2020-06-23 PROCEDURE — 85027 COMPLETE CBC AUTOMATED: CPT | Performed by: INTERNAL MEDICINE

## 2020-06-23 PROCEDURE — 84439 ASSAY OF FREE THYROXINE: CPT | Performed by: INTERNAL MEDICINE

## 2020-06-23 PROCEDURE — 36415 COLL VENOUS BLD VENIPUNCTURE: CPT | Performed by: INTERNAL MEDICINE

## 2020-06-23 PROCEDURE — 81003 URINALYSIS AUTO W/O SCOPE: CPT | Performed by: INTERNAL MEDICINE

## 2020-06-23 PROCEDURE — 83036 HEMOGLOBIN GLYCOSYLATED A1C: CPT | Performed by: INTERNAL MEDICINE

## 2020-06-23 PROCEDURE — 80053 COMPREHEN METABOLIC PANEL: CPT | Performed by: INTERNAL MEDICINE

## 2020-06-23 PROCEDURE — 87186 SC STD MICRODIL/AGAR DIL: CPT | Performed by: INTERNAL MEDICINE

## 2020-06-23 PROCEDURE — 87086 URINE CULTURE/COLONY COUNT: CPT | Performed by: INTERNAL MEDICINE

## 2020-06-23 NOTE — ASSESSMENT & PLAN NOTE
Given chronicity, intermittent symptoms and not associated with suprapubic, flank pain, fever/chills etc. doubt UTI.  However check UA and send for urine culture per patient request.  If negative and symptoms persist will need to return for further work-up.

## 2020-06-23 NOTE — ASSESSMENT & PLAN NOTE
Stable on Protonix.  Continue. Recommend avoiding trigger foods like acidic or spicy foods, head of bed elevation, do not lie flat immediately after meals etc.

## 2020-06-23 NOTE — ASSESSMENT & PLAN NOTE
Well-controlled on rare lorazepam use.  Discussed with patient that per current office policy she would need an office visit every 3 months, CSA and UDS yearly in the [facility] every 3 months.  This is regardless of whether or not she uses the medication intermittently.  Patient given CSA to review on her own and she will call me if she wishes to proceed.  Informed her that I would not be able to refill until the above are done.  If she does not feel comfortable following through with this, unfortunately she would need to find another primary care provider and/or get a psychiatry referral.

## 2020-06-23 NOTE — ASSESSMENT & PLAN NOTE
Stable, chronic.  Repeat CBC today as well as iron labs.  Call for any new signs/symptoms of bleeding.

## 2020-06-23 NOTE — ASSESSMENT & PLAN NOTE
Stable. Continue amlodipine, benazepril. F/u cardiology as scheduled.  Continue home BP logs and if still persistently elevated may need med adjustments.

## 2020-06-24 ENCOUNTER — TELEPHONE (OUTPATIENT)
Dept: INTERNAL MEDICINE | Facility: CLINIC | Age: 80
End: 2020-06-24

## 2020-06-24 DIAGNOSIS — D50.9 IRON DEFICIENCY ANEMIA, UNSPECIFIED IRON DEFICIENCY ANEMIA TYPE: Primary | ICD-10-CM

## 2020-06-24 LAB
DEPRECATED RDW RBC AUTO: 42.5 FL (ref 37–54)
ERYTHROCYTE [DISTWIDTH] IN BLOOD BY AUTOMATED COUNT: 14.7 % (ref 12.3–15.4)
FERRITIN SERPL-MCNC: 14.4 NG/ML (ref 13–150)
HCT VFR BLD AUTO: 31.4 % (ref 34–46.6)
HGB BLD-MCNC: 9.9 G/DL (ref 12–15.9)
MCH RBC QN AUTO: 25.3 PG (ref 26.6–33)
MCHC RBC AUTO-ENTMCNC: 31.5 G/DL (ref 31.5–35.7)
MCV RBC AUTO: 80.3 FL (ref 79–97)
PLATELET # BLD AUTO: 335 10*3/MM3 (ref 140–450)
PMV BLD AUTO: 12.3 FL (ref 6–12)
RBC # BLD AUTO: 3.91 10*6/MM3 (ref 3.77–5.28)
T4 FREE SERPL-MCNC: 1.34 NG/DL (ref 0.93–1.7)
TSH SERPL DL<=0.05 MIU/L-ACNC: 1.34 UIU/ML (ref 0.27–4.2)
WBC NRBC COR # BLD: 4.28 10*3/MM3 (ref 3.4–10.8)

## 2020-06-24 RX ORDER — FERROUS SULFATE 325(65) MG
325 TABLET ORAL
Qty: 90 TABLET | Refills: 0 | Status: SHIPPED | OUTPATIENT
Start: 2020-06-24 | End: 2020-09-30 | Stop reason: SDDI

## 2020-06-24 NOTE — TELEPHONE ENCOUNTER
Patient returned call. Informed of providers message. Patient requested copy of lab results be mailed to her. I have printed and mailed copy.     Notes recorded by Jemima Saha CMA on 6/24/2020 at 11:57 AM EDT  Tried to call patient with results no answer LVM TO CB  ------    Notes recorded by Lynda Mcclure MD on 6/24/2020 at 7:44 AM EDT  Anemia is worsening and her iron is low.  I am concerned about a potential source of a subtle GI bleed.  I would like her to stop by at her convenience to  a stool testing kit.  If that is positive for blood, we will have to refer her to GI.  I would also like her to start an iron supplement, Rx sent.    Thyroid function is normal.  Liver and kidney function are normal.  Cholesterol is normal.  A1c (diabetes test) remains borderline elevated in the prediabetes range.  She does not need medication for this.  Instead she should try to follow as healthy diet as she can and stay as active as possible.    Her urine culture is pending.  We will call her when that results.

## 2020-06-25 LAB — BACTERIA SPEC AEROBE CULT: ABNORMAL

## 2020-06-25 RX ORDER — MELOXICAM 7.5 MG/1
TABLET ORAL
Qty: 30 TABLET | Refills: 0 | Status: SHIPPED | OUTPATIENT
Start: 2020-06-25 | End: 2020-07-08

## 2020-06-25 RX ORDER — LEVOFLOXACIN 250 MG/1
250 TABLET ORAL DAILY
Qty: 3 TABLET | Refills: 0 | Status: SHIPPED | OUTPATIENT
Start: 2020-06-25 | End: 2020-09-30 | Stop reason: SDDI

## 2020-06-29 ENCOUNTER — LAB (OUTPATIENT)
Dept: INTERNAL MEDICINE | Facility: CLINIC | Age: 80
End: 2020-06-29

## 2020-06-29 ENCOUNTER — TELEPHONE (OUTPATIENT)
Dept: INTERNAL MEDICINE | Facility: CLINIC | Age: 80
End: 2020-06-29

## 2020-06-29 DIAGNOSIS — K21.9 GASTROESOPHAGEAL REFLUX DISEASE WITHOUT ESOPHAGITIS: Primary | ICD-10-CM

## 2020-06-29 DIAGNOSIS — R19.5 OCCULT BLOOD POSITIVE STOOL: ICD-10-CM

## 2020-06-29 DIAGNOSIS — D50.9 IRON DEFICIENCY ANEMIA, UNSPECIFIED IRON DEFICIENCY ANEMIA TYPE: ICD-10-CM

## 2020-06-29 LAB
EXPIRATION DATE 2: ABNORMAL
EXPIRATION DATE 3: ABNORMAL
EXPIRATION DATE: ABNORMAL
GASTROCULT GAST QL: POSITIVE
HEMOCCULT SP2 STL QL: POSITIVE
HEMOCCULT SP3 STL QL: POSITIVE
Lab: ABNORMAL

## 2020-06-29 PROCEDURE — 82274 ASSAY TEST FOR BLOOD FECAL: CPT | Performed by: INTERNAL MEDICINE

## 2020-06-29 NOTE — TELEPHONE ENCOUNTER
Patient called back and was read message. She states she would like a referral to Dr. Guanaco Winn [phone].          Notes recorded by Huong Earl MA on 6/29/2020 at 3:36 PM EDT  [facility] office number given  ------    Notes recorded by Lynda Mcclure MD on 6/29/2020 at 3:26 PM EDT  Her stool was positive for blood.  I recommend a referral to GI for colonoscopy and EGD to look for any potential sources of bleeding (i.e. polyps, ulcer, precancerous or cancerous lesions).  If she is willing to proceed, I will place referral.  She should continue on her iron supplement in the meantime.

## 2020-07-08 ENCOUNTER — OFFICE VISIT (OUTPATIENT)
Dept: INTERNAL MEDICINE | Facility: CLINIC | Age: 80
End: 2020-07-08

## 2020-07-08 VITALS
TEMPERATURE: 98.4 F | BODY MASS INDEX: 27.42 KG/M2 | HEIGHT: 61 IN | OXYGEN SATURATION: 97 % | DIASTOLIC BLOOD PRESSURE: 68 MMHG | SYSTOLIC BLOOD PRESSURE: 108 MMHG | WEIGHT: 145.25 LBS | RESPIRATION RATE: 18 BRPM | HEART RATE: 77 BPM

## 2020-07-08 DIAGNOSIS — Z00.00 MEDICARE ANNUAL WELLNESS VISIT, SUBSEQUENT: Primary | ICD-10-CM

## 2020-07-08 DIAGNOSIS — R82.998 LEUKOCYTES IN URINE: ICD-10-CM

## 2020-07-08 DIAGNOSIS — Z23 IMMUNIZATION DUE: ICD-10-CM

## 2020-07-08 DIAGNOSIS — D64.9 ANEMIA, UNSPECIFIED TYPE: ICD-10-CM

## 2020-07-08 DIAGNOSIS — N89.8 VAGINAL IRRITATION: ICD-10-CM

## 2020-07-08 DIAGNOSIS — Z87.440 HISTORY OF UTI: ICD-10-CM

## 2020-07-08 LAB
BASOPHILS # BLD AUTO: 0.1 10*3/MM3 (ref 0–0.2)
BASOPHILS NFR BLD AUTO: 1.4 % (ref 0–1.5)
BILIRUB BLD-MCNC: NEGATIVE MG/DL
CLARITY, POC: CLEAR
COLOR UR: YELLOW
DEPRECATED RDW RBC AUTO: 47.3 FL (ref 37–54)
EOSINOPHIL # BLD AUTO: 0.07 10*3/MM3 (ref 0–0.4)
EOSINOPHIL NFR BLD AUTO: 0.9 % (ref 0.3–6.2)
ERYTHROCYTE [DISTWIDTH] IN BLOOD BY AUTOMATED COUNT: 15.9 % (ref 12.3–15.4)
EXPIRATION DATE: ABNORMAL
GLUCOSE UR STRIP-MCNC: NEGATIVE MG/DL
HCT VFR BLD AUTO: 38.2 % (ref 34–46.6)
HGB BLD-MCNC: 11.7 G/DL (ref 12–15.9)
IMM GRANULOCYTES # BLD AUTO: 0.03 10*3/MM3 (ref 0–0.05)
IMM GRANULOCYTES NFR BLD AUTO: 0.4 % (ref 0–0.5)
KETONES UR QL: NEGATIVE
LEUKOCYTE EST, POC: ABNORMAL
LYMPHOCYTES # BLD AUTO: 1.01 10*3/MM3 (ref 0.7–3.1)
LYMPHOCYTES NFR BLD AUTO: 13.7 % (ref 19.6–45.3)
Lab: ABNORMAL
MCH RBC QN AUTO: 25.4 PG (ref 26.6–33)
MCHC RBC AUTO-ENTMCNC: 30.6 G/DL (ref 31.5–35.7)
MCV RBC AUTO: 82.9 FL (ref 79–97)
MONOCYTES # BLD AUTO: 0.51 10*3/MM3 (ref 0.1–0.9)
MONOCYTES NFR BLD AUTO: 6.9 % (ref 5–12)
NEUTROPHILS NFR BLD AUTO: 5.67 10*3/MM3 (ref 1.7–7)
NEUTROPHILS NFR BLD AUTO: 76.7 % (ref 42.7–76)
NITRITE UR-MCNC: NEGATIVE MG/ML
NRBC BLD AUTO-RTO: 0 /100 WBC (ref 0–0.2)
PH UR: 5 [PH] (ref 5–8)
PLATELET # BLD AUTO: 369 10*3/MM3 (ref 140–450)
PMV BLD AUTO: 12.1 FL (ref 6–12)
PROT UR STRIP-MCNC: NEGATIVE MG/DL
RBC # BLD AUTO: 4.61 10*6/MM3 (ref 3.77–5.28)
RBC # UR STRIP: NEGATIVE /UL
SP GR UR: 1.01 (ref 1–1.03)
UROBILINOGEN UR QL: NORMAL
WBC # BLD AUTO: 7.39 10*3/MM3 (ref 3.4–10.8)

## 2020-07-08 PROCEDURE — 36415 COLL VENOUS BLD VENIPUNCTURE: CPT | Performed by: NURSE PRACTITIONER

## 2020-07-08 PROCEDURE — 87086 URINE CULTURE/COLONY COUNT: CPT | Performed by: NURSE PRACTITIONER

## 2020-07-08 PROCEDURE — 85025 COMPLETE CBC W/AUTO DIFF WBC: CPT | Performed by: NURSE PRACTITIONER

## 2020-07-08 PROCEDURE — G0439 PPPS, SUBSEQ VISIT: HCPCS | Performed by: NURSE PRACTITIONER

## 2020-07-08 PROCEDURE — 90732 PPSV23 VACC 2 YRS+ SUBQ/IM: CPT | Performed by: NURSE PRACTITIONER

## 2020-07-08 PROCEDURE — 81003 URINALYSIS AUTO W/O SCOPE: CPT | Performed by: NURSE PRACTITIONER

## 2020-07-08 PROCEDURE — G0009 ADMIN PNEUMOCOCCAL VACCINE: HCPCS | Performed by: NURSE PRACTITIONER

## 2020-07-08 NOTE — PROGRESS NOTES
The ABCs of the Annual Wellness Visit  Subsequent Medicare Wellness Visit    Chief Complaint   Patient presents with   • Medicare Wellness-subsequent     would like to have colonoscopy expedited/ would like to have iron re-checked and urine       Subjective   History of Present Illness:  Leslee Wu is a 79 y.o. female who presents for a Subsequent Medicare Wellness Visit.  Patient has recent anemia colonoscopy with EGD are pending.  Patient scheduled 2020.  She would like to see [facility] and she plans to call to set this up.  She is having fatigue and would like to have her CBC checked.  She complains of vaginal irritation had recent UTI no other symptoms would like her urinalysis checked today.         HEALTH RISK ASSESSMENT    Recent Hospitalizations:  No hospitalization(s) within the last year.    Current Medical Providers:  Patient Care Team:  Lynda Mcclure MD as PCP - General (Internal Medicine)  Salima Pretty MD as PCP - Claims Attributed  Eric Angulo MD Christensen, Christian Paul as Consulting Physician (Orthopedic Surgery)  Hermilo Felipe MD as Consulting Physician (Gynecology)  Salima Pretty MD as Consulting Physician (Endocrinology)    Smoking Status:  Social History     Tobacco Use   Smoking Status Former Smoker   • Types: Cigarettes   • Last attempt to quit: 1960   • Years since quittin.5   Smokeless Tobacco Never Used   Tobacco Comment    Smoked briefly in her 20's       Alcohol Consumption:  Social History     Substance and Sexual Activity   Alcohol Use No       Depression Screen:   PHQ-2/PHQ-9 Depression Screening 2020   Little interest or pleasure in doing things 0   Feeling down, depressed, or hopeless 0   Total Score 0       Fall Risk Screen:  STEADI Fall Risk Assessment was completed, and patient is at LOW risk for falls.Assessment completed on:2020    Health Habits and Functional and Cognitive Screening:  Functional &  Cognitive Status 7/8/2020   Do you have difficulty preparing food and eating? No   Do you have difficulty bathing yourself, getting dressed or grooming yourself? No   Do you have difficulty using the toilet? No   Do you have difficulty moving around from place to place? No   Do you have trouble with steps or getting out of a bed or a chair? No   Current Diet Well Balanced Diet   Dental Exam Up to date   Eye Exam Up to date   Exercise (times per week) 7 times per week   Current Exercise Activities Include Walking   Do you need help using the phone?  No   Are you deaf or do you have serious difficulty hearing?  Yes   Do you need help with transportation? No   Do you need help shopping? No   Do you need help preparing meals?  No   Do you need help with housework?  No   Do you need help with laundry? No   Do you need help taking your medications? No   Do you need help managing money? No   Do you ever drive or ride in a car without wearing a seat belt? No   Have you felt unusual stress, anger or loneliness in the last month? No   Who do you live with? Spouse   If you need help, do you have trouble finding someone available to you? No   Have you been bothered in the last four weeks by sexual problems? No   Do you have difficulty concentrating, remembering or making decisions? No         Does the patient have evidence of cognitive impairment? No    Asprin use counseling:Taking ASA appropriately as indicated    Age-appropriate Screening Schedule:  Refer to the list below for future screening recommendations based on patient's age, sex and/or medical conditions. Orders for these recommended tests are listed in the plan section. The patient has been provided with a written plan.    Health Maintenance   Topic Date Due   • TDAP/TD VACCINES (1 - Tdap) 10/31/1951   • ZOSTER VACCINE (2 of 3) 02/23/2011   • INFLUENZA VACCINE  08/01/2020   • DXA SCAN  04/16/2021   • LIPID PANEL  06/23/2021   • MAMMOGRAM  12/20/2021          The  following portions of the patient's history were reviewed and updated as appropriate: allergies, current medications, past family history, past medical history, past social history, past surgical history and problem list.    Outpatient Medications Prior to Visit   Medication Sig Dispense Refill   • acyclovir (ZOVIRAX) 200 MG capsule TAKE ONE CAPSULE FIVE TIMES A DAY FOR 4 DAYS 20 capsule 1   • amLODIPine (NORVASC) 10 MG tablet TAKE 1 TABLET BY MOUTH ONCE DAILY 90 tablet 3   • aspirin 81 MG EC tablet Take 81 mg by mouth Daily.     • benazepril (LOTENSIN) 20 MG tablet TAKE 1 TABLET BY MOUTH ONCE DAILY 90 tablet 3   • Cholecalciferol (VITAMIN D3) 50 MCG (2000 UT) capsule Take 2,000 Units by mouth Daily.     • ferrous sulfate 325 (65 FE) MG tablet Take 1 tablet by mouth Daily With Breakfast. 90 tablet 0   • levoFLOXacin (Levaquin) 250 MG tablet Take 1 tablet by mouth Daily. 3 tablet 0   • levothyroxine (SYNTHROID, LEVOTHROID) 75 MCG tablet TAKE 1 TABLET BY MOUTH ONCE DAILY 90 tablet 3   • LORazepam (ATIVAN) 0.5 MG tablet Take 1 tablet by mouth Every 8 (Eight) Hours As Needed for Anxiety. (Patient taking differently: Take 0.5 mg by mouth As Needed for Anxiety.) 30 tablet 2   • Misc Natural Products (OSTEO BI-FLEX JOINT SHIELD PO) Take  by mouth Daily.     • pantoprazole (PROTONIX) 40 MG EC tablet TAKE 1 TABLET BY MOUTH ONCE DAILY 90 tablet 0   • meloxicam (MOBIC) 7.5 MG tablet Take 1 tablet by mouth once daily 30 tablet 0     No facility-administered medications prior to visit.        Patient Active Problem List   Diagnosis   • Anxiety   • Gastroesophageal reflux disease without esophagitis   • Hyperparathyroidism (CMS/HCC)   • Essential hypertension   • Acquired hypothyroidism   • Parathyroid adenoma   • Nontoxic single thyroid nodule   • Iron deficiency anemia   • Spondylolisthesis   • Arthritis   • Coronary artery disease involving native coronary artery of native heart without angina pectoris   • Vitamin D deficiency  "  • Mixed hyperlipidemia   • Prediabetes   • High risk medication use   • Dysuria       Advanced Care Planning:  ACP discussion was held with the patient during this visit. Patient has an advance directive (not in EMR), copy requested.    Review of Systems   Constitutional: Positive for fatigue. Negative for activity change, appetite change, chills, diaphoresis and fever.   HENT: Negative for congestion, sinus pressure, sore throat and trouble swallowing.    Gastrointestinal: Negative for abdominal pain, constipation, nausea and vomiting.   Endocrine: Negative for polydipsia, polyphagia and polyuria.   Genitourinary: Positive for vaginal pain. Negative for difficulty urinating, dysuria and flank pain.   Musculoskeletal: Negative for arthralgias.   Skin: Negative for rash.   Allergic/Immunologic: Negative for environmental allergies.   Neurological: Negative for dizziness and headaches.   Psychiatric/Behavioral: Negative for sleep disturbance.   All other systems reviewed and are negative.      Compared to one year ago, the patient feels her physical health is the same.  Compared to one year ago, the patient feels her mental health is the same.    Reviewed chart for potential of high risk medication in the elderly: yes  Reviewed chart for potential of harmful drug interactions in the elderly:yes    Objective         Vitals:    07/08/20 0953   BP: 108/68   BP Location: Right arm   Patient Position: Sitting   Cuff Size: Adult   Pulse: 77   Resp: 18   Temp: 98.4 °F (36.9 °C)   TempSrc: Temporal   SpO2: 97%   Weight: 65.9 kg (145 lb 4 oz)   Height: 154.9 cm (61\")   PainSc: 0-No pain   Finger Rub Hearing{Test (right ear):failed  Finger Rub Hearing{Test (left ear):failed        Body mass index is 27.44 kg/m².  Discussed the patient's BMI with her. The BMI is in the acceptable range.    Physical Exam   Constitutional: She is oriented to person, place, and time. She appears well-developed and well-nourished.   HENT:   Head: " Normocephalic and atraumatic.   Neck: No thyromegaly present.   Cardiovascular: Normal rate and regular rhythm.   Pulmonary/Chest: Effort normal and breath sounds normal.   Abdominal: Soft. Bowel sounds are normal.   Musculoskeletal: She exhibits no edema.   Lymphadenopathy:     She has no cervical adenopathy.   Neurological: She is alert and oriented to person, place, and time.   Skin: Skin is warm and dry. Capillary refill takes 2 to 3 seconds.   Psychiatric: She has a normal mood and affect. Her behavior is normal.   Nursing note and vitals reviewed.      Lab Results   Component Value Date    TRIG 73 06/23/2020    HDL 48 06/23/2020    LDL 65 06/23/2020    VLDL 14.6 06/23/2020    HGBA1C 5.61 (H) 06/23/2020        Assessment/Plan   Medicare Risks and Personalized Health Plan  CMS Preventative Services Quick Reference  Advance Directive Discussion  Fall Risk    The above risks/problems have been discussed with the patient.  Pertinent information has been shared with the patient in the After Visit Summary.  Follow up plans and orders are seen below in the Assessment/Plan Section.    Diagnoses and all orders for this visit:    1. Medicare annual wellness visit, subsequent (Primary)    2. Anemia, unspecified type  -     CBC & Differential  -     CBC Auto Differential    3. History of UTI  -     POC Urinalysis Dipstick, Automated    4. Vaginal irritation  -     POC Urinalysis Dipstick, Automated    5. Immunization due  -     Pneumococcal Polysaccharide Vaccine 23-Valent Greater Than or Equal To 3yo Subcutaneous / IM    6. Leukocytes in urine  -     Urine Culture - Urine, Urine, Clean Catch      Follow Up:  Return if symptoms worsen or fail to improve.     An After Visit Summary and PPPS were given to the patient.

## 2020-07-09 LAB — BACTERIA SPEC AEROBE CULT: NO GROWTH

## 2020-07-13 ENCOUNTER — TELEPHONE (OUTPATIENT)
Dept: INTERNAL MEDICINE | Facility: CLINIC | Age: 80
End: 2020-07-13

## 2020-07-13 RX ORDER — PANTOPRAZOLE SODIUM 40 MG/1
TABLET, DELAYED RELEASE ORAL
Qty: 90 TABLET | Refills: 0 | Status: SHIPPED | OUTPATIENT
Start: 2020-07-13 | End: 2020-07-14 | Stop reason: SDUPTHER

## 2020-07-13 NOTE — TELEPHONE ENCOUNTER
PT CALLED TO GET HER TEST RESULTS FROM July 8. PT WOULD LIKE TO HAVE SOMEONE CALL YOU BACK AND TO SEND A COPY OF THE RESULTS.    PT'S CONTACT [phone]

## 2020-07-14 ENCOUNTER — TELEPHONE (OUTPATIENT)
Dept: INTERNAL MEDICINE | Facility: CLINIC | Age: 80
End: 2020-07-14

## 2020-07-14 RX ORDER — PANTOPRAZOLE SODIUM 40 MG/1
40 TABLET, DELAYED RELEASE ORAL 2 TIMES DAILY
Qty: 30 TABLET | Refills: 1 | Status: SHIPPED | OUTPATIENT
Start: 2020-07-14 | End: 2020-08-25

## 2020-07-14 NOTE — TELEPHONE ENCOUNTER
Based on her recent EGD report, GI would like her to increase her pantoprazole to 40 mg twice daily for 8 weeks.  I have sent a new prescription to [facility].  After the 8 weeks, she can try to back down to once daily.

## 2020-07-20 NOTE — PATIENT INSTRUCTIONS
Medicare Wellness  Personal Prevention Plan of Service     Date of Office Visit:  2020  Encounter Provider:  SANDOVAL Lucas  Place of Service:  [facility]  Patient Name: Leslee Wu  :  1940    As part of the Medicare Wellness portion of your visit today, we are providing you with this personalized preventive plan of services (PPPS). This plan is based upon recommendations of the [place] Preventive Services Task Force (USPSTF) and the Advisory Committee on Immunization Practices (ACIP).    This lists the preventive care services that should be considered, and provides dates of when you are due. Items listed as completed are up-to-date and do not require any further intervention.    Health Maintenance   Topic Date Due   • TDAP/TD VACCINES (1 - Tdap) 10/31/1950   • ZOSTER VACCINE (2 of 3) 2011   • MEDICARE ANNUAL WELLNESS  04/15/2020   • INFLUENZA VACCINE  2020   • DXA SCAN  2021   • LIPID PANEL  2021   • MAMMOGRAM  2021   • Pneumococcal Vaccine Once at 65 Years Old  Completed       Orders Placed This Encounter   Procedures   • Urine Culture - Urine, Urine, Clean Catch   • Pneumococcal Polysaccharide Vaccine 23-Valent Greater Than or Equal To 1yo Subcutaneous / IM   • CBC Auto Differential   • POC Urinalysis Dipstick, Automated   • CBC & Differential     Order Specific Question:   Manual Differential     Answer:   No       No follow-ups on file.

## 2020-08-04 ENCOUNTER — OFFICE VISIT (OUTPATIENT)
Dept: ENDOCRINOLOGY | Facility: CLINIC | Age: 80
End: 2020-08-04

## 2020-08-04 VITALS
WEIGHT: 146.2 LBS | HEART RATE: 74 BPM | BODY MASS INDEX: 27.6 KG/M2 | OXYGEN SATURATION: 98 % | HEIGHT: 61 IN | SYSTOLIC BLOOD PRESSURE: 110 MMHG | DIASTOLIC BLOOD PRESSURE: 70 MMHG

## 2020-08-04 DIAGNOSIS — E83.52 HYPERCALCEMIA: ICD-10-CM

## 2020-08-04 DIAGNOSIS — E03.9 ACQUIRED HYPOTHYROIDISM: Primary | ICD-10-CM

## 2020-08-04 DIAGNOSIS — E04.1 NONTOXIC SINGLE THYROID NODULE: ICD-10-CM

## 2020-08-04 DIAGNOSIS — D35.1 PARATHYROID ADENOMA: ICD-10-CM

## 2020-08-04 PROCEDURE — 99213 OFFICE O/P EST LOW 20 MIN: CPT | Performed by: INTERNAL MEDICINE

## 2020-08-04 RX ORDER — LEVOTHYROXINE SODIUM 0.07 MG/1
75 TABLET ORAL DAILY
Qty: 90 TABLET | Refills: 3 | Status: SHIPPED | OUTPATIENT
Start: 2020-08-04 | End: 2021-02-04 | Stop reason: SDUPTHER

## 2020-08-04 NOTE — PROGRESS NOTES
Hyperparathyroidism (Follow Up) and Vitamin D Deficiency    Subjective   Leslee Wu is a 79 y.o. female is here today for follow-up and ultrasound.  Hypercalcemia and primary hyperparathyroidism. She has seen parathyroid surgeon and endocrinologist in 2000 and deferred surgery at that time.    BMD: T-score -1 at the hip and - 1.4 at the femoral neck, 1.5 at the spine (she is s/p spinal fusion). statistically significant 11.5% decrease in the bone mineral density of the left hip compared to the prior 2011 exam and a  23.6% decrease compared to the baseline 1998 exam. Imaging reviewed and discussed with the patient   The ten year fracture risk assessment is calculated at 12% for a major  osteoporotic fracture and 2.4% for a hip fracture.      Calcium and PTH remained stable over the last several years. stable calcium and PTH over the last year 62-->71 in 1 year, calcium 10.8 --> 11.2 over the last 2 years. Parathyroid adenoma is slowly growing according to the u/s - 1 cm right inferior parathyroid adenoma. Patient is asymptomatic.     History of thyroid nodules since 1995 and 2000, s/p 2 benign biopsies.       Hypothyroidism dx in 1995, on synthroid 75 mcg.       Patient denies having any new complaints. She is exercising regularly on the stationary bycycle. Eats healthy, takes Vit D 2000 every day. Left knee surgery went well. Calcium inpatient was 10.6.         Medications:    Current Outpatient Medications:   •  amLODIPine (NORVASC) 10 MG tablet, TAKE 1 TABLET BY MOUTH ONCE DAILY, Disp: 90 tablet, Rfl: 3  •  aspirin 81 MG EC tablet, Take 81 mg by mouth Daily., Disp: , Rfl:   •  benazepril (LOTENSIN) 20 MG tablet, TAKE 1 TABLET BY MOUTH ONCE DAILY, Disp: 90 tablet, Rfl: 3  •  Cholecalciferol (VITAMIN D3) 50 MCG (2000 UT) capsule, Take 1,000 Units by mouth Daily., Disp: , Rfl:   •  levoFLOXacin (Levaquin) 250 MG tablet, Take 1 tablet by mouth Daily., Disp: 3 tablet, Rfl: 0  •  levothyroxine (SYNTHROID, LEVOTHROID)  "75 MCG tablet, TAKE 1 TABLET BY MOUTH ONCE DAILY, Disp: 90 tablet, Rfl: 3  •  Misc Natural Products (OSTEO BI-FLEX JOINT SHIELD PO), Take  by mouth Daily., Disp: , Rfl:   •  pantoprazole (PROTONIX) 40 MG EC tablet, Take 1 tablet by mouth 2 (two) times a day., Disp: 30 tablet, Rfl: 1  •  ferrous sulfate 325 (65 FE) MG tablet, Take 1 tablet by mouth Daily With Breakfast., Disp: 90 tablet, Rfl: 0    The following portions of the patient's history were reviewed and updated as appropriate: allergies, current medications, past family history, past medical history, past social history, past surgical history and problem list.    Review of Systems   Musculoskeletal: Positive for arthralgias (knee pain ) and back pain.   All other systems reviewed and are negative.      Objective   Blood pressure 110/70, pulse 74, height 154.9 cm (61\"), weight 66.3 kg (146 lb 3.2 oz), SpO2 98 %, not currently breastfeeding.   Physical Exam   Constitutional: She is oriented to person, place, and time. She appears well-developed and well-nourished.   HENT:   Head: Normocephalic and atraumatic.   Eyes: Conjunctivae are normal.   Neck: Normal range of motion. No muscular tenderness present. Carotid bruit is not present. Thyromegaly (left 1 cm hard easy movable nodule. ) present. No thyroid mass present.   The neck is supple and no assimetry visualized   Cardiovascular: Normal rate, regular rhythm and normal heart sounds.   Pulmonary/Chest: Effort normal and breath sounds normal.   Lymphadenopathy:     She has no cervical adenopathy.   Neurological: She is alert and oriented to person, place, and time.   Psychiatric: She has a normal mood and affect. Thought content normal.   Vitals reviewed.        Results for orders placed or performed in visit on 07/08/20   Urine Culture - Urine, Urine, Clean Catch   Result Value Ref Range    Urine Culture No growth    CBC Auto Differential   Result Value Ref Range    WBC 7.39 3.40 - 10.80 10*3/mm3    RBC 4.61 " 3.77 - 5.28 10*6/mm3    Hemoglobin 11.7 (L) 12.0 - 15.9 g/dL    Hematocrit 38.2 34.0 - 46.6 %    MCV 82.9 79.0 - 97.0 fL    MCH 25.4 (L) 26.6 - 33.0 pg    MCHC 30.6 (L) 31.5 - 35.7 g/dL    RDW 15.9 (H) 12.3 - 15.4 %    RDW-SD 47.3 37.0 - 54.0 fl    MPV 12.1 (H) 6.0 - 12.0 fL    Platelets 369 140 - 450 10*3/mm3    Neutrophil % 76.7 (H) 42.7 - 76.0 %    Lymphocyte % 13.7 (L) 19.6 - 45.3 %    Monocyte % 6.9 5.0 - 12.0 %    Eosinophil % 0.9 0.3 - 6.2 %    Basophil % 1.4 0.0 - 1.5 %    Immature Grans % 0.4 0.0 - 0.5 %    Neutrophils, Absolute 5.67 1.70 - 7.00 10*3/mm3    Lymphocytes, Absolute 1.01 0.70 - 3.10 10*3/mm3    Monocytes, Absolute 0.51 0.10 - 0.90 10*3/mm3    Eosinophils, Absolute 0.07 0.00 - 0.40 10*3/mm3    Basophils, Absolute 0.10 0.00 - 0.20 10*3/mm3    Immature Grans, Absolute 0.03 0.00 - 0.05 10*3/mm3    nRBC 0.0 0.0 - 0.2 /100 WBC   POC Urinalysis Dipstick, Automated   Result Value Ref Range    Color Yellow Yellow, Straw, Dark Yellow, Amparo    Clarity, UA Clear (A) Clear    Specific Gravity  1.015 1.005 - 1.030    pH, Urine 5.0 5.0 - 8.0    Leukocytes 500 Deyanira/ul (A) Negative    Nitrite, UA Negative Negative    Protein, POC Negative Negative mg/dL    Glucose, UA Negative Negative, 1000 mg/dL (3+) mg/dL    Ketones, UA Negative Negative    Urobilinogen, UA Normal Normal    Bilirubin Negative Negative    Blood, UA Negative Negative    Lot Number [id number]     Expiration Date 11/30/20              Thyroid ultrasound 4/2017              Real time high resolution imaging of the thyroid gland was performed in transverse and longitudinal planes.      The right lobe measured 4.09 cm L x 1.67 cm AP x 1.47 cm in TV dimension.    The isthmus measured 0.25 cm in thickness.    The left thyroid lobe measured 4.16 cm L x 1.01 cm AP x 1.45 cm in TV dimension.    Thyroid gland is homogeneous and contains single nodule in the left lobe and small nodule  On the right .    Nodule 1   is located in the left mid lobe and  measures 1.67 x 1.2 x 1.3 cm (L X AP X TV).  This nodule is cystic, homogeneous, hypoechoic with well-defined borders and egg-shell calcifications, and Grade I vascularity on Color Flow Doppler.  It has artifacts:  posterior acoustic enhancement    Nodule 2  is located in the right upper lobe and measures 0.33 x 0.23 x 0.31 cm (L X AP X TV).  This nodule is solid, homogeneous, hypoechoic with well-defined margins, and Grade II vascularity on Color Flow Doppler.  It has no artifacts    Posterior to the right thyroid lobe hypoechoic extrathyroidal structure is located inferiorly, representing parathyroid gland and measures 1.07 x 0.44 x and 0.61 L x AP x TV cm.  Doppler flow representing polar artery is seen.       No pathologic lymph nodes were seen.      Assessment: Stable left calcified nodule. Slightly enlarged right inferior parathyroid adenoma.   Repeat ultrasound in 18 months.       Assessment/Plan:    Problem List Items Addressed This Visit        Endocrine    Acquired hypothyroidism - Primary    Overview     Description: A.  On chronic replacement therapy.         Parathyroid adenoma    Nontoxic single thyroid nodule      Primary hyperparathyroidism - stable calcium and PTH over the last year 62-->71-->77.2 in 1 year, calcium 10.8 --> 11.2 -->10.6-->11.1->10.7 over the last 2 years. Parathyroid adenoma is slowly growing according to the u/s, 1 cm in size.      Patient is asymptomatic, no dx of osteoporosis (osteopenia in BMD 4/2016), kidney stones or renal impairment.   Ultrasound showed 1 cm right inferior parathyroid adenoma.       Continue with increased fluid intake, Vit D supplements decreased to 1000 daily . Conservative management is appropriate. If calcium increasing, will refer to surgery. Patient is agreeable to the plan.       Cont  levothyroxine 75 and reassess the symptoms on generic.   Refilled meds        F/u in 6-8 months with labs

## 2020-08-25 RX ORDER — PANTOPRAZOLE SODIUM 40 MG/1
TABLET, DELAYED RELEASE ORAL
Qty: 30 TABLET | Refills: 0 | Status: SHIPPED | OUTPATIENT
Start: 2020-08-25 | End: 2020-09-09

## 2020-09-09 RX ORDER — PANTOPRAZOLE SODIUM 40 MG/1
TABLET, DELAYED RELEASE ORAL
Qty: 30 TABLET | Refills: 0 | Status: SHIPPED | OUTPATIENT
Start: 2020-09-09 | End: 2020-09-26

## 2020-09-22 NOTE — PROGRESS NOTES
OFFICE PROGRESS NOTE    Chief Complaint   Patient presents with   • Follow-up     3 month f/u      Last MCW 7/8/20    HPI: 79 y.o. female here for:    1.  Hypertension:  On amlodipine 10 mg daily, benazepril 20 mg daily, aspirin 81 mg daily.  Had unprovoked DVT versus thrombophlebitis in October 2019, started on Eliquis and stopped in Feb per cardiology.  Sees cardiology.  Next appointment 10/24/2020 (Dr. Solis).  She denies headache, chest pain, shortness breath, palpitations, PND, lower extremity edema.  At [facility] last week, provider there thought they heard of murmur.  Patient has not had this diagnosis before.  She wants me to take a listen.  She denies any symptoms as above.     2.  Hyperparathyroidism/hypothyroidism/prediabetes/Vit D deficiency:  Sees Dr. Oliver. Next 2/4/21.   On levothyroxine 75 mcg daily and vit d 1000 units daily.  TSH   Date Value Ref Range Status   06/23/2020 1.340 0.270 - 4.200 uIU/mL Final     Free T4   Date Value Ref Range Status   06/23/2020 1.34 0.93 - 1.70 ng/dL Final     Lab Results   Component Value Date    HGBA1C 5.61 (H) 06/23/2020     4.  Chronic pain:  Secondary to arthritis.  Stopped taking meloxicam 7.5mg daily due to GERD. Taking 2 extra strength Tylenol daily for her pain with good control.    5.  GERD:  Takes Protonix 40 mg BID. 7/10/2020 EGD showed some chronic inflammation for which 8 weeks of twice daily PPI was recommended. Wondering if she can wean back down to daily.     6.  Anxiety:  On Ativan 0.5 mg written every 8 hours as needed but takes qHS PRN (rare use).  Per EMR, last prescribed 6/20/2019 by Dr. Angulo.  Discussed practice policy of annual UDS/SARAH, Chay every 3 months and office visit every 3 months to continue refills for this at her new patient 6/23/2020 visit.  Patient did not wish to complete this at that point and wished to review it. Anxiety is doing fine without meds.     7. Anemia:  Chronic per patient.  Stable.  Stopped iron a few days ago and  "would like her labs repeated. Denies any symptoms of excess blood loss.  Colonoscopy 7/15/2020 showed some anal irritation and extensive diverticulosis thought to be contributing to intermittent blood in her stools.  Patient has not noticed any gross blood in her stools.  Lab Results   Component Value Date    WBC 7.39 07/08/2020    HGB 11.7 (L) 07/08/2020    HCT 38.2 07/08/2020    MCV 82.9 07/08/2020     07/08/2020     8. UTI sx:  1 wk dysuria, itching, frequency. Hx UTI so would like urine checked. No F/C, flank pain.    Review of Systems  Constitutional: Negative for activity change, appetite change and fever.   HENT: Negative for congestion, sneezing and sore throat.    Eyes: Negative for discharge and visual disturbance.   Respiratory: Negative for cough and shortness of breath.    Cardiovascular: Negative for chest pain and palpitations.   Gastrointestinal: Negative for abdominal distention, abdominal pain, blood in stool, constipation, nausea and vomiting.   Endocrine: Negative for cold intolerance and heat intolerance.   Genitourinary: Positive for dysuria.   Musculoskeletal: Positive for arthralgias (chronic/stable). Negative for neck stiffness.   Skin: Negative for rash.   Allergic/Immunologic: Negative for environmental allergies and food allergies.   Neurological: Negative for headache.   Hematological: Negative for adenopathy.   Psychiatric/Behavioral: Negative for depressed mood.     The following portions of the patient's history were reviewed and updated as appropriate: allergies, current medications, past family history, past medical history, past social history, past surgical history and problem list.      Physical Exam:  Vitals:    09/30/20 0927   BP: 120/72   BP Location: Right arm   Patient Position: Sitting   Cuff Size: Adult   Pulse: 68   Resp: 18   Temp: 96.9 °F (36.1 °C)   TempSrc: Temporal   SpO2: 98%   Weight: 66.3 kg (146 lb 3.2 oz)   Height: 154.9 cm (61\")       Physical Exam "   Constitutional: She is oriented to person, place, and time. She appears well-developed and well-nourished. No distress.   HENT:   Head: Normocephalic and atraumatic.   Right Ear: Tympanic membrane and external ear normal.   Left Ear: Tympanic membrane and external ear normal.   Nose: Nose normal.   Mouth/Throat: Uvula is midline and oropharynx is clear and moist. No oropharyngeal exudate.   Eyes: Conjunctivae are normal. Right eye exhibits no discharge. Left eye exhibits no discharge.   Neck: Normal range of motion. Neck supple.   Cardiovascular: Normal rate, regular rhythm and normal heart sounds. Exam reveals no gallop and no friction rub.   No murmur heard.  Pulmonary/Chest: Effort normal and breath sounds normal. No stridor. No respiratory distress. She has no wheezes. She has no rales.   Abdominal: Soft. Bowel sounds are normal. She exhibits no distension and no mass. There is no tenderness. There is no rebound and no guarding.   Musculoskeletal: She exhibits no edema.   Ambulates with steady gait.   Neurological: She is alert and oriented to person, place, and time.   Skin: Skin is warm and dry. Capillary refill takes less than 2 seconds. She is not diaphoretic.   Psychiatric: She has a normal mood and affect.   Vitals reviewed.    Brief Urine Lab Results  (Last result in the past 365 days)      Color   Clarity   Blood   Leuk Est   Nitrite   Protein   CREAT   Urine HCG        09/30/20 1040 Yellow Clear 50 Harish/ul 500 Deyanira/ul Positive Negative               Assesment and Plan: 79 y.o. female here for:  Coronary artery disease involving native coronary artery of native heart without angina pectoris  Stable.  Follow-up with cardiology as scheduled.  No murmur heard on exam today.    Essential hypertension  Stable.  Continue amlodipine, benazepril.  Follow-up with cardiology as scheduled.    Mixed hyperlipidemia  Stable without medications.  Continue aspirin 81.  Follow-up with cardiology as  scheduled.    Gastroesophageal reflux disease without esophagitis  Stable on Protonix twice daily.  Okay to try to wean to daily as tolerated but would go back to twice daily dosing for any recurrent retrosternal burning, indigestion symptoms.  Minimize use of NSAIDs as above.  Reviewed lifestyle precautions.    Vitamin D deficiency  Stable on vitamin D.  Continue.  Follow-up with Endo as scheduled.    Acquired hypothyroidism  Stable on levothyroxine per endocrinology.  Continue.    Hyperparathyroidism (CMS/HCC)  Stable.  Follow-up with endocrine as scheduled.    Prediabetes  Stable.  A1c with next visit.    Arthritis  Stable on 2 extra stent Tylenol daily.  Continue.  May increase to 3 times daily as needed as tolerated.  Avoid NSAIDs.    Acute UTI  UA positive as above.  Based on previous culture from June 2020, Rx Levaquin 250 mg daily x3 days and will call patient if needs to be adjusted based on culture results.  If symptoms do not improve, ongoing/recurrent/frequent UTIs or other concerns needs re-eval.    Iron deficiency anemia  Stable, chronic.  Endoscopy work-up as above.  CBC today along with iron labs as she has discontinued therapy.    Anxiety  Doing well without medication.  She will let me know if that changes.  Discussed again that if she wishes to continue lorazepam office policy requires annual UDS/CSA and visit every 3 months along with Chay every 3 months.  Alternatively we could discuss either maintenance medications like an SSRI or SNRI, BuSpar versus Wellbutrin or as needed like hydroxyzine although would use caution with hydroxyzine due to age and anticholinergic side effects.      Return in about 4 months (around 1/30/2021) for Follow-up, As needed if no improvement or new symptoms.    Lynda Mcclure MD  9/30/2020

## 2020-09-25 ENCOUNTER — TELEPHONE (OUTPATIENT)
Dept: URGENT CARE | Facility: CLINIC | Age: 80
End: 2020-09-25

## 2020-09-25 NOTE — TELEPHONE ENCOUNTER
Scheduled referral appointment with [place]  [address] for Monday 9/28 at 1:40 pm with Dr. Cleveland Nicholson. Spoke with Pt given referral appointment info. Pt verbalized understanding no further questions.

## 2020-09-26 RX ORDER — PANTOPRAZOLE SODIUM 40 MG/1
TABLET, DELAYED RELEASE ORAL
Qty: 30 TABLET | Refills: 2 | Status: SHIPPED | OUTPATIENT
Start: 2020-09-26 | End: 2020-12-16

## 2020-09-30 ENCOUNTER — OFFICE VISIT (OUTPATIENT)
Dept: INTERNAL MEDICINE | Facility: CLINIC | Age: 80
End: 2020-09-30

## 2020-09-30 VITALS
BODY MASS INDEX: 27.6 KG/M2 | OXYGEN SATURATION: 98 % | HEIGHT: 61 IN | HEART RATE: 68 BPM | DIASTOLIC BLOOD PRESSURE: 72 MMHG | WEIGHT: 146.2 LBS | RESPIRATION RATE: 18 BRPM | TEMPERATURE: 96.9 F | SYSTOLIC BLOOD PRESSURE: 120 MMHG

## 2020-09-30 DIAGNOSIS — R73.03 PREDIABETES: ICD-10-CM

## 2020-09-30 DIAGNOSIS — E03.9 ACQUIRED HYPOTHYROIDISM: ICD-10-CM

## 2020-09-30 DIAGNOSIS — M19.90 ARTHRITIS: ICD-10-CM

## 2020-09-30 DIAGNOSIS — D50.9 IRON DEFICIENCY ANEMIA, UNSPECIFIED IRON DEFICIENCY ANEMIA TYPE: ICD-10-CM

## 2020-09-30 DIAGNOSIS — I25.10 CORONARY ARTERY DISEASE INVOLVING NATIVE CORONARY ARTERY OF NATIVE HEART WITHOUT ANGINA PECTORIS: ICD-10-CM

## 2020-09-30 DIAGNOSIS — I10 ESSENTIAL HYPERTENSION: ICD-10-CM

## 2020-09-30 DIAGNOSIS — K21.9 GASTROESOPHAGEAL REFLUX DISEASE WITHOUT ESOPHAGITIS: ICD-10-CM

## 2020-09-30 DIAGNOSIS — E78.2 MIXED HYPERLIPIDEMIA: ICD-10-CM

## 2020-09-30 DIAGNOSIS — N39.0 ACUTE UTI: Primary | ICD-10-CM

## 2020-09-30 DIAGNOSIS — E21.3 HYPERPARATHYROIDISM (HCC): ICD-10-CM

## 2020-09-30 DIAGNOSIS — E55.9 VITAMIN D DEFICIENCY: ICD-10-CM

## 2020-09-30 PROBLEM — R30.0 DYSURIA: Status: RESOLVED | Noted: 2020-06-23 | Resolved: 2020-09-30

## 2020-09-30 LAB
BASOPHILS # BLD AUTO: 0.08 10*3/MM3 (ref 0–0.2)
BASOPHILS NFR BLD AUTO: 1.4 % (ref 0–1.5)
BILIRUB BLD-MCNC: NEGATIVE MG/DL
CLARITY, POC: CLEAR
COLOR UR: YELLOW
DEPRECATED RDW RBC AUTO: 42.1 FL (ref 37–54)
EOSINOPHIL # BLD AUTO: 0.14 10*3/MM3 (ref 0–0.4)
EOSINOPHIL NFR BLD AUTO: 2.4 % (ref 0.3–6.2)
ERYTHROCYTE [DISTWIDTH] IN BLOOD BY AUTOMATED COUNT: 13 % (ref 12.3–15.4)
EXPIRATION DATE: ABNORMAL
GLUCOSE UR STRIP-MCNC: NEGATIVE MG/DL
HCT VFR BLD AUTO: 43.9 % (ref 34–46.6)
HGB BLD-MCNC: 13.8 G/DL (ref 12–15.9)
IMM GRANULOCYTES # BLD AUTO: 0.02 10*3/MM3 (ref 0–0.05)
IMM GRANULOCYTES NFR BLD AUTO: 0.3 % (ref 0–0.5)
IRON 24H UR-MRATE: 79 MCG/DL (ref 37–145)
IRON SATN MFR SERPL: 19 % (ref 20–50)
KETONES UR QL: NEGATIVE
LEUKOCYTE EST, POC: ABNORMAL
LYMPHOCYTES # BLD AUTO: 1.13 10*3/MM3 (ref 0.7–3.1)
LYMPHOCYTES NFR BLD AUTO: 19.3 % (ref 19.6–45.3)
Lab: ABNORMAL
MCH RBC QN AUTO: 28.3 PG (ref 26.6–33)
MCHC RBC AUTO-ENTMCNC: 31.4 G/DL (ref 31.5–35.7)
MCV RBC AUTO: 90.1 FL (ref 79–97)
MONOCYTES # BLD AUTO: 0.62 10*3/MM3 (ref 0.1–0.9)
MONOCYTES NFR BLD AUTO: 10.6 % (ref 5–12)
NEUTROPHILS NFR BLD AUTO: 3.87 10*3/MM3 (ref 1.7–7)
NEUTROPHILS NFR BLD AUTO: 66 % (ref 42.7–76)
NITRITE UR-MCNC: POSITIVE MG/ML
NRBC BLD AUTO-RTO: 0 /100 WBC (ref 0–0.2)
PH UR: 5 [PH] (ref 5–8)
PLATELET # BLD AUTO: 313 10*3/MM3 (ref 140–450)
PMV BLD AUTO: 11.8 FL (ref 6–12)
PROT UR STRIP-MCNC: NEGATIVE MG/DL
RBC # BLD AUTO: 4.87 10*6/MM3 (ref 3.77–5.28)
RBC # UR STRIP: ABNORMAL /UL
SP GR UR: 1 (ref 1–1.03)
TIBC SERPL-MCNC: 425 MCG/DL (ref 298–536)
TRANSFERRIN SERPL-MCNC: 285 MG/DL (ref 200–360)
UROBILINOGEN UR QL: NORMAL
WBC # BLD AUTO: 5.86 10*3/MM3 (ref 3.4–10.8)

## 2020-09-30 PROCEDURE — 36415 COLL VENOUS BLD VENIPUNCTURE: CPT | Performed by: INTERNAL MEDICINE

## 2020-09-30 PROCEDURE — 84466 ASSAY OF TRANSFERRIN: CPT | Performed by: INTERNAL MEDICINE

## 2020-09-30 PROCEDURE — 87086 URINE CULTURE/COLONY COUNT: CPT | Performed by: INTERNAL MEDICINE

## 2020-09-30 PROCEDURE — 87077 CULTURE AEROBIC IDENTIFY: CPT | Performed by: INTERNAL MEDICINE

## 2020-09-30 PROCEDURE — 81003 URINALYSIS AUTO W/O SCOPE: CPT | Performed by: INTERNAL MEDICINE

## 2020-09-30 PROCEDURE — 85025 COMPLETE CBC W/AUTO DIFF WBC: CPT | Performed by: INTERNAL MEDICINE

## 2020-09-30 PROCEDURE — 99214 OFFICE O/P EST MOD 30 MIN: CPT | Performed by: INTERNAL MEDICINE

## 2020-09-30 PROCEDURE — 87186 SC STD MICRODIL/AGAR DIL: CPT | Performed by: INTERNAL MEDICINE

## 2020-09-30 PROCEDURE — 83540 ASSAY OF IRON: CPT | Performed by: INTERNAL MEDICINE

## 2020-09-30 RX ORDER — LEVOFLOXACIN 250 MG/1
250 TABLET ORAL DAILY
Qty: 3 TABLET | Refills: 0 | Status: SHIPPED | OUTPATIENT
Start: 2020-09-30 | End: 2020-10-03

## 2020-09-30 NOTE — ASSESSMENT & PLAN NOTE
UA positive as above.  Based on previous culture from June 2020, Rx Levaquin 250 mg daily x3 days and will call patient if needs to be adjusted based on culture results.  If symptoms do not improve, ongoing/recurrent/frequent UTIs or other concerns needs re-eval.

## 2020-09-30 NOTE — ASSESSMENT & PLAN NOTE
Stable on 2 extra stent Tylenol daily.  Continue.  May increase to 3 times daily as needed as tolerated.  Avoid NSAIDs.

## 2020-09-30 NOTE — ASSESSMENT & PLAN NOTE
Stable, chronic.  Endoscopy work-up as above.  CBC today along with iron labs as she has discontinued therapy.

## 2020-09-30 NOTE — ASSESSMENT & PLAN NOTE
Stable on Protonix twice daily.  Okay to try to wean to daily as tolerated but would go back to twice daily dosing for any recurrent retrosternal burning, indigestion symptoms.  Minimize use of NSAIDs as above.  Reviewed lifestyle precautions.

## 2020-09-30 NOTE — ASSESSMENT & PLAN NOTE
Doing well without medication.  She will let me know if that changes.  Discussed again that if she wishes to continue lorazepam office policy requires annual UDS/CSA and visit every 3 months along with Chay every 3 months.  Alternatively we could discuss either maintenance medications like an SSRI or SNRI, BuSpar versus Wellbutrin or as needed like hydroxyzine although would use caution with hydroxyzine due to age and anticholinergic side effects.

## 2020-10-02 LAB — BACTERIA SPEC AEROBE CULT: ABNORMAL

## 2020-10-20 ENCOUNTER — OFFICE VISIT (OUTPATIENT)
Dept: OBSTETRICS AND GYNECOLOGY | Facility: CLINIC | Age: 80
End: 2020-10-20

## 2020-10-20 VITALS
HEIGHT: 61 IN | WEIGHT: 147.2 LBS | BODY MASS INDEX: 27.79 KG/M2 | SYSTOLIC BLOOD PRESSURE: 116 MMHG | DIASTOLIC BLOOD PRESSURE: 66 MMHG

## 2020-10-20 DIAGNOSIS — K64.4 EXTERNAL HEMORRHOIDS: ICD-10-CM

## 2020-10-20 DIAGNOSIS — N81.6 RECTOCELE: ICD-10-CM

## 2020-10-20 DIAGNOSIS — M85.89 OSTEOPENIA OF MULTIPLE SITES: ICD-10-CM

## 2020-10-20 DIAGNOSIS — N81.10 PELVIC ORGAN PROLAPSE QUANTIFICATION STAGE 1 CYSTOCELE: ICD-10-CM

## 2020-10-20 DIAGNOSIS — Z78.0 MENOPAUSE: Primary | ICD-10-CM

## 2020-10-20 PROCEDURE — 99214 OFFICE O/P EST MOD 30 MIN: CPT | Performed by: OBSTETRICS & GYNECOLOGY

## 2020-10-20 NOTE — PROGRESS NOTES
Chief Complaint   Patient presents with   • Gynecologic Exam   • Urinary Incontinence     occasional.  cystocele.   • Fecal Incontinence     occasional.  rectocele.       Leslee Wu is a 79 y.o. year old  presenting to be seen because of gynecologic follow-up for the presence of a rectocele, cystocele, and slight uterine prolapse.  This patient has previously refused surgical intervention.  She has had 4 vaginal deliveries.  She has occasional urinary incontinence.  She does have some trouble expelling stool.  She has complaints of external hemorrhoids.  She has had one episode of cystitis in the past 12 months.  She has had replacement of both knees and her right hip.  She denies menopausal symptoms.    Social History     Substance and Sexual Activity   Sexual Activity Defer   • Birth control/protection: Post-menopausal     SCREENING TESTS    Year 2012 2016 2017   Age                         PAP                         HPV high risk                         Mammogram        benign                 MJ score                         Breast MRI                         Lipids                         Vitamin D                         Colonoscopy                         DEXA  Frax (hip/any)        osteopenia                 Ovarian Screen                             No Additional Complaints Reported    The following portions of the patient's history were reviewed and updated as appropriate:vital signs and   She  has a past medical history of Anemia, Anxiety, Atypical chest pain, Atypical chest pain, Atypical chest pain, Diverticulosis, Diverticulosis, Diverticulosis of large intestine without hemorrhage (10/21/2016), Dyslipidemia, Former smoker (2016), GERD (gastroesophageal reflux disease), GERD without esophagitis, Gout, Gout,  4 para 4, Hypertension, Hypothyroidism, Hypothyroidism, Iron deficiency  anemia, Joint pain, knee, Menopause (10/9/2019), Mild anxiety, Nontoxic single thyroid nodule, Osteoarthritis, Pain, lower leg, Parathyroid adenoma, Pelvic organ prolapse quantification stage 1 cystocele (10/20/2019), Pharyngitis, Pulmonary nodule, Rectocele (1/23/2017), Spondylolisthesis, Tobacco use, Tobacco use, Urinary tract infection, and Vaginal candidiasis.  She does not have any pertinent problems on file.  She  has a past surgical history that includes Total hip arthroplasty; tonsillectomy and adenoidectomy; Back surgery; Total knee arthroplasty (Right); Tonsillectomy and adenoidectomy; Total hip arthroplasty (Right); Total knee arthroplasty (Left); and Total Knee Arthroplasty Revision (Left).  Her family history includes Aortic aneurysm in her father; Breast cancer (age of onset: 35) in her maternal aunt; Breast cancer (age of onset: 70) in her paternal aunt; Coronary artery disease in her mother; Heart attack in her mother; Heart disease in her brother, sister, and sister; Multiple myeloma in her brother; Other in her brother, father, and sister.  She  reports that she quit smoking about 60 years ago. Her smoking use included cigarettes. She has never used smokeless tobacco. She reports that she does not drink alcohol or use drugs.  Current Outpatient Medications   Medication Sig Dispense Refill   • amLODIPine (NORVASC) 10 MG tablet TAKE 1 TABLET BY MOUTH ONCE DAILY 90 tablet 3   • aspirin 81 MG EC tablet Take 81 mg by mouth Daily.     • benazepril (LOTENSIN) 20 MG tablet TAKE 1 TABLET BY MOUTH ONCE DAILY 90 tablet 3   • Cholecalciferol (VITAMIN D3) 50 MCG (2000 UT) capsule Take 1,000 Units by mouth Daily.     • hydrocortisone 2.5 % ointment Apply a thin coat to the rectum daily 30 g 2   • levothyroxine (SYNTHROID, LEVOTHROID) 75 MCG tablet Take 1 tablet by mouth Daily. 90 tablet 3   • Misc Natural Products (OSTEO BI-FLEX JOINT SHIELD PO) Take  by mouth Daily.     • pantoprazole (PROTONIX) 40 MG EC tablet  "Take 1 tablet by mouth twice daily 30 tablet 2   • VITAMIN D PO Vitamin D       No current facility-administered medications for this visit.      She is allergic to codeine; lipitor [atorvastatin]; and lortab  [hydrocodone-acetaminophen]..        Review of Systems  A review of systems was done.  She denies cough, fever, shortness of breath, and loss of her sense of taste or smell.  Constitutional: negative for fever, chills, activity change, appetite change, fatigue and unexpected weight change.  Respiratory: negative  Cardiovascular: negative  Gastrointestinal: positive for constipation  Genitourinary:positive for pelvic pressure  Musculoskeletal:positive for back pain  Behavioral/Psych: negative          /66   Ht 154.9 cm (61\")   Wt 66.8 kg (147 lb 3.2 oz)   LMP  (LMP Unknown)   Breastfeeding No   BMI 27.81 kg/m²     Physical Exam    General:  alert; cooperative; well developed; well nourished   Skin:  No suspicious lesions seen   Thyroid: normal to inspection and palpation   Lungs:  clear to auscultation bilaterally   Heart:  regular rate and rhythm, S1, S2 normal, no murmur, click, rub or gallop   Breasts:  Examined in supine position  Symmetric without masses or skin dimpling  Nipples normal without inversion, lesions or discharge  There are no palpable axillary nodes   Abdomen: soft, non-tender; no masses  no umbilical or inguinal hernias are present  no hepato-splenomegaly   Pelvis: Clinical staff was present for exam  External genitalia:  normal appearance of the external genitalia including Bartholin's and [place] II's glands.  Vaginal:  atrophic mucosal changes are present; Grade II rectocele, Grade I midline cystocele, Uterine prolapse- Grade I  Cervix:  normal appearance.  Uterus:  normal size, shape and consistency. anteverted;  Adnexa:  non palpable bilaterally.  Rectal:  external hemorrhoids present;                             The patient was fitted with a #3 Ring pessary with support, which " was tolerated well.  It was removed.  Lab Review   CBC results and CMP results    Imaging   Mammogram results   DEXA results    Medical counseling was given to patient for the following topics: diagnostic results, instructions for management, risk factor reductions, prognosis, impressions and risks and benefits of treatment options . Total time of the encounter was 27 minute(s) and 16 minute(s)  was spent in face to face counseling.  I have counseled the patient that she has a persistent cystocele, rectocele, and uterine prolapse.  I have advised the patient that this is the cause of her pelvic pressure and occasional leakage of urine.  I have counseled her to empty her bladder frequently and to lean forward after she completes voiding to completely empty her bladder.  I have advised her to to do Kegel's exercises daily.  I have counseled her about surgical intervention versus use of a pessary.  At the present time, she does not desire either method of treatment.  I have advised her to call us if she decides that she desires either surgery or pessary.  I have counseled her to avoid straining with bowel movements.       Advance Directives- YES     ASSESSMENT  Problems Addressed this Visit        Digestive    Rectocele       Musculoskeletal and Integument    Osteopenia of multiple sites       Genitourinary    Menopause - Primary    Pelvic organ prolapse quantification stage 1 cystocele      Other Visit Diagnoses     External hemorrhoids        Relevant Medications    hydrocortisone 2.5 % ointment      Diagnoses       Codes Comments    Menopause    -  Primary ICD-10-CM: Z78.0  ICD-9-CM: 627.2     Osteopenia of multiple sites     ICD-10-CM: M85.89  ICD-9-CM: 733.90     Rectocele     ICD-10-CM: N81.6  ICD-9-CM: 618.04     Pelvic organ prolapse quantification stage 1 cystocele     ICD-10-CM: N81.10  ICD-9-CM: 618.01     External hemorrhoids     ICD-10-CM: K64.4  ICD-9-CM: 455.3            Substance History:    reports  that she quit smoking about 60 years ago. Her smoking use included cigarettes. She has never used smokeless tobacco.    reports no history of alcohol use.    reports no history of drug use.    Substance use counseling is not indicated based on patient history.      PLAN    Medications prescribed this encounter:    New Medications Ordered This Visit   Medications   • hydrocortisone 2.5 % ointment     Sig: Apply a thin coat to the rectum daily     Dispense:  30 g     Refill:  2   · Monthly self breast assessment and annual breast imaging  · Kegel's exercises daily  · Avoid heavy lifting and straining, empty bladder frequently  · Follow up: 6 month(s)  · Calcium, 600 mg/ Vit. D, 400 IU daily, regular, weight-bearing exercise 4 days a week     *Please note that portions of this documentation may have been completed with a voice recognition program.  Efforts were made to edit this dictation, but occasional words may have been mistranscribed.     This note was electronically signed.    ALLA Felipe MD  October 20, 2020  10:33 EDT

## 2020-10-29 ENCOUNTER — OFFICE VISIT (OUTPATIENT)
Dept: CARDIOLOGY | Facility: CLINIC | Age: 80
End: 2020-10-29

## 2020-10-29 VITALS
BODY MASS INDEX: 28.02 KG/M2 | DIASTOLIC BLOOD PRESSURE: 67 MMHG | SYSTOLIC BLOOD PRESSURE: 116 MMHG | HEART RATE: 96 BPM | OXYGEN SATURATION: 97 % | HEIGHT: 61 IN | WEIGHT: 148.4 LBS

## 2020-10-29 DIAGNOSIS — I10 ESSENTIAL HYPERTENSION: Primary | ICD-10-CM

## 2020-10-29 PROCEDURE — 99213 OFFICE O/P EST LOW 20 MIN: CPT | Performed by: INTERNAL MEDICINE

## 2020-10-29 NOTE — PROGRESS NOTES
"  OFFICE FOLLOW UP     Date of Encounter:10/29/2020     Name: Leslee Wu  : 1940  Address: [facility] [address] [facility] [address]    PCP: Lynda Mcclure MD  [address]  [facility] [address]    Leslee Wu is a 79 y.o. female.      Chief Complaint: Follow up of HTN    Problem List:   1.   History of atypical chest pain  A. Echocardiogram, : LVEF 90%  B. Abnormal calcium score, 2013.  C. MPS, 2013: No inducible ischemia, LVEF 70%  2.   Hypertension  3.   Hypothyroidism/chronmic replacement  4.   GERD  5.   History of brief tobacco use, in patient's 20s:  A. Noncalcified lung nodule found on chest CT, , at the left lung base and the mid left lung field.  B. Evaluation by Dr. Ivan Mota, 2013 with serial CT scans.  C. Chest CT, 2013: Noncalcified nodules smaller than prior studies.  D. Essentially \"normal\" CT of the chest, 2014.  6.   Osteoarthrosis  7.   Iron deficiency anemia.  8.   Spondylolisthesis/degenerative disc disease.  9.   Mild chronic anxiety.  10. History of diverticulosis  11. History of gout.  12. Left lower extremity ?DVT 2019    A. LE venous duplex 10/17/19: discordant data superficial vs DVT   B. LE venous duplex - normal, 11/15/2019  12. Surgical History:                        A. Back surgery                        B. Right TKR.                        C. T&A.                        D. Right hip replacement.                        E. Left TKR.     Allergies:  Allergies   Allergen Reactions   • Codeine    • Lipitor [Atorvastatin] Myalgia   • Lortab  [Hydrocodone-Acetaminophen]        Current Medications:  •  amLODIPine (NORVASC) 10 MG tablet, TAKE 1 TABLET BY MOUTH ONCE DAILY  •  aspirin 81 MG EC tablet, Take 81 mg by mouth Daily.  •  benazepril (LOTENSIN) 20 MG tablet, TAKE 1 TABLET BY MOUTH ONCE DAILY  •  Cholecalciferol (VITAMIN D3) 50 MCG ( UT) capsule, Take 1,000 Units by mouth Daily  •  hydrocortisone 2.5 % " "ointment, Apply a thin coat to the rectum daily  •  levothyroxine (SYNTHROID, LEVOTHROID) 75 MCG tablet, Take 1 tablet by mouth Daily.  •  Misc Natural Products (OSTEO BI-FLEX JOINT SHIELD PO), Take  by mouth Daily.  •  pantoprazole (PROTONIX) 40 MG EC tablet, Take 1 tablet by mouth twice daily    History of Present Illness:   Leslee Wu returns for follow up today. One year ago she was found to have a superficial DVT, repeated duplex was clear. She has not had recurrent symptoms and stopped her Eliquis last winter. She is doing well overall. Home blood pressures are normal. She follows regularly with her PCP. She recently went to [facility] wax removal and was told of murmur. Labs are available for review. She denies chest pain, shortness of breath, edema, and syncope. She is \"tired\" some times.       The following portions of the patient's history were reviewed and updated as appropriate: allergies, current medications and problem list.    ROS: Pertinent positives as listed in the HPI.  All other systems reviewed and negative.    Objective:    Vitals:    10/29/20 1107 10/29/20 1109   BP: 112/68 116/67   BP Location: Left arm Left arm   Patient Position: Sitting Standing   Pulse: 68 96   SpO2: 97%    Weight: 67.3 kg (148 lb 6.4 oz)    Height: 154.9 cm (61\")        Physical Exam:  GENERAL: Alert, cooperative, in no acute distress.   HEENT: Normocephalic, no adenopathy, no jugular venous distention  HEART: No discrete PMI is noted. Regular rhythm, normal rate, and no gallops or rubs. Grade 1 systolic murmur.  LUNGS: Clear to auscultation bilaterally. No wheezing, rales or ronchi.  ABDOMEN: Soft, bowel sounds present, non-tender   NEUROLOGIC: No focal abnormalities involving strength or sensation are noted.   EXTREMITIES: No clubbing, cyanosis, or edema noted.      Diagnostic Data:    Lab Results   Component Value Date    CHOL 128 06/23/2020    CHLPL 177 12/11/2015    TRIG 73 06/23/2020    HDL 48 06/23/2020    LDL " 65 06/23/2020     Lab Results   Component Value Date    TSH 1.340 06/23/2020      Lab Results   Component Value Date    GLUCOSE 94 06/23/2020    BUN 13 06/23/2020    CREATININE 0.89 06/23/2020    EGFRIFNONA 61 06/23/2020    BCR 14.6 06/23/2020    K 4.3 06/23/2020    CO2 23.6 06/23/2020    CALCIUM 10.7 (H) 06/23/2020    ALBUMIN 3.90 06/23/2020    AST 15 06/23/2020    ALT 8 06/23/2020     Lab Results   Component Value Date    WBC 5.86 09/30/2020    HGB 13.8 09/30/2020    HCT 43.9 09/30/2020    MCV 90.1 09/30/2020     09/30/2020      Procedures      Assessment and Plan:   1.  HTN: Well controlled today and at home. She will continue current medications.   2.  Murmur: Likely age related (AV sclerosis) and not causing symptoms currently.   3. Superficial DVT: resolved. Continue aspirin.     I will see Leslee Wu back in one year or sooner on an as needed basis.    Scribed for Zeb Solis MD by Maribell Hernández RN. 10/29/2020 11:41 EDT.       EMR Dragon/Transcription Disclaimer:  Much of this encounter note is an electronic transcription/translation of spoken language to printed text.  The electronic translation of spoken language may permit erroneous, or at times, nonsensical words or phrases to be inadvertently transcribed.  Although I have reviewed the note for such errors, some may still exist.

## 2020-11-12 RX ORDER — AMLODIPINE BESYLATE 10 MG/1
TABLET ORAL
Qty: 90 TABLET | Refills: 3 | Status: SHIPPED | OUTPATIENT
Start: 2020-11-12 | End: 2021-11-15

## 2020-11-19 ENCOUNTER — TRANSCRIBE ORDERS (OUTPATIENT)
Dept: INTERNAL MEDICINE | Facility: CLINIC | Age: 80
End: 2020-11-19

## 2020-11-19 DIAGNOSIS — Z12.31 VISIT FOR SCREENING MAMMOGRAM: Primary | ICD-10-CM

## 2020-11-24 ENCOUNTER — FLU SHOT (OUTPATIENT)
Dept: INTERNAL MEDICINE | Facility: CLINIC | Age: 80
End: 2020-11-24

## 2020-11-24 DIAGNOSIS — Z23 NEED FOR INFLUENZA VACCINATION: ICD-10-CM

## 2020-11-24 PROCEDURE — G0008 ADMIN INFLUENZA VIRUS VAC: HCPCS | Performed by: INTERNAL MEDICINE

## 2020-11-24 PROCEDURE — 90694 VACC AIIV4 NO PRSRV 0.5ML IM: CPT | Performed by: INTERNAL MEDICINE

## 2020-12-16 RX ORDER — PANTOPRAZOLE SODIUM 40 MG/1
TABLET, DELAYED RELEASE ORAL
Qty: 180 TABLET | Refills: 1 | Status: SHIPPED | OUTPATIENT
Start: 2020-12-16 | End: 2021-02-09

## 2021-01-05 ENCOUNTER — HOSPITAL ENCOUNTER (OUTPATIENT)
Dept: MAMMOGRAPHY | Facility: HOSPITAL | Age: 81
Discharge: HOME | End: 2021-01-05
Admitting: INTERNAL MEDICINE

## 2021-01-05 DIAGNOSIS — Z12.31 VISIT FOR SCREENING MAMMOGRAM: ICD-10-CM

## 2021-01-05 PROCEDURE — 77063 BREAST TOMOSYNTHESIS BI: CPT

## 2021-01-05 PROCEDURE — 77067 SCR MAMMO BI INCL CAD: CPT | Performed by: RADIOLOGY

## 2021-01-05 PROCEDURE — 77067 SCR MAMMO BI INCL CAD: CPT

## 2021-01-05 PROCEDURE — 77063 BREAST TOMOSYNTHESIS BI: CPT | Performed by: RADIOLOGY

## 2021-02-04 ENCOUNTER — OFFICE VISIT (OUTPATIENT)
Dept: ENDOCRINOLOGY | Facility: CLINIC | Age: 81
End: 2021-02-04

## 2021-02-04 ENCOUNTER — LAB (OUTPATIENT)
Dept: LAB | Facility: HOSPITAL | Age: 81
End: 2021-02-04

## 2021-02-04 VITALS
SYSTOLIC BLOOD PRESSURE: 122 MMHG | DIASTOLIC BLOOD PRESSURE: 68 MMHG | BODY MASS INDEX: 28.13 KG/M2 | HEIGHT: 61 IN | OXYGEN SATURATION: 98 % | HEART RATE: 78 BPM | WEIGHT: 149 LBS

## 2021-02-04 DIAGNOSIS — E03.9 ACQUIRED HYPOTHYROIDISM: ICD-10-CM

## 2021-02-04 DIAGNOSIS — E21.3 HYPERPARATHYROIDISM (HCC): Primary | ICD-10-CM

## 2021-02-04 LAB
ALBUMIN SERPL-MCNC: 4.2 G/DL (ref 3.5–5.2)
ALBUMIN/GLOB SERPL: 1.6 G/DL
ALP SERPL-CCNC: 111 U/L (ref 39–117)
ALT SERPL W P-5'-P-CCNC: 9 U/L (ref 1–33)
ANION GAP SERPL CALCULATED.3IONS-SCNC: 9 MMOL/L (ref 5–15)
AST SERPL-CCNC: 17 U/L (ref 1–32)
BILIRUB SERPL-MCNC: 0.3 MG/DL (ref 0–1.2)
BUN SERPL-MCNC: 18 MG/DL (ref 8–23)
BUN/CREAT SERPL: 22 (ref 7–25)
CALCIUM SPEC-SCNC: 11.6 MG/DL (ref 8.6–10.5)
CHLORIDE SERPL-SCNC: 104 MMOL/L (ref 98–107)
CO2 SERPL-SCNC: 25 MMOL/L (ref 22–29)
CREAT SERPL-MCNC: 0.82 MG/DL (ref 0.57–1)
GFR SERPL CREATININE-BSD FRML MDRD: 67 ML/MIN/1.73
GLOBULIN UR ELPH-MCNC: 2.6 GM/DL
GLUCOSE SERPL-MCNC: 76 MG/DL (ref 65–99)
POTASSIUM SERPL-SCNC: 4.4 MMOL/L (ref 3.5–5.2)
PROT SERPL-MCNC: 6.8 G/DL (ref 6–8.5)
PTH-INTACT SERPL-MCNC: 76.9 PG/ML (ref 15–65)
SODIUM SERPL-SCNC: 138 MMOL/L (ref 136–145)
T4 FREE SERPL-MCNC: 1.56 NG/DL (ref 0.93–1.7)
TSH SERPL DL<=0.05 MIU/L-ACNC: 1.07 UIU/ML (ref 0.27–4.2)

## 2021-02-04 PROCEDURE — 99214 OFFICE O/P EST MOD 30 MIN: CPT | Performed by: INTERNAL MEDICINE

## 2021-02-04 PROCEDURE — 84439 ASSAY OF FREE THYROXINE: CPT | Performed by: INTERNAL MEDICINE

## 2021-02-04 PROCEDURE — 84443 ASSAY THYROID STIM HORMONE: CPT | Performed by: INTERNAL MEDICINE

## 2021-02-04 PROCEDURE — 82306 VITAMIN D 25 HYDROXY: CPT | Performed by: INTERNAL MEDICINE

## 2021-02-04 PROCEDURE — 83970 ASSAY OF PARATHORMONE: CPT | Performed by: INTERNAL MEDICINE

## 2021-02-04 PROCEDURE — 80053 COMPREHEN METABOLIC PANEL: CPT | Performed by: INTERNAL MEDICINE

## 2021-02-04 RX ORDER — LEVOTHYROXINE SODIUM 0.07 MG/1
75 TABLET ORAL DAILY
Qty: 90 TABLET | Refills: 3 | Status: SHIPPED | OUTPATIENT
Start: 2021-02-04 | End: 2021-11-15

## 2021-02-04 NOTE — PROGRESS NOTES
Hypothyroidism (follow up)    Subjective   Leslee Wu is a 80 y.o. female is here today for follow-up and ultrasound.  Hypercalcemia and primary hyperparathyroidism. She has seen parathyroid surgeon and endocrinologist in 2000 and deferred surgery at that time.    BMD 2019: T-score -1 at the hip and - 1.4 at the femoral neck, 1.5 at the spine (she is s/p spinal fusion). statistically significant 11.5% decrease in the bone mineral density of the left hip compared to the prior 2011 exam and a  23.6% decrease compared to the baseline 1998 exam. Imaging reviewed and discussed with the patient   The ten year fracture risk assessment is calculated at 12% for a major  osteoporotic fracture and 2.4% for a hip fracture.      Calcium and PTH remained stable over the last several years. stable calcium and PTH over the last year 62-->71 in 1 year, calcium 10.8 --> 11.2 over the last 2 years. Parathyroid adenoma is slowly growing according to the u/s - 1 cm right inferior parathyroid adenoma. Patient is asymptomatic.     History of thyroid nodules since 1995 and 2000, s/p 2 benign biopsies.       Hypothyroidism dx in 1995, on synthroid 75 mcg.       Patient denies having any new complaints. She is exercising regularly on the stationary bycycle. Eats healthy, takes Vit D 2000 every day. Left knee surgery went well. She drinks a lot of water       Medications:    Current Outpatient Medications:   •  amLODIPine (NORVASC) 10 MG tablet, Take 1 tablet by mouth once daily, Disp: 90 tablet, Rfl: 3  •  aspirin 81 MG EC tablet, Take 81 mg by mouth Daily., Disp: , Rfl:   •  benazepril (LOTENSIN) 20 MG tablet, TAKE 1 TABLET BY MOUTH ONCE DAILY, Disp: 90 tablet, Rfl: 3  •  Cholecalciferol (VITAMIN D3) 50 MCG (2000 UT) capsule, Take 1,000 Units by mouth Daily., Disp: , Rfl:   •  hydrocortisone 2.5 % ointment, Apply a thin coat to the rectum daily, Disp: 30 g, Rfl: 2  •  levothyroxine (SYNTHROID, LEVOTHROID) 75 MCG tablet, Take 1 tablet by  "mouth Daily., Disp: 90 tablet, Rfl: 3  •  Misc Natural Products (OSTEO BI-FLEX JOINT SHIELD PO), Take  by mouth Daily., Disp: , Rfl:   •  pantoprazole (PROTONIX) 40 MG EC tablet, Take 1 tablet by mouth twice daily, Disp: 180 tablet, Rfl: 1    Review of Systems   Musculoskeletal: Positive for arthralgias (knee pain ) and back pain.   All other systems reviewed and are negative.      Objective   Blood pressure 122/68, pulse 78, height 154.9 cm (61\"), weight 67.6 kg (149 lb), SpO2 98 %, not currently breastfeeding.   Physical Exam   Constitutional: She is oriented to person, place, and time. She appears well-developed.   HENT:   Head: Normocephalic and atraumatic.   Eyes: Conjunctivae are normal.   Neck: Normal range of motion. No muscular tenderness present. Carotid bruit is not present. Thyromegaly (left 1 cm hard easy movable nodule. ) present. No thyroid mass present.   The neck is supple and no assimetry visualized   Cardiovascular: Normal rate, regular rhythm and normal heart sounds.   Pulmonary/Chest: Effort normal and breath sounds normal.   Lymphadenopathy:     She has no cervical adenopathy.   Neurological: She is alert and oriented to person, place, and time.   Psychiatric: Thought content normal.   Vitals reviewed.        Results for orders placed or performed in visit on 09/30/20   Urine Culture - Urine, Urine, Clean Catch    Specimen: Urine, Clean Catch   Result Value Ref Range    Urine Culture >100,000 CFU/mL Pantoea species (A)        Susceptibility    Pantoea species - BLOSSOM     Cefazolin <=4 Susceptible ug/ml     Cefepime <=1 Susceptible ug/ml     Ceftazidime <=1 Susceptible ug/ml     Ceftriaxone <=1 Susceptible ug/ml     Gentamicin <=1 Susceptible ug/ml     Levofloxacin <=0.12 Susceptible ug/ml     Nitrofurantoin 32 Susceptible ug/ml     Piperacillin + Tazobactam <=4 Susceptible ug/ml     Tetracycline <=1 Susceptible ug/ml     Trimethoprim + Sulfamethoxazole <=20 Susceptible ug/ml   Iron Profile    " Specimen: Arm, Left; Blood   Result Value Ref Range    Iron 79 37 - 145 mcg/dL    Iron Saturation 19 (L) 20 - 50 %    Transferrin 285 200 - 360 mg/dL    TIBC 425 298 - 536 mcg/dL   CBC Auto Differential    Specimen: Arm, Left; Blood   Result Value Ref Range    WBC 5.86 3.40 - 10.80 10*3/mm3    RBC 4.87 3.77 - 5.28 10*6/mm3    Hemoglobin 13.8 12.0 - 15.9 g/dL    Hematocrit 43.9 34.0 - 46.6 %    MCV 90.1 79.0 - 97.0 fL    MCH 28.3 26.6 - 33.0 pg    MCHC 31.4 (L) 31.5 - 35.7 g/dL    RDW 13.0 12.3 - 15.4 %    RDW-SD 42.1 37.0 - 54.0 fl    MPV 11.8 6.0 - 12.0 fL    Platelets 313 140 - 450 10*3/mm3    Neutrophil % 66.0 42.7 - 76.0 %    Lymphocyte % 19.3 (L) 19.6 - 45.3 %    Monocyte % 10.6 5.0 - 12.0 %    Eosinophil % 2.4 0.3 - 6.2 %    Basophil % 1.4 0.0 - 1.5 %    Immature Grans % 0.3 0.0 - 0.5 %    Neutrophils, Absolute 3.87 1.70 - 7.00 10*3/mm3    Lymphocytes, Absolute 1.13 0.70 - 3.10 10*3/mm3    Monocytes, Absolute 0.62 0.10 - 0.90 10*3/mm3    Eosinophils, Absolute 0.14 0.00 - 0.40 10*3/mm3    Basophils, Absolute 0.08 0.00 - 0.20 10*3/mm3    Immature Grans, Absolute 0.02 0.00 - 0.05 10*3/mm3    nRBC 0.0 0.0 - 0.2 /100 WBC   POC Urinalysis Dipstick, Automated    Specimen: Urine   Result Value Ref Range    Color Yellow Yellow, Straw, Dark Yellow, Amparo    Clarity, UA Clear Clear    Specific Gravity  1.005 1.005 - 1.030    pH, Urine 5.0 5.0 - 8.0    Leukocytes 500 Deyanira/ul (A) Negative    Nitrite, UA Positive (A) Negative    Protein, POC Negative Negative mg/dL    Glucose, UA Negative Negative, 1000 mg/dL (3+) mg/dL    Ketones, UA Negative Negative    Urobilinogen, UA Normal Normal    Bilirubin Negative Negative    Blood, UA 50 Harish/ul (A) Negative    Lot Number [id number]     Expiration Date 5-31-21              Thyroid ultrasound 2017 and 2019             Real time high resolution imaging of the thyroid gland was performed in transverse and longitudinal planes.      The right lobe measured 4.09 cm L x 1.67 cm AP x 1.47  cm in TV dimension.    The isthmus measured 0.25 cm in thickness.    The left thyroid lobe measured 4.16 cm L x 1.01 cm AP x 1.45 cm in TV dimension.    Thyroid gland is homogeneous and contains single nodule in the left lobe and small nodule  On the right .    Nodule 1   is located in the left mid lobe and measures 1.67 x 1.2 x 1.3 cm (L X AP X TV).  This nodule is cystic, homogeneous, hypoechoic with well-defined borders and egg-shell calcifications, and Grade I vascularity on Color Flow Doppler.  It has artifacts:  posterior acoustic enhancement    Nodule 2  is located in the right upper lobe and measures 0.33 x 0.23 x 0.31 cm (L X AP X TV).  This nodule is solid, homogeneous, hypoechoic with well-defined margins, and Grade II vascularity on Color Flow Doppler.  It has no artifacts    Posterior to the right thyroid lobe hypoechoic extrathyroidal structure is located inferiorly, representing parathyroid gland and measures 1.07 x 0.44 x and 0.61 L x AP x TV cm.  Doppler flow representing polar artery is seen.       No pathologic lymph nodes were seen.      Assessment: Stable left calcified nodule. Slightly enlarged right inferior parathyroid adenoma.   Repeat ultrasound in 18 months.       Assessment/Plan:    Problem List Items Addressed This Visit        Other    Hyperparathyroidism (CMS/HCC) - Primary    Relevant Orders    Comprehensive Metabolic Panel    Vitamin D 25 Hydroxy    PTH, Intact    Acquired hypothyroidism    Overview     Description: A.  On chronic replacement therapy.         Relevant Medications    levothyroxine (SYNTHROID, LEVOTHROID) 75 MCG tablet    Other Relevant Orders    TSH    T4, Free      Primary hyperparathyroidism - stable calcium and PTH over the last year 62-->71-->77.2 in 1 year, calcium 10.8 --> 11.2 -->10.6-->11.1->10.7 over the last 2 years. Parathyroid adenoma is slowly growing according to the u/s, 1 cm in size.      Patient is asymptomatic, no dx of osteoporosis (osteopenia in  BMD 4/2016), kidney stones or renal impairment.   Ultrasound showed 1 cm right inferior parathyroid adenoma.   Will repeat in 2022.       Continue with increased fluid intake, Vit D supplements decreased to 1000 daily . Conservative management is appropriate. If calcium increasing, will refer to surgery. Patient is agreeable to the plan.   Patient is due for BMD, will order before next visit.       Cont  levothyroxine 75 and reassess the symptoms on generic.   Refilled meds    Labs today - parathyroid, thyroid function and vit D, renal function.     F/u in 6-8 months with labs

## 2021-02-05 LAB — 25(OH)D3 SERPL-MCNC: 62.3 NG/ML (ref 30–100)

## 2021-02-08 NOTE — PROGRESS NOTES
OFFICE PROGRESS NOTE    Chief Complaint   Patient presents with   • Follow-up     6 month f/u      Last MCW 7/8/2020    HPI: 80 y.o. female here for:     1.  Hypertension/CAD/HLD:  On amlodipine 10 mg daily, benazepril 20 mg daily, aspirin 81 mg daily.    Sees cardiology.  Next appointment 11/4/21 (Dr. Solis)  She denies headache, chest pain, shortness breath, palpitations, PND, lower extremity edema.  Lab Results   Component Value Date    CHOL 128 06/23/2020    CHLPL 177 12/11/2015    TRIG 73 06/23/2020    HDL 48 06/23/2020    LDL 65 06/23/2020         2.  Hyperparathyroidism/hypothyroidism/prediabetes/Vit D deficiency:  Sees Dr. Oliver.  8/19/2021.   On levothyroxine 75 mcg daily and vit d 1000 units daily.  TSH   Date Value Ref Range Status   02/04/2021 1.070 0.270 - 4.200 uIU/mL Final     Free T4   Date Value Ref Range Status   02/04/2021 1.56 0.93 - 1.70 ng/dL Final   ]  Lab Results   Component Value Date    HGBA1C 5.61 (H) 06/23/2020     4.  Chronic pain:  Secondary to arthritis. Taking 2 extra strength Tylenol daily for her pain with generally good control.  Has been on meloxicam 7.5 mg daily in the past but had to stop due to GERD flares.     5.  GERD:  Takes Protonix 40 mg daily (we from BID since her last visit). 7/10/2020 EGD showed some chronic inflammation for which 8 weeks of twice daily PPI was recommended.      6.  UTI sx:  Complains of 2 to 3 weeks of vaginal itching/burning, increased urinary frequency, odor to her urine.  She does have a history of cystocele but feels current symptoms are consistent with prior UTIs.  She has some mild suprapubic pressure as well.  She denies any fevers or chills, no hematuria.  She does have some flank pain which is different than from her chronic back pain.    Review of Systems  Constitutional: Negative for activity change, appetite change and fever.   HENT: Negative for congestion, sneezing and sore throat.    Eyes: Negative for discharge and visual disturbance.  "  Respiratory: Negative for cough and shortness of breath.    Cardiovascular: Negative for chest pain and palpitations.   Gastrointestinal: Negative for abdominal distention, abdominal pain, blood in stool, constipation, nausea and vomiting.   Endocrine: Negative for cold intolerance and heat intolerance.   Genitourinary: Positive for dysuria/suprapubic pressure/frequency.   Musculoskeletal: Positive for arthralgias (chronic/stable). Negative for neck stiffness.   Skin: Negative for rash.   Allergic/Immunologic: Negative for environmental allergies and food allergies.   Neurological: Negative for headache.   Hematological: Negative for adenopathy.   Psychiatric/Behavioral: Negative for depressed mood.     The following portions of the patient's history were reviewed and updated as appropriate: allergies, current medications, past family history, past medical history, past social history, past surgical history and problem list.      Physical Exam:  Vitals:    02/09/21 1107   BP: 120/68   BP Location: Right arm   Patient Position: Sitting   Cuff Size: Adult   Pulse: 83   Resp: 18   Temp: 97.9 °F (36.6 °C)   TempSrc: Temporal   SpO2: 96%   Weight: 67.2 kg (148 lb 3.2 oz)   Height: 154.9 cm (61\")       Physical Exam   Constitutional: She is oriented to person, place, and time. She appears well-developed and well-nourished. No distress.   HENT:   Head: Normocephalic and atraumatic.   Right Ear: Tympanic membrane and external ear normal.   Left Ear: Tympanic membrane and external ear normal.   Nose: Nose normal.   Mouth/Throat: Uvula is midline and oropharynx is clear and moist. No oropharyngeal exudate.   Eyes: Conjunctivae are normal. Right eye exhibits no discharge. Left eye exhibits no discharge.   Neck: Normal range of motion. Neck supple.   Cardiovascular: Normal rate, regular rhythm and normal heart sounds. Exam reveals no gallop and no friction rub.   No murmur heard.  Pulmonary/Chest: Effort normal and breath " sounds normal. No stridor. No respiratory distress. She has no wheezes. She has no rales.   Abdominal: Soft. Bowel sounds are normal. She exhibits no distension and no mass. There is no tenderness. There is no rebound and no guarding.   Musculoskeletal: She exhibits no edema.   Ambulates with steady gait.   Neurological: She is alert and oriented to person, place, and time.   Skin: Skin is warm and dry. Capillary refill takes less than 2 seconds. She is not diaphoretic.   Psychiatric: She has a normal mood and affect.   Vitals reviewed.    Assesment and Plan: 80 y.o. female here for:  Mixed hyperlipidemia  Stable without medications.  Continue aspirin 81 and follow-up with cardiology scheduled.  Consider lipid panel with next visit.    Essential hypertension  Stable on amlodipine/benazepril.  Continue.  Recent CMP stable.  Cardiology follow-up as scheduled.  Continue periodic home BP checks.    Coronary artery disease involving native coronary artery of native heart without angina pectoris  Stable.  Cardiology follow-up as scheduled.    Acquired hypothyroidism  Stable on levothyroxine per endocrinology.  Continue.  Recent labs stable.    Hyperparathyroidism (CMS/HCC)  Stable.  Follow-up with endocrinology as scheduled.    Prediabetes  Stable.  Recent A1c acceptable.    Vitamin D deficiency  Stable on vitamin D.  Continue.  Recent levels normal.    Gastroesophageal reflux disease without esophagitis  Stable on Protonix daily.  Continue to monitor.    Acute UTI  UA positive as above.  Based on previous culture results start Omnicef 300 mg twice daily x10 days and will call with culture results if antibiotic adjustments are needed.    Arthritis  Stable on 2 extra strength Tylenol daily.  Continue.  May increase to 3 times daily if needed.      Return for As needed if no improvement or new symptoms, Next scheduled follow up.    Lynda Mcclure MD  2/9/2021

## 2021-02-09 ENCOUNTER — OFFICE VISIT (OUTPATIENT)
Dept: INTERNAL MEDICINE | Facility: CLINIC | Age: 81
End: 2021-02-09

## 2021-02-09 VITALS
WEIGHT: 148.2 LBS | DIASTOLIC BLOOD PRESSURE: 68 MMHG | HEART RATE: 83 BPM | SYSTOLIC BLOOD PRESSURE: 120 MMHG | BODY MASS INDEX: 27.98 KG/M2 | HEIGHT: 61 IN | RESPIRATION RATE: 18 BRPM | OXYGEN SATURATION: 96 % | TEMPERATURE: 97.9 F

## 2021-02-09 DIAGNOSIS — N39.0 ACUTE UTI: Primary | ICD-10-CM

## 2021-02-09 DIAGNOSIS — I10 ESSENTIAL HYPERTENSION: ICD-10-CM

## 2021-02-09 DIAGNOSIS — K21.9 GASTROESOPHAGEAL REFLUX DISEASE WITHOUT ESOPHAGITIS: ICD-10-CM

## 2021-02-09 DIAGNOSIS — R73.03 PREDIABETES: ICD-10-CM

## 2021-02-09 DIAGNOSIS — E55.9 VITAMIN D DEFICIENCY: ICD-10-CM

## 2021-02-09 DIAGNOSIS — E78.2 MIXED HYPERLIPIDEMIA: ICD-10-CM

## 2021-02-09 DIAGNOSIS — M19.90 ARTHRITIS: ICD-10-CM

## 2021-02-09 DIAGNOSIS — E03.9 ACQUIRED HYPOTHYROIDISM: ICD-10-CM

## 2021-02-09 DIAGNOSIS — I25.10 CORONARY ARTERY DISEASE INVOLVING NATIVE CORONARY ARTERY OF NATIVE HEART WITHOUT ANGINA PECTORIS: ICD-10-CM

## 2021-02-09 DIAGNOSIS — E21.3 HYPERPARATHYROIDISM (HCC): ICD-10-CM

## 2021-02-09 LAB
BILIRUB BLD-MCNC: NEGATIVE MG/DL
CLARITY, POC: ABNORMAL
COLOR UR: YELLOW
EXPIRATION DATE: ABNORMAL
GLUCOSE UR STRIP-MCNC: NEGATIVE MG/DL
KETONES UR QL: NEGATIVE
LEUKOCYTE EST, POC: ABNORMAL
Lab: ABNORMAL
NITRITE UR-MCNC: POSITIVE MG/ML
PH UR: 6 [PH] (ref 5–8)
PROT UR STRIP-MCNC: NEGATIVE MG/DL
RBC # UR STRIP: NEGATIVE /UL
SP GR UR: 1 (ref 1–1.03)
UROBILINOGEN UR QL: NORMAL

## 2021-02-09 PROCEDURE — 87088 URINE BACTERIA CULTURE: CPT | Performed by: INTERNAL MEDICINE

## 2021-02-09 PROCEDURE — 81003 URINALYSIS AUTO W/O SCOPE: CPT | Performed by: INTERNAL MEDICINE

## 2021-02-09 PROCEDURE — 87086 URINE CULTURE/COLONY COUNT: CPT | Performed by: INTERNAL MEDICINE

## 2021-02-09 PROCEDURE — 99214 OFFICE O/P EST MOD 30 MIN: CPT | Performed by: INTERNAL MEDICINE

## 2021-02-09 PROCEDURE — 87186 SC STD MICRODIL/AGAR DIL: CPT | Performed by: INTERNAL MEDICINE

## 2021-02-09 RX ORDER — CEFDINIR 300 MG/1
300 CAPSULE ORAL 2 TIMES DAILY
Qty: 20 CAPSULE | Refills: 0 | Status: SHIPPED | OUTPATIENT
Start: 2021-02-09 | End: 2021-02-11

## 2021-02-09 RX ORDER — PANTOPRAZOLE SODIUM 40 MG/1
40 TABLET, DELAYED RELEASE ORAL DAILY
Qty: 180 TABLET | Refills: 1
Start: 2021-02-09 | End: 2021-12-20 | Stop reason: SDUPTHER

## 2021-02-09 NOTE — ASSESSMENT & PLAN NOTE
Stable on amlodipine/benazepril.  Continue.  Recent CMP stable.  Cardiology follow-up as scheduled.  Continue periodic home BP checks.

## 2021-02-09 NOTE — ASSESSMENT & PLAN NOTE
UA positive as above.  Based on previous culture results start Omnicef 300 mg twice daily x10 days and will call with culture results if antibiotic adjustments are needed.

## 2021-02-09 NOTE — ASSESSMENT & PLAN NOTE
Stable without medications.  Continue aspirin 81 and follow-up with cardiology scheduled.  Consider lipid panel with next visit.

## 2021-02-11 ENCOUNTER — TELEPHONE (OUTPATIENT)
Dept: INTERNAL MEDICINE | Facility: CLINIC | Age: 81
End: 2021-02-11

## 2021-02-11 LAB — BACTERIA SPEC AEROBE CULT: ABNORMAL

## 2021-02-11 RX ORDER — NITROFURANTOIN 25; 75 MG/1; MG/1
100 CAPSULE ORAL 2 TIMES DAILY
Qty: 14 CAPSULE | Refills: 0 | Status: SHIPPED | OUTPATIENT
Start: 2021-02-11 | End: 2021-02-18

## 2021-02-11 NOTE — TELEPHONE ENCOUNTER
"Let pt know that UCx grew a \"resistant\" type of E.coli. I would like to switch her abx to Macrobid 100mg PO BID x 7d. Rx sent. She should stop the Omnicef I had given during the visit. If sx aren't better and/or she spikes a fever or other concerns, should let us know right away and we may consider ID consult.  "

## 2021-02-15 ENCOUNTER — APPOINTMENT (OUTPATIENT)
Dept: BONE DENSITY | Facility: HOSPITAL | Age: 81
End: 2021-02-15

## 2021-02-23 RX ORDER — BENAZEPRIL HYDROCHLORIDE 20 MG/1
TABLET ORAL
Qty: 90 TABLET | Refills: 2 | Status: SHIPPED | OUTPATIENT
Start: 2021-02-23 | End: 2021-11-15

## 2021-02-23 NOTE — TELEPHONE ENCOUNTER
Next appt 11/4/2021  Lab Results   Component Value Date    GLUCOSE 76 02/04/2021    BUN 18 02/04/2021    CREATININE 0.82 02/04/2021    EGFRIFNONA 67 02/04/2021    BCR 22.0 02/04/2021    K 4.4 02/04/2021    CO2 25.0 02/04/2021    CALCIUM 11.6 (H) 02/04/2021    ALBUMIN 4.20 02/04/2021    AST 17 02/04/2021    ALT 9 02/04/2021

## 2021-04-26 ENCOUNTER — OFFICE VISIT (OUTPATIENT)
Dept: OBSTETRICS AND GYNECOLOGY | Facility: CLINIC | Age: 81
End: 2021-04-26

## 2021-04-26 VITALS
BODY MASS INDEX: 28.09 KG/M2 | SYSTOLIC BLOOD PRESSURE: 118 MMHG | WEIGHT: 148.8 LBS | HEIGHT: 61 IN | DIASTOLIC BLOOD PRESSURE: 72 MMHG

## 2021-04-26 DIAGNOSIS — N81.11 MIDLINE CYSTOCELE: Primary | ICD-10-CM

## 2021-04-26 DIAGNOSIS — Z78.0 MENOPAUSE: ICD-10-CM

## 2021-04-26 DIAGNOSIS — N81.6 RECTOCELE: ICD-10-CM

## 2021-04-26 PROBLEM — N81.10 PELVIC ORGAN PROLAPSE QUANTIFICATION STAGE 1 CYSTOCELE: Status: RESOLVED | Noted: 2019-10-20 | Resolved: 2021-04-26

## 2021-04-26 PROCEDURE — 99214 OFFICE O/P EST MOD 30 MIN: CPT | Performed by: OBSTETRICS & GYNECOLOGY

## 2021-04-26 NOTE — PROGRESS NOTES
Chief Complaint   Patient presents with   • Follow-up     6 month follow-up, pelvic organ prolapse (uterine prolapse, rectocele, cystocele)       Leslee Wu is a 80 y.o. year old  presenting to be seen for her gynecologic exam with Chronic problems including a grade 1 midline cystocele and a grade 2 rectocele.  This patient presented for my care 6 months ago with complaints of pelvic pressure.  I discussed surgical intervention versus use of a pessary and she did not desire either.  I counseled her to do Kegel's exercises on a daily basis; to avoid heavy lifting and straining; to avoid constipation and straining with bowel movements; and to lean forward after she voids to completely empty her bladder.  She has been doing these things and feels that she has had no worsening of her symptoms.  She continues to desire to avoid surgery if possible.  She has had replacement of both knees and of her right hip.      Prob List  Visit Dx  Meds     Problem list reviewed including menopause and osteoarthritis    Medications reviewed  SCREENING TESTS    Year 2012   Age                         PAP                         HPV high risk                         Mammogram         benign benign               MJ score                         Breast MRI                         Lipids                         Vitamin D                         Colonoscopy                         DEXA  Frax (hip/any)        osteopenia                 Ovarian Screen                             GYN screening history:  · Last mammogram: was done on approximately  and the result was: Birads I (Normal).  · Last DEXA: was done on approximately 2019 and the results were: osteopenia of hips and osteopenia of radius.    No Additional Complaints Reported    The following portions of the patient's history were reviewed and updated as  appropriate:vital signs and   She  has a past medical history of Anemia, Anxiety, Atypical chest pain, Atypical chest pain, Atypical chest pain, Diverticulosis, Diverticulosis, Diverticulosis of large intestine without hemorrhage (10/21/2016), Dyslipidemia, Former smoker (2016), GERD (gastroesophageal reflux disease), GERD without esophagitis, Gout, Gout,  4 para 4, Hypertension, Hypothyroidism, Hypothyroidism, Iron deficiency anemia, Joint pain, knee, Menopause (10/9/2019), Mild anxiety, Nontoxic single thyroid nodule, Osteoarthritis, Pain, lower leg, Parathyroid adenoma, Pelvic organ prolapse quantification stage 1 cystocele (10/20/2019), Pharyngitis, Pulmonary nodule, Rectocele (2017), Spondylolisthesis, Tobacco use, Tobacco use, Urinary tract infection, and Vaginal candidiasis.  She does not have any pertinent problems on file.  She  has a past surgical history that includes Total hip arthroplasty; tonsillectomy and adenoidectomy; Back surgery; Total knee arthroplasty (Right); Tonsillectomy and adenoidectomy; Total hip arthroplasty (Right); Total knee arthroplasty (Left); and Total Knee Arthroplasty Revision (Left).  Her family history includes Aortic aneurysm in her father; Breast cancer (age of onset: 35) in her maternal aunt; Breast cancer (age of onset: 70) in her paternal aunt; Coronary artery disease in her mother; Heart attack in her mother; Heart disease in her brother, sister, and sister; Multiple myeloma in her brother; Other in her brother, father, and sister.  She  reports that she quit smoking about 61 years ago. Her smoking use included cigarettes. She has never used smokeless tobacco. She reports that she does not drink alcohol and does not use drugs.  Current Outpatient Medications   Medication Sig Dispense Refill   • amLODIPine (NORVASC) 10 MG tablet Take 1 tablet by mouth once daily 90 tablet 3   • aspirin 81 MG EC tablet Take 81 mg by mouth Daily.     • benazepril (LOTENSIN)  "20 MG tablet Take 1 tablet by mouth once daily 90 tablet 2   • Cholecalciferol (VITAMIN D3) 50 MCG (2000 UT) capsule Take 1,000 Units by mouth Daily.     • hydrocortisone 2.5 % ointment Apply a thin coat to the rectum daily 30 g 2   • levothyroxine (SYNTHROID, LEVOTHROID) 75 MCG tablet Take 1 tablet by mouth Daily. 90 tablet 3   • Misc Natural Products (OSTEO BI-FLEX JOINT SHIELD PO) Take  by mouth Daily.     • pantoprazole (PROTONIX) 40 MG EC tablet Take 1 tablet by mouth Daily. 180 tablet 1     No current facility-administered medications for this visit.     She is allergic to codeine, euthyrox [levothyroxine], lipitor [atorvastatin], and lortab  [hydrocodone-acetaminophen]..    Review of Systems  A review of systems was taken.  She denies cough, fever, shortness of breath, and loss of her sense of taste or smell  Constitutional: negative for fever, chills, activity change, appetite change, fatigue and unexpected weight change.  Respiratory: negative  Cardiovascular: negative  Gastrointestinal: negative  Genitourinary:negative  Musculoskeletal:positive for arthralgias and stiff joints  Behavioral/Psych: negative     I have reviewed and confirmed the accuracy of the ROS as documented by the MA/LPN/RN ALLA Felipe MD    /72   Ht 154.9 cm (61\")   Wt 67.5 kg (148 lb 12.8 oz)   LMP  (LMP Unknown)   Breastfeeding No   BMI 28.12 kg/m²     MEDICALLY INDICATED   Physical Exam    General:  alert; cooperative; well developed; well nourished   Skin:  No suspicious lesions seen   Thyroid: normal to inspection and palpation   Lungs:  breathing is unlabored  clear to auscultation bilaterally   Heart:  regular rate and rhythm, S1, S2 normal, no murmur, click, rub or gallop  normal apical impulse   Breasts:  Not performed.   Abdomen: soft, non-tender; no masses  no umbilical or inguinal hernias are present  no hepato-splenomegaly   Pelvis: Clinical staff was present for exam  External genitalia:  normal appearance of " the external genitalia including Bartholin's and [place]'s glands.  Vaginal:  atrophic mucosal changes are present;  Cervix:  normal appearance.  Uterus:  normal size, shape and consistency. anteverted;  Adnexa:  non palpable bilaterally.  Rectal:  external hemorrhoids present;  Cystocele GRADE 1  Rectocele GRADE 2     Results Review:    Labs  Imaging  Meds  Media :23}      Labs reviewed    Imaging reviewed including mammograms and DEXA    Medications reviewed            ASSESSMENT  Problems Addressed this Visit        Gastrointestinal Abdominal     Rectocele       Genitourinary and Reproductive     Menopause    Midline cystocele - Primary      Diagnoses       Codes Comments    Midline cystocele    -  Primary ICD-10-CM: N81.11  ICD-9-CM: 618.01     Rectocele     ICD-10-CM: N81.6  ICD-9-CM: 618.04     Menopause     ICD-10-CM: Z78.0  ICD-9-CM: 627.2       The grade 1 midline cystocele, and grade 2 rectocele or chronic problems with a stable prognosis with current management.  Once again, the patient does not desire surgical intervention or use of a pessary.  I have reviewed with her the need to avoid heavy lifting and straining; to avoid straining with bowel movements; to lean forward after she voids to completely empty her bladder; and to do Kegel's exercises daily.  She will also continue using hydrocortisone cream to her external hemorrhoids.        Substance History:   reports that she quit smoking about 61 years ago. Her smoking use included cigarettes. She has never used smokeless tobacco.   reports no history of alcohol use.   reports no history of drug use.    Substance use counseling is not indicated based on patient history.      PLAN    · Medications prescribed this encounter:  No orders of the defined types were placed in this encounter.  · Kegel's exercises with 15 tightenings 5 times a day  · Avoid heavy lifting and straining and avoid straining with bowel movements  · I have advised the patient to  contact me should she change her mind about surgical intervention.  · Calcium, 600 mg/ Vit. D, 400 IU daily; regular weight-bearing exercise  · Return in 6 months  *Please note that portions of this documentation may have been completed with a voice recognition program.  Efforts were made to edit this dictation, but occasional words may have been mistranscribed.       This note was electronically signed.    ALLA Felipe MD  April 26, 2021  10:57 EDT

## 2021-04-28 ENCOUNTER — APPOINTMENT (OUTPATIENT)
Dept: BONE DENSITY | Facility: HOSPITAL | Age: 81
End: 2021-04-28

## 2021-05-07 PROCEDURE — U0004 COV-19 TEST NON-CDC HGH THRU: HCPCS | Performed by: NURSE PRACTITIONER

## 2021-05-07 PROCEDURE — U0005 INFEC AGEN DETEC AMPLI PROBE: HCPCS | Performed by: NURSE PRACTITIONER

## 2021-05-09 ENCOUNTER — TELEPHONE (OUTPATIENT)
Dept: URGENT CARE | Facility: CLINIC | Age: 81
End: 2021-05-09

## 2021-05-11 ENCOUNTER — TELEPHONE (OUTPATIENT)
Dept: URGENT CARE | Facility: CLINIC | Age: 81
End: 2021-05-11

## 2021-05-13 ENCOUNTER — OFFICE VISIT (OUTPATIENT)
Dept: INTERNAL MEDICINE | Facility: CLINIC | Age: 81
End: 2021-05-13

## 2021-05-13 VITALS
WEIGHT: 147.8 LBS | RESPIRATION RATE: 20 BRPM | BODY MASS INDEX: 25.37 KG/M2 | HEART RATE: 80 BPM | SYSTOLIC BLOOD PRESSURE: 110 MMHG | DIASTOLIC BLOOD PRESSURE: 56 MMHG | TEMPERATURE: 97.5 F

## 2021-05-13 DIAGNOSIS — B37.0 ORAL CANDIDIASIS: Primary | ICD-10-CM

## 2021-05-13 PROCEDURE — 99213 OFFICE O/P EST LOW 20 MIN: CPT | Performed by: INTERNAL MEDICINE

## 2021-05-13 NOTE — PROGRESS NOTES
Subjective       Leslee Wu is a 80 y.o. female.     Chief Complaint   Patient presents with   • white coated tongue       History obtained from {source of history:761524}.      History of Present Illness     {Common H&P Review Areas:71217}      Review of Systems        Objective     Blood pressure 110/56, pulse 80, temperature 97.5 °F (36.4 °C), temperature source Infrared, resp. rate 20, weight 67 kg (147 lb 12.8 oz), not currently breastfeeding.    Physical Exam  Vitals and nursing note reviewed.   Constitutional:       Appearance: She is well-developed and normal weight.   HENT:      Head: Normocephalic and atraumatic.      Comments: No maxillary or frontal sinus tenderness to palpation.     Right Ear: Tympanic membrane, ear canal and external ear normal.      Left Ear: Tympanic membrane, ear canal and external ear normal.      Mouth/Throat:      Mouth: Mucous membranes are moist. No oral lesions.      Pharynx: Oropharynx is clear.      Comments: Thin white coating on tongue.  Tonsils absent.  Eyes:      Conjunctiva/sclera: Conjunctivae normal.   Cardiovascular:      Rate and Rhythm: Normal rate and regular rhythm.      Heart sounds: No murmur heard.     Pulmonary:      Effort: Pulmonary effort is normal.      Breath sounds: Normal breath sounds.   Musculoskeletal:      Cervical back: Normal range of motion and neck supple.   Lymphadenopathy:      Cervical: No cervical adenopathy.   Skin:     Findings: No rash.   Neurological:      Mental Status: She is alert.   Psychiatric:         Mood and Affect: Mood normal.           Assessment/Plan   {Assess/PlanSmartLinks:96399}        No follow-ups on file.

## 2021-05-13 NOTE — PROGRESS NOTES
Subjective       Leslee Wu is a 80 y.o. female.     Chief Complaint   Patient presents with   • white coated tongue       History obtained from the patient.      Sore Throat   This is a new problem. The current episode started yesterday. The problem has been gradually improving. There has been no fever. The pain is mild. Pertinent negatives include no abdominal pain, congestion, coughing, diarrhea, ear discharge, ear pain, headaches, hoarse voice, plugged ear sensation, neck pain, shortness of breath, swollen glands, trouble swallowing or vomiting. Associated symptoms comments: Has had a white coating on the tongue  . She has had no exposure to strep or mono. Treatments tried: Allegra. The treatment provided moderate relief.      The patient was seen at [facility] on 5/7/2021.  Rapid strep screen and COVID-19 testing was negative.  She denies any recent antibiotics.    The following portions of the patient's history were reviewed and updated as appropriate: allergies, current medications, past family history, past medical history, past social history, past surgical history and problem list.      Review of Systems   Constitutional: Positive for fatigue. Negative for appetite change, chills and fever.   HENT: Positive for postnasal drip and sore throat. Negative for congestion, ear discharge, ear pain, hoarse voice, rhinorrhea and trouble swallowing.    Eyes: Negative for pain, discharge, redness and itching.   Respiratory: Negative for cough, shortness of breath and wheezing.    Cardiovascular: Negative for chest pain.   Gastrointestinal: Negative for abdominal pain, diarrhea, nausea and vomiting.   Musculoskeletal: Negative for arthralgias, myalgias, neck pain and neck stiffness.   Skin: Negative for rash.   Neurological: Negative for headaches.   Hematological: Negative for adenopathy.           Objective     Blood pressure 110/56, pulse 80, temperature 97.5 °F (36.4 °C), temperature source Infrared, resp. rate 20,  weight 67 kg (147 lb 12.8 oz), not currently breastfeeding.    Physical Exam  Vitals and nursing note reviewed.   Constitutional:       Appearance: She is well-developed and normal weight.   HENT:      Head: Normocephalic and atraumatic.      Comments: No maxillary or frontal sinus tenderness to palpation.     Right Ear: Tympanic membrane, ear canal and external ear normal.      Left Ear: Tympanic membrane, ear canal and external ear normal.      Mouth/Throat:      Mouth: Mucous membranes are moist. No oral lesions.      Pharynx: Oropharynx is clear.      Comments: Tonsils absent.  Thin white coating on the tongue.  Eyes:      Conjunctiva/sclera: Conjunctivae normal.   Cardiovascular:      Rate and Rhythm: Normal rate and regular rhythm.      Heart sounds: No murmur heard.     Pulmonary:      Effort: Pulmonary effort is normal.      Breath sounds: Normal breath sounds.   Musculoskeletal:      Cervical back: Normal range of motion and neck supple.   Lymphadenopathy:      Cervical: No cervical adenopathy.   Skin:     Findings: No rash.   Neurological:      Mental Status: She is alert.   Psychiatric:         Mood and Affect: Mood normal.           Assessment/Plan   Diagnoses and all orders for this visit:    1. Oral candidiasis (Primary)  -     nystatin (MYCOSTATIN) 162492 UNIT/ML suspension; Swish and swallow 5 mL 4 (Four) Times a Day for 7 days.  Dispense: 140 mL; Refill: 0          Return if symptoms worsen or fail to improve.

## 2021-06-29 ENCOUNTER — OFFICE VISIT (OUTPATIENT)
Dept: INTERNAL MEDICINE | Facility: CLINIC | Age: 81
End: 2021-06-29

## 2021-06-29 VITALS
WEIGHT: 150.4 LBS | OXYGEN SATURATION: 96 % | DIASTOLIC BLOOD PRESSURE: 72 MMHG | RESPIRATION RATE: 18 BRPM | TEMPERATURE: 97.3 F | SYSTOLIC BLOOD PRESSURE: 112 MMHG | HEART RATE: 71 BPM | BODY MASS INDEX: 25.82 KG/M2

## 2021-06-29 DIAGNOSIS — B37.0 ORAL THRUSH: Primary | ICD-10-CM

## 2021-06-29 DIAGNOSIS — N76.0 ACUTE VAGINITIS: ICD-10-CM

## 2021-06-29 PROCEDURE — 99213 OFFICE O/P EST LOW 20 MIN: CPT | Performed by: NURSE PRACTITIONER

## 2021-06-29 RX ORDER — FLUCONAZOLE 100 MG/1
100 TABLET ORAL DAILY
Qty: 5 TABLET | Refills: 0 | Status: SHIPPED | OUTPATIENT
Start: 2021-06-29 | End: 2021-08-02

## 2021-06-30 ENCOUNTER — TELEPHONE (OUTPATIENT)
Dept: INTERNAL MEDICINE | Facility: CLINIC | Age: 81
End: 2021-06-30

## 2021-06-30 NOTE — PROGRESS NOTES
Chief Complaint  Urinary Tract Infection (burning, odor), Thrush, and Back Pain    Subjective          Leslee Wu presents to [facility]  History of Present Illness  Patient comes to clinic today with complaints of soreness and white patches in mouth and vaginal itching.  Patient has had yeast around before as well as yeast infections feels this may be reason for her visit today.    #1 thrush-patient has had thrush before feels that she has this symptom again.  She has sore mouth with white patches.  She is use liquid medication to resolve symptoms in the past.  #2 vaginal itching.  Patient had new onset of vaginal itching at the same time she developed soreness in her mouth.  She would like to have treatment of her symptoms.  There is no malodor.  No blood.  No urinary symptoms no constipation.  No pelvic pain.  No fever.      Objective   Vital Signs:   /72 (BP Location: Right arm, Patient Position: Sitting, Cuff Size: Adult)   Pulse 71   Temp 97.3 °F (36.3 °C) (Temporal)   Resp 18   Wt 68.2 kg (150 lb 6.4 oz)   SpO2 96%   BMI 25.82 kg/m²     Physical Exam  Vitals and nursing note reviewed.   Constitutional:       Appearance: She is well-developed.   HENT:      Head: Normocephalic and atraumatic.      Mouth/Throat:      Comments: White patches removable with erythematous base and oral mucosa posterior pharynx  Cardiovascular:      Rate and Rhythm: Normal rate and regular rhythm.   Pulmonary:      Effort: Pulmonary effort is normal.      Breath sounds: Normal breath sounds.   Abdominal:      General: Bowel sounds are normal.      Palpations: Abdomen is soft.   Genitourinary:     Comments: Deferred  Skin:     General: Skin is warm and dry.      Capillary Refill: Capillary refill takes 2 to 3 seconds.   Neurological:      Mental Status: She is alert and oriented to person, place, and time.   Psychiatric:         Behavior: Behavior normal.        Result  Review :                 Assessment and Plan    Diagnoses and all orders for this visit:    1. Oral thrush (Primary)  -     fluconazole (Diflucan) 100 MG tablet; Take 1 tablet by mouth Daily.  Dispense: 5 tablet; Refill: 0    2. Acute vaginitis  -     fluconazole (Diflucan) 100 MG tablet; Take 1 tablet by mouth Daily.  Dispense: 5 tablet; Refill: 0            Follow Up   Return if symptoms worsen or fail to improve.  Patient was given instructions and counseling regarding her condition or for health maintenance advice. Please see specific information pulled into the AVS if appropriate.     RTC/call  If symptoms worsen  Meds MOA and SE's reviewed and pt v/u

## 2021-07-16 ENCOUNTER — HOSPITAL ENCOUNTER (OUTPATIENT)
Dept: BONE DENSITY | Facility: HOSPITAL | Age: 81
Discharge: HOME | End: 2021-07-16
Admitting: INTERNAL MEDICINE

## 2021-07-16 DIAGNOSIS — E21.3 HYPERPARATHYROIDISM (HCC): ICD-10-CM

## 2021-07-16 PROCEDURE — 77080 DXA BONE DENSITY AXIAL: CPT

## 2021-08-02 ENCOUNTER — OFFICE VISIT (OUTPATIENT)
Dept: INTERNAL MEDICINE | Facility: CLINIC | Age: 81
End: 2021-08-02

## 2021-08-02 VITALS
BODY MASS INDEX: 25.75 KG/M2 | HEART RATE: 82 BPM | SYSTOLIC BLOOD PRESSURE: 108 MMHG | RESPIRATION RATE: 16 BRPM | TEMPERATURE: 97.8 F | WEIGHT: 150 LBS | DIASTOLIC BLOOD PRESSURE: 62 MMHG | OXYGEN SATURATION: 97 %

## 2021-08-02 DIAGNOSIS — D50.9 IRON DEFICIENCY ANEMIA, UNSPECIFIED IRON DEFICIENCY ANEMIA TYPE: ICD-10-CM

## 2021-08-02 DIAGNOSIS — M54.9 MID BACK PAIN: ICD-10-CM

## 2021-08-02 DIAGNOSIS — E03.9 ACQUIRED HYPOTHYROIDISM: ICD-10-CM

## 2021-08-02 DIAGNOSIS — M15.9 PRIMARY OSTEOARTHRITIS INVOLVING MULTIPLE JOINTS: ICD-10-CM

## 2021-08-02 DIAGNOSIS — D35.1 PARATHYROID ADENOMA: ICD-10-CM

## 2021-08-02 DIAGNOSIS — R73.03 PREDIABETES: ICD-10-CM

## 2021-08-02 DIAGNOSIS — I10 ESSENTIAL HYPERTENSION: ICD-10-CM

## 2021-08-02 DIAGNOSIS — F41.9 ANXIETY: ICD-10-CM

## 2021-08-02 DIAGNOSIS — I25.10 CORONARY ARTERY DISEASE INVOLVING NATIVE CORONARY ARTERY OF NATIVE HEART WITHOUT ANGINA PECTORIS: ICD-10-CM

## 2021-08-02 DIAGNOSIS — J30.9 ALLERGIC RHINITIS, UNSPECIFIED SEASONALITY, UNSPECIFIED TRIGGER: ICD-10-CM

## 2021-08-02 DIAGNOSIS — K21.9 GASTROESOPHAGEAL REFLUX DISEASE WITHOUT ESOPHAGITIS: ICD-10-CM

## 2021-08-02 DIAGNOSIS — E83.52 HYPERCALCEMIA: ICD-10-CM

## 2021-08-02 DIAGNOSIS — E78.2 MIXED HYPERLIPIDEMIA: ICD-10-CM

## 2021-08-02 DIAGNOSIS — E21.3 HYPERPARATHYROIDISM (HCC): ICD-10-CM

## 2021-08-02 DIAGNOSIS — Z00.00 MEDICARE ANNUAL WELLNESS VISIT, SUBSEQUENT: Primary | ICD-10-CM

## 2021-08-02 DIAGNOSIS — E55.9 VITAMIN D DEFICIENCY: ICD-10-CM

## 2021-08-02 LAB
BILIRUB BLD-MCNC: NEGATIVE MG/DL
CLARITY, POC: CLEAR
COLOR UR: YELLOW
EXPIRATION DATE: NORMAL
GLUCOSE UR STRIP-MCNC: NEGATIVE MG/DL
KETONES UR QL: NEGATIVE
LEUKOCYTE EST, POC: NEGATIVE
NITRITE UR-MCNC: NEGATIVE MG/ML
PH UR: 6 [PH] (ref 5–8)
PROT UR STRIP-MCNC: NEGATIVE MG/DL
RBC # UR STRIP: NEGATIVE /UL
SP GR UR: 1.01 (ref 1–1.03)
UROBILINOGEN UR QL: NORMAL

## 2021-08-02 PROCEDURE — G0439 PPPS, SUBSEQ VISIT: HCPCS | Performed by: NURSE PRACTITIONER

## 2021-08-02 PROCEDURE — 82306 VITAMIN D 25 HYDROXY: CPT | Performed by: INTERNAL MEDICINE

## 2021-08-02 PROCEDURE — 81003 URINALYSIS AUTO W/O SCOPE: CPT | Performed by: NURSE PRACTITIONER

## 2021-08-02 PROCEDURE — 99213 OFFICE O/P EST LOW 20 MIN: CPT | Performed by: NURSE PRACTITIONER

## 2021-08-02 PROCEDURE — 84443 ASSAY THYROID STIM HORMONE: CPT | Performed by: INTERNAL MEDICINE

## 2021-08-02 PROCEDURE — 84439 ASSAY OF FREE THYROXINE: CPT | Performed by: INTERNAL MEDICINE

## 2021-08-02 PROCEDURE — 80069 RENAL FUNCTION PANEL: CPT | Performed by: INTERNAL MEDICINE

## 2021-08-02 PROCEDURE — 83970 ASSAY OF PARATHORMONE: CPT | Performed by: INTERNAL MEDICINE

## 2021-08-03 LAB
25(OH)D3 SERPL-MCNC: 56.2 NG/ML (ref 30–100)
ALBUMIN SERPL-MCNC: 4 G/DL (ref 3.5–5.2)
ANION GAP SERPL CALCULATED.3IONS-SCNC: 7.4 MMOL/L (ref 5–15)
BUN SERPL-MCNC: 14 MG/DL (ref 8–23)
BUN/CREAT SERPL: 14 (ref 7–25)
CALCIUM SPEC-SCNC: 11 MG/DL (ref 8.6–10.5)
CHLORIDE SERPL-SCNC: 105 MMOL/L (ref 98–107)
CO2 SERPL-SCNC: 24.6 MMOL/L (ref 22–29)
CREAT SERPL-MCNC: 1 MG/DL (ref 0.57–1)
GFR SERPL CREATININE-BSD FRML MDRD: 53 ML/MIN/1.73
GLUCOSE SERPL-MCNC: 98 MG/DL (ref 65–99)
PHOSPHATE SERPL-MCNC: 2.7 MG/DL (ref 2.5–4.5)
POTASSIUM SERPL-SCNC: 4.7 MMOL/L (ref 3.5–5.2)
PTH-INTACT SERPL-MCNC: 90.6 PG/ML (ref 15–65)
SODIUM SERPL-SCNC: 137 MMOL/L (ref 136–145)
T4 FREE SERPL-MCNC: 1.55 NG/DL (ref 0.93–1.7)
TSH SERPL DL<=0.05 MIU/L-ACNC: 0.86 UIU/ML (ref 0.27–4.2)

## 2021-08-19 ENCOUNTER — OFFICE VISIT (OUTPATIENT)
Dept: ENDOCRINOLOGY | Facility: CLINIC | Age: 81
End: 2021-08-19

## 2021-08-19 VITALS
OXYGEN SATURATION: 100 % | HEART RATE: 88 BPM | WEIGHT: 149.7 LBS | DIASTOLIC BLOOD PRESSURE: 64 MMHG | SYSTOLIC BLOOD PRESSURE: 110 MMHG | HEIGHT: 60 IN | BODY MASS INDEX: 29.39 KG/M2

## 2021-08-19 DIAGNOSIS — E21.3 HYPERPARATHYROIDISM (HCC): Primary | ICD-10-CM

## 2021-08-19 DIAGNOSIS — E03.9 ACQUIRED HYPOTHYROIDISM: ICD-10-CM

## 2021-08-19 PROCEDURE — 99214 OFFICE O/P EST MOD 30 MIN: CPT | Performed by: INTERNAL MEDICINE

## 2021-08-19 NOTE — PROGRESS NOTES
Hypothyroidism (Follow UP), Vitamin D Deficiency, and Hyperparathyroidism    Subjective   Leslee Wu is a 80 y.o. female is here today for follow-up and ultrasound.  Hypercalcemia and primary hyperparathyroidism. She has seen parathyroid surgeon and endocrinologist in 2000 and deferred surgery at that time.     BMD 2021: T-score -1.2 at the hip and - 1.6 at the femoral neck, 1.5 at the spine (she is s/p spinal fusion). 14% for major fracture, 3.7% hip fracture.      Calcium and PTH slowly increasing during the last several years.  Parathyroid adenoma is slowly growing according to the u/s - 1 cm right inferior parathyroid adenoma. Patient is asymptomatic.     History of thyroid nodules since 1995 and 2000, s/p 2 benign biopsies.       Hypothyroidism dx in 1995, on synthroid 75 mcg.       Patient denies having any new complaints. She is exercising regularly on the stationary bycycle. Eats healthy, takes Vit D 2000 every day. Left knee surgery went well. She drinks a lot of water. No problems with memory     Medications:    Current Outpatient Medications:   •  amLODIPine (NORVASC) 10 MG tablet, Take 1 tablet by mouth once daily, Disp: 90 tablet, Rfl: 3  •  aspirin 81 MG EC tablet, Take 81 mg by mouth Daily., Disp: , Rfl:   •  benazepril (LOTENSIN) 20 MG tablet, Take 1 tablet by mouth once daily, Disp: 90 tablet, Rfl: 2  •  Cholecalciferol (VITAMIN D3) 50 MCG (2000 UT) capsule, Take 1,000 Units by mouth Daily., Disp: , Rfl:   •  hydrocortisone 2.5 % ointment, Apply a thin coat to the rectum daily, Disp: 30 g, Rfl: 2  •  levothyroxine (SYNTHROID, LEVOTHROID) 75 MCG tablet, Take 1 tablet by mouth Daily., Disp: 90 tablet, Rfl: 3  •  Misc Natural Products (OSTEO BI-FLEX JOINT SHIELD PO), Take  by mouth Daily., Disp: , Rfl:   •  nystatin (MYCOSTATIN) 652590 UNIT/ML suspension, Swish and swallow 5 mL 4 (Four) Times a Day for 7 days., Disp: 473 mL, Rfl: 0  •  pantoprazole (PROTONIX) 40 MG EC tablet, Take 1 tablet by mouth  "Daily., Disp: 180 tablet, Rfl: 1  •  fexofenadine (Allegra Allergy) 60 MG tablet, Take 1 tablet by mouth 2 (Two) Times a Day for 30 days., Disp: 60 tablet, Rfl: 3    Review of Systems   Musculoskeletal: Positive for arthralgias (knee pain ) and back pain.   All other systems reviewed and are negative.      Objective   Blood pressure 110/64, pulse 88, height 152.4 cm (60\"), weight 67.9 kg (149 lb 11.2 oz), SpO2 100 %, not currently breastfeeding.   Physical Exam   Constitutional: She is oriented to person, place, and time. She appears well-developed.   HENT:   Head: Normocephalic and atraumatic.   Eyes: Conjunctivae are normal.   Neck: Carotid bruit is not present. Thyromegaly (left 1 cm hard easy movable nodule. ) present. No thyroid mass present.   The neck is supple and no assimetry visualized   Cardiovascular: Normal rate, regular rhythm and normal heart sounds.   Pulmonary/Chest: Effort normal and breath sounds normal.   Lymphadenopathy:     She has no cervical adenopathy.   Neurological: She is alert and oriented to person, place, and time.   Psychiatric: Thought content normal.   Vitals reviewed.        Results for orders placed or performed in visit on 08/02/21   TSH    Specimen: Blood   Result Value Ref Range    TSH 0.865 0.270 - 4.200 uIU/mL   T4, Free    Specimen: Blood   Result Value Ref Range    Free T4 1.55 0.93 - 1.70 ng/dL   PTH, Intact    Specimen: Blood   Result Value Ref Range    PTH, Intact 90.6 (H) 15.0 - 65.0 pg/mL   Renal Function Panel    Specimen: Blood   Result Value Ref Range    Glucose 98 65 - 99 mg/dL    BUN 14 8 - 23 mg/dL    Creatinine 1.00 0.57 - 1.00 mg/dL    Sodium 137 136 - 145 mmol/L    Potassium 4.7 3.5 - 5.2 mmol/L    Chloride 105 98 - 107 mmol/L    CO2 24.6 22.0 - 29.0 mmol/L    Calcium 11.0 (H) 8.6 - 10.5 mg/dL    Albumin 4.00 3.50 - 5.20 g/dL    Phosphorus 2.7 2.5 - 4.5 mg/dL    Anion Gap 7.4 5.0 - 15.0 mmol/L    BUN/Creatinine Ratio 14.0 7.0 - 25.0    eGFR Non  Amer 53 " (L) >60 mL/min/1.73   Vitamin D 25 Hydroxy    Specimen: Blood   Result Value Ref Range    25 Hydroxy, Vitamin D 56.2 30.0 - 100.0 ng/ml   POC Urinalysis Dipstick, Automated    Specimen: Urine   Result Value Ref Range    Color Yellow Yellow, Straw, Dark Yellow, Amparo    Clarity, UA Clear Clear    Specific Gravity  1.010 1.005 - 1.030    pH, Urine 6.0 5.0 - 8.0    Leukocytes Negative Negative    Nitrite, UA Negative Negative    Protein, POC Negative Negative mg/dL    Glucose, UA Negative Negative, 1000 mg/dL (3+) mg/dL    Ketones, UA Negative Negative    Urobilinogen, UA Normal Normal    Bilirubin Negative Negative    Blood, UA Negative Negative    Expiration Date 2/28/22              Thyroid ultrasound 2017 and 2019             Real time high resolution imaging of the thyroid gland was performed in transverse and longitudinal planes.      The right lobe measured 4.09 cm L x 1.67 cm AP x 1.47 cm in TV dimension.    The isthmus measured 0.25 cm in thickness.    The left thyroid lobe measured 4.16 cm L x 1.01 cm AP x 1.45 cm in TV dimension.    Thyroid gland is homogeneous and contains single nodule in the left lobe and small nodule  On the right .    Nodule 1   is located in the left mid lobe and measures 1.67 x 1.2 x 1.3 cm (L X AP X TV).  This nodule is cystic, homogeneous, hypoechoic with well-defined borders and egg-shell calcifications, and Grade I vascularity on Color Flow Doppler.  It has artifacts:  posterior acoustic enhancement    Nodule 2  is located in the right upper lobe and measures 0.33 x 0.23 x 0.31 cm (L X AP X TV).  This nodule is solid, homogeneous, hypoechoic with well-defined margins, and Grade II vascularity on Color Flow Doppler.  It has no artifacts    Posterior to the right thyroid lobe hypoechoic extrathyroidal structure is located inferiorly, representing parathyroid gland and measures 1.07 x 0.44 x and 0.61 L x AP x TV cm.  Doppler flow representing polar artery is seen.       No pathologic  lymph nodes were seen.      Assessment: Stable left calcified nodule. Slightly enlarged right inferior parathyroid adenoma.   Repeat ultrasound in 18 months.     Results for orders placed or performed in visit on 08/02/21   TSH    Specimen: Blood   Result Value Ref Range    TSH 0.865 0.270 - 4.200 uIU/mL   T4, Free    Specimen: Blood   Result Value Ref Range    Free T4 1.55 0.93 - 1.70 ng/dL   PTH, Intact    Specimen: Blood   Result Value Ref Range    PTH, Intact 90.6 (H) 15.0 - 65.0 pg/mL   Renal Function Panel    Specimen: Blood   Result Value Ref Range    Glucose 98 65 - 99 mg/dL    BUN 14 8 - 23 mg/dL    Creatinine 1.00 0.57 - 1.00 mg/dL    Sodium 137 136 - 145 mmol/L    Potassium 4.7 3.5 - 5.2 mmol/L    Chloride 105 98 - 107 mmol/L    CO2 24.6 22.0 - 29.0 mmol/L    Calcium 11.0 (H) 8.6 - 10.5 mg/dL    Albumin 4.00 3.50 - 5.20 g/dL    Phosphorus 2.7 2.5 - 4.5 mg/dL    Anion Gap 7.4 5.0 - 15.0 mmol/L    BUN/Creatinine Ratio 14.0 7.0 - 25.0    eGFR Non African Amer 53 (L) >60 mL/min/1.73   Vitamin D 25 Hydroxy    Specimen: Blood   Result Value Ref Range    25 Hydroxy, Vitamin D 56.2 30.0 - 100.0 ng/ml   POC Urinalysis Dipstick, Automated    Specimen: Urine   Result Value Ref Range    Color Yellow Yellow, Straw, Dark Yellow, Amparo    Clarity, UA Clear Clear    Specific Gravity  1.010 1.005 - 1.030    pH, Urine 6.0 5.0 - 8.0    Leukocytes Negative Negative    Nitrite, UA Negative Negative    Protein, POC Negative Negative mg/dL    Glucose, UA Negative Negative, 1000 mg/dL (3+) mg/dL    Ketones, UA Negative Negative    Urobilinogen, UA Normal Normal    Bilirubin Negative Negative    Blood, UA Negative Negative    Expiration Date 2/28/22        Assessment/Plan:    Problem List Items Addressed This Visit        Other    Hyperparathyroidism (CMS/HCC) - Primary    Relevant Orders    Comprehensive Metabolic Panel    Vitamin D 25 Hydroxy    PTH, Intact    Vitamin B12    Acquired hypothyroidism    Overview     Description:  A.  On chronic replacement therapy.         Relevant Orders    TSH      Primary hyperparathyroidism - stable calcium and PTH over the last year 62-->71-->77.2--> 90 in 1 year, calcium 10.8 --> 11.2 -->10.6-->11.1->10.7-->11 over the last 2 years. Parathyroid adenoma is slowly growing according to the u/s, 1 cm in size.    Bone density 2/2021 showed lowest T-score of -1.6    Patient is asymptomatic, no dx of osteoporosis (osteopenia in BMD 4/2016), kidney stones or renal impairment.   Ultrasound showed 1 cm right inferior parathyroid adenoma.   Will repeat in 2022.       Continue with increased fluid intake, Vit D supplements 1000 daily . Conservative management is appropriate. I have reviewed BMD results and discussed surgical options. She prefer to continue with conservative management since she is asymptomatic.   BMD results reviewed.     Cont  levothyroxine 75, levels are normal.     Labs reviewed. - parathyroid, thyroid function and vit D, renal function.     F/u in 6-8 months with labs

## 2021-08-25 ENCOUNTER — TREATMENT (OUTPATIENT)
Dept: PHYSICAL THERAPY | Facility: CLINIC | Age: 81
End: 2021-08-25

## 2021-08-25 DIAGNOSIS — M54.9 MID BACK PAIN: Primary | ICD-10-CM

## 2021-08-25 PROCEDURE — 97162 PT EVAL MOD COMPLEX 30 MIN: CPT | Performed by: PHYSICAL THERAPIST

## 2021-08-25 PROCEDURE — 97110 THERAPEUTIC EXERCISES: CPT | Performed by: PHYSICAL THERAPIST

## 2021-08-25 NOTE — PROGRESS NOTES
Physical Therapy Initial Evaluation and Plan of Care      Subjective Evaluation    History of Present Illness  Mechanism of injury: Pt is a 80 year old female presenting to the clinic with mid back pain, mostly on the left. She has a history of a lumbar fusion years ago. This pain began a few weeks ago. She was worried it was her kidney. She did not have blood work done by her PCP. Prior to COVID she was doing water aerobics which was helpful for her pain, but she has not been doing that. She reports that she has not had the pain as in the last 2 weeks, and she thinks the problem has resolved mostly on its own. She almost always gets the pain when she vacuums. She also used to notice it when she was up on her feet for long periods, but that no longer bothers her. She reports that she used to have pain even when she was sitting.     Quality of life: excellent    Pain  Current pain ratin  At worst pain ratin  Quality: dull ache  Aggravating factors: repetitive movement, lifting and standing  Progression: improved    Social Support  Lives with: spouse    Hand dominance: right    Diagnostic Tests  No diagnostic tests performed    Patient Goals  Patient goals for therapy: decreased pain, increased motion, increased strength, independence with ADLs/IADLs and return to sport/leisure activities             Objective          Postural Observations  Seated posture: poor  Standing posture: poor        Palpation     Additional Palpation Details  Pt has mild tenderness at approximately T9 spinous process and along the left 9th rib.    Active Range of Motion     Lumbar   Flexion: 68 degrees   Extension: 7 degrees   Left lateral flexion: 10 degrees   Right lateral flexion: 10 degrees     Strength/Myotome Testing     Left Shoulder     Planes of Motion   Flexion: 4+   Abduction: 4+   External rotation at 0°: 5   Internal rotation at 0°: 5     Isolated Muscles   Lower trapezius: 3+   Middle trapezius: 3+   Serratus anterior:  4     Right Shoulder     Planes of Motion   Flexion: 4   Abduction: 4   External rotation at 0°: 4+   Internal rotation at 0°: 4+     Isolated Muscles   Lower trapezius: 3+   Middle trapezius: 3+   Serratus anterior: 4-     Left Hip   Planes of Motion   Flexion: 4+    Right Hip   Planes of Motion   Flexion: 4+    Left Knee   Flexion: 4+  Extension: 5    Right Knee   Flexion: 4+  Extension: 5          Assessment & Plan     Assessment  Impairments: activity intolerance, impaired physical strength and lacks appropriate home exercise program  Assessment details: Pt is an 80 year old female presenting to the clinic with left sided mid back pain, which has mostly resolved on its own over the last 2 weeks. She does have some upper extremity weakness, and tenderness along her 9th rib. She still gets pain while vacuuming, but other than that the issue is resolved. She was educated on an HEP for scapular strength and improved posture. She was instructed to return if her symptoms return.  Prognosis: good  Functional Limitations: lifting, sitting, standing and unable to perform repetitive tasks  Goals  Plan Goals: SHORT TERM GOALS:     2 weeks  1. Pt I w/ HEP  2. Pt to demonstrate ability to perform 30 minutes continuous activity in the clinic without increase in pain     LONG TERM GOALS:   6 weeks  1. Pt to demonstrate 4/5 strength for the mid and lower trap muscles to improve stability of the shoulders.  2. Pt to demonstrate ability to lift 5# OH with the bilateral arm without increase in pain  3. Pt to tolerate 60 minutes continuous activity in the clinic without increase in pain     Plan  Therapy options: will be seen for skilled physical therapy services  Planned modality interventions: cryotherapy, dry needling, electrical stimulation/Russian stimulation, high voltage pulsed current (pain management), TENS, microcurrent electrical stimulation and thermotherapy (hydrocollator packs)  Planned therapy interventions:  abdominal trunk stabilization, body mechanics training, functional ROM exercises, home exercise program, joint mobilization, manual therapy, neuromuscular re-education, postural training, soft tissue mobilization, spinal/joint mobilization, strengthening, stretching and therapeutic activities  Duration in visits: 1  Duration in weeks: 4  Treatment plan discussed with: patient        Manual Therapy:         mins  85034;  Therapeutic Exercise:    15     mins  42349;     Neuromuscular Heena:        mins  08031;    Therapeutic Activity:          mins  69373;     Gait Training:           mins  16583;     Ultrasound:          mins  05943;    Electrical Stimulation:         mins  47165 ( );  Dry Needling          mins self-pay    Timed Treatment:  15    mins   Total Treatment:     45   mins    PT SIGNATURE: Prachi Blum, PT   DATE TREATMENT INITIATED: 8/25/2021    Initial Certification  Certification Period: 11/23/2021  I certify that the therapy services are furnished while this patient is under my care.  The services outlined above are required by this patient, and will be reviewed every 90 days.     PHYSICIAN: Josephine Johnson, APRN      DATE:     Please sign and return via fax to [phone].. Thank you, [facility].

## 2021-08-30 ENCOUNTER — TELEPHONE (OUTPATIENT)
Dept: INTERNAL MEDICINE | Facility: CLINIC | Age: 81
End: 2021-08-30

## 2021-08-30 NOTE — TELEPHONE ENCOUNTER
"Patient called in wanting to be seen by SANDOVAL Reina today or tomorrow for the thrush in her mouth. Pt states her mouth \"still feels tight.\" Informed patient that [facility] does not have any availability today, but she can come in tomorrow. We have one Same Day appt at 5:45p. Informed pt that she will need to call in tomorrow and ask for a same day. Advised pt that she might not get an appt and if she cannot, she should go to Urgent Care. Pt verbalized understanding.   "

## 2021-09-01 ENCOUNTER — LAB (OUTPATIENT)
Dept: LAB | Facility: HOSPITAL | Age: 81
End: 2021-09-01

## 2021-09-01 ENCOUNTER — OFFICE VISIT (OUTPATIENT)
Dept: INTERNAL MEDICINE | Facility: CLINIC | Age: 81
End: 2021-09-01

## 2021-09-01 VITALS
DIASTOLIC BLOOD PRESSURE: 62 MMHG | TEMPERATURE: 98.2 F | OXYGEN SATURATION: 97 % | RESPIRATION RATE: 16 BRPM | SYSTOLIC BLOOD PRESSURE: 110 MMHG | HEART RATE: 75 BPM | BODY MASS INDEX: 29.61 KG/M2 | WEIGHT: 151.6 LBS

## 2021-09-01 DIAGNOSIS — E78.2 MIXED HYPERLIPIDEMIA: ICD-10-CM

## 2021-09-01 DIAGNOSIS — E03.9 ACQUIRED HYPOTHYROIDISM: ICD-10-CM

## 2021-09-01 DIAGNOSIS — B37.0 ORAL THRUSH: Primary | ICD-10-CM

## 2021-09-01 DIAGNOSIS — E55.9 VITAMIN D DEFICIENCY: ICD-10-CM

## 2021-09-01 DIAGNOSIS — K21.9 GASTROESOPHAGEAL REFLUX DISEASE WITHOUT ESOPHAGITIS: ICD-10-CM

## 2021-09-01 DIAGNOSIS — E21.3 HYPERPARATHYROIDISM (HCC): ICD-10-CM

## 2021-09-01 DIAGNOSIS — R63.8 OTHER SYMPTOMS AND SIGNS CONCERNING FOOD AND FLUID INTAKE: ICD-10-CM

## 2021-09-01 DIAGNOSIS — N76.0 ACUTE VAGINITIS: ICD-10-CM

## 2021-09-01 DIAGNOSIS — I10 ESSENTIAL HYPERTENSION: ICD-10-CM

## 2021-09-01 LAB
BILIRUB BLD-MCNC: NEGATIVE MG/DL
CLARITY, POC: CLEAR
COLOR UR: YELLOW
EXPIRATION DATE: ABNORMAL
GLUCOSE UR STRIP-MCNC: NEGATIVE MG/DL
KETONES UR QL: NEGATIVE
LEUKOCYTE EST, POC: ABNORMAL
Lab: ABNORMAL
NITRITE UR-MCNC: NEGATIVE MG/ML
PH UR: 6 [PH] (ref 5–8)
PROT UR STRIP-MCNC: NEGATIVE MG/DL
RBC # UR STRIP: NEGATIVE /UL
SP GR UR: 1 (ref 1–1.03)
UROBILINOGEN UR QL: NORMAL

## 2021-09-01 PROCEDURE — 81003 URINALYSIS AUTO W/O SCOPE: CPT | Performed by: NURSE PRACTITIONER

## 2021-09-01 PROCEDURE — 83970 ASSAY OF PARATHORMONE: CPT

## 2021-09-01 PROCEDURE — 82607 VITAMIN B-12: CPT | Performed by: NURSE PRACTITIONER

## 2021-09-01 PROCEDURE — 82746 ASSAY OF FOLIC ACID SERUM: CPT | Performed by: NURSE PRACTITIONER

## 2021-09-01 PROCEDURE — 84443 ASSAY THYROID STIM HORMONE: CPT

## 2021-09-01 PROCEDURE — 36415 COLL VENOUS BLD VENIPUNCTURE: CPT | Performed by: NURSE PRACTITIONER

## 2021-09-01 PROCEDURE — 99214 OFFICE O/P EST MOD 30 MIN: CPT | Performed by: NURSE PRACTITIONER

## 2021-09-01 PROCEDURE — 80053 COMPREHEN METABOLIC PANEL: CPT

## 2021-09-01 PROCEDURE — 80061 LIPID PANEL: CPT

## 2021-09-01 PROCEDURE — 87086 URINE CULTURE/COLONY COUNT: CPT

## 2021-09-01 PROCEDURE — 82728 ASSAY OF FERRITIN: CPT | Performed by: NURSE PRACTITIONER

## 2021-09-01 PROCEDURE — G0432 EIA HIV-1/HIV-2 SCREEN: HCPCS | Performed by: NURSE PRACTITIONER

## 2021-09-01 PROCEDURE — 85025 COMPLETE CBC W/AUTO DIFF WBC: CPT

## 2021-09-01 PROCEDURE — 82310 ASSAY OF CALCIUM: CPT

## 2021-09-01 PROCEDURE — 82306 VITAMIN D 25 HYDROXY: CPT

## 2021-09-01 RX ORDER — FLUCONAZOLE 100 MG/1
100 TABLET ORAL DAILY
Qty: 5 TABLET | Refills: 0 | Status: SHIPPED | OUTPATIENT
Start: 2021-09-01 | End: 2021-11-04

## 2021-09-01 NOTE — PROGRESS NOTES
Chief Complaint  Thrush (tongue)    Subjective          Leslee Wu presents to [facility]  History of Present Illness  Leslee comes to clinic today with complaints of thrush.  Patient was last seen 8/2/2021 for annual wellness    She has had recurrent thrush.  We do not have a current CBC on her.  Due to have labs completed last visit has not completed.  Thick rash on tongue-suspect fluid intake.  Recurrent.She has had oral nystatin which was not as helpful as diflucan x 5.  She is also having vaginal itching.  She was told to obtain probiotics but had not yet done so.       7/20 EGD with oral biopsies shows very mild inflammation no eosinophils are seen.  Dr. Nicholosn saw patient 9/20 but not for oral candidiasis.        Review of systems no fevers sweats change of appetite weight loss no runny stuffy nose sore throat cough no abdominal symptoms no change in bowel bladder habits no new rashes no lumps and bumps positive for thick white thick coating on tongue positive for vaginal itching      Objective   Vital Signs:   /62 (BP Location: Right arm, Patient Position: Sitting, Cuff Size: Adult)   Pulse 75   Temp 98.2 °F (36.8 °C) (Temporal)   Resp 16   Wt 68.8 kg (151 lb 9.6 oz)   SpO2 97%   BMI 29.61 kg/m²     Physical Exam  Vitals and nursing note reviewed.   Constitutional:       Appearance: She is well-developed.   HENT:      Head: Normocephalic and atraumatic.      Right Ear: Tympanic membrane and ear canal normal.      Left Ear: Tympanic membrane and ear canal normal.      Nose: Nose normal.      Mouth/Throat:      Comments: Oral mucosa normal except thick white coating on tongue  Cardiovascular:      Rate and Rhythm: Normal rate and regular rhythm.   Pulmonary:      Effort: Pulmonary effort is normal.      Breath sounds: Normal breath sounds.   Abdominal:      General: Bowel sounds are normal.      Palpations: Abdomen is soft.   Skin:     General: Skin  is warm and dry.      Capillary Refill: Capillary refill takes 2 to 3 seconds.      Comments: Deferred    Neurological:      Mental Status: She is alert and oriented to person, place, and time.   Psychiatric:         Behavior: Behavior normal.        Result Review :                 Assessment and Plan    Diagnoses and all orders for this visit:    1. Oral thrush (Primary)  -     HIV-1 / O / 2 Ag / Antibody 4th Generation  -     Vitamin B12  -     Folate  -     Ferritin  -     Ambulatory Referral to ENT (Otolaryngology)  -     fluconazole (Diflucan) 100 MG tablet; Take 1 tablet by mouth Daily.  Dispense: 5 tablet; Refill: 0    2. Other symptoms and signs concerning food and fluid intake   -     Ferritin  -     Ambulatory Referral to ENT (Otolaryngology)    3. Acute vaginitis  -     fluconazole (Diflucan) 100 MG tablet; Take 1 tablet by mouth Daily.  Dispense: 5 tablet; Refill: 0  -     Urine Culture - Urine, Urine, Random Void; Future  -     POC Urinalysis Dipstick, Multipro    Add probiotic      Follow Up   Return if symptoms worsen or fail to improve.  Patient was given instructions and counseling regarding her condition or for health maintenance advice. Please see specific information pulled into the AVS if appropriate.     RTC/call  If symptoms worsen  Meds MOA and SE's reviewed and pt v/u

## 2021-09-02 LAB
25(OH)D3 SERPL-MCNC: 53.4 NG/ML (ref 30–100)
ALBUMIN SERPL-MCNC: 4.3 G/DL (ref 3.5–5.2)
ALBUMIN/GLOB SERPL: 2 G/DL
ALP SERPL-CCNC: 102 U/L (ref 39–117)
ALT SERPL W P-5'-P-CCNC: 11 U/L (ref 1–33)
ANION GAP SERPL CALCULATED.3IONS-SCNC: 8.8 MMOL/L (ref 5–15)
AST SERPL-CCNC: 16 U/L (ref 1–32)
BACTERIA SPEC AEROBE CULT: NO GROWTH
BASOPHILS # BLD AUTO: 0.08 10*3/MM3 (ref 0–0.2)
BASOPHILS NFR BLD AUTO: 1.3 % (ref 0–1.5)
BILIRUB SERPL-MCNC: 0.2 MG/DL (ref 0–1.2)
BUN SERPL-MCNC: 15 MG/DL (ref 8–23)
BUN/CREAT SERPL: 19.7 (ref 7–25)
CALCIUM SPEC-SCNC: 10.9 MG/DL (ref 8.6–10.5)
CALCIUM SPEC-SCNC: 11.2 MG/DL (ref 8.6–10.5)
CHLORIDE SERPL-SCNC: 103 MMOL/L (ref 98–107)
CHOLEST SERPL-MCNC: 141 MG/DL (ref 0–200)
CO2 SERPL-SCNC: 24.2 MMOL/L (ref 22–29)
CREAT SERPL-MCNC: 0.76 MG/DL (ref 0.57–1)
DEPRECATED RDW RBC AUTO: 41.7 FL (ref 37–54)
EOSINOPHIL # BLD AUTO: 0.07 10*3/MM3 (ref 0–0.4)
EOSINOPHIL NFR BLD AUTO: 1.2 % (ref 0.3–6.2)
ERYTHROCYTE [DISTWIDTH] IN BLOOD BY AUTOMATED COUNT: 12.4 % (ref 12.3–15.4)
FERRITIN SERPL-MCNC: 27.4 NG/ML (ref 13–150)
FOLATE SERPL-MCNC: >20 NG/ML (ref 4.78–24.2)
GFR SERPL CREATININE-BSD FRML MDRD: 73 ML/MIN/1.73
GLOBULIN UR ELPH-MCNC: 2.1 GM/DL
GLUCOSE SERPL-MCNC: 81 MG/DL (ref 65–99)
HCT VFR BLD AUTO: 37.8 % (ref 34–46.6)
HDLC SERPL-MCNC: 52 MG/DL (ref 40–60)
HGB BLD-MCNC: 12.3 G/DL (ref 12–15.9)
HIV1+2 AB SER QL: NORMAL
IMM GRANULOCYTES # BLD AUTO: 0.01 10*3/MM3 (ref 0–0.05)
IMM GRANULOCYTES NFR BLD AUTO: 0.2 % (ref 0–0.5)
LDLC SERPL CALC-MCNC: 68 MG/DL (ref 0–100)
LDLC/HDLC SERPL: 1.27 {RATIO}
LYMPHOCYTES # BLD AUTO: 1.14 10*3/MM3 (ref 0.7–3.1)
LYMPHOCYTES NFR BLD AUTO: 19 % (ref 19.6–45.3)
MCH RBC QN AUTO: 29.6 PG (ref 26.6–33)
MCHC RBC AUTO-ENTMCNC: 32.5 G/DL (ref 31.5–35.7)
MCV RBC AUTO: 90.9 FL (ref 79–97)
MONOCYTES # BLD AUTO: 0.69 10*3/MM3 (ref 0.1–0.9)
MONOCYTES NFR BLD AUTO: 11.5 % (ref 5–12)
NEUTROPHILS NFR BLD AUTO: 4.02 10*3/MM3 (ref 1.7–7)
NEUTROPHILS NFR BLD AUTO: 66.8 % (ref 42.7–76)
NRBC BLD AUTO-RTO: 0 /100 WBC (ref 0–0.2)
PLATELET # BLD AUTO: 307 10*3/MM3 (ref 140–450)
PMV BLD AUTO: 11.4 FL (ref 6–12)
POTASSIUM SERPL-SCNC: 4.6 MMOL/L (ref 3.5–5.2)
PROT SERPL-MCNC: 6.4 G/DL (ref 6–8.5)
PTH-INTACT SERPL-MCNC: 84.7 PG/ML (ref 15–65)
RBC # BLD AUTO: 4.16 10*6/MM3 (ref 3.77–5.28)
SODIUM SERPL-SCNC: 136 MMOL/L (ref 136–145)
TRIGL SERPL-MCNC: 115 MG/DL (ref 0–150)
TSH SERPL DL<=0.05 MIU/L-ACNC: 0.85 UIU/ML (ref 0.27–4.2)
VIT B12 BLD-MCNC: 513 PG/ML (ref 211–946)
VLDLC SERPL-MCNC: 21 MG/DL (ref 5–40)
WBC # BLD AUTO: 6.01 10*3/MM3 (ref 3.4–10.8)

## 2021-09-15 PROBLEM — R09.81 NASAL CONGESTION: Status: ACTIVE | Noted: 2021-09-15

## 2021-11-03 NOTE — PROGRESS NOTES
"  OFFICE FOLLOW UP     Date of Encounter:2021     Name: Leslee Wu  : 1940  Address: [facility] [address] [facility] [address]    PCP: Josephine Johnson, APRN  [address]  [facility] [address]    Leslee Wu is an 81 y.o. female.    Chief Complaint: Follow up of chest pain, HTN    Problem List:   1.   History of atypical chest pain  A. Echocardiogram, : LVEF 90%  B. Abnormal calcium score, 2013.  C. MPS, 2013: No inducible ischemia, LVEF 70%  2.   Hypertension  3.   Hypothyroidism/chronmic replacement  4.   GERD  5.   History of brief tobacco use, in patient's 20s:  A. Noncalcified lung nodule found on chest CT, , at the left lung base and the mid left lung field.  B. Evaluation by Dr. Ivan Mota, 2013 with serial CT scans.  C. Chest CT, 2013: Noncalcified nodules smaller than prior studies.  D. Essentially \"normal\" CT of the chest, 2014.  6.   Osteoarthrosis  7.   Iron deficiency anemia.  8.   Spondylolisthesis/degenerative disc disease.  9.   Mild chronic anxiety.  10. History of diverticulosis  11. History of gout.  12. Left lower extremity ?DVT 2019    A. LE venous duplex 10/17/19: discordant data superficial vs DVT   B. LE venous duplex - normal, 11/15/2019  12. Surgical History:                        A. Back surgery                        B. Right TKR.                        C. T&A.                        D. Right hip replacement.                        E. Left TKR.    Allergies:  Allergies   Allergen Reactions   • Codeine    • Euthyrox [Levothyroxine] Paresthesia     euthyrox brand   • Lipitor [Atorvastatin] Myalgia   • Lortab  [Hydrocodone-Acetaminophen]        Current Medications:  Current Outpatient Medications   Medication Instructions   • amLODIPine (NORVASC) 10 MG tablet Take 1 tablet by mouth once daily   • aspirin 81 mg, Oral, Daily   • Bacillus Coagulans-Inulin (Probiotic) 1-250 BILLION-MG capsule Oral   • benazepril " "(LOTENSIN) 20 MG tablet Take 1 tablet by mouth once daily   • fexofenadine (ALLEGRA ALLERGY) 60 mg, Oral, 2 Times Daily   • hydrocortisone 2.5 % ointment Apply a thin coat to the rectum daily   • levothyroxine (SYNTHROID, LEVOTHROID) 75 mcg, Oral, Daily   • Misc Natural Products (OSTEO BI-FLEX JOINT SHIELD PO) Oral, Daily   • pantoprazole (PROTONIX) 40 mg, Oral, Daily   • Vitamin D3 1,000 Units, Oral, Daily        History of Present Illness:           Mrs. Wu presents for yearly follow up. She denies any cardiac complaints since her last visit. No chest pain or unusual exertional dyspnea. She has chronic back pain with history of prior fusion. She has not been checking home BPs recently.     The following portions of the patient's history were reviewed and updated as appropriate: allergies, current medications and problem list.    ROS: Pertinent positives as listed in the HPI.  All other systems reviewed and negative.    Objective:  Vitals:    11/04/21 1132 11/04/21 1141 11/04/21 1202 11/04/21 1203   BP:   149/83 142/82   BP Location:   Right arm Right arm   Patient Position:   Sitting Standing   Pulse: 75 75     SpO2: 95%      Weight: 68 kg (150 lb)      Height: 152.4 cm (60\")        Body mass index is 29.29 kg/m².    Physical Exam:  GENERAL: Alert, cooperative, in no acute distress.   HEENT: Normocephalic, no adenopathy, no jugular venous distention  HEART: No discrete PMI is noted. Regular rhythm, normal rate, and no murmurs, gallops, or rubs.   LUNGS: Clear to auscultation bilaterally. No wheezing, rales or ronchi.  ABDOMEN: Soft, bowel sounds present, non-tender   NEUROLOGIC: No focal abnormalities involving strength or sensation are noted.   EXTREMITIES: No clubbing, cyanosis, or edema noted.     Diagnostic Data:    Lab Results   Component Value Date    GLUCOSE 81 09/01/2021    BUN 15 09/01/2021    CREATININE 0.76 09/01/2021    EGFRIFNONA 73 09/01/2021    BCR 19.7 09/01/2021    K 4.6 09/01/2021    CO2 24.2 " 09/01/2021    CALCIUM 11.2 (H) 09/01/2021    CALCIUM 10.9 (H) 09/01/2021    ALBUMIN 4.30 09/01/2021    AST 16 09/01/2021    ALT 11 09/01/2021     Lab Results   Component Value Date    WBC 6.01 09/01/2021    HGB 12.3 09/01/2021    HCT 37.8 09/01/2021    MCV 90.9 09/01/2021     09/01/2021     Lab Results   Component Value Date    CHOL 141 09/01/2021    CHLPL 177 12/11/2015    TRIG 115 09/01/2021    HDL 52 09/01/2021    LDL 68 09/01/2021       Procedures    Assessment and Plan:     1. Hypertension: BP slightly above target today, however she has not been checking home BPs regularly. She will begin monitoring at home and call us should they remain consistently >130mmHg systolic.   2. History of chest pain: no recurrent chest pain or anginal equivalent.   3. Follow up in 1 year, sooner if needed.     Electronically signed by Gladis Adams PA-C, 11/04/21, 12:04 PM EDT.

## 2021-11-04 ENCOUNTER — OFFICE VISIT (OUTPATIENT)
Dept: CARDIOLOGY | Facility: CLINIC | Age: 81
End: 2021-11-04

## 2021-11-04 VITALS
HEIGHT: 60 IN | HEART RATE: 75 BPM | OXYGEN SATURATION: 95 % | BODY MASS INDEX: 29.45 KG/M2 | SYSTOLIC BLOOD PRESSURE: 142 MMHG | WEIGHT: 150 LBS | DIASTOLIC BLOOD PRESSURE: 82 MMHG

## 2021-11-04 DIAGNOSIS — I10 ESSENTIAL HYPERTENSION: ICD-10-CM

## 2021-11-04 DIAGNOSIS — E78.2 MIXED HYPERLIPIDEMIA: ICD-10-CM

## 2021-11-04 DIAGNOSIS — I25.10 CORONARY ARTERY DISEASE INVOLVING NATIVE CORONARY ARTERY OF NATIVE HEART WITHOUT ANGINA PECTORIS: Primary | ICD-10-CM

## 2021-11-04 PROCEDURE — 99213 OFFICE O/P EST LOW 20 MIN: CPT | Performed by: PHYSICIAN ASSISTANT

## 2021-11-04 RX ORDER — GUAIFENESIN 600 MG/1
TABLET, EXTENDED RELEASE ORAL EVERY 12 HOURS SCHEDULED
COMMUNITY
End: 2021-11-04

## 2021-11-04 RX ORDER — BACILLUS COAGULANS/INULIN 1B-250 MG
CAPSULE ORAL
COMMUNITY

## 2021-11-15 RX ORDER — AMLODIPINE BESYLATE 10 MG/1
TABLET ORAL
Qty: 90 TABLET | Refills: 3 | Status: SHIPPED | OUTPATIENT
Start: 2021-11-15 | End: 2022-02-09

## 2021-11-15 RX ORDER — BENAZEPRIL HYDROCHLORIDE 20 MG/1
TABLET ORAL
Qty: 90 TABLET | Refills: 3 | Status: SHIPPED | OUTPATIENT
Start: 2021-11-15 | End: 2022-11-07

## 2021-11-15 RX ORDER — LEVOTHYROXINE SODIUM 75 UG/1
TABLET ORAL
Qty: 90 TABLET | Refills: 0 | Status: SHIPPED | OUTPATIENT
Start: 2021-11-15 | End: 2022-02-08 | Stop reason: ALTCHOICE

## 2021-11-15 NOTE — TELEPHONE ENCOUNTER
Lab Results   Component Value Date    GLUCOSE 81 09/01/2021    BUN 15 09/01/2021    CREATININE 0.76 09/01/2021    EGFRIFNONA 73 09/01/2021    BCR 19.7 09/01/2021    K 4.6 09/01/2021    CO2 24.2 09/01/2021    CALCIUM 11.2 (H) 09/01/2021    CALCIUM 10.9 (H) 09/01/2021    ALBUMIN 4.30 09/01/2021    AST 16 09/01/2021    ALT 11 09/01/2021

## 2021-11-18 ENCOUNTER — OFFICE VISIT (OUTPATIENT)
Dept: OBSTETRICS AND GYNECOLOGY | Facility: CLINIC | Age: 81
End: 2021-11-18

## 2021-11-18 VITALS
HEIGHT: 61 IN | BODY MASS INDEX: 28.4 KG/M2 | WEIGHT: 150.4 LBS | SYSTOLIC BLOOD PRESSURE: 122 MMHG | DIASTOLIC BLOOD PRESSURE: 80 MMHG

## 2021-11-18 DIAGNOSIS — N81.6 CYSTOCELE WITH RECTOCELE: ICD-10-CM

## 2021-11-18 DIAGNOSIS — Z01.411 ENCOUNTER FOR GYNECOLOGICAL EXAMINATION WITH ABNORMAL FINDING: Primary | ICD-10-CM

## 2021-11-18 DIAGNOSIS — N81.10 PELVIC ORGAN PROLAPSE QUANTIFICATION STAGE 2 CYSTOCELE: ICD-10-CM

## 2021-11-18 DIAGNOSIS — N81.10 CYSTOCELE WITH RECTOCELE: ICD-10-CM

## 2021-11-18 DIAGNOSIS — N81.4 CYSTOCELE WITH UTERINE PROLAPSE: ICD-10-CM

## 2021-11-18 PROCEDURE — G0101 CA SCREEN;PELVIC/BREAST EXAM: HCPCS | Performed by: NURSE PRACTITIONER

## 2021-11-18 NOTE — PROGRESS NOTES
Chief Complaint  Leslee Wu is a 81 y.o.  female presenting for Gynecologic Exam ([facility] exam, former [facility] pt.)    History of Present Illness  Very pleasant and kind 80yo woman who is here for annual gyn exam.  She is  with 9 grands.  And she is looking forward to the Holidays.  She feels no worsening of the pelvic organ prolapse symptoms.  (Some pressure / but no urinary incont.  She has some urge incont. If cannot get to the bathroom in time.  But, mostly dry days.)  No vaginitis sx.  No UTIs.  No constipation.  She is faithful about doing Kegel's exercises and avoiding heavy lifting.  All preventive health procedures up to date.  Last mammo 2021 and she is aware it is due again in .  Last DEXA scan 2021 osteopenia.  We discussed Ca, Vit D, lots of walking & wt bearing exercise.  Last Colonoscopy 7/15/2020 polyps      The following portions of the patient's history were reviewed and updated as appropriate: allergies, current medications, past family history, past medical history, past social history, past surgical history and problem list.    Allergies   Allergen Reactions   • Codeine Nausea And Vomiting   • Euthyrox [Levothyroxine] Paresthesia     euthyrox brand   • Lipitor [Atorvastatin] Myalgia   • Lortab [Hydrocodone-Acetaminophen] Nausea And Vomiting         Current Outpatient Medications:   •  amLODIPine (NORVASC) 10 MG tablet, Take 1 tablet by mouth once daily, Disp: 90 tablet, Rfl: 3  •  aspirin 81 MG EC tablet, Take 81 mg by mouth Daily., Disp: , Rfl:   •  Bacillus Coagulans-Inulin (Probiotic) 1-250 BILLION-MG capsule, Take  by mouth., Disp: , Rfl:   •  benazepril (LOTENSIN) 20 MG tablet, Take 1 tablet by mouth once daily, Disp: 90 tablet, Rfl: 3  •  Cholecalciferol (VITAMIN D3) 50 MCG ( UT) capsule, Take 1,000 Units by mouth Daily., Disp: , Rfl:   •  Euthyrox 75 MCG tablet, Take 1 tablet by mouth once daily, Disp: 90 tablet, Rfl: 0  •  Misc Natural Products (OSTEO BI-FLEX  JOINT SHIELD PO), Take  by mouth Daily., Disp: , Rfl:   •  pantoprazole (PROTONIX) 40 MG EC tablet, Take 1 tablet by mouth Daily., Disp: 180 tablet, Rfl: 1  •  fexofenadine (Allegra Allergy) 60 MG tablet, Take 1 tablet by mouth 2 (Two) Times a Day for 30 days., Disp: 60 tablet, Rfl: 3  •  hydrocortisone 2.5 % ointment, Apply a thin coat to the rectum daily, Disp: 30 g, Rfl: 2    Past Medical History:   Diagnosis Date   • Anemia    • Anxiety    • Atypical chest pain     Normal exercise Cardiolite stress test and echocardiogram in .   • Atypical chest pain    • Atypical chest pain     History of atypical chest pain: Echocardiogram, ; LVEF 90%. Abnormal calcium score, 2013. MPS, 2013: No inducible ischemia, LVEF 70%.   • Diverticulosis    • Diverticulosis     History of diverticulosis.   • Diverticulosis of large intestine without hemorrhage 10/21/2016   • Dyslipidemia    • Former smoker 2016   • GERD (gastroesophageal reflux disease)    • GERD without esophagitis    • Gout    • Gout     History of gout.   •  4 para 4    • Hypertension    • Hypothyroidism     On chronic replacement therapy.   • Hypothyroidism     Hypothyroidism/chronic replacement.   • Iron deficiency anemia     Iron deficiency anemia.   • Joint pain, knee    • Menopause 10/9/2019   • Mild anxiety     Mild chronic anxiety.   • Nontoxic single thyroid nodule    • Osteoarthritis    • Pain, lower leg    • Parathyroid adenoma    • Pelvic organ prolapse quantification stage 1 cystocele 10/20/2019    Follows with Dr. Felipe GYN evaluation 10/9 no specific treatment at this time.   • Pharyngitis    • Pulmonary nodule     CT scan of the chest of 2013 reports new noncalcified nodule in the right lung base measuring 8 x 5 mm and a stable noncalcified nodule in the left mid lung field measuring 4-5 mm, follow-up CT scan of 2014 reports interval resolution of the right basilar lung nodule and stable calcified nodule in the  "left upper lobe.   • Rectocele 1/23/2017   • Spondylolisthesis     Spondylolisthesis/degenerative disc disease.   • Tobacco use     History of brief tobacco use, in the patient’s 20s:    • Tobacco use     History of brief tobacco use, in the patient’s 20s: Noncalcified lung nodule found on chest CT, June 2013, at the left lung base and the mid left lung field.  Evaluation by Dr. Ivan Mota, August 2013 with serial CT scans. Chest CT, November 2013: Noncalcified nodules smaller than prior studies. Essentially “normal” CT of the chest, 08/06/2014.    • Urinary tract infection    • Vaginal candidiasis         Past Surgical History:   Procedure Laterality Date   • BACK SURGERY     • TONSILLECTOMY AND ADENOIDECTOMY     • TONSILLECTOMY AND ADENOIDECTOMY     • TOTAL HIP ARTHROPLASTY     • TOTAL HIP ARTHROPLASTY Right     Right hip replacement.   • TOTAL KNEE ARTHROPLASTY Right    • TOTAL KNEE ARTHROPLASTY Left    • TOTAL KNEE ARTHROPLASTY REVISION Left        Objective  /80 (BP Location: Left arm, Patient Position: Sitting, Cuff Size: Adult)   Ht 154.9 cm (61\")   Wt 68.2 kg (150 lb 6.4 oz)   LMP  (LMP Unknown)   Breastfeeding No   BMI 28.42 kg/m²     Physical Exam  Exam conducted with a chaperone present.   Constitutional:       Appearance: Normal appearance.   HENT:      Head: Normocephalic.   Neck:      Thyroid: No thyroid mass or thyromegaly.   Cardiovascular:      Rate and Rhythm: Normal rate and regular rhythm.      Heart sounds: Normal heart sounds.   Pulmonary:      Effort: Pulmonary effort is normal.      Breath sounds: Normal breath sounds.   Abdominal:      Palpations: Abdomen is soft. There is no mass.      Tenderness: There is no abdominal tenderness.   Genitourinary:     General: Normal vulva.      Vagina: Prolapsed vaginal walls present. No erythema.      Cervix: No discharge, lesion or erythema.      Uterus: Normal.       Adnexa: Right adnexa normal.        Right: No tenderness.          Left: No " mass.        Comments: Vagina with 2+ cystocele and 2+ rectocele.  Also prolapse of the uterus, which comes down to within 3cm of the introitus when bearing down.  Anus appears wnl.  No rectal exam performed.  Neurological:      Mental Status: She is alert.         Assessment/Plan   Diagnoses and all orders for this visit:    1. Encounter for gynecological examination with abnormal finding (Primary)    2. Pelvic organ prolapse quantification stage 2 cystocele    3. Cystocele with uterine prolapse    4. Cystocele with rectocele    Reviewed instr re: avoiding heavy lifting, doing Kegel's exercises, avoiding constipation, etc.  Flds / fiber and healthy diet.  She does NOT want any surg or pessary.  See HPI re: other couns  Couns re: reg monthly SBE and continuing annual mammograms.    Procedures        Return in about 1 year (around 11/18/2022) for Annual physical.    Huong Jaramillo, APRN  11/18/2021

## 2021-11-19 ENCOUNTER — TELEPHONE (OUTPATIENT)
Dept: OBSTETRICS AND GYNECOLOGY | Facility: CLINIC | Age: 81
End: 2021-11-19

## 2021-11-19 RX ORDER — HYDROCORTISONE 25 MG/G
CREAM TOPICAL
Qty: 30 G | Refills: 1 | Status: SHIPPED | OUTPATIENT
Start: 2021-11-19 | End: 2021-12-03

## 2021-11-19 NOTE — TELEPHONE ENCOUNTER
Patient was seen in the office yesterday.  She desires treatment for hemorrhoids.  Is asking for rectal suppositories, but there is no indication based on exam that she has internal hemorrhoids.  She states that she has frequent BM's and can feel external hemorrhoids.  Has itching.    Please advise.  Pharmacy:  [facility], [place].    Please call patient back on cell phone  [phone].

## 2021-11-22 ENCOUNTER — TELEPHONE (OUTPATIENT)
Dept: ENDOCRINOLOGY | Facility: CLINIC | Age: 81
End: 2021-11-22

## 2021-11-22 NOTE — TELEPHONE ENCOUNTER
[facility] pharmacy called b/c the pt requested to change thyroid medication to Levothyroxine. [facility] just needs an approval or denial. They can be reached at [phone].

## 2021-12-20 ENCOUNTER — TELEPHONE (OUTPATIENT)
Dept: INTERNAL MEDICINE | Facility: CLINIC | Age: 81
End: 2021-12-20

## 2021-12-20 RX ORDER — PANTOPRAZOLE SODIUM 40 MG/1
40 TABLET, DELAYED RELEASE ORAL DAILY
Qty: 180 TABLET | Refills: 1
Start: 2021-12-20 | End: 2021-12-21 | Stop reason: SDUPTHER

## 2021-12-20 NOTE — TELEPHONE ENCOUNTER
Caller: Leslee Wu    Relationship: Self    Best call back number: [phone]    Requested Prescriptions:   Requested Prescriptions     Pending Prescriptions Disp Refills   • pantoprazole (PROTONIX) 40 MG EC tablet 180 tablet 1     Sig: Take 1 tablet by mouth Daily.        Pharmacy where request should be sent: [address] - [phone] [facility] [phone] [facility]     Additional details provided by patient:   PATIENT IS COMPLETLEY OUT OF MEDICATION     Does the patient have less than a 3 day supply:  [x] Yes  [] No    Joann Adame Rep   12/20/21 11:27 EST

## 2021-12-21 DIAGNOSIS — K21.9 GASTROESOPHAGEAL REFLUX DISEASE WITHOUT ESOPHAGITIS: Primary | ICD-10-CM

## 2021-12-21 RX ORDER — PANTOPRAZOLE SODIUM 40 MG/1
40 TABLET, DELAYED RELEASE ORAL DAILY
Qty: 180 TABLET | Refills: 1 | Status: SHIPPED | OUTPATIENT
Start: 2021-12-21 | End: 2022-12-14

## 2021-12-30 ENCOUNTER — TRANSCRIBE ORDERS (OUTPATIENT)
Dept: ADMINISTRATIVE | Facility: HOSPITAL | Age: 81
End: 2021-12-30

## 2021-12-30 DIAGNOSIS — Z12.31 VISIT FOR SCREENING MAMMOGRAM: Primary | ICD-10-CM

## 2022-01-21 ENCOUNTER — TELEPHONE (OUTPATIENT)
Dept: CARDIOLOGY | Facility: CLINIC | Age: 82
End: 2022-01-21

## 2022-01-21 DIAGNOSIS — Z79.899 HIGH RISK MEDICATION USE: Primary | ICD-10-CM

## 2022-02-01 ENCOUNTER — HOSPITAL ENCOUNTER (OUTPATIENT)
Dept: MAMMOGRAPHY | Facility: HOSPITAL | Age: 82
Discharge: HOME | End: 2022-02-01
Admitting: INTERNAL MEDICINE

## 2022-02-01 DIAGNOSIS — Z12.31 VISIT FOR SCREENING MAMMOGRAM: ICD-10-CM

## 2022-02-01 PROCEDURE — 77067 SCR MAMMO BI INCL CAD: CPT | Performed by: RADIOLOGY

## 2022-02-01 PROCEDURE — 77063 BREAST TOMOSYNTHESIS BI: CPT

## 2022-02-01 PROCEDURE — 77063 BREAST TOMOSYNTHESIS BI: CPT | Performed by: RADIOLOGY

## 2022-02-01 PROCEDURE — 77067 SCR MAMMO BI INCL CAD: CPT

## 2022-02-08 RX ORDER — LEVOTHYROXINE SODIUM 0.07 MG/1
TABLET ORAL
Qty: 90 TABLET | Refills: 3 | Status: SHIPPED | OUTPATIENT
Start: 2022-02-08 | End: 2022-10-25 | Stop reason: SDUPTHER

## 2022-02-08 RX ORDER — LEVOTHYROXINE SODIUM 0.07 MG/1
75 TABLET ORAL DAILY
Qty: 90 TABLET | Refills: 1 | Status: SHIPPED | OUTPATIENT
Start: 2022-02-08 | End: 2022-02-09 | Stop reason: SDUPTHER

## 2022-02-09 ENCOUNTER — OFFICE VISIT (OUTPATIENT)
Dept: INTERNAL MEDICINE | Facility: CLINIC | Age: 82
End: 2022-02-09

## 2022-02-09 ENCOUNTER — HOSPITAL ENCOUNTER (OUTPATIENT)
Dept: GENERAL RADIOLOGY | Facility: HOSPITAL | Age: 82
Discharge: HOME | End: 2022-02-09
Admitting: NURSE PRACTITIONER

## 2022-02-09 VITALS
TEMPERATURE: 97.5 F | SYSTOLIC BLOOD PRESSURE: 98 MMHG | RESPIRATION RATE: 16 BRPM | OXYGEN SATURATION: 99 % | WEIGHT: 146.6 LBS | HEART RATE: 75 BPM | BODY MASS INDEX: 27.7 KG/M2 | DIASTOLIC BLOOD PRESSURE: 62 MMHG

## 2022-02-09 DIAGNOSIS — K21.9 GASTROESOPHAGEAL REFLUX DISEASE WITHOUT ESOPHAGITIS: ICD-10-CM

## 2022-02-09 DIAGNOSIS — N81.6 RECTOCELE: ICD-10-CM

## 2022-02-09 DIAGNOSIS — E21.3 HYPERPARATHYROIDISM: ICD-10-CM

## 2022-02-09 DIAGNOSIS — E03.9 ACQUIRED HYPOTHYROIDISM: ICD-10-CM

## 2022-02-09 DIAGNOSIS — E55.9 VITAMIN D DEFICIENCY: ICD-10-CM

## 2022-02-09 DIAGNOSIS — M85.89 OSTEOPENIA OF MULTIPLE SITES: ICD-10-CM

## 2022-02-09 DIAGNOSIS — M25.511 ACUTE PAIN OF RIGHT SHOULDER: ICD-10-CM

## 2022-02-09 DIAGNOSIS — R82.998 LEUKOCYTES IN URINE: ICD-10-CM

## 2022-02-09 DIAGNOSIS — M25.511 ACUTE PAIN OF RIGHT SHOULDER: Primary | ICD-10-CM

## 2022-02-09 DIAGNOSIS — D50.9 IRON DEFICIENCY ANEMIA, UNSPECIFIED IRON DEFICIENCY ANEMIA TYPE: ICD-10-CM

## 2022-02-09 DIAGNOSIS — E78.2 MIXED HYPERLIPIDEMIA: ICD-10-CM

## 2022-02-09 DIAGNOSIS — I10 ESSENTIAL HYPERTENSION: ICD-10-CM

## 2022-02-09 DIAGNOSIS — R73.03 PREDIABETES: ICD-10-CM

## 2022-02-09 DIAGNOSIS — I25.10 CORONARY ARTERY DISEASE INVOLVING NATIVE CORONARY ARTERY OF NATIVE HEART WITHOUT ANGINA PECTORIS: ICD-10-CM

## 2022-02-09 LAB
ALBUMIN SERPL-MCNC: 4.1 G/DL (ref 3.5–5.2)
ALBUMIN/GLOB SERPL: 1.6 G/DL
ALP SERPL-CCNC: 105 U/L (ref 39–117)
ALT SERPL W P-5'-P-CCNC: 14 U/L (ref 1–33)
ANION GAP SERPL CALCULATED.3IONS-SCNC: 9 MMOL/L (ref 5–15)
AST SERPL-CCNC: 19 U/L (ref 1–32)
BASOPHILS # BLD AUTO: 0.09 10*3/MM3 (ref 0–0.2)
BASOPHILS NFR BLD AUTO: 1.7 % (ref 0–1.5)
BILIRUB BLD-MCNC: NEGATIVE MG/DL
BILIRUB SERPL-MCNC: 0.2 MG/DL (ref 0–1.2)
BUN SERPL-MCNC: 13 MG/DL (ref 8–23)
BUN/CREAT SERPL: 17.1 (ref 7–25)
CALCIUM SPEC-SCNC: 11.1 MG/DL (ref 8.6–10.5)
CHLORIDE SERPL-SCNC: 104 MMOL/L (ref 98–107)
CHOLEST SERPL-MCNC: 157 MG/DL (ref 0–200)
CLARITY, POC: CLEAR
CO2 SERPL-SCNC: 24 MMOL/L (ref 22–29)
COLOR UR: YELLOW
CREAT SERPL-MCNC: 0.76 MG/DL (ref 0.57–1)
DEPRECATED RDW RBC AUTO: 41.7 FL (ref 37–54)
EOSINOPHIL # BLD AUTO: 0.17 10*3/MM3 (ref 0–0.4)
EOSINOPHIL NFR BLD AUTO: 3.3 % (ref 0.3–6.2)
ERYTHROCYTE [DISTWIDTH] IN BLOOD BY AUTOMATED COUNT: 12.5 % (ref 12.3–15.4)
EXPIRATION DATE: ABNORMAL
GFR SERPL CREATININE-BSD FRML MDRD: 73 ML/MIN/1.73
GLOBULIN UR ELPH-MCNC: 2.5 GM/DL
GLUCOSE SERPL-MCNC: 86 MG/DL (ref 65–99)
GLUCOSE UR STRIP-MCNC: NEGATIVE MG/DL
HCT VFR BLD AUTO: 41.2 % (ref 34–46.6)
HDLC SERPL-MCNC: 47 MG/DL (ref 40–60)
HGB BLD-MCNC: 12.9 G/DL (ref 12–15.9)
IMM GRANULOCYTES # BLD AUTO: 0.01 10*3/MM3 (ref 0–0.05)
IMM GRANULOCYTES NFR BLD AUTO: 0.2 % (ref 0–0.5)
KETONES UR QL: NEGATIVE
LDLC SERPL CALC-MCNC: 93 MG/DL (ref 0–100)
LDLC/HDLC SERPL: 1.95 {RATIO}
LEUKOCYTE EST, POC: ABNORMAL
LYMPHOCYTES # BLD AUTO: 0.77 10*3/MM3 (ref 0.7–3.1)
LYMPHOCYTES NFR BLD AUTO: 14.8 % (ref 19.6–45.3)
Lab: ABNORMAL
MCH RBC QN AUTO: 28.3 PG (ref 26.6–33)
MCHC RBC AUTO-ENTMCNC: 31.3 G/DL (ref 31.5–35.7)
MCV RBC AUTO: 90.4 FL (ref 79–97)
MONOCYTES # BLD AUTO: 0.64 10*3/MM3 (ref 0.1–0.9)
MONOCYTES NFR BLD AUTO: 12.3 % (ref 5–12)
NEUTROPHILS NFR BLD AUTO: 3.51 10*3/MM3 (ref 1.7–7)
NEUTROPHILS NFR BLD AUTO: 67.7 % (ref 42.7–76)
NITRITE UR-MCNC: NEGATIVE MG/ML
NRBC BLD AUTO-RTO: 0 /100 WBC (ref 0–0.2)
PH UR: 6 [PH] (ref 5–8)
PLATELET # BLD AUTO: 293 10*3/MM3 (ref 140–450)
PMV BLD AUTO: 11.8 FL (ref 6–12)
POTASSIUM SERPL-SCNC: 4.3 MMOL/L (ref 3.5–5.2)
PROT SERPL-MCNC: 6.6 G/DL (ref 6–8.5)
PROT UR STRIP-MCNC: NEGATIVE MG/DL
RBC # BLD AUTO: 4.56 10*6/MM3 (ref 3.77–5.28)
RBC # UR STRIP: NEGATIVE /UL
SODIUM SERPL-SCNC: 137 MMOL/L (ref 136–145)
SP GR UR: 1.01 (ref 1–1.03)
TRIGL SERPL-MCNC: 91 MG/DL (ref 0–150)
UROBILINOGEN UR QL: NORMAL
VLDLC SERPL-MCNC: 17 MG/DL (ref 5–40)
WBC NRBC COR # BLD: 5.19 10*3/MM3 (ref 3.4–10.8)

## 2022-02-09 PROCEDURE — 36415 COLL VENOUS BLD VENIPUNCTURE: CPT | Performed by: NURSE PRACTITIONER

## 2022-02-09 PROCEDURE — 87086 URINE CULTURE/COLONY COUNT: CPT | Performed by: NURSE PRACTITIONER

## 2022-02-09 PROCEDURE — 80061 LIPID PANEL: CPT | Performed by: INTERNAL MEDICINE

## 2022-02-09 PROCEDURE — 82306 VITAMIN D 25 HYDROXY: CPT | Performed by: INTERNAL MEDICINE

## 2022-02-09 PROCEDURE — 82607 VITAMIN B-12: CPT | Performed by: INTERNAL MEDICINE

## 2022-02-09 PROCEDURE — 80053 COMPREHEN METABOLIC PANEL: CPT | Performed by: INTERNAL MEDICINE

## 2022-02-09 PROCEDURE — 85025 COMPLETE CBC W/AUTO DIFF WBC: CPT | Performed by: NURSE PRACTITIONER

## 2022-02-09 PROCEDURE — 83970 ASSAY OF PARATHORMONE: CPT | Performed by: INTERNAL MEDICINE

## 2022-02-09 PROCEDURE — 84443 ASSAY THYROID STIM HORMONE: CPT | Performed by: INTERNAL MEDICINE

## 2022-02-09 PROCEDURE — 99214 OFFICE O/P EST MOD 30 MIN: CPT | Performed by: NURSE PRACTITIONER

## 2022-02-09 PROCEDURE — 81003 URINALYSIS AUTO W/O SCOPE: CPT | Performed by: NURSE PRACTITIONER

## 2022-02-09 PROCEDURE — 73030 X-RAY EXAM OF SHOULDER: CPT

## 2022-02-09 RX ORDER — AMLODIPINE BESYLATE 5 MG/1
5 TABLET ORAL DAILY
Qty: 90 TABLET | Refills: 1 | Status: SHIPPED | OUTPATIENT
Start: 2022-02-09 | End: 2022-08-09

## 2022-02-09 NOTE — PATIENT INSTRUCTIONS
Advance Care Planning and Advance Directives     You make decisions on a daily basis - decisions about where you want to live, your career, your home, your life. Perhaps one of the most important decisions you face is your choice for future medical care. Take time to talk with your family and your healthcare team and start planning today.  Advance Care Planning is a process that can help you:  · Understand possible future healthcare decisions in light of your own experiences  · Reflect on those decision in light of your goals and values  · Discuss your decisions with those closest to you and the healthcare professionals that care for you  · Make a plan by creating a document that reflects your wishes    Surrogate Decision Maker  In the event of a medical emergency, which has left you unable to communicate or to make your own decisions, you would need someone to make decisions for you.  It is important to discuss your preferences for medical treatment with this person while you are in good health.     Qualities of a surrogate decision maker:  • Willing to take on this role and responsibility  • Knows what you want for future medical care  • Willing to follow your wishes even if they don't agree with them  • Able to make difficult medical decisions under stressful circumstances    Advance Directives  These are legal documents you can create that will guide your healthcare team and decision maker(s) when needed. These documents can be stored in the electronic medical record.    · Living Will - a legal document to guide your care if you have a terminal condition or a serious illness and are unable to communicate. States vary by statute in document names/types, but most forms may include one or more of the following:        -  Directions regarding life-prolonging treatments        -  Directions regarding artificially provided nutrition/hydration        -  Choosing a healthcare decision maker        -  Direction  regarding organ/tissue donation    · Durable Power of  for Healthcare - this document names an -in-fact to make medical decisions for you, but it may also allow this person to make personal and financial decisions for you. Please seek the advice of an  if you need this type of document.    **Advance Directives are not required and no one may discriminate against you if you do not sign one.    Medical Orders  Many states allow specific forms/orders signed by your physician to record your wishes for medical treatment in your current state of health. This form, signed in personal communication with your physician, addresses resuscitation and other medical interventions that you may or may not want.      For more information or to schedule a time with a [facility] Advance Care Planning Facilitator contact: [facility]/ACP or call [phone] and someone will contact you directly.  ACP information pamphlet given to the patient.  ACP information provided on the AVS.

## 2022-02-09 NOTE — PROGRESS NOTES
Chief Complaint  Hyperlipidemia (6mth fasting fu) and Shoulder Pain (Rt)    Subjective          Leslee Wu presents to [facility]  History of Present Illness  Patient comes to clinic today with acute right shoulder pain.  She reports that she has had a 1 month history of pain in the right shoulder without radicular pain.  She reports it occurred after she was lifting a lot of boxes around [place] but did not remember a specific time she injured her shoulder.  She is agreeable to x-ray physical therapy to further evaluate.  She does not report numbness or weakness in the arm.  She has not had any rash and no treatment in regards to medications to treat the pain.  No other alleviating factors exacerbating symptoms are movement.    The patient has GERD without exacerbated symptoms on Protonix    The patient has essential hypertension with blood pressure low normal without dizziness.  She does report that she has symptoms of feeling more fatigued and notes that her blood pressure has been down.  She is down 4 pounds in weight.  Without chest pain shortness of breath headache dizziness visual changes edema syncope or palpitations.  Patient has hyperlipidemia   And is down in weight since her last visit.    Patient has coronary artery disease-denies chest pain.  Patient has upcoming cardiology appointment.    Patient has hypothyroidism takes Synthroid as directed on empty stomach an hour apart from other medications per her endocrinologist.    Patient has vitamin D deficiency taking vitamin D.    Patient has osteopenia with hyperparathyroidism followed by endocrinology.  Not currently taking calcium    Patient has prediabetes denies polydipsia polyuria polyphagia follows a heart healthy diet.    Patient has rectocele which has diarrhea and at times leakage takes antidiarrheal pill however she does not want consultation at this time with [facility]  will call if she changes her mind.    Review of systems negative for headache dizziness visual changes positive for fatigue no change in appetite no chest pain shortness of breath swelling no palpitations edema or syncope no abdominal pain no changes of bowel habits or bladder habit noted fecal leakage as above due to rectocele.  No new lumps bumps or rashes.        Objective   Vital Signs:   BP 98/62 (BP Location: Right arm, Patient Position: Sitting, Cuff Size: Adult)   Pulse 75   Temp 97.5 °F (36.4 °C) (Temporal)   Resp 16   Wt 66.5 kg (146 lb 9.6 oz)   SpO2 99%   BMI 27.70 kg/m²     Physical Exam  Vitals and nursing note reviewed.   Constitutional:       Appearance: She is well-developed.   HENT:      Head: Normocephalic and atraumatic.      Right Ear: External ear normal.      Left Ear: External ear normal.      Nose: Nose normal.   Neck:      Thyroid: No thyromegaly.   Cardiovascular:      Rate and Rhythm: Normal rate and regular rhythm.   Pulmonary:      Effort: Pulmonary effort is normal.      Breath sounds: Normal breath sounds.   Abdominal:      General: Bowel sounds are normal. There is no distension.      Palpations: Abdomen is soft.      Tenderness: There is no abdominal tenderness.   Lymphadenopathy:      Cervical: No cervical adenopathy.   Skin:     Capillary Refill: Capillary refill takes 2 to 3 seconds.   Neurological:      Mental Status: She is alert and oriented to person, place, and time.   Psychiatric:         Behavior: Behavior normal.        Result Review :                 Assessment and Plan    Diagnoses and all orders for this visit:    1. Acute pain of right shoulder (Primary)  -     XR Shoulder 2+ View Right; Future  -     Ambulatory Referral to Physical Therapy Evaluate and treat    2. Gastroesophageal reflux disease without esophagitis  -     CBC & Differential; Future  -     Comprehensive Metabolic Panel; Future  -     POC Urinalysis Dipstick, Automated; Future  -     POC  Urinalysis Dipstick, Automated  -     CBC & Differential  -     Cancel: Comprehensive Metabolic Panel    3. Essential hypertension  -     Comprehensive Metabolic Panel; Future  -     POC Urinalysis Dipstick, Automated; Future  -     POC Urinalysis Dipstick, Automated  -     Cancel: Comprehensive Metabolic Panel    4. Mixed hyperlipidemia  -     Lipid Panel; Future  -     Lipid Panel    5. Coronary artery disease involving native coronary artery of native heart without angina pectoris    6. Acquired hypothyroidism  -     TSH    7. Vitamin D deficiency  -     Vitamin D 25 Hydroxy; Future  -     Cancel: Vitamin D 25 Hydroxy    8. Osteopenia of multiple sites    9. Prediabetes    10. Rectocele    11. Iron deficiency anemia, unspecified iron deficiency anemia type    12. Hyperparathyroidism (HCC)  -     Comprehensive Metabolic Panel  -     Vitamin D 25 Hydroxy  -     PTH, Intact  -     Vitamin B12    13. Leukocytes in urine  -     Urine Culture - Urine, Urine, Clean Catch; Future  -     Urine Culture - Urine, Urine, Clean Catch    Other orders  -     amLODIPine (NORVASC) 5 MG tablet; Take 1 tablet by mouth Daily.  Dispense: 90 tablet; Refill: 1        Decreased norvasc to 5mg qd due to low bp and fatigue      Discussed the patient's BMI with her. BMI is above normal parameters. Recommendations include: educational material.    AWV: Scheduled  A1C:   Lab Results   Component Value Date    HGBA1C 5.61 (H) 06/23/2020      ACP: Advance Care Planning   ACP discussion was held with the patient during this visit. Patient does not have an advance directive, information provided.   Mammogram: N/A  Colonoscopy: N/A  X-ray of right shoulder notes arthrosis with narrowing and sclerosis mild irregularity noting involving the humerus possibly reflecting underlying cuff pathology.  Note patient to set up for orthopedic consult.  Need to  Follow Up   Return in about 6 months (around 8/9/2022) for fasting, Medicare Wellness.  Patient was  given instructions and counseling regarding her condition or for health maintenance advice. Please see specific information pulled into the AVS if appropriate.     RTC/call  If symptoms worsen  Meds MOA and SE's reviewed and pt v/u

## 2022-02-10 LAB
25(OH)D3 SERPL-MCNC: 54.1 NG/ML
BACTERIA SPEC AEROBE CULT: NO GROWTH
PTH-INTACT SERPL-MCNC: 98.4 PG/ML (ref 15–65)
TSH SERPL DL<=0.05 MIU/L-ACNC: 0.95 UIU/ML (ref 0.27–4.2)
VIT B12 BLD-MCNC: 474 PG/ML (ref 211–946)

## 2022-02-15 ENCOUNTER — TELEPHONE (OUTPATIENT)
Dept: INTERNAL MEDICINE | Facility: CLINIC | Age: 82
End: 2022-02-15

## 2022-02-15 NOTE — TELEPHONE ENCOUNTER
Caller: Leslee Wu    Relationship: Self    Best call back number: [phone]       What was the call regarding: PATIENT CALLED TO REQUEST A COPY OF HER LABS AND XRAY RESULTS FROM LAST WEEK TO BE MAILED TO HER HOME.      Do you require a callback: YES

## 2022-02-23 ENCOUNTER — OFFICE VISIT (OUTPATIENT)
Dept: ENDOCRINOLOGY | Facility: CLINIC | Age: 82
End: 2022-02-23

## 2022-02-23 VITALS
HEART RATE: 82 BPM | SYSTOLIC BLOOD PRESSURE: 110 MMHG | OXYGEN SATURATION: 100 % | WEIGHT: 147.4 LBS | HEIGHT: 61 IN | BODY MASS INDEX: 27.83 KG/M2 | DIASTOLIC BLOOD PRESSURE: 70 MMHG

## 2022-02-23 DIAGNOSIS — E21.3 HYPERPARATHYROIDISM: Primary | ICD-10-CM

## 2022-02-23 DIAGNOSIS — E03.9 ACQUIRED HYPOTHYROIDISM: ICD-10-CM

## 2022-02-23 PROCEDURE — 99214 OFFICE O/P EST MOD 30 MIN: CPT | Performed by: INTERNAL MEDICINE

## 2022-02-23 NOTE — PATIENT INSTRUCTIONS
Results for orders placed or performed in visit on 02/09/22   Urine Culture - Urine, Urine, Clean Catch    Specimen: Urine, Clean Catch   Result Value Ref Range    Urine Culture No growth    Comprehensive Metabolic Panel    Specimen: Blood   Result Value Ref Range    Glucose 86 65 - 99 mg/dL    BUN 13 8 - 23 mg/dL    Creatinine 0.76 0.57 - 1.00 mg/dL    Sodium 137 136 - 145 mmol/L    Potassium 4.3 3.5 - 5.2 mmol/L    Chloride 104 98 - 107 mmol/L    CO2 24.0 22.0 - 29.0 mmol/L    Calcium 11.1 (H) 8.6 - 10.5 mg/dL    Total Protein 6.6 6.0 - 8.5 g/dL    Albumin 4.10 3.50 - 5.20 g/dL    ALT (SGPT) 14 1 - 33 U/L    AST (SGOT) 19 1 - 32 U/L    Alkaline Phosphatase 105 39 - 117 U/L    Total Bilirubin 0.2 0.0 - 1.2 mg/dL    eGFR Non African Amer 73 >60 mL/min/1.73    Globulin 2.5 gm/dL    A/G Ratio 1.6 g/dL    BUN/Creatinine Ratio 17.1 7.0 - 25.0    Anion Gap 9.0 5.0 - 15.0 mmol/L   TSH    Specimen: Blood   Result Value Ref Range    TSH 0.955 0.270 - 4.200 uIU/mL   Vitamin D 25 Hydroxy    Specimen: Blood   Result Value Ref Range    25 Hydroxy, Vitamin D 54.1 ng/ml   PTH, Intact    Specimen: Blood   Result Value Ref Range    PTH, Intact 98.4 (H) 15.0 - 65.0 pg/mL   Vitamin B12    Specimen: Blood   Result Value Ref Range    Vitamin B-12 474 211 - 946 pg/mL   Lipid Panel    Specimen: Blood   Result Value Ref Range    Total Cholesterol 157 0 - 200 mg/dL    Triglycerides 91 0 - 150 mg/dL    HDL Cholesterol 47 40 - 60 mg/dL    LDL Cholesterol  93 0 - 100 mg/dL    VLDL Cholesterol 17 5 - 40 mg/dL    LDL/HDL Ratio 1.95    CBC Auto Differential    Specimen: Blood   Result Value Ref Range    WBC 5.19 3.40 - 10.80 10*3/mm3    RBC 4.56 3.77 - 5.28 10*6/mm3    Hemoglobin 12.9 12.0 - 15.9 g/dL    Hematocrit 41.2 34.0 - 46.6 %    MCV 90.4 79.0 - 97.0 fL    MCH 28.3 26.6 - 33.0 pg    MCHC 31.3 (L) 31.5 - 35.7 g/dL    RDW 12.5 12.3 - 15.4 %    RDW-SD 41.7 37.0 - 54.0 fl    MPV 11.8 6.0 - 12.0 fL    Platelets 293 140 - 450 10*3/mm3     Neutrophil % 67.7 42.7 - 76.0 %    Lymphocyte % 14.8 (L) 19.6 - 45.3 %    Monocyte % 12.3 (H) 5.0 - 12.0 %    Eosinophil % 3.3 0.3 - 6.2 %    Basophil % 1.7 (H) 0.0 - 1.5 %    Immature Grans % 0.2 0.0 - 0.5 %    Neutrophils, Absolute 3.51 1.70 - 7.00 10*3/mm3    Lymphocytes, Absolute 0.77 0.70 - 3.10 10*3/mm3    Monocytes, Absolute 0.64 0.10 - 0.90 10*3/mm3    Eosinophils, Absolute 0.17 0.00 - 0.40 10*3/mm3    Basophils, Absolute 0.09 0.00 - 0.20 10*3/mm3    Immature Grans, Absolute 0.01 0.00 - 0.05 10*3/mm3    nRBC 0.0 0.0 - 0.2 /100 WBC   POC Urinalysis Dipstick, Automated    Specimen: Urine   Result Value Ref Range    Color Yellow Yellow, Straw, Dark Yellow, Amparo    Clarity, UA Clear Clear    Specific Gravity  1.015 1.005 - 1.030    pH, Urine 6.0 5.0 - 8.0    Leukocytes 75 Deyanira/ul (A) Negative    Nitrite, UA Negative Negative    Protein, POC Negative Negative mg/dL    Glucose, UA Negative Negative, 1000 mg/dL (3+) mg/dL    Ketones, UA Negative Negative    Urobilinogen, UA Normal Normal    Bilirubin Negative Negative    Blood, UA Negative Negative    Lot Number [id number]     Expiration Date 12/31/2022

## 2022-02-23 NOTE — PROGRESS NOTES
Hypothyroidism (Follow Up)    Subjective   Leslee Wu is a 81 y.o. female is here today for follow-up and ultrasound.  Hypercalcemia and primary hyperparathyroidism. She has seen parathyroid surgeon and endocrinologist in 2000 and deferred surgery at that time.     BMD 2021: T-score -1.2 at the hip and - 1.6 at the femoral neck, 1.5 at the spine (she is s/p spinal fusion). 14% for major fracture, 3.7% hip fracture.      Calcium and PTH slowly increasing during the last several years.  Parathyroid adenoma is slowly growing according to the u/s - 1 cm right inferior parathyroid adenoma. Patient is asymptomatic.     History of thyroid nodules since 1995 and 2000, s/p 2 benign biopsies.       Hypothyroidism dx in 1995, on synthroid 75 mcg.       Patient denies having any new complaints. She is exercising regularly on the stationary bycycle. Eats healthy, takes Vit D 2000 every day. She drinks a lot of water. No problems with memory or fatigue.     Medications:    Current Outpatient Medications:   •  amLODIPine (NORVASC) 5 MG tablet, Take 1 tablet by mouth Daily., Disp: 90 tablet, Rfl: 1  •  aspirin 81 MG EC tablet, Take 81 mg by mouth Daily., Disp: , Rfl:   •  Bacillus Coagulans-Inulin (Probiotic) 1-250 BILLION-MG capsule, Take  by mouth., Disp: , Rfl:   •  benazepril (LOTENSIN) 20 MG tablet, Take 1 tablet by mouth once daily, Disp: 90 tablet, Rfl: 3  •  Cholecalciferol (VITAMIN D3) 50 MCG (2000 UT) capsule, Take 1,000 Units by mouth Daily., Disp: , Rfl:   •  hydrocortisone 2.5 % ointment, Apply a thin coat to the rectum daily, Disp: 30 g, Rfl: 2  •  levothyroxine (SYNTHROID, LEVOTHROID) 75 MCG tablet, Take 1 tablet by mouth once daily, Disp: 90 tablet, Rfl: 3  •  Misc Natural Products (OSTEO BI-FLEX JOINT SHIELD PO), Take  by mouth Daily., Disp: , Rfl:   •  pantoprazole (PROTONIX) 40 MG EC tablet, Take 1 tablet by mouth Daily., Disp: 180 tablet, Rfl: 1  •  fexofenadine (Allegra Allergy) 60 MG tablet, Take 1 tablet by  "mouth 2 (Two) Times a Day for 30 days., Disp: 60 tablet, Rfl: 3    Review of Systems   Musculoskeletal: Positive for arthralgias (knee pain ) and back pain.   All other systems reviewed and are negative.      Objective   /70   Pulse 82   Ht 154.9 cm (61\")   Wt 66.9 kg (147 lb 6.4 oz)   LMP  (LMP Unknown)   SpO2 100%   BMI 27.85 kg/m²    Physical Exam   Constitutional: She is oriented to person, place, and time. She appears well-developed.   HENT:   Head: Normocephalic and atraumatic.   Eyes: Conjunctivae are normal.   Neck: Thyromegaly (left 1 cm hard easy movable nodule. ) present. No thyroid mass present.   The neck is supple and no assimetry visualized   Cardiovascular: Normal rate, regular rhythm and normal heart sounds.   Pulmonary/Chest: Effort normal and breath sounds normal.   Neurological: She is alert and oriented to person, place, and time.   Psychiatric: Thought content normal.   Vitals reviewed.        Results for orders placed or performed in visit on 02/09/22   Urine Culture - Urine, Urine, Clean Catch    Specimen: Urine, Clean Catch   Result Value Ref Range    Urine Culture No growth    Comprehensive Metabolic Panel    Specimen: Blood   Result Value Ref Range    Glucose 86 65 - 99 mg/dL    BUN 13 8 - 23 mg/dL    Creatinine 0.76 0.57 - 1.00 mg/dL    Sodium 137 136 - 145 mmol/L    Potassium 4.3 3.5 - 5.2 mmol/L    Chloride 104 98 - 107 mmol/L    CO2 24.0 22.0 - 29.0 mmol/L    Calcium 11.1 (H) 8.6 - 10.5 mg/dL    Total Protein 6.6 6.0 - 8.5 g/dL    Albumin 4.10 3.50 - 5.20 g/dL    ALT (SGPT) 14 1 - 33 U/L    AST (SGOT) 19 1 - 32 U/L    Alkaline Phosphatase 105 39 - 117 U/L    Total Bilirubin 0.2 0.0 - 1.2 mg/dL    eGFR Non African Amer 73 >60 mL/min/1.73    Globulin 2.5 gm/dL    A/G Ratio 1.6 g/dL    BUN/Creatinine Ratio 17.1 7.0 - 25.0    Anion Gap 9.0 5.0 - 15.0 mmol/L   TSH    Specimen: Blood   Result Value Ref Range    TSH 0.955 0.270 - 4.200 uIU/mL   Vitamin D 25 Hydroxy    Specimen: " Blood   Result Value Ref Range    25 Hydroxy, Vitamin D 54.1 ng/ml   PTH, Intact    Specimen: Blood   Result Value Ref Range    PTH, Intact 98.4 (H) 15.0 - 65.0 pg/mL   Vitamin B12    Specimen: Blood   Result Value Ref Range    Vitamin B-12 474 211 - 946 pg/mL   Lipid Panel    Specimen: Blood   Result Value Ref Range    Total Cholesterol 157 0 - 200 mg/dL    Triglycerides 91 0 - 150 mg/dL    HDL Cholesterol 47 40 - 60 mg/dL    LDL Cholesterol  93 0 - 100 mg/dL    VLDL Cholesterol 17 5 - 40 mg/dL    LDL/HDL Ratio 1.95    CBC Auto Differential    Specimen: Blood   Result Value Ref Range    WBC 5.19 3.40 - 10.80 10*3/mm3    RBC 4.56 3.77 - 5.28 10*6/mm3    Hemoglobin 12.9 12.0 - 15.9 g/dL    Hematocrit 41.2 34.0 - 46.6 %    MCV 90.4 79.0 - 97.0 fL    MCH 28.3 26.6 - 33.0 pg    MCHC 31.3 (L) 31.5 - 35.7 g/dL    RDW 12.5 12.3 - 15.4 %    RDW-SD 41.7 37.0 - 54.0 fl    MPV 11.8 6.0 - 12.0 fL    Platelets 293 140 - 450 10*3/mm3    Neutrophil % 67.7 42.7 - 76.0 %    Lymphocyte % 14.8 (L) 19.6 - 45.3 %    Monocyte % 12.3 (H) 5.0 - 12.0 %    Eosinophil % 3.3 0.3 - 6.2 %    Basophil % 1.7 (H) 0.0 - 1.5 %    Immature Grans % 0.2 0.0 - 0.5 %    Neutrophils, Absolute 3.51 1.70 - 7.00 10*3/mm3    Lymphocytes, Absolute 0.77 0.70 - 3.10 10*3/mm3    Monocytes, Absolute 0.64 0.10 - 0.90 10*3/mm3    Eosinophils, Absolute 0.17 0.00 - 0.40 10*3/mm3    Basophils, Absolute 0.09 0.00 - 0.20 10*3/mm3    Immature Grans, Absolute 0.01 0.00 - 0.05 10*3/mm3    nRBC 0.0 0.0 - 0.2 /100 WBC   POC Urinalysis Dipstick, Automated    Specimen: Urine   Result Value Ref Range    Color Yellow Yellow, Straw, Dark Yellow, Amparo    Clarity, UA Clear Clear    Specific Gravity  1.015 1.005 - 1.030    pH, Urine 6.0 5.0 - 8.0    Leukocytes 75 Deyanira/ul (A) Negative    Nitrite, UA Negative Negative    Protein, POC Negative Negative mg/dL    Glucose, UA Negative Negative, 1000 mg/dL (3+) mg/dL    Ketones, UA Negative Negative    Urobilinogen, UA Normal Normal     Bilirubin Negative Negative    Blood, UA Negative Negative    Lot Number [id number]     Expiration Date 12/31/2022              Thyroid ultrasound 2019             Real time high resolution imaging of the thyroid gland was performed in transverse and longitudinal planes.      The right lobe measured 4.09 cm L x 1.67 cm AP x 1.47 cm in TV dimension.    The isthmus measured 0.25 cm in thickness.    The left thyroid lobe measured 4.16 cm L x 1.01 cm AP x 1.45 cm in TV dimension.    Thyroid gland is homogeneous and contains single nodule in the left lobe and small nodule  On the right .    Nodule 1   is located in the left mid lobe and measures 1.67 x 1.2 x 1.3 cm (L X AP X TV).  This nodule is cystic, homogeneous, hypoechoic with well-defined borders and egg-shell calcifications, and Grade I vascularity on Color Flow Doppler.  It has artifacts:  posterior acoustic enhancement    Nodule 2  is located in the right upper lobe and measures 0.33 x 0.23 x 0.31 cm (L X AP X TV).  This nodule is solid, homogeneous, hypoechoic with well-defined margins, and Grade II vascularity on Color Flow Doppler.  It has no artifacts    Posterior to the right thyroid lobe hypoechoic extrathyroidal structure is located inferiorly, representing parathyroid gland and measures 1.07 x 0.44 x and 0.61 L x AP x TV cm.  Doppler flow representing polar artery is seen.       No pathologic lymph nodes were seen.      Assessment: Stable left calcified nodule. Slightly enlarged right inferior parathyroid adenoma.   Repeat ultrasound in 18 months.     Results for orders placed or performed in visit on 02/09/22   Urine Culture - Urine, Urine, Clean Catch    Specimen: Urine, Clean Catch   Result Value Ref Range    Urine Culture No growth    Comprehensive Metabolic Panel    Specimen: Blood   Result Value Ref Range    Glucose 86 65 - 99 mg/dL    BUN 13 8 - 23 mg/dL    Creatinine 0.76 0.57 - 1.00 mg/dL    Sodium 137 136 - 145 mmol/L    Potassium 4.3 3.5 - 5.2  mmol/L    Chloride 104 98 - 107 mmol/L    CO2 24.0 22.0 - 29.0 mmol/L    Calcium 11.1 (H) 8.6 - 10.5 mg/dL    Total Protein 6.6 6.0 - 8.5 g/dL    Albumin 4.10 3.50 - 5.20 g/dL    ALT (SGPT) 14 1 - 33 U/L    AST (SGOT) 19 1 - 32 U/L    Alkaline Phosphatase 105 39 - 117 U/L    Total Bilirubin 0.2 0.0 - 1.2 mg/dL    eGFR Non African Amer 73 >60 mL/min/1.73    Globulin 2.5 gm/dL    A/G Ratio 1.6 g/dL    BUN/Creatinine Ratio 17.1 7.0 - 25.0    Anion Gap 9.0 5.0 - 15.0 mmol/L   TSH    Specimen: Blood   Result Value Ref Range    TSH 0.955 0.270 - 4.200 uIU/mL   Vitamin D 25 Hydroxy    Specimen: Blood   Result Value Ref Range    25 Hydroxy, Vitamin D 54.1 ng/ml   PTH, Intact    Specimen: Blood   Result Value Ref Range    PTH, Intact 98.4 (H) 15.0 - 65.0 pg/mL   Vitamin B12    Specimen: Blood   Result Value Ref Range    Vitamin B-12 474 211 - 946 pg/mL   Lipid Panel    Specimen: Blood   Result Value Ref Range    Total Cholesterol 157 0 - 200 mg/dL    Triglycerides 91 0 - 150 mg/dL    HDL Cholesterol 47 40 - 60 mg/dL    LDL Cholesterol  93 0 - 100 mg/dL    VLDL Cholesterol 17 5 - 40 mg/dL    LDL/HDL Ratio 1.95    CBC Auto Differential    Specimen: Blood   Result Value Ref Range    WBC 5.19 3.40 - 10.80 10*3/mm3    RBC 4.56 3.77 - 5.28 10*6/mm3    Hemoglobin 12.9 12.0 - 15.9 g/dL    Hematocrit 41.2 34.0 - 46.6 %    MCV 90.4 79.0 - 97.0 fL    MCH 28.3 26.6 - 33.0 pg    MCHC 31.3 (L) 31.5 - 35.7 g/dL    RDW 12.5 12.3 - 15.4 %    RDW-SD 41.7 37.0 - 54.0 fl    MPV 11.8 6.0 - 12.0 fL    Platelets 293 140 - 450 10*3/mm3    Neutrophil % 67.7 42.7 - 76.0 %    Lymphocyte % 14.8 (L) 19.6 - 45.3 %    Monocyte % 12.3 (H) 5.0 - 12.0 %    Eosinophil % 3.3 0.3 - 6.2 %    Basophil % 1.7 (H) 0.0 - 1.5 %    Immature Grans % 0.2 0.0 - 0.5 %    Neutrophils, Absolute 3.51 1.70 - 7.00 10*3/mm3    Lymphocytes, Absolute 0.77 0.70 - 3.10 10*3/mm3    Monocytes, Absolute 0.64 0.10 - 0.90 10*3/mm3    Eosinophils, Absolute 0.17 0.00 - 0.40 10*3/mm3     Basophils, Absolute 0.09 0.00 - 0.20 10*3/mm3    Immature Grans, Absolute 0.01 0.00 - 0.05 10*3/mm3    nRBC 0.0 0.0 - 0.2 /100 WBC   POC Urinalysis Dipstick, Automated    Specimen: Urine   Result Value Ref Range    Color Yellow Yellow, Straw, Dark Yellow, Amparo    Clarity, UA Clear Clear    Specific Gravity  1.015 1.005 - 1.030    pH, Urine 6.0 5.0 - 8.0    Leukocytes 75 Deyanira/ul (A) Negative    Nitrite, UA Negative Negative    Protein, POC Negative Negative mg/dL    Glucose, UA Negative Negative, 1000 mg/dL (3+) mg/dL    Ketones, UA Negative Negative    Urobilinogen, UA Normal Normal    Bilirubin Negative Negative    Blood, UA Negative Negative    Lot Number [id number]     Expiration Date 12/31/2022        Assessment/Plan:    Problem List Items Addressed This Visit        Other    Hyperparathyroidism (HCC) - Primary    Acquired hypothyroidism    Overview     Description: A.  On chronic replacement therapy.           Primary hyperparathyroidism - increasing calcium and PTH over the last year 62-->71-->77.2--> 90-->98.4, calcium 10.8 --> 11.2 -->10.6-->11.1->10.7-->11-->11.1. Parathyroid adenoma is slowly growing according to the u/s.    Bone density 2/2021 showed lowest T-score of -1.6    Patient is asymptomatic, no dx of osteoporosis, kidney stones or renal impairment.   Ultrasound showed 1 cm right inferior parathyroid adenoma.   Will repeat in 2022.       Continue with increased fluid intake, Vit D supplements 1000 daily . Conservative management is appropriate. I have reviewed BMD results and discussed surgical options. She prefer to continue with conservative management since she is asymptomatic.     Hypothyroidism   Cont  levothyroxine 75, levels are normal.     Labs reviewed. - parathyroid, thyroid function and vit D, renal function.     F/u in 6-8 months with u/s

## 2022-03-23 ENCOUNTER — TREATMENT (OUTPATIENT)
Dept: PHYSICAL THERAPY | Facility: CLINIC | Age: 82
End: 2022-03-23

## 2022-03-23 DIAGNOSIS — M25.511 CHRONIC RIGHT SHOULDER PAIN: Primary | ICD-10-CM

## 2022-03-23 DIAGNOSIS — G89.29 CHRONIC RIGHT SHOULDER PAIN: Primary | ICD-10-CM

## 2022-03-23 PROCEDURE — 97110 THERAPEUTIC EXERCISES: CPT | Performed by: PHYSICAL THERAPIST

## 2022-03-23 PROCEDURE — 97162 PT EVAL MOD COMPLEX 30 MIN: CPT | Performed by: PHYSICAL THERAPIST

## 2022-03-23 NOTE — PROGRESS NOTES
Physical Therapy Initial Evaluation and Plan of Care      Subjective Evaluation    History of Present Illness  Mechanism of injury: PT is an 81 year old female presenting to PT with right shoulder pain. She noticed this around [place] time. She has a hard time reaching out to the side, holding a coffee pot, and reaching behind her back are all painful activities. She does not take any medication for the pain. She has been trying to move it gently at home which has improved her symptoms. She denies any numbness, tingling, or burning. She does not regularly exercise.       Patient Occupation: Retired  Quality of life: excellent    Pain  Current pain rating: 3  At worst pain ratin  Location: Lateral brachial region  Quality: dull ache and sharp  Aggravating factors: overhead activity, outstretched reach, repetitive movement and lifting  Progression: improved    Hand dominance: right    Diagnostic Tests  X-ray: abnormal (See report)    Patient Goals  Patient goals for therapy: decreased pain, increased motion, increased strength, independence with ADLs/IADLs and return to sport/leisure activities             Objective          Active Range of Motion   Left Shoulder   Flexion: 135 degrees   Abduction: 120 degrees     Right Shoulder   Flexion: 120 degrees   Abduction: 105 degrees with pain    Additional Active Range of Motion Details  Apleys ER right: T2  Apleys ER left: T2  Apleys IR right: T10 (pain)  Apleys IR left: T7    Strength/Myotome Testing     Left Shoulder     Planes of Motion   Flexion: 4+   Abduction: 4   External rotation at 0°: 4+   Internal rotation at 0°: 4+     Isolated Muscles   Biceps: 4+     Right Shoulder     Planes of Motion   Flexion: 4-   Abduction: 3+   External rotation at 0°: 4   Internal rotation at 0°: 4     Isolated Muscles   Biceps: 4+     Tests     Right Shoulder   Positive belly press and lift-off.   Negative Hawkin's.           Assessment & Plan     Assessment  Impairments:  abnormal muscle tone, abnormal or restricted ROM, activity intolerance, impaired physical strength, lacks appropriate home exercise program and pain with function  Functional Limitations: carrying objects, lifting, uncomfortable because of pain, reaching behind back, reaching overhead and unable to perform repetitive tasks  Assessment details: Patient is an 81 year old female presenting to the clinic with right shoulder pain, signs and symptoms consistent with a partial tear of the rotator cuff. She has decreased AROM, decreased strength, and positive lift off and belly press. Her impairments are limiting her ability to reach out to the side and lift items without pain. Pt would benefit from skilled PT to address her impairments so she can reach her long term goals.  Prognosis: good    Goals  Plan Goals: SHORT TERM GOALS:     2 weeks  1. Pt I w/ HEP  2. Pt to demonstrate PROM of the right shoulder to WFL to improve ability to perform ADL's  3. Pt to demonstrate ability to perform 30 minutes continuous activity in the clinic without increase in pain     LONG TERM GOALS:   6 weeks  1. Pt to demonstrate AROM of the right shoulder to WNL to allow ability to perform all necessary functional activities  2. Pt to demonstrate ability to lift 5# OH with the right arm without increase in pain  3. Pt to report being able to work full duty without increase in pain in the shoulder  4. Pt to tolerate 60 minutes continuous activity in the clinic without increase in pain     Plan  Therapy options: will be seen for skilled therapy services  Planned modality interventions: cryotherapy, dry needling, electrical stimulation/Russian stimulation, high voltage pulsed current (pain management), TENS and thermotherapy (hydrocollator packs)  Planned therapy interventions: body mechanics training, functional ROM exercises, home exercise program, joint mobilization, manual therapy, neuromuscular re-education, postural training, soft tissue  mobilization, strengthening, spinal/joint mobilization, stretching and therapeutic activities  Duration in visits: 1  Duration in weeks: 12  Treatment plan discussed with: patient        Manual Therapy:         mins  96434;  Therapeutic Exercise:    10     mins  88394;     Neuromuscular Heena:        mins  09826;    Therapeutic Activity:          mins  39451;     Gait Training:           mins  41712;     Ultrasound:          mins  04727;    Electrical Stimulation:         mins  26879 ( );  Dry Needling          mins self-pay    Timed Treatment:   10   mins   Total Treatment:     45   mins    PT SIGNATURE: Prachi Bowen, PT   DATE TREATMENT INITIATED: 3/23/2022    Initial Certification  Certification Period: 3/23/2022 thru 6/20/2022  I certify that the therapy services are furnished while this patient is under my care.  The services outlined above are required by this patient, and will be reviewed every 90 days.     PHYSICIAN: Josephine Johnson, SANDOVAL      DATE:     Please sign and return via fax to [phone].. Thank you, [facility].

## 2022-03-25 ENCOUNTER — HOSPITAL ENCOUNTER (EMERGENCY)
Facility: HOSPITAL | Age: 82
Discharge: HOME | End: 2022-03-25
Attending: EMERGENCY MEDICINE | Admitting: EMERGENCY MEDICINE

## 2022-03-25 VITALS
SYSTOLIC BLOOD PRESSURE: 142 MMHG | DIASTOLIC BLOOD PRESSURE: 91 MMHG | WEIGHT: 140 LBS | HEIGHT: 61 IN | OXYGEN SATURATION: 96 % | HEART RATE: 88 BPM | RESPIRATION RATE: 16 BRPM | BODY MASS INDEX: 26.43 KG/M2 | TEMPERATURE: 98.3 F

## 2022-03-25 DIAGNOSIS — Z20.822 ENCOUNTER FOR LABORATORY TESTING FOR COVID-19 VIRUS: Primary | ICD-10-CM

## 2022-03-25 LAB — SARS-COV-2 RNA PNL SPEC NAA+PROBE: NOT DETECTED

## 2022-03-25 PROCEDURE — U0004 COV-19 TEST NON-CDC HGH THRU: HCPCS | Performed by: EMERGENCY MEDICINE

## 2022-03-25 PROCEDURE — C9803 HOPD COVID-19 SPEC COLLECT: HCPCS | Performed by: EMERGENCY MEDICINE

## 2022-03-25 PROCEDURE — U0005 INFEC AGEN DETEC AMPLI PROBE: HCPCS | Performed by: EMERGENCY MEDICINE

## 2022-03-25 PROCEDURE — 99283 EMERGENCY DEPT VISIT LOW MDM: CPT

## 2022-03-25 NOTE — ED PROVIDER NOTES
Subjective   Ms. Wu is here solely for a COVID test. She is worried because she was in [place] with many unmasked people recently, and her  has since developed a respiratory illness. She is asymptomatic but wanted testing to be sure. No other reason for ED evaluation.          Review of Systems   Constitutional: Negative for fever.   HENT: Negative for rhinorrhea and sinus pressure.    Musculoskeletal: Negative for myalgias.       Past Medical History:   Diagnosis Date   • Anemia    • Anxiety    • Atypical chest pain     Normal exercise Cardiolite stress test and echocardiogram in .   • Atypical chest pain    • Atypical chest pain     History of atypical chest pain: Echocardiogram, ; LVEF 90%. Abnormal calcium score, 2013. MPS, 2013: No inducible ischemia, LVEF 70%.   • Diverticulosis    • Diverticulosis     History of diverticulosis.   • Diverticulosis of large intestine without hemorrhage 10/21/2016   • Dyslipidemia    • Former smoker 2016   • GERD (gastroesophageal reflux disease)    • GERD without esophagitis    • Gout    • Gout     History of gout.   •  4 para 4    • Hypertension    • Hypothyroidism     On chronic replacement therapy.   • Hypothyroidism     Hypothyroidism/chronic replacement.   • Iron deficiency anemia     Iron deficiency anemia.   • Joint pain, knee    • Menopause 10/9/2019   • Mild anxiety     Mild chronic anxiety.   • Nontoxic single thyroid nodule    • Osteoarthritis    • Pain, lower leg    • Parathyroid adenoma    • Pelvic organ prolapse quantification stage 1 cystocele 10/20/2019    Follows with Dr. Felipe GYN evaluation 10/9 no specific treatment at this time.   • Pharyngitis    • Pulmonary nodule     CT scan of the chest of 2013 reports new noncalcified nodule in the right lung base measuring 8 x 5 mm and a stable noncalcified nodule in the left mid lung field measuring 4-5 mm, follow-up CT scan of 2014 reports interval resolution of  the right basilar lung nodule and stable calcified nodule in the left upper lobe.   • Rectocele 1/23/2017   • Spondylolisthesis     Spondylolisthesis/degenerative disc disease.   • Tobacco use     History of brief tobacco use, in the patient’s 20s:    • Tobacco use     History of brief tobacco use, in the patient’s 20s: Noncalcified lung nodule found on chest CT, June 2013, at the left lung base and the mid left lung field.  Evaluation by Dr. Ivan Mota, August 2013 with serial CT scans. Chest CT, November 2013: Noncalcified nodules smaller than prior studies. Essentially “normal” CT of the chest, 08/06/2014.    • Urinary tract infection    • Vaginal candidiasis        Allergies   Allergen Reactions   • Codeine Nausea And Vomiting   • Euthyrox [Levothyroxine] Paresthesia     euthyrox brand   • Lipitor [Atorvastatin] Myalgia   • Lortab [Hydrocodone-Acetaminophen] Nausea And Vomiting       Past Surgical History:   Procedure Laterality Date   • BACK SURGERY     • TONSILLECTOMY AND ADENOIDECTOMY     • TONSILLECTOMY AND ADENOIDECTOMY     • TOTAL HIP ARTHROPLASTY     • TOTAL HIP ARTHROPLASTY Right     Right hip replacement.   • TOTAL KNEE ARTHROPLASTY Right    • TOTAL KNEE ARTHROPLASTY Left    • TOTAL KNEE ARTHROPLASTY REVISION Left        Family History   Problem Relation Age of Onset   • Coronary artery disease Mother    • Heart attack Mother    • Aortic aneurysm Father    • Other Father         ruptured AAA   • Heart disease Sister         Stent placement   • Heart disease Brother         CABG   • Heart disease Sister         Stent placement   • Pulmonary fibrosis Sister    • Other Sister         2 sisters who have had cardiac stent placement   • Other Brother         CABG   • Breast cancer Maternal Aunt 35   • Breast cancer Paternal Aunt 70   • Multiple myeloma Brother    • Ovarian cancer Neg Hx        Social History     Socioeconomic History   • Marital status:    Tobacco Use   • Smoking status: Former Smoker      Types: Cigarettes     Quit date: 1960     Years since quittin.2   • Smokeless tobacco: Never Used   • Tobacco comment: Smoked briefly in her 20's   Substance and Sexual Activity   • Alcohol use: No   • Drug use: No   • Sexual activity: Defer     Birth control/protection: Post-menopausal           Objective   Physical Exam  Vitals and nursing note reviewed.   Constitutional:       General: She is not in acute distress.  HENT:      Head: Atraumatic.   Eyes:      Conjunctiva/sclera: Conjunctivae normal.   Neck:      Trachea: Phonation normal.   Pulmonary:      Effort: No respiratory distress.   Musculoskeletal:      Cervical back: Neck supple.   Skin:     General: Skin is warm and dry.   Neurological:      Mental Status: She is alert and oriented to person, place, and time.   Psychiatric:         Behavior: Behavior normal.         Procedures           ED Course  ED Course as of 03/25/22 2151   Fri Mar 25, 2022   171 Since signing into the ED, she has found out that her 's COVID test was negative. [LI]      ED Course User Index  [LI] Tom Ortiz MD                                                 [facility]    Final diagnoses:   Encounter for laboratory testing for COVID-19 virus       ED Disposition  ED Disposition     ED Disposition   Discharge    Condition   Stable    Comment   --             Josephine Johnson, APRN  [address]  [address]  [place] [id number]  [phone]    Call   As needed if you start to feel sick in any way.         Medication List      No changes were made to your prescriptions during this visit.          Tom Ortiz MD  22

## 2022-03-25 NOTE — DISCHARGE INSTRUCTIONS
You can look up your COVID test result in [facility]. If it is positive we should be calling you, but checking on your own is the best way to be sure of the result.

## 2022-03-30 ENCOUNTER — TREATMENT (OUTPATIENT)
Dept: PHYSICAL THERAPY | Facility: CLINIC | Age: 82
End: 2022-03-30

## 2022-03-30 DIAGNOSIS — G89.29 CHRONIC RIGHT SHOULDER PAIN: Primary | ICD-10-CM

## 2022-03-30 DIAGNOSIS — M25.511 CHRONIC RIGHT SHOULDER PAIN: Primary | ICD-10-CM

## 2022-03-30 PROCEDURE — 97140 MANUAL THERAPY 1/> REGIONS: CPT | Performed by: PHYSICAL THERAPIST

## 2022-03-30 PROCEDURE — 97110 THERAPEUTIC EXERCISES: CPT | Performed by: PHYSICAL THERAPIST

## 2022-03-30 PROCEDURE — 97112 NEUROMUSCULAR REEDUCATION: CPT | Performed by: PHYSICAL THERAPIST

## 2022-03-30 NOTE — PROGRESS NOTES
Physical Therapy Daily Progress Note    Subjective   Leslee Wu reports: she has not noticed much of a difference in her pain yet.      Objective   See Exercise, Manual, and Modality Logs for complete treatment.       Assessment/Plan   Pt tolerated treatment well. She required cues to keep her shoulders down and back with all activities. Manual therapy interventions decreased her pain. Pt would benefit from continued skilled PT.    Progress per Plan of Care           Manual Therapy:    12     mins  25053;  Therapeutic Exercise:    20     mins  21023;     Neuromuscular Heena:    10    mins  69524;    Therapeutic Activity:          mins  93449;     Gait Training:           mins  99801;     Ultrasound:          mins  81092;    Electrical Stimulation:         mins  92870 ( );  E-Stim Attended:         mins  48877  Iontophoresis          mins 93767   Traction          mins  28723  Fluidotherapy          mins  90385  Dry Needling          mins self-pay - No Charge  Paraffin          mins  43044    Timed Treatment:   42   mins   Total Treatment:     42   mins    Prachi Bowen, PT, DPT  Physical Therapist

## 2022-04-06 ENCOUNTER — TREATMENT (OUTPATIENT)
Dept: PHYSICAL THERAPY | Facility: CLINIC | Age: 82
End: 2022-04-06

## 2022-04-06 DIAGNOSIS — M25.511 CHRONIC RIGHT SHOULDER PAIN: Primary | ICD-10-CM

## 2022-04-06 DIAGNOSIS — G89.29 CHRONIC RIGHT SHOULDER PAIN: Primary | ICD-10-CM

## 2022-04-06 PROCEDURE — 97112 NEUROMUSCULAR REEDUCATION: CPT | Performed by: PHYSICAL THERAPIST

## 2022-04-06 PROCEDURE — 97140 MANUAL THERAPY 1/> REGIONS: CPT | Performed by: PHYSICAL THERAPIST

## 2022-04-06 PROCEDURE — 97110 THERAPEUTIC EXERCISES: CPT | Performed by: PHYSICAL THERAPIST

## 2022-04-06 NOTE — PROGRESS NOTES
Physical Therapy Daily Progress Note    Subjective   Leslee Wu reports: she is doing better but the pain is not completely gone yet.     Objective   See Exercise, Manual, and Modality Logs for complete treatment.       Assessment/Plan   Pt tolerated treatment well. She requires cues not to strain her neck while performing reverse fly exercise. Pt would benefit from continued skilled PT.    Progress per Plan of Care           Manual Therapy:    14     mins  57725;  Therapeutic Exercise:    17     mins  40293;     Neuromuscular Heena:    14    mins  10764;    Therapeutic Activity:          mins  48717;     Gait Training:           mins  17516;     Ultrasound:          mins  17884;    Electrical Stimulation:         mins  28771 ( );  E-Stim Attended:         mins  44480  Iontophoresis          mins 85993   Traction          mins  10072  Fluidotherapy          mins  44178  Dry Needling          mins self-pay - No Charge  Paraffin          mins  04937    Timed Treatment:   45   mins   Total Treatment:     45   mins    Prachi Bowen, PT, DPT  Physical Therapist

## 2022-04-27 ENCOUNTER — TREATMENT (OUTPATIENT)
Dept: PHYSICAL THERAPY | Facility: CLINIC | Age: 82
End: 2022-04-27

## 2022-04-27 DIAGNOSIS — G89.29 CHRONIC RIGHT SHOULDER PAIN: Primary | ICD-10-CM

## 2022-04-27 DIAGNOSIS — M25.511 CHRONIC RIGHT SHOULDER PAIN: Primary | ICD-10-CM

## 2022-04-27 PROCEDURE — 97110 THERAPEUTIC EXERCISES: CPT | Performed by: PHYSICAL THERAPIST

## 2022-04-27 PROCEDURE — 97112 NEUROMUSCULAR REEDUCATION: CPT | Performed by: PHYSICAL THERAPIST

## 2022-04-27 PROCEDURE — 97140 MANUAL THERAPY 1/> REGIONS: CPT | Performed by: PHYSICAL THERAPIST

## 2022-04-27 NOTE — PROGRESS NOTES
Physical Therapy Daily Progress Note    Subjective   Leslee Wu reports: she feels about 50% better overall since starting PT. She still gets some popping in the shoulder, but feels like her range of motion has improved.    Today's Pain ratin/10    Objective          Active Range of Motion     Right Shoulder   Flexion: 137 degrees   Abduction: 127 degrees       See Exercise, Manual, and Modality Logs for complete treatment.       Assessment/Plan   Pt tolerated treatment well. Her shoulder flexion and abduction AROM have improved since starting PT. She requires cues to keep her elbow straight with standing scaption. She is making steady progress towards her goals. Pt would benefit from continued skilled PT.    Progress per Plan of Care           Manual Therapy:    12     mins  54202;  Therapeutic Exercise:    19     mins  41108;     Neuromuscular Heena:    16    mins  13039;    Therapeutic Activity:          mins  08308;     Gait Training:           mins  57036;     Ultrasound:          mins  14388;    Electrical Stimulation:         mins  08727 ( );  E-Stim Attended:         mins  58143  Iontophoresis          mins 22154   Traction          mins  61034  Fluidotherapy          mins  40887  Dry Needling          mins self-pay - No Charge  Paraffin          mins  70259    Timed Treatment:   47   mins   Total Treatment:     47   mins    Prachi Bowen, PT, DPT  Physical Therapist

## 2022-05-04 ENCOUNTER — TREATMENT (OUTPATIENT)
Dept: PHYSICAL THERAPY | Facility: CLINIC | Age: 82
End: 2022-05-04

## 2022-05-04 DIAGNOSIS — M25.511 CHRONIC RIGHT SHOULDER PAIN: Primary | ICD-10-CM

## 2022-05-04 DIAGNOSIS — G89.29 CHRONIC RIGHT SHOULDER PAIN: Primary | ICD-10-CM

## 2022-05-04 PROCEDURE — 97140 MANUAL THERAPY 1/> REGIONS: CPT | Performed by: PHYSICAL THERAPIST

## 2022-05-04 PROCEDURE — 97110 THERAPEUTIC EXERCISES: CPT | Performed by: PHYSICAL THERAPIST

## 2022-05-04 NOTE — PROGRESS NOTES
Physical Therapy Daily Progress Note    Subjective   Leslee Wu reports: her shoulder is feeling better and not as stiff.      Objective   See Exercise, Manual, and Modality Logs for complete treatment.       Assessment/Plan   Pt tolerated treatment well. She had some increased popping in the shoulder with manual therapy interventions. She required cues to squeeze her shoulders down and back with all of her exercises. Pt would benefit from continued skilled PT.    Progress per Plan of Care           Manual Therapy:    10     mins  06391;  Therapeutic Exercise:    25     mins  88973;     Neuromuscular Heena:        mins  56849;    Therapeutic Activity:          mins  94938;     Gait Training:           mins  17072;     Ultrasound:          mins  96057;    Electrical Stimulation:         mins  88813 ( );  E-Stim Attended:         mins  91201  Iontophoresis          mins 62518   Traction          mins  31292  Fluidotherapy          mins  83103  Dry Needling          mins self-pay - No Charge  Paraffin          mins  77962    Timed Treatment:   25   mins   Total Treatment:     48   mins    Prachi Bowen, PT, DPT  Physical Therapist

## 2022-05-18 ENCOUNTER — TREATMENT (OUTPATIENT)
Dept: PHYSICAL THERAPY | Facility: CLINIC | Age: 82
End: 2022-05-18

## 2022-05-18 DIAGNOSIS — M25.511 CHRONIC RIGHT SHOULDER PAIN: Primary | ICD-10-CM

## 2022-05-18 DIAGNOSIS — G89.29 CHRONIC RIGHT SHOULDER PAIN: Primary | ICD-10-CM

## 2022-05-18 PROCEDURE — 97112 NEUROMUSCULAR REEDUCATION: CPT | Performed by: PHYSICAL THERAPIST

## 2022-05-18 PROCEDURE — 97110 THERAPEUTIC EXERCISES: CPT | Performed by: PHYSICAL THERAPIST

## 2022-05-18 PROCEDURE — 97140 MANUAL THERAPY 1/> REGIONS: CPT | Performed by: PHYSICAL THERAPIST

## 2022-05-18 NOTE — PROGRESS NOTES
Physical Therapy Daily Progress Note    Subjective   Leslee Wu reports: yesterday she tried to lift a case of water and thinks she strained something in her shoulder. Prior to that she was doing fine.    Objective   See Exercise, Manual, and Modality Logs for complete treatment.       Assessment/Plan   Pt tolerated treatment well. Manual therapy interventions decrease her shoulder pain, and she was able to do all of her exercises relatively pain free. Pt would benefit from continued skilled PT.    Progress per Plan of Care           Manual Therapy:    15     mins  04328;  Therapeutic Exercise:    15     mins  88413;     Neuromuscular Heena:    12    mins  26977;    Therapeutic Activity:          mins  37887;     Gait Training:           mins  80343;     Ultrasound:          mins  01561;    Electrical Stimulation:         mins  18264 ( );  E-Stim Attended:         mins  09722  Iontophoresis          mins 08933   Traction          mins  74274  Fluidotherapy          mins  42808  Dry Needling          mins self-pay - No Charge  Paraffin          mins  27114    Timed Treatment:   42   mins   Total Treatment:     42   mins    Prachi Bowen, PT, DPT  Physical Therapist

## 2022-05-25 ENCOUNTER — TREATMENT (OUTPATIENT)
Dept: PHYSICAL THERAPY | Facility: CLINIC | Age: 82
End: 2022-05-25

## 2022-05-25 DIAGNOSIS — G89.29 CHRONIC RIGHT SHOULDER PAIN: Primary | ICD-10-CM

## 2022-05-25 DIAGNOSIS — M25.511 CHRONIC RIGHT SHOULDER PAIN: Primary | ICD-10-CM

## 2022-05-25 PROCEDURE — 97140 MANUAL THERAPY 1/> REGIONS: CPT | Performed by: PHYSICAL THERAPIST

## 2022-05-25 PROCEDURE — 97110 THERAPEUTIC EXERCISES: CPT | Performed by: PHYSICAL THERAPIST

## 2022-05-25 PROCEDURE — 97112 NEUROMUSCULAR REEDUCATION: CPT | Performed by: PHYSICAL THERAPIST

## 2022-05-25 NOTE — PROGRESS NOTES
Physical Therapy Daily Progress Note    Subjective   Leslee Wu reports: she has improved about 75% since starting PT. She gets twinges when she tries to lift anything. She notices trying to lift a full coffee pot is bothersome.     Objective          Active Range of Motion     Right Shoulder   Flexion: 137 degrees   Abduction: 143 degrees     Strength/Myotome Testing     Left Shoulder     Planes of Motion   Flexion: 4   Abduction: 4   External rotation at 0°: 4+   Internal rotation at 0°: 5     Right Shoulder     Planes of Motion   Flexion: 4   Abduction: 4   External rotation at 0°: 4+   Internal rotation at 0°: 5       See Exercise, Manual, and Modality Logs for complete treatment.       Assessment/Plan   Pt is an 81 year old female who has been coming to PT for chronic right shoulder pain. Her AROM has reached a plateau at this point and WNL. Her strength is the same on both UE's. She plans to come for 2 more visits to work a little more on strength. At that time, she will be discharged.    Progress per Plan of Care           Manual Therapy:    10     mins  93851;  Therapeutic Exercise:    15     mins  45083;     Neuromuscular Heena:    13    mins  06587;    Therapeutic Activity:          mins  47943;     Gait Training:          mins  85243;     Ultrasound:          mins  60978;    Electrical Stimulation:         mins  61399 ( );  E-Stim Attended:         mins  78833  Iontophoresis          mins 42468   Traction         mins  91496  Fluidotherapy          mins  98071  Dry Needling          mins self-pay - No Charge  Paraffin         mins  39497    Timed Treatment:   38   mins   Total Treatment:     50   mins    Prachi Bowen, PT, DPT  Physical Therapist

## 2022-06-08 ENCOUNTER — TREATMENT (OUTPATIENT)
Dept: PHYSICAL THERAPY | Facility: CLINIC | Age: 82
End: 2022-06-08

## 2022-06-08 DIAGNOSIS — G89.29 CHRONIC RIGHT SHOULDER PAIN: Primary | ICD-10-CM

## 2022-06-08 DIAGNOSIS — M25.511 CHRONIC RIGHT SHOULDER PAIN: Primary | ICD-10-CM

## 2022-06-08 PROCEDURE — 97140 MANUAL THERAPY 1/> REGIONS: CPT | Performed by: PHYSICAL THERAPIST

## 2022-06-08 PROCEDURE — 97110 THERAPEUTIC EXERCISES: CPT | Performed by: PHYSICAL THERAPIST

## 2022-06-08 NOTE — PROGRESS NOTES
Physical Therapy Daily Progress Note    Subjective   Leslee Wu reports: she feels like she is doing better overall and plans to keep up with her HEP.      Objective   See Exercise, Manual, and Modality Logs for complete treatment.       Assessment/Plan   Pt has tolerated treatment well. She is independent with her exercises and has reached a plateau in progress. At this time, she is safe for discharge and was instructed to return if symptoms worsen.     Other  DC         Manual Therapy:    10     mins  11278;  Therapeutic Exercise:    15     mins  52674;     Neuromuscular Heena:        mins  63382;    Therapeutic Activity:          mins  06479;     Gait Training:           mins  47530;     Ultrasound:          mins  53368;    Electrical Stimulation:         mins  74610 ( );  E-Stim Attended:         mins  58136  Iontophoresis          mins 74142   Traction          mins  34935  Fluidotherapy          mins  17644  Dry Needling          mins self-pay - No Charge  Paraffin          mins  72443    Timed Treatment:   25   mins   Total Treatment:     46   mins    Prachi Bowen, PT, DPT  Physical Therapist

## 2022-07-08 ENCOUNTER — TELEPHONE (OUTPATIENT)
Dept: INTERNAL MEDICINE | Facility: CLINIC | Age: 82
End: 2022-07-08

## 2022-07-08 RX ORDER — METHYLPREDNISOLONE 4 MG/1
TABLET ORAL
Qty: 21 TABLET | Refills: 0 | Status: SHIPPED | OUTPATIENT
Start: 2022-07-08 | End: 2022-08-23

## 2022-07-08 NOTE — TELEPHONE ENCOUNTER
Spoke with patient and informed her that [facility] sent in a medrol dose sherita to the pharmacy.  She needs to take the medication with food and continue to use Tylenol for the pain.  Patient verbalized understanding and appreciation.

## 2022-07-08 NOTE — TELEPHONE ENCOUNTER
Caller: Leslee Wu    Relationship: Self    Best call back number: [phone]    What medication are you requesting: PAIN MEDICATION    What are your current symptoms: TEETH PAIN    If a prescription is needed, what is your preferred pharmacy and phone number:    [facility] [phone]  Additional notes:  PATIENT IS GETTING WORK DONE FOR HER TEETH AND SHE HAS AN INFECTION AND IS TAKING AMOXICILLIN FOR THAT HOWEVER SHE CAN GET HER TEETH EXTRACTED UNTIL AUGUST AND WOULD LIKE SOMETHING FOR PAIN? PATIENT WAS INSTRUCTED TO TAKE TYLENOL AND IBUPROFEN HOWEVER PATIENT CAN NOT TAKE IBUPROFEN. PLEASE ADVISE

## 2022-07-08 NOTE — TELEPHONE ENCOUNTER
Medrol dose pack sent to pharmacy.  This should help with her pain.  Please have patient take medication with food and continue taking Tylenol.

## 2022-08-09 ENCOUNTER — OFFICE VISIT (OUTPATIENT)
Dept: INTERNAL MEDICINE | Facility: CLINIC | Age: 82
End: 2022-08-09

## 2022-08-09 ENCOUNTER — HOSPITAL ENCOUNTER (OUTPATIENT)
Dept: GENERAL RADIOLOGY | Facility: HOSPITAL | Age: 82
Discharge: HOME | End: 2022-08-09
Admitting: NURSE PRACTITIONER

## 2022-08-09 VITALS
DIASTOLIC BLOOD PRESSURE: 72 MMHG | RESPIRATION RATE: 20 BRPM | HEART RATE: 62 BPM | HEIGHT: 60 IN | SYSTOLIC BLOOD PRESSURE: 120 MMHG | TEMPERATURE: 96.6 F | BODY MASS INDEX: 28.07 KG/M2 | WEIGHT: 143 LBS

## 2022-08-09 DIAGNOSIS — I25.10 CORONARY ARTERY DISEASE INVOLVING NATIVE CORONARY ARTERY OF NATIVE HEART WITHOUT ANGINA PECTORIS: ICD-10-CM

## 2022-08-09 DIAGNOSIS — E03.9 ACQUIRED HYPOTHYROIDISM: ICD-10-CM

## 2022-08-09 DIAGNOSIS — K21.9 GASTROESOPHAGEAL REFLUX DISEASE, UNSPECIFIED WHETHER ESOPHAGITIS PRESENT: ICD-10-CM

## 2022-08-09 DIAGNOSIS — M54.6 CHRONIC LEFT-SIDED THORACIC BACK PAIN: ICD-10-CM

## 2022-08-09 DIAGNOSIS — E03.9 HYPOTHYROIDISM, UNSPECIFIED TYPE: ICD-10-CM

## 2022-08-09 DIAGNOSIS — F41.9 ANXIETY: ICD-10-CM

## 2022-08-09 DIAGNOSIS — I10 HYPERTENSION, UNSPECIFIED TYPE: ICD-10-CM

## 2022-08-09 DIAGNOSIS — N81.6 RECTOCELE: ICD-10-CM

## 2022-08-09 DIAGNOSIS — J30.9 ALLERGIC RHINITIS, UNSPECIFIED SEASONALITY, UNSPECIFIED TRIGGER: ICD-10-CM

## 2022-08-09 DIAGNOSIS — E55.9 VITAMIN D DEFICIENCY: ICD-10-CM

## 2022-08-09 DIAGNOSIS — R73.03 PREDIABETES: ICD-10-CM

## 2022-08-09 DIAGNOSIS — K21.9 GASTROESOPHAGEAL REFLUX DISEASE WITHOUT ESOPHAGITIS: ICD-10-CM

## 2022-08-09 DIAGNOSIS — Z00.00 MEDICARE ANNUAL WELLNESS VISIT, SUBSEQUENT: Primary | ICD-10-CM

## 2022-08-09 DIAGNOSIS — D50.9 IRON DEFICIENCY ANEMIA, UNSPECIFIED IRON DEFICIENCY ANEMIA TYPE: ICD-10-CM

## 2022-08-09 DIAGNOSIS — G89.29 CHRONIC LEFT-SIDED THORACIC BACK PAIN: ICD-10-CM

## 2022-08-09 LAB
ALBUMIN SERPL-MCNC: 4.1 G/DL (ref 3.5–5.2)
ALBUMIN/GLOB SERPL: 1.6 G/DL
ALP SERPL-CCNC: 122 U/L (ref 39–117)
ALT SERPL W P-5'-P-CCNC: 9 U/L (ref 1–33)
ANION GAP SERPL CALCULATED.3IONS-SCNC: 12 MMOL/L (ref 5–15)
AST SERPL-CCNC: 19 U/L (ref 1–32)
BASOPHILS # BLD AUTO: 0.08 10*3/MM3 (ref 0–0.2)
BASOPHILS NFR BLD AUTO: 1.4 % (ref 0–1.5)
BILIRUB BLD-MCNC: NEGATIVE MG/DL
BILIRUB SERPL-MCNC: 0.3 MG/DL (ref 0–1.2)
BUN SERPL-MCNC: 14 MG/DL (ref 8–23)
BUN/CREAT SERPL: 17.9 (ref 7–25)
CALCIUM SPEC-SCNC: 11.3 MG/DL (ref 8.6–10.5)
CHLORIDE SERPL-SCNC: 104 MMOL/L (ref 98–107)
CHOLEST SERPL-MCNC: 160 MG/DL (ref 0–200)
CLARITY, POC: CLEAR
CO2 SERPL-SCNC: 23 MMOL/L (ref 22–29)
COLOR UR: YELLOW
CREAT SERPL-MCNC: 0.78 MG/DL (ref 0.57–1)
DEPRECATED RDW RBC AUTO: 43.2 FL (ref 37–54)
EGFRCR SERPLBLD CKD-EPI 2021: 75.9 ML/MIN/1.73
EOSINOPHIL # BLD AUTO: 0.06 10*3/MM3 (ref 0–0.4)
EOSINOPHIL NFR BLD AUTO: 1.1 % (ref 0.3–6.2)
ERYTHROCYTE [DISTWIDTH] IN BLOOD BY AUTOMATED COUNT: 13 % (ref 12.3–15.4)
EXPIRATION DATE: NORMAL
EXPIRATION DATE: NORMAL
GLOBULIN UR ELPH-MCNC: 2.5 GM/DL
GLUCOSE SERPL-MCNC: 83 MG/DL (ref 65–99)
GLUCOSE UR STRIP-MCNC: NEGATIVE MG/DL
HBA1C MFR BLD: 5.3 %
HCT VFR BLD AUTO: 39.8 % (ref 34–46.6)
HDLC SERPL-MCNC: 51 MG/DL (ref 40–60)
HGB BLD-MCNC: 12.6 G/DL (ref 12–15.9)
IMM GRANULOCYTES # BLD AUTO: 0.02 10*3/MM3 (ref 0–0.05)
IMM GRANULOCYTES NFR BLD AUTO: 0.4 % (ref 0–0.5)
KETONES UR QL: NEGATIVE
LDLC SERPL CALC-MCNC: 91 MG/DL (ref 0–100)
LDLC/HDLC SERPL: 1.76 {RATIO}
LEUKOCYTE EST, POC: NEGATIVE
LYMPHOCYTES # BLD AUTO: 0.77 10*3/MM3 (ref 0.7–3.1)
LYMPHOCYTES NFR BLD AUTO: 13.8 % (ref 19.6–45.3)
Lab: NORMAL
Lab: NORMAL
MCH RBC QN AUTO: 28.9 PG (ref 26.6–33)
MCHC RBC AUTO-ENTMCNC: 31.7 G/DL (ref 31.5–35.7)
MCV RBC AUTO: 91.3 FL (ref 79–97)
MONOCYTES # BLD AUTO: 0.55 10*3/MM3 (ref 0.1–0.9)
MONOCYTES NFR BLD AUTO: 9.9 % (ref 5–12)
NEUTROPHILS NFR BLD AUTO: 4.08 10*3/MM3 (ref 1.7–7)
NEUTROPHILS NFR BLD AUTO: 73.4 % (ref 42.7–76)
NITRITE UR-MCNC: NEGATIVE MG/ML
NRBC BLD AUTO-RTO: 0 /100 WBC (ref 0–0.2)
PH UR: 7 [PH] (ref 5–8)
PLATELET # BLD AUTO: 330 10*3/MM3 (ref 140–450)
PMV BLD AUTO: 11.1 FL (ref 6–12)
POTASSIUM SERPL-SCNC: 4.3 MMOL/L (ref 3.5–5.2)
PROT SERPL-MCNC: 6.6 G/DL (ref 6–8.5)
PROT UR STRIP-MCNC: NEGATIVE MG/DL
RBC # BLD AUTO: 4.36 10*6/MM3 (ref 3.77–5.28)
RBC # UR STRIP: NEGATIVE /UL
SODIUM SERPL-SCNC: 139 MMOL/L (ref 136–145)
SP GR UR: 1 (ref 1–1.03)
TRIGL SERPL-MCNC: 95 MG/DL (ref 0–150)
TSH SERPL DL<=0.05 MIU/L-ACNC: 0.94 UIU/ML (ref 0.27–4.2)
UROBILINOGEN UR QL: NORMAL
VLDLC SERPL-MCNC: 18 MG/DL (ref 5–40)
WBC NRBC COR # BLD: 5.56 10*3/MM3 (ref 3.4–10.8)

## 2022-08-09 PROCEDURE — 84443 ASSAY THYROID STIM HORMONE: CPT | Performed by: NURSE PRACTITIONER

## 2022-08-09 PROCEDURE — 80053 COMPREHEN METABOLIC PANEL: CPT | Performed by: NURSE PRACTITIONER

## 2022-08-09 PROCEDURE — G0439 PPPS, SUBSEQ VISIT: HCPCS | Performed by: NURSE PRACTITIONER

## 2022-08-09 PROCEDURE — 3044F HG A1C LEVEL LT 7.0%: CPT | Performed by: NURSE PRACTITIONER

## 2022-08-09 PROCEDURE — 85025 COMPLETE CBC W/AUTO DIFF WBC: CPT | Performed by: NURSE PRACTITIONER

## 2022-08-09 PROCEDURE — 1170F FXNL STATUS ASSESSED: CPT | Performed by: NURSE PRACTITIONER

## 2022-08-09 PROCEDURE — 72070 X-RAY EXAM THORAC SPINE 2VWS: CPT

## 2022-08-09 PROCEDURE — 81003 URINALYSIS AUTO W/O SCOPE: CPT | Performed by: NURSE PRACTITIONER

## 2022-08-09 PROCEDURE — 99213 OFFICE O/P EST LOW 20 MIN: CPT | Performed by: NURSE PRACTITIONER

## 2022-08-09 PROCEDURE — 82306 VITAMIN D 25 HYDROXY: CPT | Performed by: NURSE PRACTITIONER

## 2022-08-09 PROCEDURE — 80061 LIPID PANEL: CPT | Performed by: NURSE PRACTITIONER

## 2022-08-09 PROCEDURE — 83036 HEMOGLOBIN GLYCOSYLATED A1C: CPT | Performed by: NURSE PRACTITIONER

## 2022-08-09 PROCEDURE — 1159F MED LIST DOCD IN RCRD: CPT | Performed by: NURSE PRACTITIONER

## 2022-08-09 RX ORDER — AMLODIPINE BESYLATE 10 MG/1
10 TABLET ORAL DAILY
COMMUNITY
Start: 2022-05-07 | End: 2022-11-07

## 2022-08-09 NOTE — PATIENT INSTRUCTIONS
Advance Care Planning and Advance Directives     You make decisions on a daily basis - decisions about where you want to live, your career, your home, your life. Perhaps one of the most important decisions you face is your choice for future medical care. Take time to talk with your family and your healthcare team and start planning today.  Advance Care Planning is a process that can help you:  Understand possible future healthcare decisions in light of your own experiences  Reflect on those decision in light of your goals and values  Discuss your decisions with those closest to you and the healthcare professionals that care for you  Make a plan by creating a document that reflects your wishes    Surrogate Decision Maker  In the event of a medical emergency, which has left you unable to communicate or to make your own decisions, you would need someone to make decisions for you.  It is important to discuss your preferences for medical treatment with this person while you are in good health.     Qualities of a surrogate decision maker:  Willing to take on this role and responsibility  Knows what you want for future medical care  Willing to follow your wishes even if they don't agree with them  Able to make difficult medical decisions under stressful circumstances    Advance Directives  These are legal documents you can create that will guide your healthcare team and decision maker(s) when needed. These documents can be stored in the electronic medical record.    Living Will - a legal document to guide your care if you have a terminal condition or a serious illness and are unable to communicate. States vary by statute in document names/types, but most forms may include one or more of the following:        -  Directions regarding life-prolonging treatments        -  Directions regarding artificially provided nutrition/hydration        -  Choosing a healthcare decision maker        -  Direction regarding organ/tissue  donation    Durable Power of  for Healthcare - this document names an -in-fact to make medical decisions for you, but it may also allow this person to make personal and financial decisions for you. Please seek the advice of an  if you need this type of document.    **Advance Directives are not required and no one may discriminate against you if you do not sign one.    Medical Orders  Many states allow specific forms/orders signed by your physician to record your wishes  Medicare Wellness  Personal Prevention Plan of Service     Date of Office Visit:    Encounter Provider:  SANDOVAL Lucas  Place of Service:  [facility]  Patient Name: Leslee Wu  :  1940    As part of the Medicare Wellness portion of your visit today, we are providing you with this personalized preventive plan of services (PPPS). This plan is based upon recommendations of the [place] Preventive Services Task Force (USPSTF) and the Advisory Committee on Immunization Practices (ACIP).    This lists the preventive care services that should be considered, and provides dates of when you are due. Items listed as completed are up-to-date and do not require any further intervention.    Health Maintenance   Topic Date Due    TDAP/TD VACCINES (1 - Tdap) Never done    ZOSTER VACCINE (2 of 3) 2011    COVID-19 Vaccine (4 - Booster for Pfizer series) 2022    INFLUENZA VACCINE  10/01/2022    LIPID PANEL  2023    COLORECTAL CANCER SCREENING  07/15/2023    DXA SCAN  2023    ANNUAL WELLNESS VISIT  2023    MAMMOGRAM  2024    Pneumococcal Vaccine 65+  Completed       Orders Placed This Encounter   Procedures    XR Spine Thoracic 2 View (In Office)     Order Specific Question:   Reason for Exam:     Answer:   l thoracic intermittent pain especially at hs     Order Specific Question:   Release to patient     Answer:   Routine Release     Ambulatory Referral to Physical Therapy     Referral Priority:   Routine     Referral Type:   Physical Therapy     Referral Reason:   Specialty Services Required     Requested Specialty:   Physical Therapy     Number of Visits Requested:   1       Return in about 1 month (around 9/9/2022), or recheck back and 6 mo f/u fasting appt--2 appt.        MyPlate from [employer]    MyPlate is an outline of a general healthy diet based on the 2010 Dietary Guidelines for Americans, from the U.S. Department of Agriculture (USDA). It sets guidelines for how To follow MyPlate recommendations:  Eat a wide variety of fruits and vegetables, grains, and protein foods.  Serve smaller portions and eat less food throughout the day.  Limit portion sizes to avoid overeating.  Enjoy your food.  Get at least 150 minutes of exercise every week. This is about 30 minutes each day, 5 or more days per week.  It can be difficult to have every meal look like MyPlate. Think about MyPlate as eating guidelines for an entire day, rather than each individual meal.  Fruits and vegetables  Make half of your plate fruits and vegetables.  Eat many different colors of fruits and vegetables each day.  For a 2,000 calorie daily food plan, eat:  2½ cups of vegetables every day.  2 cups of fruit every day.  1 cup is equal to:  1 cup raw or cooked vegetables.  1 cup raw fruit.  1 medium-sized orange, apple, or banana.  1 cup 100% fruit or vegetable juice.  2 cups raw leafy greens, such as lettuce, spinach, or kale.  ½ cup dried fruit.  Grains  One fourth of your plate should be grains.  Make at least half of the grains you eat each day whole grains.  For a 2,000 calorie daily food plan, eat 6 oz of grains every day.  1 oz is equal to:  1 slice bread.  1 cup cereal.  ½ cup cooked rice, cereal, or pasta.  Protein  One fourth of your plate should be protein.  Eat a wide variety of protein foods, including meat, poultry, fish, eggs, beans, nuts, and tofu.  For a 2,000  calorie daily food plan, eat 5½ oz of protein every day.  1 oz is equal to:  1 oz meat, poultry, or fish.  ¼ cup cooked beans.  1 egg.  ½ oz nuts or seeds.  1 Tbsp peanut butter.  Dairy  Drink fat-free or low-fat (1%) milk.  Eat or drink dairy as a side to meals.  For a 2,000 calorie daily food plan, eat or drink 3 cups of dairy every day.  1 cup is equal to:  1 cup milk, yogurt, cottage cheese, or soy milk (soy beverage).  2 oz processed cheese.  1½ oz natural cheese.  Fats, oils, salt, and sugars  Only small amounts of oils are recommended.  Avoid foods that are high in calories and low in nutritional value (empty calories), like foods high in fat or added sugars.  Choose foods that are low in salt (sodium). Choose foods that have less than 140 milligrams (mg) of sodium per serving.  Drink water instead of sugary drinks. Drink enough water each day to keep your urine pale yellow.  Where to find support  Work with your health care provider or a nutrition specialist (dietitian) to develop a customized eating plan that is right for you.  Download an [employer] (mobile application) to help you track your daily food intake.  Where to find more information  Go to ChooseMyPlate.gov for more information.  Summary  MyPlate is a general guideline for healthy eating from the USDA. It is based on the 2010 Dietary Guidelines for Americans.  In general, fruits and vegetables should take up ½ of your plate, grains should take up ¼ of your plate, and protein should take up ¼ of your plate.  This information is not intended to replace advice given to you by your health care provider. Make sure you discuss any questions you have with your health care provider.  Document Revised: 05/21/2020 Document Reviewed: 03/19/2018  Elsevier Patient Education © 2021 Elsevier Inc.   for medical treatment in your current state of health. This form, signed in personal communication with your physician, addresses resuscitation and other medical  interventions that you may or may not want.      For more information or to schedule a time with a [facility] Advance Care Planning Facilitator contact: [facility]/ACP or call [phone] and someone will contact you directly.

## 2022-08-09 NOTE — PROGRESS NOTES
The ABCs of the Annual Wellness Visit  Subsequent Medicare Wellness Visit    Chief Complaint   Patient presents with   • Medicare Wellness-subsequent   • Back Pain      Subjective    History of Present Illness:  Leslee Wu is a 81 y.o. female who presents for a Subsequent Medicare Wellness Visit.    The patient presents today for a Medicare annual wellness visit.    Back pain  The patient reports she has been doing well since her last visit. She has been experiencing intermittent left mid thoracic flank pain. Dr. Angulo informed her it was likely a pulled muscle. The patient states it does not hurt every day. She reports she has never had an x-ray. The patient attended physical therapy once, but it was not helpful. She did some exercises in bed the other night, which seemed to help.    Cataract  The patient has an appointment on 09/2022 to have cataract surgery. She states she has been seeing her ophthalmologist for 3 to 4 years and always informed her of her cataracts being small. They are not small now.    Hypertension  The patient takes amlodipine 10 mg and benazepril 20 mg for her blood pressure. Her blood pressure today is 120/72mmHg.    Hypothyroidism  The patient takes levothyroxine 75 mcg daily for her hypothyroidism and is followed by Dr. Oliver for it.     Rectocele  The patient reports experiencing a lot of indigestion lately. She has a bowel movement every day. The patient reports having rectocele and thinks it may be related to her having a bowel movement 4 times.  She takes a diarrhea pill, which helps her to not have a bowel movement so much, because it starts to become like diarrhea. The patient states it occurs once a week.    Coronary artery disease  The patient is seen by her cardiologist Dr. Saavedra for coronary artery disease. She has an appointment with him on 11/11/2022. She denies any new onset of headache, dizziness, syncope, swelling, angina, dyspnea, or abdominal pain. The patient reports  taking a coated baby aspirin daily when she eats her food.  Denies tarry stools hematic emesis or upper abdominal pain.      Allergies  The patient is tolerating fexofenadine well with her allergies and takes it as needed.    Anxiety  The patient is not taking anything specifically for her anxiety.     Vitamin D deficiency  The patient reports taking 1000 units of vitamin D daily.    Hearing  The patient failed her hearing test. She has hearing aids and is wearing them today.    Health maintenance  The patient reports her overall mental and physical health is the same as last year. The patient has not received her shingles vaccine. She has had all her COVID-19 vaccines except for the last booster. Her last DEXA scan was on 07/2021 and she was informed of having osteopenia. The patient has not smoked in the last 15 years. She does not want any diet education classes regarding her weight. The patient has obtained a mammogram. She has a living will, but [employer] does not have a copy. The patient is scheduled to see Dr. Pretty on 10/25/2022 and her gynecologist on 11/30/2022.    The following portions of the patient's history were reviewed and   updated as appropriate: allergies, current medications, past family history, past medical history, past social history, past surgical history and problem list.    Compared to one year ago, the patient feels her physical   health is the same.    Compared to one year ago, the patient feels her mental   health is the same.    Recent Hospitalizations:  She was not admitted to the hospital during the last year.       Current Medical Providers:  Patient Care Team:  Josephine Johnson APRN as PCP - General (Internal Medicine)  Eric Angulo MD (Inactive)  Jasiel Wells MD as Consulting Physician (Orthopedic Surgery)  Hermilo Felipe MD (Inactive) as Consulting Physician (Gynecology)  Salima Pretty MD as Consulting Physician (Endocrinology)  Cleveland Nicholson MD  as Consulting Physician (Otolaryngology)    Outpatient Medications Prior to Visit   Medication Sig Dispense Refill   • amLODIPine (NORVASC) 10 MG tablet Take 10 mg by mouth Daily.     • aspirin 81 MG EC tablet Take 81 mg by mouth Daily.     • Bacillus Coagulans-Inulin (Probiotic) 1-250 BILLION-MG capsule Take  by mouth.     • benazepril (LOTENSIN) 20 MG tablet Take 1 tablet by mouth once daily 90 tablet 3   • Cholecalciferol (VITAMIN D3) 50 MCG (2000 UT) capsule Take 1,000 Units by mouth Daily.     • hydrocortisone 2.5 % ointment Apply a thin coat to the rectum daily 30 g 2   • levothyroxine (SYNTHROID, LEVOTHROID) 75 MCG tablet Take 1 tablet by mouth once daily 90 tablet 3   • Misc Natural Products (OSTEO BI-FLEX JOINT SHIELD PO) Take  by mouth Daily.     • pantoprazole (PROTONIX) 40 MG EC tablet Take 1 tablet by mouth Daily. 180 tablet 1   • fexofenadine (Allegra Allergy) 60 MG tablet Take 1 tablet by mouth 2 (Two) Times a Day for 30 days. 60 tablet 3   • methylPREDNISolone (MEDROL) 4 MG dose pack Take as directed on package instructions. 21 tablet 0   • amLODIPine (NORVASC) 5 MG tablet Take 1 tablet by mouth Daily. 90 tablet 1     No facility-administered medications prior to visit.       No opioid medication identified on active medication list. I have reviewed chart for other potential  high risk medication/s and harmful drug interactions in the elderly.          Aspirin is on active medication list. Aspirin use is indicated based on review of current medical condition/s. Pros and cons of this therapy have been discussed today. Benefits of this medication outweigh potential harm.  Patient has been encouraged to continue taking this medication.  .    Reviewed risk of aspirin including gastritis and gastrointestinal bleed.  Symptoms could include coffee ground emesis, dark tarry stools, and abdominal pain.  If symptoms occur was advised to contact the office.  Advised to take encteric coated  aspirin with food.  "          Patient Active Problem List   Diagnosis   • Anxiety   • Gastroesophageal reflux disease without esophagitis   • Hyperparathyroidism (HCC)   • Essential hypertension   • Acquired hypothyroidism   • Iron deficiency anemia   • Spondylolisthesis   • Arthritis   • Coronary artery disease involving native coronary artery of native heart without angina pectoris   • Vitamin D deficiency   • Mixed hyperlipidemia   • Prediabetes   • High risk medication use   • Acute UTI   • Menopause   • Osteopenia of multiple sites   • Rectocele   • Midline cystocele   • Nasal congestion     Advance Care Planning  Advance Directive is not on file.  ACP discussion was held with the patient during this visit. Patient does not have an advance directive, information provided.    Review of Systems   Constitutional: Negative.    HENT: Negative.    Eyes: Negative.    Respiratory: Negative.    Cardiovascular: Negative.    Gastrointestinal: Negative.    Endocrine: Negative.    Genitourinary: Negative.    Musculoskeletal: Positive for back pain.   Skin: Negative.    Allergic/Immunologic: Negative.    Neurological: Negative.    Hematological: Negative.    Psychiatric/Behavioral: Negative.         Objective    Vitals:    08/09/22 1127   BP: 120/72   BP Location: Right arm   Patient Position: Sitting   Cuff Size: Adult   Pulse: 62   Resp: 20   Temp: 96.6 °F (35.9 °C)   TempSrc: Temporal   Weight: 64.9 kg (143 lb)   Height: 151.8 cm (59.75\")     Estimated body mass index is 28.16 kg/m² as calculated from the following:    Height as of this encounter: 151.8 cm (59.75\").    Weight as of this encounter: 64.9 kg (143 lb).    BMI is >= 25 and <30. (Overweight) The following options were offered after discussion;: weight loss educational material (shared in after visit summary)  Finger Rub Hearing{Test (right ear):failed  Finger Rub Hearing{Test (left ear):passed            Does the patient have evidence of cognitive impairment? Yes    Physical " Exam  Vitals and nursing note reviewed.   Constitutional:       Appearance: She is well-developed.   HENT:      Head: Normocephalic and atraumatic.      Comments: Right EAC with obstructed wet cerumen.     Right Ear: External ear normal.      Left Ear: External ear normal.      Nose: Nose normal.   Neck:      Thyroid: No thyromegaly.   Cardiovascular:      Rate and Rhythm: Normal rate and regular rhythm.   Pulmonary:      Effort: Pulmonary effort is normal.      Breath sounds: Normal breath sounds.   Abdominal:      General: Bowel sounds are normal. There is no distension.      Palpations: Abdomen is soft.      Tenderness: There is no abdominal tenderness.   Musculoskeletal:      Comments: Mild nonpitting edema in the lower extremities.   Lymphadenopathy:      Cervical: No cervical adenopathy.   Skin:     Capillary Refill: Capillary refill takes 2 to 3 seconds.   Neurological:      Mental Status: She is alert and oriented to person, place, and time.   Psychiatric:         Behavior: Behavior normal.                 HEALTH RISK ASSESSMENT    Smoking Status:  Social History     Tobacco Use   Smoking Status Former Smoker   • Types: Cigarettes   • Quit date:    • Years since quittin.6   Smokeless Tobacco Never Used   Tobacco Comment    Smoked briefly in her 20's     Alcohol Consumption:  Social History     Substance and Sexual Activity   Alcohol Use No     Fall Risk Screen:    STEADI Fall Risk Assessment was completed, and patient is at LOW risk for falls.Assessment completed on:2022    Depression Screening:  PHQ-2/PHQ-9 Depression Screening 2022   Retired PHQ-9 Total Score -   Retired Total Score -   Little Interest or Pleasure in Doing Things 0-->not at all   Feeling Down, Depressed or Hopeless 0-->not at all   PHQ-9: Brief Depression Severity Measure Score 0       Health Habits and Functional and Cognitive Screening:  Functional & Cognitive Status 2022   Do you have difficulty preparing food and  eating? No   Do you have difficulty bathing yourself, getting dressed or grooming yourself? No   Do you have difficulty using the toilet? No   Do you have difficulty moving around from place to place? No   Do you have trouble with steps or getting out of a bed or a chair? No   Current Diet Well Balanced Diet   Dental Exam Up to date   Eye Exam Up to date   Exercise (times per week) 3 times per week   Current Exercises Include Walking   Current Exercise Activities Include -   Do you need help using the phone?  No   Are you deaf or do you have serious difficulty hearing?  Yes   Do you need help with transportation? No   Do you need help shopping? No   Do you need help preparing meals?  No   Do you need help with housework?  No   Do you need help with laundry? No   Do you need help taking your medications? No   Do you need help managing money? No   Do you ever drive or ride in a car without wearing a seat belt? No   Have you felt unusual stress, anger or loneliness in the last month? No   Who do you live with? Spouse   If you need help, do you have trouble finding someone available to you? No   Have you been bothered in the last four weeks by sexual problems? No   Do you have difficulty concentrating, remembering or making decisions? No       Age-appropriate Screening Schedule:  Refer to the list below for future screening recommendations based on patient's age, sex and/or medical conditions. Orders for these recommended tests are listed in the plan section. The patient has been provided with a written plan.    Health Maintenance   Topic Date Due   • TDAP/TD VACCINES (1 - Tdap) Never done   • ZOSTER VACCINE (2 of 3) 02/23/2011   • INFLUENZA VACCINE  10/01/2022   • LIPID PANEL  02/09/2023   • DXA SCAN  07/16/2023   • MAMMOGRAM  02/01/2024              Assessment & Plan   CMS Preventative Services Quick Reference  Risk Factors Identified During Encounter  Hearing Problem  Polypharmacy  The above risks/problems have been  discussed with the patient.  Follow up actions/plans if indicated are seen below in the Assessment/Plan Section.  Pertinent information has been shared with the patient in the After Visit Summary.    Diagnoses and all orders for this visit:    1. Medicare annual wellness visit, subsequent (Primary)    2. Chronic left-sided thoracic back pain  -     XR Spine Thoracic 2 View (In Office)  -     Ambulatory Referral to Physical Therapy    3. Hypertension, unspecified type    4. Hypothyroidism, unspecified type    5. Acquired hypothyroidism    6. Gastroesophageal reflux disease without esophagitis    7. Gastroesophageal reflux disease, unspecified whether esophagitis present    8. Rectocele    9. Coronary artery disease involving native coronary artery of native heart without angina pectoris    10. Vitamin D deficiency    11. Prediabetes    12. Iron deficiency anemia, unspecified iron deficiency anemia type    13. Anxiety    14. Allergic rhinitis, unspecified seasonality, unspecified trigger      1. Medicare annual wellness visit, subsequent (Primary)  -     CBC & Differential; Future  -     Comprehensive Metabolic Panel; Future  -     Lipid Panel; Future  -     TSH; Future  -     POC Urinalysis      2. Chronic left-sided thoracic back pain  -     XR Spine Thoracic 2 View (In Office)  -     Ambulatory Referral to Physical Therapy. She will follow-up in 1 month and if symptoms do not improve an MRI will be obtained.     3. Hypertension, unspecified type     4. Hypothyroidism, unspecified type  -     TSH; Future   -     The patient was informed to take levothyroxine on an empty stomach at the same time daily and to avoid taking it with other medications.     5. Acquired hypothyroidism     6. Gastroesophageal reflux disease without esophagitis     7. Gastroesophageal reflux disease, unspecified whether esophagitis present     8. Rectocele     9. Coronary artery disease involving native coronary artery of native heart without  angina pectoris     10. Vitamin D deficiency  -     Vitamin D 25; Future     11. Prediabetes     12. Iron deficiency anemia, unspecified iron deficiency anemia type     13. Anxiety     14. Allergic rhinitis, unspecified seasonality, unspecified trigger    15. Vaccination  Due for shingles vaccination    16. Right EAC obstruction- new-recurred   -     Her ENT DrGavin Nicholson will remove it at her next visit per patients request. Information will be sent to ophthalmology in regard to her progress notes and labs for review.    Follow Up:   Return in about 1 month (around 9/9/2022), or recheck back and 6 mo f/u fasting appt--2 appt.     An After Visit Summary and PPPS were made available to the patient.                 Transcribed from ambient dictation for SANDOVAL Lucas by Colin Galvez.  08/09/22   14:44 EDT    Patient verbalized consent to the visit recording.

## 2022-08-10 ENCOUNTER — TELEPHONE (OUTPATIENT)
Dept: INTERNAL MEDICINE | Facility: CLINIC | Age: 82
End: 2022-08-10

## 2022-08-10 LAB — 25(OH)D3 SERPL-MCNC: 66.7 NG/ML (ref 30–100)

## 2022-08-14 NOTE — TELEPHONE ENCOUNTER
Noted  
Pre op form for eye surgery in [place] in box.  Waiting on lab results from yesterday   
Moderna dose 1, 2, and 3

## 2022-08-18 ENCOUNTER — TELEPHONE (OUTPATIENT)
Dept: INTERNAL MEDICINE | Facility: CLINIC | Age: 82
End: 2022-08-18

## 2022-08-18 NOTE — TELEPHONE ENCOUNTER
Please print patient's past medical history, current medications, drug allergies, social history, family history, and my most recent progress note with her most recent labs and [facility] #[phone] for her upcoming cataract surgery

## 2022-08-23 ENCOUNTER — TELEPHONE (OUTPATIENT)
Dept: INTERNAL MEDICINE | Facility: CLINIC | Age: 82
End: 2022-08-23

## 2022-08-23 PROCEDURE — U0004 COV-19 TEST NON-CDC HGH THRU: HCPCS | Performed by: NURSE PRACTITIONER

## 2022-08-23 PROCEDURE — U0005 INFEC AGEN DETEC AMPLI PROBE: HCPCS | Performed by: NURSE PRACTITIONER

## 2022-08-24 ENCOUNTER — TELEPHONE (OUTPATIENT)
Dept: URGENT CARE | Facility: CLINIC | Age: 82
End: 2022-08-24

## 2022-08-24 NOTE — TELEPHONE ENCOUNTER
TCB  I called patient and she said that she had already received the Molpuvinar from [facility]. She is feeling better.

## 2022-09-08 ENCOUNTER — OFFICE VISIT (OUTPATIENT)
Dept: INTERNAL MEDICINE | Facility: CLINIC | Age: 82
End: 2022-09-08

## 2022-09-08 VITALS
WEIGHT: 138.8 LBS | OXYGEN SATURATION: 96 % | DIASTOLIC BLOOD PRESSURE: 64 MMHG | BODY MASS INDEX: 27.11 KG/M2 | HEART RATE: 78 BPM | TEMPERATURE: 97.5 F | SYSTOLIC BLOOD PRESSURE: 112 MMHG | RESPIRATION RATE: 16 BRPM

## 2022-09-08 DIAGNOSIS — U07.1 COVID-19: ICD-10-CM

## 2022-09-08 DIAGNOSIS — H25.9 AGE-RELATED CATARACT OF BOTH EYES, UNSPECIFIED AGE-RELATED CATARACT TYPE: ICD-10-CM

## 2022-09-08 DIAGNOSIS — I10 HYPERTENSION, UNSPECIFIED TYPE: ICD-10-CM

## 2022-09-08 DIAGNOSIS — R53.83 FATIGUE, UNSPECIFIED TYPE: Primary | ICD-10-CM

## 2022-09-08 PROCEDURE — 93000 ELECTROCARDIOGRAM COMPLETE: CPT | Performed by: NURSE PRACTITIONER

## 2022-09-08 PROCEDURE — 99213 OFFICE O/P EST LOW 20 MIN: CPT | Performed by: NURSE PRACTITIONER

## 2022-09-08 NOTE — PROGRESS NOTES
The ABCs of the Annual Wellness Visit  Subsequent Medicare Wellness Visit    Chief Complaint   Patient presents with   • Pre-op Exam     Left eye cataract    • Medicare Wellness-subsequent      Subjective    History of Present Illness:  Leslee Wu is a 81 y.o. female who presents for a Subsequent Medicare Wellness Visit.    The following portions of the patient's history were reviewed and   updated as appropriate: allergies, current medications, past family history, past medical history, past social history, past surgical history and problem list.    Compared to one year ago, the patient feels her physical   health is {better worse same:55143}.    Compared to one year ago, the patient feels her mental   health is {better worse same:73906}.    Recent Hospitalizations:  {Hospital Admission Status in the last 365 days:19617}      Current Medical Providers:  Patient Care Team:  Josephine Johnson APRN as PCP - General (Internal Medicine)  Eric Angulo MD (Inactive)  Jasiel Wells MD as Consulting Physician (Orthopedic Surgery)  Hermilo Felipe MD (Inactive) as Consulting Physician (Gynecology)  Salima Pretty MD as Consulting Physician (Endocrinology)  Cleveland Nicholson MD as Consulting Physician (Otolaryngology)    Outpatient Medications Prior to Visit   Medication Sig Dispense Refill   • amLODIPine (NORVASC) 10 MG tablet Take 10 mg by mouth Daily.     • aspirin 81 MG EC tablet Take 81 mg by mouth Daily.     • Bacillus Coagulans-Inulin (Probiotic) 1-250 BILLION-MG capsule Take  by mouth.     • benazepril (LOTENSIN) 20 MG tablet Take 1 tablet by mouth once daily 90 tablet 3   • chlorhexidine (PERIDEX) 0.12 % solution SWISH WITH 15ML IN MOUTH FOR 30 SECONDS TWICE AFTER MEALS THEN SPIT OUT     • Cholecalciferol (VITAMIN D3) 50 MCG (2000 UT) capsule Take 1,000 Units by mouth Daily.     • hydrocortisone 2.5 % ointment Apply a thin coat to the rectum daily 30 g 2   • levothyroxine (SYNTHROID,  LEVOTHROID) 75 MCG tablet Take 1 tablet by mouth once daily 90 tablet 3   • Misc Natural Products (OSTEO BI-FLEX JOINT SHIELD PO) Take  by mouth Daily.     • pantoprazole (PROTONIX) 40 MG EC tablet Take 1 tablet by mouth Daily. 180 tablet 1   • benzonatate (TESSALON) 200 MG capsule Take 1 capsule by mouth 3 (Three) Times a Day As Needed for Cough. 30 capsule 0   • fexofenadine (Allegra Allergy) 60 MG tablet Take 1 tablet by mouth 2 (Two) Times a Day for 30 days. 60 tablet 3     No facility-administered medications prior to visit.       No opioid medication identified on active medication list. I have reviewed chart for other potential  high risk medication/s and harmful drug interactions in the elderly.          Aspirin is on active medication list. {ASPIRIN ON MEDICATION LIST INDICATED/NOT INDICATED:43030}.      Patient Active Problem List   Diagnosis   • Anxiety   • Gastroesophageal reflux disease without esophagitis   • Hyperparathyroidism (HCC)   • Essential hypertension   • Acquired hypothyroidism   • Iron deficiency anemia   • Spondylolisthesis   • Arthritis   • Coronary artery disease involving native coronary artery of native heart without angina pectoris   • Vitamin D deficiency   • Mixed hyperlipidemia   • Prediabetes   • High risk medication use   • Acute UTI   • Menopause   • Osteopenia of multiple sites   • Rectocele   • Midline cystocele   • Nasal congestion     Advance Care Planning  Advance Directive is not on file.  ACP discussion was held with the patient during this visit. Patient does not have an advance directive, declines further assistance.      Finger Rub Hearing{Test (right ear):{Pass/Fail/Bord:37583}  Finger Rub Hearing{Test (left ear):{Pass/Fail/Bord:59602}         Objective    Vitals:    09/08/22 0932   BP: 112/64   BP Location: Right arm   Patient Position: Sitting   Cuff Size: Adult   Pulse: 78   Resp: 16   Temp: 97.5 °F (36.4 °C)   TempSrc: Infrared   SpO2: 96%   Weight: 63 kg (138 lb  "12.8 oz)   PainSc: 0-No pain     Estimated body mass index is 27.11 kg/m² as calculated from the following:    Height as of 22: 152.4 cm (60\").    Weight as of this encounter: 63 kg (138 lb 12.8 oz).    BMI is >= 25 and <30. (Overweight) The following options were offered after discussion;: weight loss educational material (shared in after visit summary)      Does the patient have evidence of cognitive impairment? No    Physical Exam  Vitals and nursing note reviewed.   Constitutional:       Appearance: She is well-developed.   HENT:      Head: Normocephalic and atraumatic.      Right Ear: External ear normal.      Left Ear: External ear normal.      Nose: Nose normal.   Neck:      Thyroid: No thyromegaly.   Cardiovascular:      Rate and Rhythm: Normal rate and regular rhythm.   Pulmonary:      Effort: Pulmonary effort is normal.      Breath sounds: Normal breath sounds.   Abdominal:      General: Bowel sounds are normal. There is no distension.      Palpations: Abdomen is soft.      Tenderness: There is no abdominal tenderness.   Lymphadenopathy:      Cervical: No cervical adenopathy.   Skin:     Capillary Refill: Capillary refill takes 2 to 3 seconds.   Neurological:      Mental Status: She is alert and oriented to person, place, and time.   Psychiatric:         Behavior: Behavior normal.       Lab Results   Component Value Date    TRIG 95 2022    HDL 51 2022    LDL 91 2022    VLDL 18 2022    HGBA1C 5.3 2022            HEALTH RISK ASSESSMENT    Smoking Status:  Social History     Tobacco Use   Smoking Status Former Smoker   • Types: Cigarettes   • Quit date:    • Years since quittin.7   Smokeless Tobacco Never Used   Tobacco Comment    Smoked briefly in her 20's     Alcohol Consumption:  Social History     Substance and Sexual Activity   Alcohol Use No     Fall Risk Screen:    STEADI Fall Risk Assessment was completed, and patient is at LOW risk for falls.Assessment " completed on:8/9/2022    Depression Screening:  PHQ-2/PHQ-9 Depression Screening 8/9/2022   Retired PHQ-9 Total Score -   Retired Total Score -   Little Interest or Pleasure in Doing Things 0-->not at all   Feeling Down, Depressed or Hopeless 0-->not at all   PHQ-9: Brief Depression Severity Measure Score 0       Health Habits and Functional and Cognitive Screening:  Functional & Cognitive Status 8/9/2022   Do you have difficulty preparing food and eating? No   Do you have difficulty bathing yourself, getting dressed or grooming yourself? No   Do you have difficulty using the toilet? No   Do you have difficulty moving around from place to place? No   Do you have trouble with steps or getting out of a bed or a chair? No   Current Diet Well Balanced Diet   Dental Exam Up to date   Eye Exam Up to date   Exercise (times per week) 3 times per week   Current Exercises Include Walking   Current Exercise Activities Include -   Do you need help using the phone?  No   Are you deaf or do you have serious difficulty hearing?  Yes   Do you need help with transportation? No   Do you need help shopping? No   Do you need help preparing meals?  No   Do you need help with housework?  No   Do you need help with laundry? No   Do you need help taking your medications? No   Do you need help managing money? No   Do you ever drive or ride in a car without wearing a seat belt? No   Have you felt unusual stress, anger or loneliness in the last month? No   Who do you live with? Spouse   If you need help, do you have trouble finding someone available to you? No   Have you been bothered in the last four weeks by sexual problems? No   Do you have difficulty concentrating, remembering or making decisions? No       Age-appropriate Screening Schedule:  Refer to the list below for future screening recommendations based on patient's age, sex and/or medical conditions. Orders for these recommended tests are listed in the plan section. The patient has  been provided with a written plan.    Health Maintenance   Topic Date Due   • TDAP/TD VACCINES (1 - Tdap) Never done   • ZOSTER VACCINE (2 of 3) 02/23/2011   • INFLUENZA VACCINE  10/01/2022   • DXA SCAN  07/16/2023   • LIPID PANEL  08/09/2023   • MAMMOGRAM  02/01/2024              Assessment & Plan   CMS Preventative Services Quick Reference  Risk Factors Identified During Encounter  {Medicare Wellness Risk Factors:00413}  The above risks/problems have been discussed with the patient.  Follow up actions/plans if indicated are seen below in the Assessment/Plan Section.  Pertinent information has been shared with the patient in the After Visit Summary.    Diagnoses and all orders for this visit:    1. Mixed hyperlipidemia (Primary)  -     Comprehensive Metabolic Panel; Future  -     Lipid Panel; Future    2. Essential hypertension  -     Comprehensive Metabolic Panel; Future  -     POC Urinalysis Dipstick, Automated; Future    3. Coronary artery disease involving native coronary artery of native heart without angina pectoris    4. Vitamin D deficiency  -     Vitamin D 25 Hydroxy; Future    5. Prediabetes  -     Hemoglobin A1c; Future    6. Acquired hypothyroidism  -     TSH; Future    7. Gastroesophageal reflux disease, unspecified whether esophagitis present  -     CBC & Differential; Future  -     Comprehensive Metabolic Panel; Future  -     POC Urinalysis Dipstick, Automated; Future    8. Iron deficiency anemia, unspecified iron deficiency anemia type    9. Anxiety    10. Idiopathic gout, unspecified chronicity, unspecified site  -     Uric Acid; Future    11. Arthritis        Follow Up:   Return in about 6 months (around 3/8/2023) for fasting, Next scheduled follow up.     An After Visit Summary and PPPS were made available to the patient.    {Optional Chart Navigation Links Wrapup  Review (Popup)  Advance Care Planning  Labs  CC  Problem List  Visit Diagnosis  Medications  Result Review  [facility]  Health  Maintenance  Quality  BestPractice  SmartSets  SnapShot  Encounters  Notes  Media  Procedures :23}      {Time Spent-Use for E/M Coding (Optional):28782}

## 2022-09-08 NOTE — PATIENT INSTRUCTIONS
Medicare Wellness  Personal Prevention Plan of Service     Date of Office Visit:    Encounter Provider:  SANDOVAL Lucas  Place of Service:  [facility]  Patient Name: Leslee Wu  :  1940    As part of the Medicare Wellness portion of your visit today, we are providing you with this personalized preventive plan of services (PPPS). This plan is based upon recommendations of the [place] Preventive Services Task Force (USPSTF) and the Advisory Committee on Immunization Practices (ACIP).    This lists the preventive care services that should be considered, and provides dates of when you are due. Items listed as completed are up-to-date and do not require any further intervention.    Health Maintenance   Topic Date Due    TDAP/TD VACCINES (1 - Tdap) Never done    ZOSTER VACCINE (2 of 3) 2011    COVID-19 Vaccine (4 - Booster for Pfizer series) 2022    INFLUENZA VACCINE  10/01/2022    COLORECTAL CANCER SCREENING  07/15/2023    DXA SCAN  2023    ANNUAL WELLNESS VISIT  2023    LIPID PANEL  2023    MAMMOGRAM  2024    Pneumococcal Vaccine 65+  Completed       No orders of the defined types were placed in this encounter.      No follow-ups on file.        MyPlate from USDA    MyPlate is an outline of a general healthy diet based on the 2010 Dietary Guidelines for Americans, from the U.S. Department of Agriculture (USDA). It sets guidelines for how To follow MyPlate recommendations:  Eat a wide variety of fruits and vegetables, grains, and protein foods.  Serve smaller portions and eat less food throughout the day.  Limit portion sizes to avoid overeating.  Enjoy your food.  Get at least 150 minutes of exercise every week. This is about 30 minutes each day, 5 or more days per week.  It can be difficult to have every meal look like MyPlate. Think about MyPlate as eating guidelines for an entire day, rather than each  individual meal.  Fruits and vegetables  Make half of your plate fruits and vegetables.  Eat many different colors of fruits and vegetables each day.  For a 2,000 calorie daily food plan, eat:  2½ cups of vegetables every day.  2 cups of fruit every day.  1 cup is equal to:  1 cup raw or cooked vegetables.  1 cup raw fruit.  1 medium-sized orange, apple, or banana.  1 cup 100% fruit or vegetable juice.  2 cups raw leafy greens, such as lettuce, spinach, or kale.  ½ cup dried fruit.  Grains  One fourth of your plate should be grains.  Make at least half of the grains you eat each day whole grains.  For a 2,000 calorie daily food plan, eat 6 oz of grains every day.  1 oz is equal to:  1 slice bread.  1 cup cereal.  ½ cup cooked rice, cereal, or pasta.  Protein  One fourth of your plate should be protein.  Eat a wide variety of protein foods, including meat, poultry, fish, eggs, beans, nuts, and tofu.  For a 2,000 calorie daily food plan, eat 5½ oz of protein every day.  1 oz is equal to:  1 oz meat, poultry, or fish.  ¼ cup cooked beans.  1 egg.  ½ oz nuts or seeds.  1 Tbsp peanut butter.  Dairy  Drink fat-free or low-fat (1%) milk.  Eat or drink dairy as a side to meals.  For a 2,000 calorie daily food plan, eat or drink 3 cups of dairy every day.  1 cup is equal to:  1 cup milk, yogurt, cottage cheese, or soy milk (soy beverage).  2 oz processed cheese.  1½ oz natural cheese.  Fats, oils, salt, and sugars  Only small amounts of oils are recommended.  Avoid foods that are high in calories and low in nutritional value (empty calories), like foods high in fat or added sugars.  Choose foods that are low in salt (sodium). Choose foods that have less than 140 milligrams (mg) of sodium per serving.  Drink water instead of sugary drinks. Drink enough water each day to keep your urine pale yellow.  Where to find support  Work with your health care provider or a nutrition specialist (dietitian) to develop a customized eating  plan that is right for you.  Download an eduardo (mobile application) to help you track your daily food intake.  Where to find more information  Go to ChooseMyPlate.gov for more information.  Summary  MyPlate is a general guideline for healthy eating from the USDA. It is based on the 2010 Dietary Guidelines for Americans.  In general, fruits and vegetables should take up ½ of your plate, grains should take up ¼ of your plate, and protein should take up ¼ of your plate.  This information is not intended to replace advice given to you by your health care provider. Make sure you discuss any questions you have with your health care provider.  Document Revised: 05/21/2020 Document Reviewed: 03/19/2018  Elsevier Patient Education © 2021 Elsevier Inc.

## 2022-09-08 NOTE — PROGRESS NOTES
Chief Complaint  Fatigue, URI, and Cataract    Subjective          Leslee Wu presents to [facility]  History of Present Illness  The patient presents today for follow-u p regarding fatigue, URI, and cataract.     She is scheduled to undergo left eye cataract surgery with Dr. White at [facility], once all the appropriate forms are completed. The patient notes having a history of mild cataracts, and was advised she will have to undergo surgical intervention if her symptoms were exacerbated. She adds having visual changes with watching television and driving at night.      The patient's calcium level was elevated at her last visit. She denies taking a calcium supplement, but she has been taking Osteo Bi-Flex. She notes her calcium levels are monitored by Dr. Pretty. Her parathyroid level has slightly elevated from 1 year ago. It has been 10.9 pg/mL, 11.2 pg/mL, 11.1pg/mL, and 11.3 pg/mL. Prior to that, it had been 11.6 pg/mL.     She is currently taking Synthroid to treat her hypothyroidism.      The patient has a history of hypertension.      The patient reports being tested for COVID-19 on 08/23/2022 and diagnosed with COVID-19 on 08/24/2022. She states her symptoms began with a cough on 08/21/2022. She denies any rhinorrhea, nasal congestion, sore throat, congestion, chest pain, or shortness of breath. She continues to have a significant amount of productive sputum. The patient adds her fatigue is still present, but it is improving.      No new chest pain, shortness of breath, headaches, visual changes, dizziness, palpitations, syncope, swelling in the lower extremities, or shortness of breath when laying down. No abdominal pain, constipation, hematuria or hematochezia, or dysuria. No new lumps, bumps, or rashes.    Objective   Vital Signs:   /64 (BP Location: Right arm, Patient Position: Sitting, Cuff Size: Adult)   Pulse 78   Temp 97.5 °F (36.4  °C) (Infrared)   Resp 16   Wt 63 kg (138 lb 12.8 oz)   SpO2 96%   BMI 27.11 kg/m²     Physical Exam  Vitals and nursing note reviewed.   Constitutional:       Appearance: She is well-developed.   HENT:      Head: Normocephalic and atraumatic.      Right Ear: External ear normal.      Left Ear: External ear normal.      Nose: Nose normal.      Mouth/Throat:      Pharynx: Oropharynx is clear.   Eyes:      General:         Right eye: No discharge.         Left eye: No discharge.      Conjunctiva/sclera: Conjunctivae normal.   Neck:      Thyroid: No thyromegaly.   Cardiovascular:      Rate and Rhythm: Normal rate and regular rhythm.   Pulmonary:      Effort: Pulmonary effort is normal.      Breath sounds: Normal breath sounds.   Abdominal:      General: Bowel sounds are normal. There is no distension.      Palpations: Abdomen is soft.      Tenderness: There is no abdominal tenderness.   Lymphadenopathy:      Cervical: No cervical adenopathy.   Skin:     Capillary Refill: Capillary refill takes 2 to 3 seconds.   Neurological:      Mental Status: She is alert and oriented to person, place, and time.   Psychiatric:         Behavior: Behavior normal.        Result Review :            Previous laboratory studies reviewed. Hemoglobin A1C was normal. Vitamin D was normal. Thyroid was normal. CBC was normal.      ECG 12 Lead    Date/Time: 2022 10:02 AM  Performed by: Josephine Johnson APRN  Authorized by: Josephine Johnson APRN   Comparison: compared with previous ECG   Comparison to previous EC2018 nsr  Rhythm: sinus rhythm  Conduction: conduction normal  ST Segments: ST segments normal  T Waves: T waves normal                Assessment and Plan    Diagnoses and all orders for this visit:    1. Fatigue, unspecified type (Primary)    2. COVID-19    3. Age-related cataract of both eyes, unspecified age-related cataract type    4. Hypertension, unspecified type    Other orders  -     Cancel: CBC & Differential; Future  -      Cancel: Comprehensive Metabolic Panel; Future  -     Cancel: TSH; Future  -     Cancel: POC Urinalysis Dipstick, Automated; Future  -     Cancel: Lipid Panel; Future  -     Cancel: Vitamin D 25 Hydroxy; Future  -     Cancel: Uric Acid; Future  -     Cancel: Hemoglobin A1c; Future  -     ECG 12 Lead      Age-related cataract of bilateral eyes  - The patient is schedule to undergo cataract surgical intervention of her left eye.     Hypercalcemia  -  We will continue to monitor her calcium levels.     Fatigue improving post covid     Follow Up   Return in about 6 months (around 3/8/2023) for --may already be scheduled.  Patient was given instructions and counseling regarding her condition or for health maintenance advice. Please see specific information pulled into the AVS if appropriate.     RTC/call  If symptoms worsen  Meds MOA and SE's reviewed and pt v/u    SANDOVAL Lucas [facility]  [address] [address]  Fax [phone]  Phone [phone]    Transcribed from ambient dictation for SANDOVAL Lucas by Jaydon Senior.  09/08/22   11:10 EDT    Patient verbalized consent to the visit recording.

## 2022-10-25 ENCOUNTER — LAB (OUTPATIENT)
Dept: LAB | Facility: HOSPITAL | Age: 82
End: 2022-10-25

## 2022-10-25 ENCOUNTER — OFFICE VISIT (OUTPATIENT)
Dept: ENDOCRINOLOGY | Facility: CLINIC | Age: 82
End: 2022-10-25

## 2022-10-25 VITALS
BODY MASS INDEX: 27.45 KG/M2 | OXYGEN SATURATION: 98 % | DIASTOLIC BLOOD PRESSURE: 60 MMHG | HEART RATE: 65 BPM | SYSTOLIC BLOOD PRESSURE: 100 MMHG | HEIGHT: 60 IN | WEIGHT: 139.8 LBS

## 2022-10-25 DIAGNOSIS — E03.9 ACQUIRED HYPOTHYROIDISM: ICD-10-CM

## 2022-10-25 DIAGNOSIS — D50.9 IRON DEFICIENCY ANEMIA, UNSPECIFIED IRON DEFICIENCY ANEMIA TYPE: ICD-10-CM

## 2022-10-25 DIAGNOSIS — E21.3 HYPERPARATHYROIDISM: ICD-10-CM

## 2022-10-25 DIAGNOSIS — E04.1 SOLITARY THYROID NODULE: Primary | ICD-10-CM

## 2022-10-25 PROCEDURE — 80069 RENAL FUNCTION PANEL: CPT | Performed by: INTERNAL MEDICINE

## 2022-10-25 PROCEDURE — 83540 ASSAY OF IRON: CPT | Performed by: INTERNAL MEDICINE

## 2022-10-25 PROCEDURE — 84443 ASSAY THYROID STIM HORMONE: CPT | Performed by: INTERNAL MEDICINE

## 2022-10-25 PROCEDURE — 76536 US EXAM OF HEAD AND NECK: CPT | Performed by: INTERNAL MEDICINE

## 2022-10-25 PROCEDURE — 84439 ASSAY OF FREE THYROXINE: CPT | Performed by: INTERNAL MEDICINE

## 2022-10-25 PROCEDURE — 83970 ASSAY OF PARATHORMONE: CPT | Performed by: INTERNAL MEDICINE

## 2022-10-25 PROCEDURE — 85027 COMPLETE CBC AUTOMATED: CPT | Performed by: INTERNAL MEDICINE

## 2022-10-25 PROCEDURE — 82306 VITAMIN D 25 HYDROXY: CPT | Performed by: INTERNAL MEDICINE

## 2022-10-25 PROCEDURE — 99214 OFFICE O/P EST MOD 30 MIN: CPT | Performed by: INTERNAL MEDICINE

## 2022-10-25 PROCEDURE — 84466 ASSAY OF TRANSFERRIN: CPT | Performed by: INTERNAL MEDICINE

## 2022-10-25 RX ORDER — PANTOPRAZOLE SODIUM 20 MG/1
TABLET, DELAYED RELEASE ORAL
COMMUNITY
End: 2023-03-22

## 2022-10-25 RX ORDER — LEVOTHYROXINE SODIUM 0.07 MG/1
75 TABLET ORAL DAILY
Qty: 90 TABLET | Refills: 3 | Status: SHIPPED | OUTPATIENT
Start: 2022-10-25

## 2022-10-25 NOTE — PROGRESS NOTES
Solitary Thyroid nodule (Follow up & U/S)    Subjective   Leslee Wu is a 81 y.o. female is here today for follow-up and ultrasound.  Hypercalcemia and primary hyperparathyroidism. She has seen parathyroid surgeon and endocrinologist in 2000 and deferred surgery at that time.     BMD 2021: T-score -1.2 at the hip and - 1.6 at the femoral neck, 1.5 at the spine (she is s/p spinal fusion). 14% for major fracture, 3.7% hip fracture.      Calcium and PTH slowly increasing during the last several years.  Parathyroid adenoma is slowly growing according to the u/s - 1 cm right inferior parathyroid adenoma. Patient is asymptomatic.     History of thyroid nodules since 1995 and 2000, s/p 2 benign biopsies.       Hypothyroidism dx in 1995, on synthroid 75 mcg.       Patient denies having any new complaints. She is exercising regularly on the stationary bycycle. Eats healthy, takes Vit D 2000 every day. She drinks a lot of water. No problems with memory.   C/o fatigue and dry mouth.    Medications:    Current Outpatient Medications:   •  amLODIPine (NORVASC) 10 MG tablet, Take 10 mg by mouth Daily., Disp: , Rfl:   •  aspirin 81 MG EC tablet, Take 81 mg by mouth Daily., Disp: , Rfl:   •  Bacillus Coagulans-Inulin (Probiotic) 1-250 BILLION-MG capsule, Take  by mouth., Disp: , Rfl:   •  benazepril (LOTENSIN) 20 MG tablet, Take 1 tablet by mouth once daily, Disp: 90 tablet, Rfl: 3  •  chlorhexidine (PERIDEX) 0.12 % solution, SWISH WITH 15ML IN MOUTH FOR 30 SECONDS TWICE AFTER MEALS THEN SPIT OUT, Disp: , Rfl:   •  Cholecalciferol (VITAMIN D3) 50 MCG (2000 UT) capsule, Take 1,000 Units by mouth Daily., Disp: , Rfl:   •  hydrocortisone 2.5 % ointment, Apply a thin coat to the rectum daily, Disp: 30 g, Rfl: 2  •  levothyroxine (SYNTHROID, LEVOTHROID) 75 MCG tablet, Take 1 tablet by mouth Daily., Disp: 90 tablet, Rfl: 3  •  Misc Natural Products (OSTEO BI-FLEX JOINT SHIELD PO), Take  by mouth Daily., Disp: , Rfl:   •  pantoprazole  "(PROTONIX) 20 MG EC tablet, Protonix, Disp: , Rfl:   •  pantoprazole (PROTONIX) 40 MG EC tablet, Take 1 tablet by mouth Daily., Disp: 180 tablet, Rfl: 1  •  fexofenadine (Allegra Allergy) 60 MG tablet, Take 1 tablet by mouth 2 (Two) Times a Day for 30 days., Disp: 60 tablet, Rfl: 3    Review of Systems   Constitutional: Positive for fatigue.   Musculoskeletal: Positive for arthralgias (knee pain ) and back pain.   All other systems reviewed and are negative.      Objective   /60   Pulse 65   Ht 152.4 cm (60\")   Wt 63.4 kg (139 lb 12.8 oz)   LMP  (LMP Unknown)   SpO2 98%   BMI 27.30 kg/m²    Physical Exam   Constitutional: She is oriented to person, place, and time. She appears well-developed.   HENT:   Head: Normocephalic and atraumatic.   Eyes: Conjunctivae are normal.   Neck: Thyromegaly (left 1 cm hard easy movable nodule. ) present. No thyroid mass present.   The neck is supple and no assimetry visualized   Cardiovascular: Normal rate, regular rhythm, normal heart sounds and normal pulses.   Pulmonary/Chest: Effort normal and breath sounds normal.   Neurological: She is alert and oriented to person, place, and time.   Psychiatric: Thought content normal.   Vitals reviewed.        Results for orders placed or performed during the hospital encounter of 08/23/22   COVID-19 PCR, [facility], NP SWAB IN [facility] VIRAL TRANSPORT MEDIA/ORAL SWISH 24-30 HR TAT - Swab, Nasopharynx    Specimen: Nasopharynx; Swab   Result Value Ref Range    SARS-CoV-2 DESTINEY Detected (C) Not Detected             Thyroid ultrasound 10/25/22            Real time high resolution imaging of the thyroid gland was performed in transverse and longitudinal planes.      The right lobe measured 4.16 cm L x 1.17 cm AP x 1.4 cm in TV dimension.    The isthmus measured 0.22 cm in thickness.    The left thyroid lobe measured 3.97 cm L x 1.17 cm AP x 1.62 cm in TV dimension.    Thyroid gland is homogeneous and contains single nodule in the left lobe " and small nodule  On the right .    Nodule 1   is located in the left mid lobe and measures 1.81 x 1.14 x 1.24 cm (L X AP X TV).  This nodule is cystic, homogeneous, hypoechoic with well-defined borders and egg-shell calcifications, and Grade I vascularity on Color Flow Doppler.  It has artifacts:  posterior acoustic enhancement    Nodule 2  is located in the right upper lobe and measures 0.54 x 0.44 x 0.35 cm (L X AP X TV).  This nodule is solid, homogeneous, hypoechoic with well-defined margins, and Grade II vascularity on Color Flow Doppler.  It has no artifacts    Posterior to the right thyroid lobe hypoechoic extrathyroidal structure is located inferiorly, representing parathyroid gland and measures 1 x 0.57 x and 0.65 L x AP x TV cm.  Doppler flow representing polar artery is seen.       No pathologic lymph nodes were seen.      Assessment: Stable left calcified nodule. Stable right inferior parathyroid adenoma.   Repeat ultrasound in 18 months.     Results for orders placed or performed during the hospital encounter of 08/23/22   COVID-19 PCR, [facility], NP SWAB IN [place] VIRAL TRANSPORT MEDIA/ORAL SWISH 24-30 HR TAT - Swab, Nasopharynx    Specimen: Nasopharynx; Swab   Result Value Ref Range    SARS-CoV-2 DESTINEY Detected (C) Not Detected       Assessment/Plan:    Problem List Items Addressed This Visit        Other    Hyperparathyroidism (HCC)    Relevant Orders    Renal Function Panel    PTH, Intact    Vitamin D,25-Hydroxy    Acquired hypothyroidism    Overview     Description: A.  On chronic replacement therapy.         Relevant Medications    levothyroxine (SYNTHROID, LEVOTHROID) 75 MCG tablet    Other Relevant Orders    TSH    T4, Free    Iron deficiency anemia    Overview     Iron deficiency anemia.         Relevant Orders    Iron Profile    CBC (No Diff)   Other Visit Diagnoses     Solitary thyroid nodule    -  Primary    Relevant Medications    levothyroxine (SYNTHROID, LEVOTHROID) 75 MCG tablet    Other  Relevant Orders    US Thyroid (Completed)    TSH    T4, Free      Primary hyperparathyroidism - increasing calcium and PTH over the last year 62-->71-->77.2--> 90-->98.4, calcium 10.8 --> 11.2 -->10.6-->11.1->10.7-->11-->11.1. Parathyroid adenoma is slowly growing according to the u/s and biochemical evaluation  Bone density 2/2021 showed lowest T-score of -1.6    Patient is asymptomatic, no dx of osteoporosis, kidney stones or renal impairment.   Ultrasound showed 1 cm right inferior parathyroid adenoma.       Continue with increased fluid intake, Vit D supplements 1000 daily . Conservative management is appropriate. I have reviewed BMD results and discussed surgical options. She prefer to continue with conservative management since she is asymptomatic.     Hypothyroidism   Cont  levothyroxine 75, levels are normal.     Labs ordered - parathyroid, thyroid function and vit D, renal function.     F/u in 6-8 months with u/s

## 2022-10-26 LAB
25(OH)D3 SERPL-MCNC: 62.1 NG/ML (ref 30–100)
ALBUMIN SERPL-MCNC: 3.9 G/DL (ref 3.5–5.2)
ANION GAP SERPL CALCULATED.3IONS-SCNC: 9.9 MMOL/L (ref 5–15)
BUN SERPL-MCNC: 17 MG/DL (ref 8–23)
BUN/CREAT SERPL: 18.7 (ref 7–25)
CALCIUM SPEC-SCNC: 12 MG/DL (ref 8.6–10.5)
CHLORIDE SERPL-SCNC: 106 MMOL/L (ref 98–107)
CO2 SERPL-SCNC: 25.1 MMOL/L (ref 22–29)
CREAT SERPL-MCNC: 0.91 MG/DL (ref 0.57–1)
DEPRECATED RDW RBC AUTO: 39.5 FL (ref 37–54)
EGFRCR SERPLBLD CKD-EPI 2021: 63.1 ML/MIN/1.73
ERYTHROCYTE [DISTWIDTH] IN BLOOD BY AUTOMATED COUNT: 11.9 % (ref 12.3–15.4)
GLUCOSE SERPL-MCNC: 108 MG/DL (ref 65–99)
HCT VFR BLD AUTO: 38.8 % (ref 34–46.6)
HGB BLD-MCNC: 12.8 G/DL (ref 12–15.9)
IRON 24H UR-MRATE: 97 MCG/DL (ref 37–145)
IRON SATN MFR SERPL: 21 % (ref 20–50)
MCH RBC QN AUTO: 29.3 PG (ref 26.6–33)
MCHC RBC AUTO-ENTMCNC: 33 G/DL (ref 31.5–35.7)
MCV RBC AUTO: 88.8 FL (ref 79–97)
PHOSPHATE SERPL-MCNC: 2.6 MG/DL (ref 2.5–4.5)
PLATELET # BLD AUTO: 313 10*3/MM3 (ref 140–450)
PMV BLD AUTO: 11.3 FL (ref 6–12)
POTASSIUM SERPL-SCNC: 4.8 MMOL/L (ref 3.5–5.2)
PTH-INTACT SERPL-MCNC: 87.2 PG/ML (ref 15–65)
RBC # BLD AUTO: 4.37 10*6/MM3 (ref 3.77–5.28)
SODIUM SERPL-SCNC: 141 MMOL/L (ref 136–145)
T4 FREE SERPL-MCNC: 1.39 NG/DL (ref 0.93–1.7)
TIBC SERPL-MCNC: 459 MCG/DL (ref 298–536)
TRANSFERRIN SERPL-MCNC: 308 MG/DL (ref 200–360)
TSH SERPL DL<=0.05 MIU/L-ACNC: 0.79 UIU/ML (ref 0.27–4.2)
WBC NRBC COR # BLD: 5.83 10*3/MM3 (ref 3.4–10.8)

## 2022-10-30 DIAGNOSIS — E21.3 HYPERPARATHYROIDISM: Primary | ICD-10-CM

## 2022-11-02 ENCOUNTER — TELEPHONE (OUTPATIENT)
Dept: ENDOCRINOLOGY | Facility: CLINIC | Age: 82
End: 2022-11-02

## 2022-11-02 NOTE — TELEPHONE ENCOUNTER
Returned pt call. LMOM asking her to return my call.  Results are below:    Your calcium level and parathyroid hormone are increasing. It is 12 now. We often refer for surgery if calcium is over 12. Vitamin D is on the higher side of normal, please reduce the Vit D dose to 1000 IU daily (if you currently take 2000). If you are taking 1000, please reduce to 800 IU. Iron panel and the rest of the labs are normal.   Please ask the patient if she would like to talk to parathyroid surgeon again about therapy option, or if she prefers to manage conservatively, I would like her to adjust the Vitamin D, increase fluid intake and repeat labs in 3 months. The rest of the labs are normal.

## 2022-11-02 NOTE — TELEPHONE ENCOUNTER
Pt returned our call to discuss labs and md advice. Please call pat back at [phone]. You can leave her a detailed message.

## 2022-11-02 NOTE — TELEPHONE ENCOUNTER
Pt returned our call to discuss labs and md advice. Please call pat back at [phone]. You can leave her a detailed message.   I returned her call left a detailed message on VM advising of results.

## 2022-11-05 ENCOUNTER — DOCUMENTATION (OUTPATIENT)
Dept: INTERNAL MEDICINE | Facility: CLINIC | Age: 82
End: 2022-11-05

## 2022-11-05 ENCOUNTER — TELEPHONE (OUTPATIENT)
Dept: INTERNAL MEDICINE | Facility: CLINIC | Age: 82
End: 2022-11-05

## 2022-11-05 RX ORDER — OSELTAMIVIR PHOSPHATE 30 MG/1
30 CAPSULE ORAL
Qty: 7 CAPSULE | Refills: 0 | Status: SHIPPED | OUTPATIENT
Start: 2022-11-05 | End: 2022-11-11

## 2022-11-05 NOTE — TELEPHONE ENCOUNTER
No 30mg Tamiflu available. Last GFR above 60, so ok to do 75mg once daily for one week. Spoke with pharmacy to update Rx.    Dr. Coelho

## 2022-11-05 NOTE — PROGRESS NOTES
Received call from patient.  Patient's  was recently diagnosed with influenza yesterday.  She is currently feeling well, but is requesting prophylaxis against influenza virus.  Patient high risk due to age, 82 years old.  Will prescribe 30 mg of oseltamivir once daily to be taken for 1 week.  Counseled patient on adequate hygiene and masking to avoid infection.  Counseled patient to seek care if any difficulty breathing, or i nability to tolerate p.o.    Dr. Coelho

## 2022-11-07 RX ORDER — AMLODIPINE BESYLATE 10 MG/1
TABLET ORAL
Qty: 90 TABLET | Refills: 1 | Status: SHIPPED | OUTPATIENT
Start: 2022-11-07 | End: 2022-11-11

## 2022-11-07 RX ORDER — BENAZEPRIL HYDROCHLORIDE 20 MG/1
TABLET ORAL
Qty: 90 TABLET | Refills: 1 | Status: SHIPPED | OUTPATIENT
Start: 2022-11-07

## 2022-11-07 NOTE — PROGRESS NOTES
"[facility] Cardiology  Office Progress Note  Leslee POSADAS Bryce  1940  [address] [facility] [address]       Visit Date: 11/11/22    PCP: Josephine Johnson, APRN  [address]  [facility] [address]    IDENTIFICATION: A 82 y.o. female former Will patient    PROBLEM LIST:   1.   History of atypical chest pain  A. Echocardiogram, 2010: LVEF 90%  B. Abnormal calcium score, August 2013.  C. MPS, 08/2013: No inducible ischemia, LVEF 70%  2.   Hypertension  3.   Hypothyroidism/chronic replacement  4.   GERD  5.   History of brief tobacco use, in patient's 20s:  A. Essentially \"normal\" CT of the chest, 08/06/2014.  6.  Hyperparathyroidism followed per Dr. Pretty  7.   Iron deficiency anemia.  8.   Spondylolisthesis/degenerative disc disease.  9.   Mild chronic anxiety.  10. History of diverticulosis  11. History of gout.  12. Left lower extremity ?DVT October 2019    A. LE venous duplex 10/17/19: discordant data superficial vs DVT   B. LE venous duplex - normal, 11/15/2019  12. Surgical History:                        A. Back surgery                        B. Right TKR.                        C. T&A.                        D. Right hip replacement.                        E. Left TKR.        CC: f/u hx of CP, HTN    Allergies  Allergies   Allergen Reactions   • Codeine Nausea And Vomiting   • Euthyrox [Levothyroxine] Paresthesia     euthyrox brand   • Lipitor [Atorvastatin] Myalgia   • Lortab [Hydrocodone-Acetaminophen] Nausea And Vomiting       Current Medications    Current Outpatient Medications:   •  amLODIPine (NORVASC) 10 MG tablet, Take 1 tablet by mouth once daily, Disp: 90 tablet, Rfl: 1  •  aspirin 81 MG EC tablet, Take 81 mg by mouth Daily., Disp: , Rfl:   •  Bacillus Coagulans-Inulin (Probiotic) 1-250 BILLION-MG capsule, Take  by mouth., Disp: , Rfl:   •  benazepril (LOTENSIN) 20 MG tablet, Take 1 tablet by mouth once daily, Disp: 90 tablet, Rfl: 1  •  chlorhexidine " "(PERIDEX) 0.12 % solution, SWISH WITH 15ML IN MOUTH FOR 30 SECONDS TWICE AFTER MEALS THEN SPIT OUT, Disp: , Rfl:   •  Cholecalciferol (VITAMIN D3) 50 MCG (2000 UT) capsule, Take 1,000 Units by mouth Daily., Disp: , Rfl:   •  levothyroxine (SYNTHROID, LEVOTHROID) 75 MCG tablet, Take 1 tablet by mouth Daily., Disp: 90 tablet, Rfl: 3  •  Misc Natural Products (OSTEO BI-FLEX JOINT SHIELD PO), Take  by mouth Daily., Disp: , Rfl:   •  pantoprazole (PROTONIX) 40 MG EC tablet, Take 1 tablet by mouth Daily., Disp: 180 tablet, Rfl: 1  •  prednisoLONE acetate (PRED FORTE) 1 % ophthalmic suspension, INSTILL 1 DROP INTO LEFT EYE 4 TIMES DAILY AS DIRECTED, Disp: , Rfl:   •  trimethoprim-polymyxin b (POLYTRIM) 18722-8.1 UNIT/ML-% ophthalmic solution, , Disp: , Rfl:   •  fexofenadine (Allegra Allergy) 60 MG tablet, Take 1 tablet by mouth 2 (Two) Times a Day for 30 days., Disp: 60 tablet, Rfl: 3  •  hydrocortisone 2.5 % ointment, Apply a thin coat to the rectum daily, Disp: 30 g, Rfl: 2  •  pantoprazole (PROTONIX) 20 MG EC tablet, Protonix, Disp: , Rfl:       History of Present Illness   Leslee Wu is a 82 y.o. year old female here for follow up.    Pt denies any chest pain, dyspnea, dyspnea on exertion, orthopnea, PND, palpitations, lower extremity edema, or claudication.  She is her chronic hyperparathyroidism with some worsened hypercalcemia noted recently and has follow-up with Dr. Pretty.  Conservative management was recommended per the last evaluation.  She is not following her blood pressure regularly at home but states she does have some fatigue following COVID that she had in August      OBJECTIVE:  Vitals:    11/11/22 1132   BP: 102/60   BP Location: Left arm   Patient Position: Sitting   Pulse: 86   SpO2: 98%   Weight: 62.9 kg (138 lb 9.6 oz)   Height: 152.4 cm (60\")     Body mass index is 27.07 kg/m².    Constitutional:       Appearance: Healthy appearance. Not in distress.   Neck:      Vascular: No JVR. JVD normal. "   Pulmonary:      Effort: Pulmonary effort is normal.      Breath sounds: Normal breath sounds. No wheezing. No rhonchi. No rales.   Chest:      Chest wall: Not tender to palpatation.   Cardiovascular:      PMI at left midclavicular line. Normal rate. Regular rhythm. Normal S1. Normal S2.      Murmurs: There is a systolic murmur.      No gallop. No click. No rub.   Pulses:     Intact distal pulses.   Edema:     Peripheral edema absent.   Abdominal:      General: Bowel sounds are normal.      Palpations: Abdomen is soft.      Tenderness: There is no abdominal tenderness.   Musculoskeletal: Normal range of motion.         General: No tenderness. Skin:     General: Skin is warm and dry.   Neurological:      General: No focal deficit present.      Mental Status: Alert and oriented to person, place and time.         Diagnostic Data:  Procedures      ASSESSMENT:   Diagnosis Plan   1. Nonrheumatic aortic valve stenosis        2. Primary hypertension            PLAN:  Aortic sclerosis no symptoms follow-up echocardiogram next year's visit.    Hypertension hypotension on concomitant benazepril amlodipine we will just decrease amlodipine to 5 mg          Tanner Saavedra MD, [facility]

## 2022-11-11 ENCOUNTER — OFFICE VISIT (OUTPATIENT)
Dept: CARDIOLOGY | Facility: CLINIC | Age: 82
End: 2022-11-11

## 2022-11-11 VITALS
BODY MASS INDEX: 27.21 KG/M2 | DIASTOLIC BLOOD PRESSURE: 60 MMHG | SYSTOLIC BLOOD PRESSURE: 102 MMHG | HEART RATE: 86 BPM | WEIGHT: 138.6 LBS | OXYGEN SATURATION: 98 % | HEIGHT: 60 IN

## 2022-11-11 DIAGNOSIS — I10 PRIMARY HYPERTENSION: ICD-10-CM

## 2022-11-11 DIAGNOSIS — I35.0 NONRHEUMATIC AORTIC VALVE STENOSIS: Primary | ICD-10-CM

## 2022-11-11 PROCEDURE — 99213 OFFICE O/P EST LOW 20 MIN: CPT | Performed by: INTERNAL MEDICINE

## 2022-11-11 RX ORDER — PREDNISOLONE ACETATE 10 MG/ML
SUSPENSION/ DROPS OPHTHALMIC
COMMUNITY
Start: 2022-11-02 | End: 2023-03-08

## 2022-11-11 RX ORDER — AMLODIPINE BESYLATE 5 MG/1
5 TABLET ORAL DAILY
Qty: 30 TABLET | Refills: 11 | Status: SHIPPED | OUTPATIENT
Start: 2022-11-11

## 2022-11-11 RX ORDER — POLYMYXIN B SULFATE AND TRIMETHOPRIM 1; 10000 MG/ML; [USP'U]/ML
SOLUTION OPHTHALMIC
COMMUNITY
Start: 2022-11-08 | End: 2023-03-22

## 2022-11-30 ENCOUNTER — OFFICE VISIT (OUTPATIENT)
Dept: OBSTETRICS AND GYNECOLOGY | Facility: CLINIC | Age: 82
End: 2022-11-30

## 2022-11-30 VITALS
SYSTOLIC BLOOD PRESSURE: 116 MMHG | HEIGHT: 60 IN | DIASTOLIC BLOOD PRESSURE: 80 MMHG | WEIGHT: 138 LBS | BODY MASS INDEX: 27.09 KG/M2

## 2022-11-30 DIAGNOSIS — N81.10 VAGINAL WALL PROLAPSE: ICD-10-CM

## 2022-11-30 DIAGNOSIS — K64.4 EXTERNAL HEMORRHOIDS: Primary | ICD-10-CM

## 2022-11-30 DIAGNOSIS — N95.2 ATROPHY OF VAGINA: ICD-10-CM

## 2022-11-30 PROCEDURE — 99213 OFFICE O/P EST LOW 20 MIN: CPT | Performed by: NURSE PRACTITIONER

## 2022-11-30 NOTE — PROGRESS NOTES
"Chief Complaint  Leslee Wu is a 82 y.o.  female presenting for Gynecologic Exam    History of Present Illness  Leslee is a very pleasant, alert & articulate, 83yo woman,  with 9 grands, & a great-grand!  She is enjoying watching her grands grow up.  She had left cataract surgery Nov 15, and has had no complications.  She has appt for the other eye in December.  Denies any gyn c/o's --- never any bldg or pressure/ no symptoms of prolapsed walls / no \"falling down or falling out\" type of feeling.  No urinary c/o's or bowel problems.  Otherwise, ROS negative.  Preventive health procedures up to date.    The following portions of the patient's history were reviewed and updated as appropriate: allergies, current medications, past family history, past medical history, past social history, past surgical history and problem list.    Allergies   Allergen Reactions   • Codeine Nausea And Vomiting   • Euthyrox [Levothyroxine] Paresthesia     euthyrox brand   • Lipitor [Atorvastatin] Myalgia   • Lortab [Hydrocodone-Acetaminophen] Nausea And Vomiting         Current Outpatient Medications:   •  amLODIPine (NORVASC) 5 MG tablet, Take 1 tablet by mouth Daily., Disp: 30 tablet, Rfl: 11  •  aspirin 81 MG EC tablet, Take 81 mg by mouth Daily., Disp: , Rfl:   •  Bacillus Coagulans-Inulin (Probiotic) 1-250 BILLION-MG capsule, Take  by mouth., Disp: , Rfl:   •  benazepril (LOTENSIN) 20 MG tablet, Take 1 tablet by mouth once daily, Disp: 90 tablet, Rfl: 1  •  chlorhexidine (PERIDEX) 0.12 % solution, SWISH WITH 15ML IN MOUTH FOR 30 SECONDS TWICE AFTER MEALS THEN SPIT OUT, Disp: , Rfl:   •  Cholecalciferol (VITAMIN D3) 50 MCG (2000 UT) capsule, Take 1,000 Units by mouth Daily., Disp: , Rfl:   •  fexofenadine (Allegra Allergy) 60 MG tablet, Take 1 tablet by mouth 2 (Two) Times a Day for 30 days., Disp: 60 tablet, Rfl: 3  •  hydrocortisone 2.5 % ointment, Apply a thin coat to the rectum daily, Disp: 30 g, Rfl: 2  •  levothyroxine " (SYNTHROID, LEVOTHROID) 75 MCG tablet, Take 1 tablet by mouth Daily., Disp: 90 tablet, Rfl: 3  •  Misc Natural Products (OSTEO BI-FLEX JOINT SHIELD PO), Take  by mouth Daily., Disp: , Rfl:   •  pantoprazole (PROTONIX) 20 MG EC tablet, Protonix, Disp: , Rfl:   •  pantoprazole (PROTONIX) 40 MG EC tablet, Take 1 tablet by mouth Daily., Disp: 180 tablet, Rfl: 1  •  prednisoLONE acetate (PRED FORTE) 1 % ophthalmic suspension, INSTILL 1 DROP INTO LEFT EYE 4 TIMES DAILY AS DIRECTED, Disp: , Rfl:   •  trimethoprim-polymyxin b (POLYTRIM) 14689-0.1 UNIT/ML-% ophthalmic solution, , Disp: , Rfl:     Past Medical History:   Diagnosis Date   • Anemia    • Anxiety    • Atypical chest pain     Normal exercise Cardiolite stress test and echocardiogram in .   • Atypical chest pain    • Atypical chest pain     History of atypical chest pain: Echocardiogram, ; LVEF 90%. Abnormal calcium score, 2013. MPS, 2013: No inducible ischemia, LVEF 70%.   • Diverticulosis    • Diverticulosis     History of diverticulosis.   • Diverticulosis of large intestine without hemorrhage 10/21/2016   • Dyslipidemia    • Former smoker 2016   • GERD (gastroesophageal reflux disease)    • GERD without esophagitis    • Gout    • Gout     History of gout.   •  4 para 4    • Hypertension    • Hypothyroidism     On chronic replacement therapy.   • Hypothyroidism     Hypothyroidism/chronic replacement.   • Iron deficiency anemia     Iron deficiency anemia.   • Joint pain, knee    • Menopause 10/9/2019   • Mild anxiety     Mild chronic anxiety.   • Nontoxic single thyroid nodule    • Osteoarthritis    • Pain, lower leg    • Parathyroid adenoma    • Pelvic organ prolapse quantification stage 1 cystocele 10/20/2019    Follows with Dr. Felipe GYN evaluation 10/9 no specific treatment at this time.   • Pharyngitis    • Pulmonary nodule     CT scan of the chest of 2013 reports new noncalcified nodule in the right lung base measuring 8  "x 5 mm and a stable noncalcified nodule in the left mid lung field measuring 4-5 mm, follow-up CT scan of 08/06/2014 reports interval resolution of the right basilar lung nodule and stable calcified nodule in the left upper lobe.   • Rectocele 1/23/2017   • Spondylolisthesis     Spondylolisthesis/degenerative disc disease.   • Tobacco use     History of brief tobacco use, in the patient’s 20s:    • Tobacco use     History of brief tobacco use, in the patient’s 20s: Noncalcified lung nodule found on chest CT, June 2013, at the left lung base and the mid left lung field.  Evaluation by Dr. Ivan Mota, August 2013 with serial CT scans. Chest CT, November 2013: Noncalcified nodules smaller than prior studies. Essentially “normal” CT of the chest, 08/06/2014.    • Urinary tract infection    • Vaginal candidiasis         Past Surgical History:   Procedure Laterality Date   • BACK SURGERY     • CATARACT EXTRACTION Left    • TONSILLECTOMY AND ADENOIDECTOMY     • TONSILLECTOMY AND ADENOIDECTOMY     • TOTAL HIP ARTHROPLASTY     • TOTAL HIP ARTHROPLASTY Right     Right hip replacement.   • TOTAL KNEE ARTHROPLASTY Right    • TOTAL KNEE ARTHROPLASTY Left    • TOTAL KNEE ARTHROPLASTY REVISION Left        Objective  /80   Ht 152.4 cm (60\")   Wt 62.6 kg (138 lb)   LMP  (LMP Unknown)   Breastfeeding No   BMI 26.95 kg/m²     Physical Exam  Vitals and nursing note reviewed. Exam conducted with a chaperone present.   Constitutional:       Appearance: Normal appearance.   HENT:      Head: Normocephalic.   Neck:      Thyroid: No thyroid mass or thyromegaly.   Cardiovascular:      Rate and Rhythm: Normal rate and regular rhythm.      Heart sounds: Murmur heard.   Pulmonary:      Effort: Pulmonary effort is normal. No respiratory distress.      Breath sounds: Normal breath sounds.   Chest:   Breasts:     Right: No inverted nipple, mass or nipple discharge.      Left: No inverted nipple, mass or nipple discharge.   Abdominal:     "  Palpations: Abdomen is soft. There is no mass.      Tenderness: There is no abdominal tenderness.   Genitourinary:     General: Normal vulva.      Labia:         Right: No rash, tenderness or lesion.         Left: No rash, tenderness or lesion.       Vagina: Erythema and prolapsed vaginal walls present. No vaginal discharge.      Cervix: No discharge, lesion or erythema.      Uterus: Not enlarged and not tender.       Adnexa:         Right: No mass or tenderness.          Left: No mass or tenderness.        Comments: Mod prolapse of vaginal walls, but she is asymptomatic.  Anus appears wnl.  No rectal exam performed.  Lymphadenopathy:      Upper Body:      Right upper body: No supraclavicular or axillary adenopathy.      Left upper body: No supraclavicular or axillary adenopathy.   Skin:     General: Skin is warm and dry.   Neurological:      Mental Status: She is alert and oriented to person, place, and time.   Psychiatric:         Mood and Affect: Mood normal.         Behavior: Behavior normal.         Assessment/Plan   Diagnoses and all orders for this visit:    1. External hemorrhoids (Primary)    2. Vaginal wall prolapse    3. Atrophy of vagina    She has enough Tx for the ext hemorrhoids.  Will contact me if needs further refills.    Avoid heavy lifting.  If she starts to have infections (vaginitis or UTIs) will need to call me.    Procedures            Return in about 1 year (around 11/30/2023) for Annual physical.    Huong Jaramillo, APRN  11/30/2022

## 2022-12-13 DIAGNOSIS — K21.9 GASTROESOPHAGEAL REFLUX DISEASE WITHOUT ESOPHAGITIS: ICD-10-CM

## 2022-12-14 RX ORDER — PANTOPRAZOLE SODIUM 40 MG/1
TABLET, DELAYED RELEASE ORAL
Qty: 90 TABLET | Refills: 0 | Status: SHIPPED | OUTPATIENT
Start: 2022-12-14 | End: 2023-03-17

## 2023-02-16 ENCOUNTER — LAB (OUTPATIENT)
Dept: ENDOCRINOLOGY | Facility: CLINIC | Age: 83
End: 2023-02-16
Payer: MEDICARE

## 2023-02-16 DIAGNOSIS — E21.3 HYPERPARATHYROIDISM: ICD-10-CM

## 2023-02-16 LAB
ALBUMIN SERPL-MCNC: 4.1 G/DL (ref 3.5–5.2)
ALBUMIN/GLOB SERPL: 1.6 G/DL
ALP SERPL-CCNC: 116 U/L (ref 39–117)
ALT SERPL W P-5'-P-CCNC: 12 U/L (ref 1–33)
ANION GAP SERPL CALCULATED.3IONS-SCNC: 8 MMOL/L (ref 5–15)
AST SERPL-CCNC: 20 U/L (ref 1–32)
BILIRUB SERPL-MCNC: 0.2 MG/DL (ref 0–1.2)
BUN SERPL-MCNC: 14 MG/DL (ref 8–23)
BUN/CREAT SERPL: 16.3 (ref 7–25)
CALCIUM SPEC-SCNC: 11.7 MG/DL (ref 8.6–10.5)
CHLORIDE SERPL-SCNC: 104 MMOL/L (ref 98–107)
CO2 SERPL-SCNC: 26 MMOL/L (ref 22–29)
CREAT SERPL-MCNC: 0.86 MG/DL (ref 0.57–1)
EGFRCR SERPLBLD CKD-EPI 2021: 67.1 ML/MIN/1.73
GLOBULIN UR ELPH-MCNC: 2.5 GM/DL
GLUCOSE SERPL-MCNC: 114 MG/DL (ref 65–99)
POTASSIUM SERPL-SCNC: 4.4 MMOL/L (ref 3.5–5.2)
PROT SERPL-MCNC: 6.6 G/DL (ref 6–8.5)
SODIUM SERPL-SCNC: 138 MMOL/L (ref 136–145)

## 2023-02-16 PROCEDURE — 82306 VITAMIN D 25 HYDROXY: CPT | Performed by: INTERNAL MEDICINE

## 2023-02-16 PROCEDURE — 80053 COMPREHEN METABOLIC PANEL: CPT | Performed by: INTERNAL MEDICINE

## 2023-02-16 PROCEDURE — 83970 ASSAY OF PARATHORMONE: CPT | Performed by: INTERNAL MEDICINE

## 2023-02-17 LAB
25(OH)D3 SERPL-MCNC: 48 NG/ML (ref 30–100)
PTH-INTACT SERPL-MCNC: 96 PG/ML (ref 15–65)

## 2023-03-08 ENCOUNTER — OFFICE VISIT (OUTPATIENT)
Dept: INTERNAL MEDICINE | Facility: CLINIC | Age: 83
End: 2023-03-08
Payer: MEDICARE

## 2023-03-08 VITALS
WEIGHT: 139 LBS | HEIGHT: 60 IN | HEART RATE: 76 BPM | BODY MASS INDEX: 27.29 KG/M2 | RESPIRATION RATE: 16 BRPM | DIASTOLIC BLOOD PRESSURE: 64 MMHG | TEMPERATURE: 97.8 F | SYSTOLIC BLOOD PRESSURE: 112 MMHG

## 2023-03-08 DIAGNOSIS — I10 ESSENTIAL HYPERTENSION: ICD-10-CM

## 2023-03-08 DIAGNOSIS — E78.2 MIXED HYPERLIPIDEMIA: ICD-10-CM

## 2023-03-08 DIAGNOSIS — K21.9 GASTROESOPHAGEAL REFLUX DISEASE, UNSPECIFIED WHETHER ESOPHAGITIS PRESENT: ICD-10-CM

## 2023-03-08 DIAGNOSIS — E55.9 VITAMIN D DEFICIENCY: ICD-10-CM

## 2023-03-08 DIAGNOSIS — I25.10 CORONARY ARTERY DISEASE INVOLVING NATIVE CORONARY ARTERY OF NATIVE HEART WITHOUT ANGINA PECTORIS: ICD-10-CM

## 2023-03-08 DIAGNOSIS — R53.83 OTHER FATIGUE: ICD-10-CM

## 2023-03-08 DIAGNOSIS — R31.9 URINARY TRACT INFECTION WITH HEMATURIA, SITE UNSPECIFIED: ICD-10-CM

## 2023-03-08 DIAGNOSIS — R73.03 PREDIABETES: ICD-10-CM

## 2023-03-08 DIAGNOSIS — Z12.11 COLON CANCER SCREENING: ICD-10-CM

## 2023-03-08 DIAGNOSIS — E21.3 HYPERPARATHYROIDISM: ICD-10-CM

## 2023-03-08 DIAGNOSIS — Z12.31 SCREENING MAMMOGRAM FOR BREAST CANCER: ICD-10-CM

## 2023-03-08 DIAGNOSIS — R82.90 ABNORMAL URINALYSIS: Primary | ICD-10-CM

## 2023-03-08 DIAGNOSIS — M85.89 OSTEOPENIA OF MULTIPLE SITES: ICD-10-CM

## 2023-03-08 DIAGNOSIS — N39.0 URINARY TRACT INFECTION WITH HEMATURIA, SITE UNSPECIFIED: ICD-10-CM

## 2023-03-08 LAB
BILIRUB BLD-MCNC: NEGATIVE MG/DL
BILIRUB UR QL STRIP: NEGATIVE
CLARITY UR: ABNORMAL
CLARITY, POC: ABNORMAL
COLOR UR: YELLOW
COLOR UR: YELLOW
EXPIRATION DATE: ABNORMAL
GLUCOSE UR STRIP-MCNC: NEGATIVE MG/DL
GLUCOSE UR STRIP-MCNC: NEGATIVE MG/DL
HGB UR QL STRIP.AUTO: ABNORMAL
KETONES UR QL STRIP: NEGATIVE
KETONES UR QL: NEGATIVE
LEUKOCYTE EST, POC: ABNORMAL
LEUKOCYTE ESTERASE UR QL STRIP.AUTO: ABNORMAL
Lab: ABNORMAL
NITRITE UR QL STRIP: NEGATIVE
NITRITE UR-MCNC: NEGATIVE MG/ML
PH UR STRIP.AUTO: 6 [PH] (ref 5–8)
PH UR: 5 [PH] (ref 5–8)
PROT UR QL STRIP: NEGATIVE
PROT UR STRIP-MCNC: NEGATIVE MG/DL
RBC # UR STRIP: ABNORMAL /UL
SP GR UR STRIP: 1.01 (ref 1–1.03)
SP GR UR: 1.01 (ref 1–1.03)
UROBILINOGEN UR QL STRIP: ABNORMAL
UROBILINOGEN UR QL: NORMAL

## 2023-03-08 PROCEDURE — 81001 URINALYSIS AUTO W/SCOPE: CPT | Performed by: NURSE PRACTITIONER

## 2023-03-08 PROCEDURE — 87186 SC STD MICRODIL/AGAR DIL: CPT | Performed by: NURSE PRACTITIONER

## 2023-03-08 PROCEDURE — 87077 CULTURE AEROBIC IDENTIFY: CPT | Performed by: NURSE PRACTITIONER

## 2023-03-08 PROCEDURE — 1159F MED LIST DOCD IN RCRD: CPT | Performed by: NURSE PRACTITIONER

## 2023-03-08 PROCEDURE — 3044F HG A1C LEVEL LT 7.0%: CPT | Performed by: NURSE PRACTITIONER

## 2023-03-08 PROCEDURE — 87181 SC STD AGAR DILUTION PER AGT: CPT | Performed by: NURSE PRACTITIONER

## 2023-03-08 PROCEDURE — 3074F SYST BP LT 130 MM HG: CPT | Performed by: NURSE PRACTITIONER

## 2023-03-08 PROCEDURE — 99214 OFFICE O/P EST MOD 30 MIN: CPT | Performed by: NURSE PRACTITIONER

## 2023-03-08 PROCEDURE — 3078F DIAST BP <80 MM HG: CPT | Performed by: NURSE PRACTITIONER

## 2023-03-08 PROCEDURE — 87077 CULTURE AEROBIC IDENTIFY: CPT

## 2023-03-08 PROCEDURE — 83036 HEMOGLOBIN GLYCOSYLATED A1C: CPT | Performed by: NURSE PRACTITIONER

## 2023-03-08 PROCEDURE — 81003 URINALYSIS AUTO W/O SCOPE: CPT | Performed by: NURSE PRACTITIONER

## 2023-03-08 PROCEDURE — 1160F RVW MEDS BY RX/DR IN RCRD: CPT | Performed by: NURSE PRACTITIONER

## 2023-03-08 PROCEDURE — 87086 URINE CULTURE/COLONY COUNT: CPT | Performed by: NURSE PRACTITIONER

## 2023-03-08 RX ORDER — CEFDINIR 300 MG/1
300 CAPSULE ORAL 2 TIMES DAILY
Qty: 20 CAPSULE | Refills: 0 | OUTPATIENT
Start: 2023-03-08 | End: 2023-03-22

## 2023-03-08 RX ORDER — AMLODIPINE BESYLATE 10 MG/1
1 TABLET ORAL DAILY
COMMUNITY
Start: 2023-02-13 | End: 2023-03-08

## 2023-03-08 NOTE — PATIENT INSTRUCTIONS
"DASH Eating Plan  DASH stands for Dietary Approaches to Stop Hypertension. The DASH eating plan is a healthy eating plan that has been shown to:  Reduce high blood pressure (hypertension).  Reduce your risk for type 2 diabetes, heart disease, and stroke.  Help with weight loss.  What are tips for following this plan?  Reading food labels  Check food labels for the amount of salt (sodium) per serving. Choose foods with less than 5 percent of the Daily Value of sodium. Generally, foods with less than 300 milligrams (mg) of sodium per serving fit into this eating plan.  To find whole grains, look for the word \"whole\" as the first word in the ingredient list.  Shopping  Buy products labeled as \"low-sodium\" or \"no salt added.\"  Buy fresh foods. Avoid canned foods and pre-made or frozen meals.  Cooking  Avoid adding salt when cooking. Use salt-free seasonings or herbs instead of table salt or sea salt. Check with your health care provider or pharmacist before using salt substitutes.  Do not champagne foods. Cook foods using healthy methods such as baking, boiling, grilling, roasting, and broiling instead.  Cook with heart-healthy oils, such as olive, canola, avocado, soybean, or sunflower oil.  Meal planning    Eat a balanced diet that includes:  4 or more servings of fruits and 4 or more servings of vegetables each day. Try to fill one-half of your plate with fruits and vegetables.  6-8 servings of whole grains each day.  Less than 6 oz (170 g) of lean meat, poultry, or fish each day. A 3-oz (85-g) serving of meat is about the same size as a deck of cards. One egg equals 1 oz (28 g).  2-3 servings of low-fat dairy each day. One serving is 1 cup (237 mL).  1 serving of nuts, seeds, or beans 5 times each week.  2-3 servings of heart-healthy fats. Healthy fats called omega-3 fatty acids are found in foods such as walnuts, flaxseeds, fortified milks, and eggs. These fats are also found in cold-water fish, such as sardines, salmon, " and mackerel.  Limit how much you eat of:  Canned or prepackaged foods.  Food that is high in trans fat, such as some fried foods.  Food that is high in saturated fat, such as fatty meat.  Desserts and other sweets, sugary drinks, and other foods with added sugar.  Full-fat dairy products.  Do not salt foods before eating.  Do not eat more than 4 egg yolks a week.  Try to eat at least 2 vegetarian meals a week.  Eat more home-cooked food and less restaurant, buffet, and fast food.  Lifestyle  When eating at a restaurant, ask that your food be prepared with less salt or no salt, if possible.  If you drink alcohol:  Limit how much you use to:  0-1 drink a day for women who are not pregnant.  0-2 drinks a day for men.  Be aware of how much alcohol is in your drink. In the [place] one drink equals one 12 oz bottle of beer (355 mL), one 5 oz glass of wine (148 mL), or one 1½ oz glass of hard liquor (44 mL).  General information  Avoid eating more than 2,300 mg of salt a day. If you have hypertension, you may need to reduce your sodium intake to 1,500 mg a day.  Work with your health care provider to maintain a healthy body weight or to lose weight. Ask what an ideal weight is for you.  Get at least 30 minutes of exercise that causes your heart to beat faster (aerobic exercise) most days of the week. Activities may include walking, swimming, or biking.  Work with your health care provider or dietitian to adjust your eating plan to your individual calorie needs.  What foods should I eat?  Fruits  All fresh, dried, or frozen fruit. Canned fruit in natural juice (without added sugar).  Vegetables  Fresh or frozen vegetables (raw, steamed, roasted, or grilled). Low-sodium or reduced-sodium tomato and vegetable juice. Low-sodium or reduced-sodium tomato sauce and tomato paste. Low-sodium or reduced-sodium canned vegetables.  Grains  Whole-grain or whole-wheat bread. Whole-grain or whole-wheat pasta. Brown rice. Oatmeal. Quinoa.  Bulgur. Whole-grain and low-sodium cereals. Laura bread. Low-fat, low-sodium crackers. Whole-wheat flour tortillas.  Meats and other proteins  Skinless chicken or turkey. Ground chicken or turkey. Pork with fat trimmed off. Fish and seafood. Egg whites. Dried beans, peas, or lentils. Unsalted nuts, nut butters, and seeds. Unsalted canned beans. Lean cuts of beef with fat trimmed off. Low-sodium, lean precooked or cured meat, such as sausages or meat loaves.  Dairy  Low-fat (1%) or fat-free (skim) milk. Reduced-fat, low-fat, or fat-free cheeses. Nonfat, low-sodium ricotta or cottage cheese. Low-fat or nonfat yogurt. Low-fat, low-sodium cheese.  Fats and oils  Soft margarine without trans fats. Vegetable oil. Reduced-fat, low-fat, or light mayonnaise and salad dressings (reduced-sodium). Canola, safflower, olive, avocado, soybean, and sunflower oils. Avocado.  Seasonings and condiments  Herbs. Spices. Seasoning mixes without salt.  Other foods  Unsalted popcorn and pretzels. Fat-free sweets.  The items listed above may not be a complete list of foods and beverages you can eat. Contact a dietitian for more information.  What foods should I avoid?  Fruits  Canned fruit in a light or heavy syrup. Fried fruit. Fruit in cream or butter sauce.  Vegetables  Creamed or fried vegetables. Vegetables in a cheese sauce. Regular canned vegetables (not low-sodium or reduced-sodium). Regular canned tomato sauce and paste (not low-sodium or reduced-sodium). Regular tomato and vegetable juice (not low-sodium or reduced-sodium). Pickles. Olives.  Grains  Baked goods made with fat, such as croissants, muffins, or some breads. Dry pasta or rice meal packs.  Meats and other proteins  Fatty cuts of meat. Ribs. Fried meat. Reyes. Bologna, salami, and other precooked or cured meats, such as sausages or meat loaves. Fat from the back of a pig (fatback). Bratwurst. Salted nuts and seeds. Canned beans with added salt. Canned or smoked fish.  Whole eggs or egg yolks. Chicken or turkey with skin.  Dairy  Whole or 2% milk, cream, and half-and-half. Whole or full-fat cream cheese. Whole-fat or sweetened yogurt. Full-fat cheese. Nondairy creamers. Whipped toppings. Processed cheese and cheese spreads.  Fats and oils  Butter. Stick margarine. Lard. Shortening. Ghee. Reyes fat. Tropical oils, such as coconut, palm kernel, or palm oil.  Seasonings and condiments  Onion salt, garlic salt, seasoned salt, table salt, and sea salt. Worcestershire sauce. Tartar sauce. Barbecue sauce. Teriyaki sauce. Soy sauce, including reduced-sodium. Steak sauce. Canned and packaged gravies. Fish sauce. Oyster sauce. Cocktail sauce. Store-bought horseradish. Ketchup. Mustard. Meat flavorings and tenderizers. Bouillon cubes. Hot sauces. Pre-made or packaged marinades. Pre-made or packaged taco seasonings. Relishes. Regular salad dressings.  Other foods  Salted popcorn and pretzels.  The items listed above may not be a complete list of foods and beverages you should avoid. Contact a dietitian for more information.  Where to find more information  National Heart, Lung, and Blood [place]: www.nhlbi.nih.gov  American Heart Association: www.heart.org  Academy of Nutrition and Dietetics: www.eatright.org  National Kidney Foundation: www.kidney.org  Summary  The DASH eating plan is a healthy eating plan that has been shown to reduce high blood pressure (hypertension). It may also reduce your risk for type 2 diabetes, heart disease, and stroke.  When on the DASH eating plan, aim to eat more fresh fruits and vegetables, whole grains, lean proteins, low-fat dairy, and heart-healthy fats.  With the DASH eating plan, you should limit salt (sodium) intake to 2,300 mg a day. If you have hypertension, you may need to reduce your sodium intake to 1,500 mg a day.  Work with your health care provider or dietitian to adjust your eating plan to your individual calorie needs.  This information is not  intended to replace advice given to you by your health care provider. Make sure you discuss any questions you have with your health care provider.  Document Revised: 11/20/2020 Document Reviewed: 11/20/2020  Elsevier Patient Education © 2022 [employer]    Advance Care Planning and Advance Directives     You make decisions on a daily basis - decisions about where you want to live, your career, your home, your life. Perhaps one of the most important decisions you face is your choice for future medical care. Take time to talk with your family and your healthcare team and start planning today.  Advance Care Planning is a process that can help you:  Understand possible future healthcare decisions in light of your own experiences  Reflect on those decision in light of your goals and values  Discuss your decisions with those closest to you and the healthcare professionals that care for you  Make a plan by creating a document that reflects your wishes    Surrogate Decision Maker  In the event of a medical emergency, which has left you unable to communicate or to make your own decisions, you would need someone to make decisions for you.  It is important to discuss your preferences for medical treatment with this person while you are in good health.     Qualities of a surrogate decision maker:  Willing to take on this role and responsibility  Knows what you want for future medical care  Willing to follow your wishes even if they don't agree with them  Able to make difficult medical decisions under stressful circumstances    Advance Directives  These are legal documents you can create that will guide your healthcare team and decision maker(s) when needed. These documents can be stored in the electronic medical record.    Living Will - a legal document to guide your care if you have a terminal condition or a serious illness and are unable to communicate. States vary by statute in document names/types, but most forms may  include one or more of the following:        -  Directions regarding life-prolonging treatments        -  Directions regarding artificially provided nutrition/hydration        -  Choosing a healthcare decision maker        -  Direction regarding organ/tissue donation    Durable Power of  for Healthcare - this document names an -in-fact to make medical decisions for you, but it may also allow this person to make personal and financial decisions for you. Please seek the advice of an  if you need this type of document.    **Advance Directives are not required and no one may discriminate against you if you do not sign one.    Medical Orders  Many states allow specific forms/orders signed by your physician to record your wishes for medical treatment in your current state of health. This form, signed in personal communication with your physician, addresses resuscitation and other medical interventions that you may or may not want.      For more information or to schedule a time with a [facility] Advance Care Planning Facilitator contact: [facility].iconDial/ACP or call [phone] and someone will contact you directly.

## 2023-03-08 NOTE — PROGRESS NOTES
Chief Complaint  Fatigue, unspecified type (6 month follow up.)    Subjective          Leslee Wu presents to [facility]  History of Present Illness    The patient presents today for a follow-up. She was last seen in 09/2022.    Lower back pain  The patient reports pain across the entire lower back. She has not experienced any urinary symptoms such as odor, frequent urination, dysuria, discoloration, nausea, flu-like symptoms or confusion.    Fatigue  The patient reports experiencing fatigue for approximately 6 months. She had COVID-19 in 08/2022.    Cataracts  The patient was scheduled to undergo cataract surgery at her last visit. She states she is doing well with her eyes.    Prediabetes  The patient is prediabetic.    Hypercalcemia  The patient's calcium level was elevated on her last blood work. She had not been taking calcium, but she is taking Osteo Bi-Flex. Her endocrinologist, Dr. Oliver, is monitoring her calcium levels. Blood work was obtained on 02/2023 and revealed her calcium level has decreased to 11.7.    Osteopenia  The patient has osteopenia.    Hypothyroidism  The patient continues to take Synthroid 75 mcg.    History of coronary artery disease  The patient's blood pressure was within normal limits today. Her weight has been stable and her body mass index is normal.    Vitamin D deficiency  The patient is taking over-the-counter vitamin D 800 units.    GERD  The patient is taking pantoprazole daily. She states if she is careful with her diet, she does not have any issues. If she eats anything spicy and hamburgers, she will experience complications.    Hypertension  The patient is taking amlodipine 5 mg and benazepril 20 mg. She saw Dr. Saavedra for the first time on 11/27/2022 and her amlodipine was decreased to 5mg.    Aortic sclerosis  The patient has no symptoms. She is scheduled for a follow-up echocardiogram in 2024.    Health screening  The  "patient is due for a colonoscopy. Her last colonoscopy was obtained on 07/15/2020 due to blood in the stool which may have been related to her arthritis medication she was taking at the time. She states she stopped taking \"all that medicine\". Her last mammogram was on 02/01/2022.     Immunizations  The patient is due for COVID-19, influenza, and shingles vaccines and she declined to receive them.    Health maintenance  The patient has not experienced kelly angina, dyspnea, swelling, abdominal pain, polyuria, polyphagia, or polydipsia. She does not have hematuria, blood in stool, dark tarry stool, or vomit resembling coffee grounds. The patient has a living will.        Current Outpatient Medications:   •  aspirin 81 MG EC tablet, Take 1 tablet by mouth Daily., Disp: , Rfl:   •  Bacillus Coagulans-Inulin (Probiotic) 1-250 BILLION-MG capsule, Take  by mouth., Disp: , Rfl:   •  benazepril (LOTENSIN) 20 MG tablet, Take 1 tablet by mouth once daily, Disp: 90 tablet, Rfl: 1  •  chlorhexidine (PERIDEX) 0.12 % solution, SWISH WITH 15ML IN MOUTH FOR 30 SECONDS TWICE AFTER MEALS THEN SPIT OUT, Disp: , Rfl:   •  Cholecalciferol (VITAMIN D3) 50 MCG (2000 UT) capsule, Take 1,000 Units by mouth Daily., Disp: , Rfl:   •  hydrocortisone 2.5 % ointment, Apply a thin coat to the rectum daily, Disp: 30 g, Rfl: 2  •  levothyroxine (SYNTHROID, LEVOTHROID) 75 MCG tablet, Take 1 tablet by mouth Daily., Disp: 90 tablet, Rfl: 3  •  Misc Natural Products (OSTEO BI-FLEX JOINT SHIELD PO), Take  by mouth Daily., Disp: , Rfl:   •  pantoprazole (PROTONIX) 20 MG EC tablet, Protonix, Disp: , Rfl:   •  pantoprazole (PROTONIX) 40 MG EC tablet, Take 1 tablet by mouth once daily, Disp: 90 tablet, Rfl: 0  •  prednisoLONE acetate (PRED FORTE) 1 % ophthalmic suspension, INSTILL 1 DROP INTO LEFT EYE 4 TIMES DAILY AS DIRECTED, Disp: , Rfl:   •  trimethoprim-polymyxin b (POLYTRIM) 86234-9.1 UNIT/ML-% ophthalmic solution, , Disp: , Rfl:   •  amLODIPine " "(NORVASC) 5 MG tablet, Take 1 tablet by mouth Daily., Disp: 30 tablet, Rfl: 11  •  cefdinir (OMNICEF) 300 MG capsule, Take 1 capsule by mouth 2 (Two) Times a Day., Disp: 20 capsule, Rfl: 0  •  fexofenadine (Allegra Allergy) 60 MG tablet, Take 1 tablet by mouth 2 (Two) Times a Day for 30 days., Disp: 60 tablet, Rfl: 3         Objective   Vital Signs:   /64 (BP Location: Right arm, Patient Position: Sitting, Cuff Size: Adult)   Pulse 76   Temp 97.8 °F (36.6 °C) (Infrared)   Resp 16   Ht 152.4 cm (60\")   Wt 63 kg (139 lb)   BMI 27.15 kg/m²     Physical Exam  Vitals reviewed.   Constitutional:       Appearance: She is well-developed.   HENT:      Head: Normocephalic and atraumatic.   Neck:      Comments: Palpable left base of neck, dime size, holosystolic murmur noted on exam.  Cardiovascular:      Rate and Rhythm: Normal rate and regular rhythm.      Heart sounds: Normal heart sounds. No murmur heard.  Pulmonary:      Effort: Pulmonary effort is normal.      Breath sounds: Normal breath sounds.   Musculoskeletal:      Comments: Lower extremities without edema. Pulses 2+.   Neurological:      Mental Status: She is alert and oriented to person, place, and time.        Result Review :                 Assessment and Plan    Diagnoses and all orders for this visit:    1. Abnormal urinalysis (Primary)  -     Urine Culture - Urine, Urine, Random Void; Future  -     Urinalysis With Microscopic - Urine, Clean Catch; Future  -     Urine Culture - Urine, Urine, Random Void  -     Urinalysis With Microscopic - Urine, Clean Catch  -     POCT urinalysis dipstick, automated    2. Coronary artery disease involving native coronary artery of native heart without angina pectoris    3. Essential hypertension  -     Comprehensive Metabolic Panel; Future  -     TSH; Future    4. Mixed hyperlipidemia  -     Comprehensive Metabolic Panel; Future  -     Lipid Panel; Future    5. Prediabetes  -     Comprehensive Metabolic Panel; " Future  -     CBC & Differential; Future  -     POC Microalbumin; Future  -     POC Glycosylated Hemoglobin (Hb A1C); Future  -     Ambulatory Referral for Diabetic Eye Exam-Ophthalmology  -     Mammo Screening Digital Tomosynthesis Bilateral With CAD; Future    6. Vitamin D deficiency  -     Vitamin D,25-Hydroxy; Future    7. Gastroesophageal reflux disease, unspecified whether esophagitis present  -     Comprehensive Metabolic Panel; Future  -     CBC & Differential; Future    8. Osteopenia of multiple sites    9. Hyperparathyroidism (HCC)    10. Screening mammogram for breast cancer  -     Mammo Screening Digital Tomosynthesis Bilateral With CAD; Future    11. Colon cancer screening  -     Ambulatory Referral for Diabetic Eye Exam-Ophthalmology    12. Urinary tract infection with hematuria, site unspecified  -     cefdinir (OMNICEF) 300 MG capsule; Take 1 capsule by mouth 2 (Two) Times a Day.  Dispense: 20 capsule; Refill: 0    13. Other fatigue  -     Protein Elec + Interp, Serum; Future      Aortic sclerosis    Abnormal urinalysis (Primary)  -     Urine Culture sent to the lab.  -     Urinalysis revealed positive blood, leukocytes, and nitrites.  -     The patient will take Omnicef 1 pill twice a day for 10 days.    Coronary artery disease involving native coronary artery of native heart without angina pectoris    Essential hypertension  -     Comprehensive Metabolic Panel; Future  -     TSH; Future  -     She will continue taking benazepril 20 mg daily.    Mixed hyperlipidemia  -     Comprehensive Metabolic Panel; Future  -     Lipid Panel; Future  -     She will continue to follow up with Dr. Oliver.    Prediabetes  -     Comprehensive Metabolic Panel; Future  -     CBC & Differential; Future  -     POC Microalbumin; Future  -     POC Glycosylated Hemoglobin (Hb A1C); Future  -     Ambulatory Referral for Diabetic Eye Exam-Ophthalmology  -     Mammo Screening Digital Tomosynthesis Bilateral With CAD;  Future    Vitamin D deficiency  -     Vitamin D,25-Hydroxy; Future    Gastroesophageal reflux disease, unspecified whether esophagitis present  -     Comprehensive Metabolic Panel; Future  -     CBC & Differential; Future  -     She will continue taking pantoprazole daily.    Osteopenia of multiple sites    Hyperparathyroidism (HCC)  - She will continue taking levothyroxine 75 mcg.    Screening mammogram for breast cancer  -     Mammo Screening Digital Tomosynthesis Bilateral With CAD; Future    Colon cancer screening  -     Ambulatory Referral for Diabetic Eye Exam-Ophthalmology    Other fatigue  -     She will let me know if the fatigue is not better after we treat her bladder infection.    Health maintenance  -     Order sent for colonoscopy.  -     Order sent for mammogram.  Declined immunizations-risk and benefits reviewed.            BMI is >= 25 and <30. (Overweight) The following options were offered after discussion;: weight loss educational material (shared in after visit summary)       AWV: utd  A1C:   Lab Results   Component Value Date    HGBA1C 5.3 08/09/2022      ACP: Advance Care Planning   ACP discussion was held with the patient during this visit. Patient has an advance directive (not in EMR), copy requested.   Mammogram: 2/1/22  Colonoscopy: 7/15/20 3 yr    Follow Up   Return in about 6 months (around 9/8/2023) for Medicare Wellness, fasting, Annual.  Patient was given instructions and counseling regarding her condition or for health maintenance advice. Please see specific information pulled into the AVS if appropriate.     RTC/call  If symptoms worsen  Meds MOA and SE's reviewed and pt v/u    SANDOVAL Lucas [facility]  [address] [id number]  Fax [phone]  Phone [phone]    Transcribed from ambient dictation for SANDOVAL Lucas by Colin Galvez.  03/08/23   14:48  EST    Patient or patient representative verbalized consent to the visit recording.  I have personally performed the services described in this document as transcribed by the above individual, and it is both accurate and complete.

## 2023-03-09 ENCOUNTER — LAB (OUTPATIENT)
Dept: INTERNAL MEDICINE | Facility: CLINIC | Age: 83
End: 2023-03-09
Payer: MEDICARE

## 2023-03-09 DIAGNOSIS — I10 ESSENTIAL HYPERTENSION: ICD-10-CM

## 2023-03-09 DIAGNOSIS — R73.03 PREDIABETES: ICD-10-CM

## 2023-03-09 DIAGNOSIS — E78.2 MIXED HYPERLIPIDEMIA: ICD-10-CM

## 2023-03-09 DIAGNOSIS — E55.9 VITAMIN D DEFICIENCY: ICD-10-CM

## 2023-03-09 DIAGNOSIS — K21.9 GASTROESOPHAGEAL REFLUX DISEASE, UNSPECIFIED WHETHER ESOPHAGITIS PRESENT: ICD-10-CM

## 2023-03-09 DIAGNOSIS — R53.83 OTHER FATIGUE: ICD-10-CM

## 2023-03-09 LAB
25(OH)D3 SERPL-MCNC: 52.1 NG/ML (ref 30–100)
ALBUMIN SERPL-MCNC: 4 G/DL (ref 3.5–5.2)
ALBUMIN/CREATININE RATIO, URINE: <30
ALBUMIN/GLOB SERPL: 1.5 G/DL
ALP SERPL-CCNC: 125 U/L (ref 39–117)
ALT SERPL W P-5'-P-CCNC: 8 U/L (ref 1–33)
ANION GAP SERPL CALCULATED.3IONS-SCNC: 9 MMOL/L (ref 5–15)
AST SERPL-CCNC: 15 U/L (ref 1–32)
BACTERIA UR QL AUTO: ABNORMAL /HPF
BASOPHILS # BLD AUTO: 0.09 10*3/MM3 (ref 0–0.2)
BASOPHILS NFR BLD AUTO: 1.8 % (ref 0–1.5)
BILIRUB SERPL-MCNC: 0.3 MG/DL (ref 0–1.2)
BUN SERPL-MCNC: 14 MG/DL (ref 8–23)
BUN/CREAT SERPL: 17.5 (ref 7–25)
CALCIUM SPEC-SCNC: 11.3 MG/DL (ref 8.6–10.5)
CHLORIDE SERPL-SCNC: 106 MMOL/L (ref 98–107)
CHOLEST SERPL-MCNC: 144 MG/DL (ref 0–200)
CO2 SERPL-SCNC: 24 MMOL/L (ref 22–29)
CREAT SERPL-MCNC: 0.8 MG/DL (ref 0.57–1)
DEPRECATED RDW RBC AUTO: 38.7 FL (ref 37–54)
EGFRCR SERPLBLD CKD-EPI 2021: 73.2 ML/MIN/1.73
EOSINOPHIL # BLD AUTO: 0.2 10*3/MM3 (ref 0–0.4)
EOSINOPHIL NFR BLD AUTO: 4.1 % (ref 0.3–6.2)
ERYTHROCYTE [DISTWIDTH] IN BLOOD BY AUTOMATED COUNT: 12.2 % (ref 12.3–15.4)
EXPIRATION DATE: NORMAL
EXPIRATION DATE: NORMAL
GLOBULIN UR ELPH-MCNC: 2.6 GM/DL
GLUCOSE SERPL-MCNC: 89 MG/DL (ref 65–99)
HBA1C MFR BLD: 5.5 %
HCT VFR BLD AUTO: 38.1 % (ref 34–46.6)
HDLC SERPL-MCNC: 53 MG/DL (ref 40–60)
HGB BLD-MCNC: 12.5 G/DL (ref 12–15.9)
HYALINE CASTS UR QL AUTO: ABNORMAL /LPF
IMM GRANULOCYTES # BLD AUTO: 0.01 10*3/MM3 (ref 0–0.05)
IMM GRANULOCYTES NFR BLD AUTO: 0.2 % (ref 0–0.5)
LDLC SERPL CALC-MCNC: 77 MG/DL (ref 0–100)
LDLC/HDLC SERPL: 1.45 {RATIO}
LYMPHOCYTES # BLD AUTO: 0.8 10*3/MM3 (ref 0.7–3.1)
LYMPHOCYTES NFR BLD AUTO: 16.4 % (ref 19.6–45.3)
Lab: NORMAL
Lab: NORMAL
MCH RBC QN AUTO: 28.9 PG (ref 26.6–33)
MCHC RBC AUTO-ENTMCNC: 32.8 G/DL (ref 31.5–35.7)
MCV RBC AUTO: 88 FL (ref 79–97)
MONOCYTES # BLD AUTO: 0.49 10*3/MM3 (ref 0.1–0.9)
MONOCYTES NFR BLD AUTO: 10 % (ref 5–12)
NEUTROPHILS NFR BLD AUTO: 3.3 10*3/MM3 (ref 1.7–7)
NEUTROPHILS NFR BLD AUTO: 67.5 % (ref 42.7–76)
NRBC BLD AUTO-RTO: 0 /100 WBC (ref 0–0.2)
PLATELET # BLD AUTO: 331 10*3/MM3 (ref 140–450)
PMV BLD AUTO: 11.1 FL (ref 6–12)
POC CREATININE URINE: 10
POC MICROALBUMIN URINE: 10
POTASSIUM SERPL-SCNC: 4.6 MMOL/L (ref 3.5–5.2)
PROT SERPL-MCNC: 6.6 G/DL (ref 6–8.5)
RBC # BLD AUTO: 4.33 10*6/MM3 (ref 3.77–5.28)
RBC # UR STRIP: ABNORMAL /HPF
REF LAB TEST METHOD: ABNORMAL
SODIUM SERPL-SCNC: 139 MMOL/L (ref 136–145)
SQUAMOUS #/AREA URNS HPF: ABNORMAL /HPF
TRIGL SERPL-MCNC: 70 MG/DL (ref 0–150)
TSH SERPL DL<=0.05 MIU/L-ACNC: 1.18 UIU/ML (ref 0.27–4.2)
VLDLC SERPL-MCNC: 14 MG/DL (ref 5–40)
WBC # UR STRIP: ABNORMAL /HPF
WBC NRBC COR # BLD: 4.89 10*3/MM3 (ref 3.4–10.8)

## 2023-03-09 PROCEDURE — 80053 COMPREHEN METABOLIC PANEL: CPT | Performed by: NURSE PRACTITIONER

## 2023-03-09 PROCEDURE — 84165 PROTEIN E-PHORESIS SERUM: CPT | Performed by: NURSE PRACTITIONER

## 2023-03-09 PROCEDURE — 85025 COMPLETE CBC W/AUTO DIFF WBC: CPT | Performed by: NURSE PRACTITIONER

## 2023-03-09 PROCEDURE — 84443 ASSAY THYROID STIM HORMONE: CPT | Performed by: NURSE PRACTITIONER

## 2023-03-09 PROCEDURE — 80061 LIPID PANEL: CPT | Performed by: NURSE PRACTITIONER

## 2023-03-09 PROCEDURE — 82306 VITAMIN D 25 HYDROXY: CPT | Performed by: NURSE PRACTITIONER

## 2023-03-10 LAB
ALBUMIN SERPL ELPH-MCNC: 3.5 G/DL (ref 2.9–4.4)
ALBUMIN/GLOB SERPL: 1.3 {RATIO} (ref 0.7–1.7)
ALPHA1 GLOB SERPL ELPH-MCNC: 0.3 G/DL (ref 0–0.4)
ALPHA2 GLOB SERPL ELPH-MCNC: 0.7 G/DL (ref 0.4–1)
B-GLOBULIN SERPL ELPH-MCNC: 1 G/DL (ref 0.7–1.3)
GAMMA GLOB SERPL ELPH-MCNC: 0.9 G/DL (ref 0.4–1.8)
GLOBULIN SER CALC-MCNC: 2.8 G/DL (ref 2.2–3.9)
LABORATORY COMMENT REPORT: NORMAL
M PROTEIN SERPL ELPH-MCNC: NORMAL G/DL
PROT PATTERN SERPL ELPH-IMP: NORMAL
PROT SERPL-MCNC: 6.3 G/DL (ref 6–8.5)

## 2023-03-15 DIAGNOSIS — K21.9 GASTROESOPHAGEAL REFLUX DISEASE WITHOUT ESOPHAGITIS: ICD-10-CM

## 2023-03-17 RX ORDER — PANTOPRAZOLE SODIUM 40 MG/1
TABLET, DELAYED RELEASE ORAL
Qty: 90 TABLET | Refills: 0 | Status: SHIPPED | OUTPATIENT
Start: 2023-03-17

## 2023-03-18 LAB — BACTERIA SPEC AEROBE CULT: ABNORMAL

## 2023-03-19 RX ORDER — NITROFURANTOIN 25; 75 MG/1; MG/1
100 CAPSULE ORAL 2 TIMES DAILY
Qty: 14 CAPSULE | Refills: 0 | OUTPATIENT
Start: 2023-03-19 | End: 2023-03-22

## 2023-03-20 ENCOUNTER — TELEPHONE (OUTPATIENT)
Dept: INTERNAL MEDICINE | Facility: CLINIC | Age: 83
End: 2023-03-20

## 2023-03-20 NOTE — TELEPHONE ENCOUNTER
----- Message from SANDOVAL Oropeza sent at 3/19/2023 11:26 PM EDT -----  Please call patient and let her know need to change her antibiotic to different antibiotic to cover her urinary tract infection.  Cefdinir to the pharmacy she may stop the previous antibiotic she was taking.

## 2023-03-21 NOTE — TELEPHONE ENCOUNTER
Patient has question why does she need to go to [facility] for diabetic eye check if she does not have diabetes, also she has eye doctor Bryce White at [facility] as she had cataract surgery and saw specialist on 3/3/23. Requested call back.

## 2023-03-21 NOTE — TELEPHONE ENCOUNTER
Spoke to patient, patient informed me that she has already finished her cefdinir . She says she is still having back aches. Would you recommend her to take the new antibiotic that you sent over to the patients pharmacy?

## 2023-03-22 ENCOUNTER — HOSPITAL ENCOUNTER (EMERGENCY)
Facility: HOSPITAL | Age: 83
Discharge: HOME | End: 2023-03-22
Attending: EMERGENCY MEDICINE | Admitting: EMERGENCY MEDICINE
Payer: MEDICARE

## 2023-03-22 ENCOUNTER — APPOINTMENT (OUTPATIENT)
Dept: GENERAL RADIOLOGY | Facility: HOSPITAL | Age: 83
End: 2023-03-22
Payer: MEDICARE

## 2023-03-22 ENCOUNTER — APPOINTMENT (OUTPATIENT)
Dept: CT IMAGING | Facility: HOSPITAL | Age: 83
End: 2023-03-22
Payer: MEDICARE

## 2023-03-22 VITALS
WEIGHT: 138 LBS | HEIGHT: 60 IN | OXYGEN SATURATION: 98 % | RESPIRATION RATE: 16 BRPM | HEART RATE: 67 BPM | DIASTOLIC BLOOD PRESSURE: 70 MMHG | TEMPERATURE: 97.8 F | SYSTOLIC BLOOD PRESSURE: 121 MMHG | BODY MASS INDEX: 27.09 KG/M2

## 2023-03-22 DIAGNOSIS — R55 SYNCOPE AND COLLAPSE: ICD-10-CM

## 2023-03-22 DIAGNOSIS — N39.0 ACUTE UTI: ICD-10-CM

## 2023-03-22 DIAGNOSIS — D72.829 LEUKOCYTOSIS, UNSPECIFIED TYPE: ICD-10-CM

## 2023-03-22 DIAGNOSIS — S49.91XA RIGHT SHOULDER INJURY, INITIAL ENCOUNTER: Primary | ICD-10-CM

## 2023-03-22 DIAGNOSIS — S43.014A ANTERIOR DISLOCATION OF RIGHT SHOULDER, INITIAL ENCOUNTER: ICD-10-CM

## 2023-03-22 LAB
ALBUMIN SERPL-MCNC: 4.3 G/DL (ref 3.5–5.2)
ALBUMIN/GLOB SERPL: 1.3 G/DL
ALP SERPL-CCNC: 148 U/L (ref 39–117)
ALT SERPL W P-5'-P-CCNC: 13 U/L (ref 1–33)
ANION GAP SERPL CALCULATED.3IONS-SCNC: 9 MMOL/L (ref 5–15)
AST SERPL-CCNC: 24 U/L (ref 1–32)
BACTERIA UR QL AUTO: ABNORMAL /HPF
BASOPHILS # BLD AUTO: 0.07 10*3/MM3 (ref 0–0.2)
BASOPHILS NFR BLD AUTO: 0.4 % (ref 0–1.5)
BILIRUB SERPL-MCNC: 0.3 MG/DL (ref 0–1.2)
BILIRUB UR QL STRIP: NEGATIVE
BUN SERPL-MCNC: 14 MG/DL (ref 8–23)
BUN/CREAT SERPL: 16.5 (ref 7–25)
CALCIUM SPEC-SCNC: 12 MG/DL (ref 8.6–10.5)
CHLORIDE SERPL-SCNC: 103 MMOL/L (ref 98–107)
CLARITY UR: ABNORMAL
CO2 SERPL-SCNC: 25 MMOL/L (ref 22–29)
COLOR UR: YELLOW
CREAT SERPL-MCNC: 0.85 MG/DL (ref 0.57–1)
D-LACTATE SERPL-SCNC: 1.6 MMOL/L (ref 0.5–2)
DEPRECATED RDW RBC AUTO: 46.1 FL (ref 37–54)
EGFRCR SERPLBLD CKD-EPI 2021: 68.1 ML/MIN/1.73
EOSINOPHIL # BLD AUTO: 0 10*3/MM3 (ref 0–0.4)
EOSINOPHIL NFR BLD AUTO: 0 % (ref 0.3–6.2)
ERYTHROCYTE [DISTWIDTH] IN BLOOD BY AUTOMATED COUNT: 13.5 % (ref 12.3–15.4)
GLOBULIN UR ELPH-MCNC: 3.2 GM/DL
GLUCOSE SERPL-MCNC: 146 MG/DL (ref 65–99)
GLUCOSE UR STRIP-MCNC: NEGATIVE MG/DL
HCT VFR BLD AUTO: 44.9 % (ref 34–46.6)
HGB BLD-MCNC: 13.8 G/DL (ref 12–15.9)
HGB UR QL STRIP.AUTO: NEGATIVE
HOLD SPECIMEN: NORMAL
HYALINE CASTS UR QL AUTO: ABNORMAL /LPF
IMM GRANULOCYTES # BLD AUTO: 0.09 10*3/MM3 (ref 0–0.05)
IMM GRANULOCYTES NFR BLD AUTO: 0.5 % (ref 0–0.5)
KETONES UR QL STRIP: NEGATIVE
LEUKOCYTE ESTERASE UR QL STRIP.AUTO: ABNORMAL
LIPASE SERPL-CCNC: 19 U/L (ref 13–60)
LYMPHOCYTES # BLD AUTO: 0.37 10*3/MM3 (ref 0.7–3.1)
LYMPHOCYTES NFR BLD AUTO: 2.1 % (ref 19.6–45.3)
MCH RBC QN AUTO: 28.6 PG (ref 26.6–33)
MCHC RBC AUTO-ENTMCNC: 30.7 G/DL (ref 31.5–35.7)
MCV RBC AUTO: 93 FL (ref 79–97)
MONOCYTES # BLD AUTO: 0.68 10*3/MM3 (ref 0.1–0.9)
MONOCYTES NFR BLD AUTO: 3.8 % (ref 5–12)
NEUTROPHILS NFR BLD AUTO: 16.46 10*3/MM3 (ref 1.7–7)
NEUTROPHILS NFR BLD AUTO: 93.2 % (ref 42.7–76)
NITRITE UR QL STRIP: NEGATIVE
NRBC BLD AUTO-RTO: 0 /100 WBC (ref 0–0.2)
PH UR STRIP.AUTO: 5.5 [PH] (ref 5–8)
PLATELET # BLD AUTO: 331 10*3/MM3 (ref 140–450)
PMV BLD AUTO: 10.6 FL (ref 6–12)
POTASSIUM SERPL-SCNC: 4.4 MMOL/L (ref 3.5–5.2)
PROT SERPL-MCNC: 7.5 G/DL (ref 6–8.5)
PROT UR QL STRIP: NEGATIVE
RBC # BLD AUTO: 4.83 10*6/MM3 (ref 3.77–5.28)
RBC # UR STRIP: ABNORMAL /HPF
REF LAB TEST METHOD: ABNORMAL
SODIUM SERPL-SCNC: 137 MMOL/L (ref 136–145)
SP GR UR STRIP: 1.02 (ref 1–1.03)
SQUAMOUS #/AREA URNS HPF: ABNORMAL /HPF
UROBILINOGEN UR QL STRIP: ABNORMAL
WBC # UR STRIP: ABNORMAL /HPF
WBC NRBC COR # BLD: 17.67 10*3/MM3 (ref 3.4–10.8)
WHOLE BLOOD HOLD COAG: NORMAL
WHOLE BLOOD HOLD SPECIMEN: NORMAL

## 2023-03-22 PROCEDURE — 81001 URINALYSIS AUTO W/SCOPE: CPT | Performed by: EMERGENCY MEDICINE

## 2023-03-22 PROCEDURE — 93005 ELECTROCARDIOGRAM TRACING: CPT | Performed by: EMERGENCY MEDICINE

## 2023-03-22 PROCEDURE — 99283 EMERGENCY DEPT VISIT LOW MDM: CPT

## 2023-03-22 PROCEDURE — 83690 ASSAY OF LIPASE: CPT | Performed by: EMERGENCY MEDICINE

## 2023-03-22 PROCEDURE — 99152 MOD SED SAME PHYS/QHP 5/>YRS: CPT

## 2023-03-22 PROCEDURE — 73030 X-RAY EXAM OF SHOULDER: CPT

## 2023-03-22 PROCEDURE — 99284 EMERGENCY DEPT VISIT MOD MDM: CPT

## 2023-03-22 PROCEDURE — 25010000002 PROPOFOL 10 MG/ML EMULSION: Performed by: EMERGENCY MEDICINE

## 2023-03-22 PROCEDURE — 36415 COLL VENOUS BLD VENIPUNCTURE: CPT

## 2023-03-22 PROCEDURE — 83605 ASSAY OF LACTIC ACID: CPT | Performed by: EMERGENCY MEDICINE

## 2023-03-22 PROCEDURE — 25010000002 FENTANYL CITRATE (PF) 50 MCG/ML SOLUTION: Performed by: EMERGENCY MEDICINE

## 2023-03-22 PROCEDURE — 80053 COMPREHEN METABOLIC PANEL: CPT | Performed by: EMERGENCY MEDICINE

## 2023-03-22 PROCEDURE — 85025 COMPLETE CBC W/AUTO DIFF WBC: CPT | Performed by: EMERGENCY MEDICINE

## 2023-03-22 PROCEDURE — 70450 CT HEAD/BRAIN W/O DYE: CPT

## 2023-03-22 PROCEDURE — 96376 TX/PRO/DX INJ SAME DRUG ADON: CPT

## 2023-03-22 PROCEDURE — 96374 THER/PROPH/DIAG INJ IV PUSH: CPT

## 2023-03-22 RX ORDER — PROPOFOL 10 MG/ML
40 VIAL (ML) INTRAVENOUS ONCE
Status: COMPLETED | OUTPATIENT
Start: 2023-03-22 | End: 2023-03-22

## 2023-03-22 RX ORDER — HYDROCODONE BITARTRATE AND ACETAMINOPHEN 5; 325 MG/1; MG/1
1 TABLET ORAL EVERY 6 HOURS PRN
Qty: 4 TABLET | Refills: 0 | Status: SHIPPED | OUTPATIENT
Start: 2023-03-22

## 2023-03-22 RX ORDER — CEFDINIR 300 MG/1
300 CAPSULE ORAL ONCE
Status: COMPLETED | OUTPATIENT
Start: 2023-03-22 | End: 2023-03-22

## 2023-03-22 RX ORDER — FENTANYL CITRATE 50 UG/ML
25 INJECTION, SOLUTION INTRAMUSCULAR; INTRAVENOUS ONCE
Status: COMPLETED | OUTPATIENT
Start: 2023-03-22 | End: 2023-03-22

## 2023-03-22 RX ORDER — FENTANYL CITRATE 50 UG/ML
25 INJECTION, SOLUTION INTRAMUSCULAR; INTRAVENOUS
Status: DISCONTINUED | OUTPATIENT
Start: 2023-03-22 | End: 2023-03-23 | Stop reason: HOSPADM

## 2023-03-22 RX ORDER — PROPOFOL 10 MG/ML
20 VIAL (ML) INTRAVENOUS ONCE
Status: COMPLETED | OUTPATIENT
Start: 2023-03-22 | End: 2023-03-22

## 2023-03-22 RX ORDER — CEFDINIR 300 MG/1
300 CAPSULE ORAL 2 TIMES DAILY
Qty: 14 CAPSULE | Refills: 0 | Status: SHIPPED | OUTPATIENT
Start: 2023-03-22 | End: 2023-03-29

## 2023-03-22 RX ORDER — SODIUM CHLORIDE 0.9 % (FLUSH) 0.9 %
10 SYRINGE (ML) INJECTION AS NEEDED
Status: DISCONTINUED | OUTPATIENT
Start: 2023-03-22 | End: 2023-03-23 | Stop reason: HOSPADM

## 2023-03-22 RX ORDER — SODIUM CHLORIDE 9 MG/ML
10 INJECTION INTRAVENOUS AS NEEDED
Status: DISCONTINUED | OUTPATIENT
Start: 2023-03-22 | End: 2023-03-23 | Stop reason: HOSPADM

## 2023-03-22 RX ADMIN — PROPOFOL 20 MG: 10 INJECTION, EMULSION INTRAVENOUS at 20:04

## 2023-03-22 RX ADMIN — CEFDINIR 300 MG: 300 CAPSULE ORAL at 23:42

## 2023-03-22 RX ADMIN — PROPOFOL 40 MG: 10 INJECTION, EMULSION INTRAVENOUS at 20:42

## 2023-03-22 RX ADMIN — SODIUM CHLORIDE 1000 ML: 9 INJECTION, SOLUTION INTRAVENOUS at 20:03

## 2023-03-22 RX ADMIN — FENTANYL CITRATE 25 MCG: 50 INJECTION, SOLUTION INTRAMUSCULAR; INTRAVENOUS at 19:26

## 2023-03-22 RX ADMIN — FENTANYL CITRATE 25 MCG: 50 INJECTION, SOLUTION INTRAMUSCULAR; INTRAVENOUS at 22:35

## 2023-03-22 NOTE — ED PROVIDER NOTES
Subjective   History of Present Illness  Patient is a pleasant 82-year-old female who presents with a right shoulder injury.  She states that she was at home and felt well this morning.  Approximately 2 hours ago she felt as if she might need to have a bowel movement.  When she went to the restroom she had some abdominal discomfort and when she was around the sink she began to feel lightheaded.  She did not think she passed out.  She thought she had slid down the wall to the floor but she does not member hitting her head she does have a contusion to her left forehead.  Essentially, she likely did have a very brief loss of consciousness and had the head injury and right shoulder injury during this collapse.  She called her daughter who helped her get up and they went to urgent treatment center.  She had a suspected shoulder dislocation and was referred to him from the urgent treatment center here to the hospital.  Patient denies similar episodes in the past and denies other injury other than the head and right shoulder.  Right shoulder is her main focus right now.  She is saying that this pain is moderate to severe and constant.  She denies any numbness seen in the right arm she does have significant limited range of motion and essentially cannot move the right shoulder without significant pain.        Review of Systems   All other systems reviewed and are negative.      Past Medical History:   Diagnosis Date   • Anemia    • Anxiety    • Atypical chest pain     Normal exercise Cardiolite stress test and echocardiogram in 2010.   • Atypical chest pain    • Atypical chest pain     History of atypical chest pain: Echocardiogram, 2010; LVEF 90%. Abnormal calcium score, August 2013. MPS, 08/2013: No inducible ischemia, LVEF 70%.   • Diverticulosis    • Diverticulosis     History of diverticulosis.   • Diverticulosis of large intestine without hemorrhage 10/21/2016   • Dyslipidemia    • Former smoker 11/23/2016   • GERD  (gastroesophageal reflux disease)    • GERD without esophagitis    • Gout    • Gout     History of gout.   •  4 para 4    • Hypertension    • Hypothyroidism     On chronic replacement therapy.   • Hypothyroidism     Hypothyroidism/chronic replacement.   • Iron deficiency anemia     Iron deficiency anemia.   • Joint pain, knee    • Menopause 10/9/2019   • Mild anxiety     Mild chronic anxiety.   • Nontoxic single thyroid nodule    • Osteoarthritis    • Pain, lower leg    • Parathyroid adenoma    • Pelvic organ prolapse quantification stage 1 cystocele 10/20/2019    Follows with Dr. Felipe GYN evaluation 10/9 no specific treatment at this time.   • Pharyngitis    • Pulmonary nodule     CT scan of the chest of 2013 reports new noncalcified nodule in the right lung base measuring 8 x 5 mm and a stable noncalcified nodule in the left mid lung field measuring 4-5 mm, follow-up CT scan of 2014 reports interval resolution of the right basilar lung nodule and stable calcified nodule in the left upper lobe.   • Rectocele 2017   • Spondylolisthesis     Spondylolisthesis/degenerative disc disease.   • Tobacco use     History of brief tobacco use, in the patient’s 20s:    • Tobacco use     History of brief tobacco use, in the patient’s 20s: Noncalcified lung nodule found on chest CT, 2013, at the left lung base and the mid left lung field.  Evaluation by Dr. Ivan Mota, 2013 with serial CT scans. Chest CT, 2013: Noncalcified nodules smaller than prior studies. Essentially “normal” CT of the chest, 2014.    • Urinary tract infection    • Vaginal candidiasis        Allergies   Allergen Reactions   • Codeine Nausea And Vomiting   • Euthyrox [Levothyroxine] Paresthesia     euthyrox brand   • Lipitor [Atorvastatin] Myalgia   • Lortab [Hydrocodone-Acetaminophen] Nausea And Vomiting       Past Surgical History:   Procedure Laterality Date   • BACK SURGERY     • CATARACT EXTRACTION Left     • TONSILLECTOMY AND ADENOIDECTOMY     • TONSILLECTOMY AND ADENOIDECTOMY     • TOTAL HIP ARTHROPLASTY     • TOTAL HIP ARTHROPLASTY Right     Right hip replacement.   • TOTAL KNEE ARTHROPLASTY Right    • TOTAL KNEE ARTHROPLASTY Left    • TOTAL KNEE ARTHROPLASTY REVISION Left        Family History   Problem Relation Age of Onset   • Aortic aneurysm Father    • Other Father         ruptured AAA   • Coronary artery disease Mother    • Heart attack Mother    • Heart disease Brother         CABG   • Heart disease Brother    • Coronary artery disease Brother    • Other Brother         CABG   • Multiple myeloma Brother    • Heart disease Sister         Stent placement   • Heart disease Sister         Stent placement   • Pulmonary fibrosis Sister    • Other Sister         2 sisters who have had cardiac stent placement   • Breast cancer Maternal Aunt 35   • Breast cancer Paternal Aunt 70   • Ovarian cancer Neg Hx        Social History     Socioeconomic History   • Marital status:    Tobacco Use   • Smoking status: Former     Types: Cigarettes     Quit date:      Years since quittin.2   • Smokeless tobacco: Never   • Tobacco comments:     Smoked briefly in her 20's   Vaping Use   • Vaping Use: Never used   Substance and Sexual Activity   • Alcohol use: No   • Drug use: No   • Sexual activity: Defer     Birth control/protection: Post-menopausal           Objective   Physical Exam  Vitals and nursing note reviewed.   Constitutional:       General: She is not in acute distress.  HENT:      Head: Normocephalic and atraumatic.   Eyes:      Conjunctiva/sclera: Conjunctivae normal.      Pupils: Pupils are equal, round, and reactive to light.   Neck:      Thyroid: No thyromegaly.   Cardiovascular:      Rate and Rhythm: Normal rate and regular rhythm.      Heart sounds: Normal heart sounds. No murmur heard.    No friction rub. No gallop.   Pulmonary:      Effort: Pulmonary effort is normal. No respiratory distress.       Breath sounds: Normal breath sounds.   Abdominal:      General: Bowel sounds are normal.      Palpations: Abdomen is soft.      Tenderness: There is no abdominal tenderness.   Musculoskeletal:         General: Deformity and signs of injury present.      Cervical back: Normal range of motion and neck supple.      Comments: Right shoulder deformity present consistent with dislocation.  Neurovascular intact.  No sensory deficit   Lymphadenopathy:      Cervical: No cervical adenopathy.   Skin:     General: Skin is warm and dry.      Capillary Refill: Capillary refill takes less than 2 seconds.   Neurological:      General: No focal deficit present.      Mental Status: She is alert and oriented to person, place, and time.   Psychiatric:         Mood and Affect: Mood normal.         Behavior: Behavior normal.         Thought Content: Thought content normal.         Procedural Sedation    Date/Time: 3/22/2023 8:04 PM  Performed by: Hakeem Apple DO  Authorized by: Hakeem Apple DO     Consent:     Consent obtained:  Written    Consent given by:  Patient    Risks discussed:  Allergic reaction, dysrhythmia, inadequate sedation, nausea, prolonged hypoxia resulting in organ damage, prolonged sedation necessitating reversal, respiratory compromise necessitating ventilatory assistance and intubation and vomiting    Alternatives discussed:  Analgesia without sedation and regional anesthesia  [place] protocol:     Procedure explained and questions answered to patient or proxy's satisfaction: yes      Relevant documents present and verified: yes      Test results available: yes      Imaging studies available: yes      Required blood products, implants, devices, and special equipment available: yes      Site/side marked: yes      Immediately prior to procedure, a time out was called: yes      Patient identity confirmed:  Arm band and verbally with patient  Indications:     Procedure performed:  Dislocation reduction    Procedure  necessitating sedation performed by:  Physician performing sedation    Intended level of sedation:  Moderate  Pre-sedation assessment:     NPO status caution: urgency dictates proceeding with non-ideal NPO status      ASA classification: class 2 - patient with mild systemic disease      Mouth openin finger widths    Thyromental distance:  3 finger widths    Mallampati score:  II - soft palate, uvula, fauces visible    Neck mobility: normal      Pre-sedation assessments completed and reviewed: airway patency, anesthesia/sedation history, cardiovascular function, hydration status, mental status, nausea/vomiting, pain level and respiratory function      History of difficult intubation: no    Immediate pre-procedure details:     Reassessment: Patient reassessed immediately prior to procedure      Reviewed: vital signs, relevant labs/tests and NPO status      Verified: bag valve mask available, emergency equipment available, intubation equipment available, IV patency confirmed and oxygen available    Procedure details (see MAR for exact dosages):     Preoxygenation:  Nonrebreather mask    Sedation:  Propofol    Analgesia:  Fentanyl    Intra-procedure monitoring:  Blood pressure monitoring, continuous capnometry, frequent LOC assessments, cardiac monitor, continuous pulse oximetry and frequent vital sign checks    Intra-procedure events: none      Total sedation time (minutes):  16  Post-procedure details:     Attendance: Constant attendance by certified staff until patient recovered      Recovery: Patient returned to pre-procedure baseline      Complications:  None    Post-sedation assessments completed and reviewed: airway patency, cardiovascular function, hydration status, mental status, nausea/vomiting, pain level and respiratory function      Specimens recovered:  None    Patient is stable for discharge or admission: no      Procedure completion:  Tolerated well, no immediate complications  Upper Extremity  "Dislocation    Date/Time: 3/30/2023 1:43 PM  Performed by: Hakeem Apple DO  Authorized by: Hakeem Apple DO   Consent: The procedure was performed in an emergent situation. Verbal consent obtained. Written consent obtained.  Risks and benefits: risks, benefits and alternatives were discussed  Consent given by: patient  Patient understanding: patient states understanding of the procedure being performed  Patient consent: the patient's understanding of the procedure matches consent given  Procedure consent: procedure consent matches procedure scheduled  Relevant documents: relevant documents present and verified  Test results: test results available and properly labeled  Imaging studies: imaging studies available  Required items: required blood products, implants, devices, and special equipment available  Patient identity confirmed: verbally with patient and arm band  Time out: Immediately prior to procedure a \"time out\" was called to verify the correct patient, procedure, equipment, support staff and site/side marked as required.  Injury location: shoulder  Location details: right shoulder  Injury type: dislocation  Dislocation type: anterior  Chronicity: new  Pre-procedure neurovascular assessment: neurovascularly intact  Manipulation performed: yes  Reduction method: traction and counter traction  Reduction successful: yes  X-ray confirmed reduction: yes  Immobilization: sling  Post-procedure neurovascular assessment: post-procedure neurovascularly intact  Post-procedure distal perfusion: normal  Post-procedure neurological function: normal  Post-procedure range of motion: improved  Patient tolerance: patient tolerated the procedure well with no immediate complications                 ED Course  ED Course as of 03/30/23 1332   Wed Mar 22, 2023   2316 [place] Syncope Rule - MDCalc  Calculated on Mar 22 2023 11:16 PM  Patient IS in the low-risk group for serious outcome.   [CP]   2324 Patient calcium level is " "chronically elevated.  This is managed by her endocrinologist. [CP]   2324 Procedural sedation and reduction went well.  No complications.  Patient feels better after the procedure.  Patient still leukocytosis etiology is not fully clear.  Given the possible UTI along with the syncopal episode and leukocytosis I will give her antibiotics to treat this.  She and her daughter are both looking forward to discharge at this time.  Patient understands have a low threshold to return to the emergency department if symptoms persist, worsen, or other concerns arise. [CP]      ED Course User Index  [CP] Hakeem Apple,           XR Shoulder 2+ View Right   Final Result   Impression:   Successful reduction of anterior shoulder dislocation. No definite fracture noting limited assessment owing to diffuse bony demineralization.      Electronically Signed: Bryce Edfolio     3/22/2023 10:25 PM EDT     Workstation ID: [id number]      CT Head Without Contrast   Final Result   Impression:   No acute intracranial findings.      Chronic and senescent changes as above.      Electronically Signed: Dash     3/22/2023 6:57 PM EDT     Workstation ID: [id number]      XR Shoulder 2+ View Right   Final Result   Impression:   Anterior shoulder dislocation. No definite fracture within the limitation of diffuse bony demineralization which limits assessment.      Electronically Signed: Dash     3/22/2023 6:54 PM EDT     Workstation ID: [id number]        Vitals:    03/22/23 1709   BP: 131/69   Pulse: 63   Resp: 16   Temp: 97.8 °F (36.6 °C)   SpO2: 94%   Weight: 62.6 kg (138 lb)   Height: 152.4 cm (60\")     Medications   Sodium Chloride (PF) 0.9 % 10 mL (has no administration in time range)   sodium chloride 0.9 % flush 10 mL (has no administration in time range)     ECG/EMG Results (last 24 hours)     ** No results found for the last 24 hours. **        ECG 12 Lead Syncope    (Results Pending)      Latest Reference Range & Units " 03/29/23 13:50 03/29/23 15:16   WBC 3.40 - 10.80 10*3/mm3 5.27    RBC 3.77 - 5.28 10*6/mm3 4.35    Hemoglobin 12.0 - 15.9 g/dL 12.6    Hematocrit 34.0 - 46.6 % 38.3    RDW 12.3 - 15.4 % 11.6 (L)    MCV 79.0 - 97.0 fL 88.0    MCH 26.6 - 33.0 pg 29.0    MCHC 31.5 - 35.7 g/dL 32.9    MPV 6.0 - 12.0 fL 11.1    Platelets 140 - 450 10*3/mm3 383    RDW-SD 37.0 - 54.0 fl 37.8    Neutrophil Rel % 42.7 - 76.0 % 74.8    Lymphocyte Rel % 19.6 - 45.3 % 12.3 (L)    Monocyte Rel % 5.0 - 12.0 % 9.7    Eosinophil Rel % 0.3 - 6.2 % 1.1    Basophil Rel % 0.0 - 1.5 % 1.7 (H)    Immature Granulocyte Rel % 0.0 - 0.5 % 0.4    Neutrophils Absolute 1.70 - 7.00 10*3/mm3 3.94    Lymphocytes Absolute 0.70 - 3.10 10*3/mm3 0.65 (L)    Monocytes Absolute 0.10 - 0.90 10*3/mm3 0.51    Eosinophils Absolute 0.00 - 0.40 10*3/mm3 0.06    Basophils Absolute 0.00 - 0.20 10*3/mm3 0.09    Immature Grans, Absolute 0.00 - 0.05 10*3/mm3 0.02    nRBC 0.0 - 0.2 /100 WBC 0.0    Color, UA Yellow, Straw, Dark Yellow, [place]   Yellow   Appearance, UA Clear   Clear   Specific Gravity, UA 1.005 - 1.030   1.015   pH, UA 5.0 - 8.0   5.0   Glucose Negative mg/dL  Negative   Ketones, UA Negative   Negative   Blood, UA Negative   Negative   Nitrite, UA Negative   Negative   Leukocytes, UA Negative   75 [place]/ul !   Protein, UA Negative mg/dL  Negative   Bilirubin, UA Negative   Negative   Urobilinogen, UA Normal, 0.2 E.U./dL   Normal   (L): Data is abnormally low  (H): Data is abnormally high  !: Data is abnormal          Medical Decision Making  Acute UTI: complicated acute illness or injury  Anterior dislocation of right shoulder, initial encounter: complicated acute illness or injury  Leukocytosis, unspecified type: complicated acute illness or injury  Right shoulder injury, initial encounter: complicated acute illness or injury  Syncope and collapse: complicated acute illness or injury  Amount and/or Complexity of Data Reviewed  External Data Reviewed: notes.  Labs:  ordered. Decision-making details documented in ED Course.  Radiology: ordered and independent interpretation performed. Decision-making details documented in ED Course.  ECG/medicine tests: ordered and independent interpretation performed. Decision-making details documented in ED Course.      Risk  Prescription drug management.              Final diagnoses:   Right shoulder injury, initial encounter   Anterior dislocation of right shoulder, initial encounter   Syncope and collapse   Acute UTI   Leukocytosis, unspecified type       ED Disposition  ED Disposition     ED Disposition   Discharge    Condition   Stable    Comment   --           DISCHARGE    Patient discharged in stable condition.    Reviewed implications of results, diagnosis, meds, responsibility to follow up, warning signs and symptoms of possible worsening, potential complications and reasons to return to ER.    Patient/Family voiced understanding of above instructions.    Discussed plan for discharge, as there is no emergent indication for admission.  Pt/family is agreeable and understands need for follow up and possible repeat testing.  Pt/family is aware that discharge does not mean that nothing is wrong but that it indicates no emergency is currently present that requires admission and they must continue care with follow-up as given below or with a physician of their choice.     FOLLOW-UP  Josephine Johnson, APRN  [address]  [address]  [facility] [address] [phone]    Schedule an appointment as soon as possible for a visit       [facility] Emergency Department  [address] [facility] [address] [phone]    If symptoms worsen    Bryce Lindsay MD  [address] [facility] [id number] [phone]    Schedule an appointment as soon as possible for a visit            Medication List      New Prescriptions    HYDROcodone-acetaminophen 5-325 MG per tablet  Commonly known as:  NORCO  Take 1 tablet by mouth Every 6 (Six) Hours As Needed for Severe Pain.        ASK your doctor about these medications    cefdinir 300 MG capsule  Commonly known as: OMNICEF  Take 1 capsule by mouth 2 (Two) Times a Day for 7 days.  Ask about: Should I take this medication?           Where to Get Your Medications      These medications were sent to [address] [facility] - [phone]  - [phone] [place] [address] [facility] [address]    Phone: [phone]   · cefdinir 300 MG capsule  · HYDROcodone-acetaminophen 5-325 MG per tablet            Hakeem Apple DO  03/30/23 8562

## 2023-03-23 NOTE — TELEPHONE ENCOUNTER
Patient stated that she had cataract surgery in December with [facility] Dr. Bryce White. Patient has been placed on schedule for next week for hospital follow up

## 2023-03-23 NOTE — TELEPHONE ENCOUNTER
Please see how the patient is doing also although she was a known emergency room after a fall.  Please also let her know that due to her prediabetes we have her obtain an eye exam yearly.  If she is already have an eye exam she will need no further work-up.

## 2023-03-29 ENCOUNTER — OFFICE VISIT (OUTPATIENT)
Dept: INTERNAL MEDICINE | Facility: CLINIC | Age: 83
End: 2023-03-29
Payer: MEDICARE

## 2023-03-29 VITALS
OXYGEN SATURATION: 99 % | TEMPERATURE: 97.3 F | HEIGHT: 60 IN | SYSTOLIC BLOOD PRESSURE: 116 MMHG | RESPIRATION RATE: 16 BRPM | BODY MASS INDEX: 26.5 KG/M2 | WEIGHT: 135 LBS | HEART RATE: 73 BPM | DIASTOLIC BLOOD PRESSURE: 70 MMHG

## 2023-03-29 DIAGNOSIS — D72.829 LEUKOCYTOSIS, UNSPECIFIED TYPE: Primary | ICD-10-CM

## 2023-03-29 DIAGNOSIS — R82.90 ABNORMAL URINALYSIS: ICD-10-CM

## 2023-03-29 LAB
BASOPHILS # BLD AUTO: 0.09 10*3/MM3 (ref 0–0.2)
BASOPHILS NFR BLD AUTO: 1.7 % (ref 0–1.5)
BILIRUB BLD-MCNC: NEGATIVE MG/DL
CLARITY, POC: CLEAR
COLOR UR: YELLOW
DEPRECATED RDW RBC AUTO: 37.8 FL (ref 37–54)
EOSINOPHIL # BLD AUTO: 0.06 10*3/MM3 (ref 0–0.4)
EOSINOPHIL NFR BLD AUTO: 1.1 % (ref 0.3–6.2)
ERYTHROCYTE [DISTWIDTH] IN BLOOD BY AUTOMATED COUNT: 11.6 % (ref 12.3–15.4)
EXPIRATION DATE: ABNORMAL
GLUCOSE UR STRIP-MCNC: NEGATIVE MG/DL
HCT VFR BLD AUTO: 38.3 % (ref 34–46.6)
HGB BLD-MCNC: 12.6 G/DL (ref 12–15.9)
IMM GRANULOCYTES # BLD AUTO: 0.02 10*3/MM3 (ref 0–0.05)
IMM GRANULOCYTES NFR BLD AUTO: 0.4 % (ref 0–0.5)
KETONES UR QL: NEGATIVE
LEUKOCYTE EST, POC: ABNORMAL
LYMPHOCYTES # BLD AUTO: 0.65 10*3/MM3 (ref 0.7–3.1)
LYMPHOCYTES NFR BLD AUTO: 12.3 % (ref 19.6–45.3)
Lab: ABNORMAL
MCH RBC QN AUTO: 29 PG (ref 26.6–33)
MCHC RBC AUTO-ENTMCNC: 32.9 G/DL (ref 31.5–35.7)
MCV RBC AUTO: 88 FL (ref 79–97)
MONOCYTES # BLD AUTO: 0.51 10*3/MM3 (ref 0.1–0.9)
MONOCYTES NFR BLD AUTO: 9.7 % (ref 5–12)
NEUTROPHILS NFR BLD AUTO: 3.94 10*3/MM3 (ref 1.7–7)
NEUTROPHILS NFR BLD AUTO: 74.8 % (ref 42.7–76)
NITRITE UR-MCNC: NEGATIVE MG/ML
NRBC BLD AUTO-RTO: 0 /100 WBC (ref 0–0.2)
PH UR: 5 [PH] (ref 5–8)
PLATELET # BLD AUTO: 383 10*3/MM3 (ref 140–450)
PMV BLD AUTO: 11.1 FL (ref 6–12)
PROT UR STRIP-MCNC: NEGATIVE MG/DL
RBC # BLD AUTO: 4.35 10*6/MM3 (ref 3.77–5.28)
RBC # UR STRIP: NEGATIVE /UL
SP GR UR: 1.01 (ref 1–1.03)
UROBILINOGEN UR QL: NORMAL
WBC NRBC COR # BLD: 5.27 10*3/MM3 (ref 3.4–10.8)

## 2023-03-29 PROCEDURE — 87086 URINE CULTURE/COLONY COUNT: CPT | Performed by: NURSE PRACTITIONER

## 2023-03-29 PROCEDURE — 85025 COMPLETE CBC W/AUTO DIFF WBC: CPT | Performed by: NURSE PRACTITIONER

## 2023-03-29 RX ORDER — NITROFURANTOIN 25; 75 MG/1; MG/1
1 CAPSULE ORAL EVERY 12 HOURS SCHEDULED
COMMUNITY
Start: 2023-03-25

## 2023-03-29 NOTE — PROGRESS NOTES
"Chief Complaint  Hospital Follow Up Visit    Subjective        Leslee Wu presents to [facility]  History of Present Illness    The patient Leslee Wu was recently seen in this office. Today, the patient is being seen due to a fall that occurred subsequently over the next few days after her recent visit in this office. She dislocated the right shoulder and was seen at the [facility] [place] ER.    Fall injury, right shoulder dislocation, left eye bruise, and a linear abrasion  The patient received a CT scan of the head while at the ER. She has a faint yellow bruise on the left lateral eye and a 1 cm abrasion that is linear and healing without sign of infection. She reports standing up after using the commode due to symptoms of nausea and leaned over the bathroom sink and fell to the left on the sink and descended down the wall. She ultimately landed on the floor propped up against the doorframe. She reports loosing consciousness for a few seconds after falling. She was not able to stand up from the floor and was not able to put much pressure on her knees due to a condition. She called her  but he \"couldn't get it\" and so her daughter came to the home and helped her stand. She noticed that something was wrong with her shoulder. She followed up with the David Dennis PA-C, Physician Assistant,  [facility] for her arm on 03/27/2023. The orthopedic physician did not exam her right shoulder but he did evaluate her x-ray of the arm. He then recommended a 6 week follow up. She reports being difficult to do the intravenous therapy and had to \"move it\" here.     UTI  She was prescribed cefdinir for her urine infection by the ER. She reports having \"one\" left of the cefdinir medication. She reports receiving a discussion from Jemima about the other medication. She reports being prescribed for 7 days a second round of antibiotics by the ER after taking the first " "prescription of antibiotics. She believes that an infection is still present and kept taking the cefdinir. Jemima called to discuss the cefdinir with the patient. She was diagnosed with a UTI infection while at the ER after doing a \"regular\" urine analysis. She denies doing a urine culture while in the ER.             Objective   Vital Signs:  /70 (BP Location: Left arm, Patient Position: Sitting, Cuff Size: Adult)   Pulse 73   Temp 97.3 °F (36.3 °C) (Infrared)   Resp 16   Ht 152.4 cm (60\")   Wt 61.2 kg (135 lb)   SpO2 99%   BMI 26.37 kg/m²   Estimated body mass index is 26.37 kg/m² as calculated from the following:    Height as of this encounter: 152.4 cm (60\").    Weight as of this encounter: 61.2 kg (135 lb).             Physical Exam  Vitals and nursing note reviewed.   Constitutional:       General: She is not in acute distress.     Appearance: Normal appearance. She is well-developed. She is not ill-appearing.   HENT:      Head: Normocephalic and atraumatic.   Eyes:      General: No scleral icterus.  Neck:      Thyroid: No thyromegaly.   Cardiovascular:      Rate and Rhythm: Normal rate and regular rhythm.   Pulmonary:      Effort: Pulmonary effort is normal.      Breath sounds: Normal breath sounds.   Abdominal:      General: Bowel sounds are normal. There is no distension.      Palpations: Abdomen is soft.      Tenderness: There is no abdominal tenderness.   Musculoskeletal:      Comments: The right shoulder is in a sling and it is diffusely mildly more swollen anteriorly in the shoulder than the left. There is no bruising. There is no break of skin. The contour looks normal.    Lymphadenopathy:      Cervical: No cervical adenopathy.   Skin:     Capillary Refill: Capillary refill takes 2 to 3 seconds.      Coloration: Skin is not pale.      Comments: She has a faint yellow bruise on the left lateral eye and a 1 cm abrasion that is linear and healing without sign of infection. She does have " bruising under the right axilla region, but not anteriorly. She said this is stated from the manipulation in regards to the right shoulder dislocation.     Neurological:      Mental Status: She is alert and oriented to person, place, and time.   Psychiatric:         Mood and Affect: Mood normal.         Behavior: Behavior normal.        Result Review :                   Assessment and Plan   Diagnoses and all orders for this visit:    1. Leukocytosis, unspecified type (Primary)  -     CBC & Differential; Future  -     POC Urinalysis Dipstick, Automated; Future  -     Urine Culture - Urine, Urine, Random Void; Future  -     CBC & Differential  -     Urine Culture - Urine, Urine, Random Void  -     POC Urinalysis Dipstick, Automated    2. Abnormal urinalysis  -     Urine Culture - Urine, Urine, Random Void; Future  -     Urine Culture - Urine, Urine, Random Void          1. Fall injury, right shoulder dislocation, left eye bruise, and a linear abrasion  The patient will continue to follow up with the  [facility]. We will request to obtain records from [facility].  - She will start PT on 03/30/2023.     2. UTI with leukocytosis           Follow Up   No follow-ups on file.  Patient was given instructions and counseling regarding her condition or for health maintenance advice. Please see specific information pulled into the AVS if appropriate.     Transcribed from ambient dictation for SANDOVAL Lucas by Yuliana GARZA.  03/29/23   15:50 EDT    Patient or patient representative verbalized consent to the visit recording.  I have personally performed the services described in this document as transcribed by the above individual, and it is both accurate and complete.

## 2023-03-30 LAB — BACTERIA SPEC AEROBE CULT: NO GROWTH

## 2023-04-03 LAB
QT INTERVAL: 402 MS
QTC INTERVAL: 446 MS

## 2023-04-04 ENCOUNTER — TELEPHONE (OUTPATIENT)
Dept: INTERNAL MEDICINE | Facility: CLINIC | Age: 83
End: 2023-04-04
Payer: MEDICARE

## 2023-04-04 NOTE — TELEPHONE ENCOUNTER
Spoke with patient patient wanted to know why she had to go to [facility] if she already has an eye doctor . I informed patient of last message from Roma that she does not need another exam if she has already had one this year. Patient verbalized good understanding.

## 2023-04-04 NOTE — TELEPHONE ENCOUNTER
Caller: Leslee Wu    Relationship: Self    Best call back number: [phone]    What is the best time to reach you: ANYTIME    Who are you requesting to speak with (clinical staff, provider,  specific staff member): CLINICAL STAFF    Do you know the name of the person who called: SELF    What was the call regarding: PATIENT STATES THAT SHE WOULD A CALL FROM THE NURSE.    Do you require a callback: YES

## 2023-05-12 RX ORDER — BENAZEPRIL HYDROCHLORIDE 20 MG/1
TABLET ORAL
Qty: 90 TABLET | Refills: 1 | Status: SHIPPED | OUTPATIENT
Start: 2023-05-12

## 2023-05-12 RX ORDER — AMLODIPINE BESYLATE 10 MG/1
TABLET ORAL
Qty: 90 TABLET | Refills: 0 | OUTPATIENT
Start: 2023-05-12

## 2023-06-07 ENCOUNTER — OFFICE VISIT (OUTPATIENT)
Dept: INTERNAL MEDICINE | Facility: CLINIC | Age: 83
End: 2023-06-07
Payer: MEDICARE

## 2023-06-07 VITALS
RESPIRATION RATE: 18 BRPM | BODY MASS INDEX: 26.11 KG/M2 | SYSTOLIC BLOOD PRESSURE: 120 MMHG | TEMPERATURE: 97.8 F | WEIGHT: 133 LBS | HEIGHT: 60 IN | HEART RATE: 76 BPM | DIASTOLIC BLOOD PRESSURE: 66 MMHG

## 2023-06-07 DIAGNOSIS — R30.0 DYSURIA: Primary | ICD-10-CM

## 2023-06-07 DIAGNOSIS — M54.50 ACUTE BILATERAL LOW BACK PAIN WITHOUT SCIATICA: ICD-10-CM

## 2023-06-07 LAB
BILIRUB BLD-MCNC: NEGATIVE MG/DL
CLARITY, POC: ABNORMAL
COLOR UR: YELLOW
EXPIRATION DATE: ABNORMAL
GLUCOSE UR STRIP-MCNC: NEGATIVE MG/DL
KETONES UR QL: ABNORMAL
LEUKOCYTE EST, POC: ABNORMAL
Lab: ABNORMAL
NITRITE UR-MCNC: POSITIVE MG/ML
PH UR: 5 [PH] (ref 5–8)
PROT UR STRIP-MCNC: NEGATIVE MG/DL
RBC # UR STRIP: NEGATIVE /UL
SP GR UR: 1.01 (ref 1–1.03)
UROBILINOGEN UR QL: NORMAL

## 2023-06-07 PROCEDURE — 87086 URINE CULTURE/COLONY COUNT: CPT | Performed by: PHYSICIAN ASSISTANT

## 2023-06-07 PROCEDURE — 81003 URINALYSIS AUTO W/O SCOPE: CPT | Performed by: PHYSICIAN ASSISTANT

## 2023-06-07 PROCEDURE — 3078F DIAST BP <80 MM HG: CPT | Performed by: PHYSICIAN ASSISTANT

## 2023-06-07 PROCEDURE — 99214 OFFICE O/P EST MOD 30 MIN: CPT | Performed by: PHYSICIAN ASSISTANT

## 2023-06-07 PROCEDURE — 3074F SYST BP LT 130 MM HG: CPT | Performed by: PHYSICIAN ASSISTANT

## 2023-06-07 RX ORDER — NITROFURANTOIN 25; 75 MG/1; MG/1
100 CAPSULE ORAL 2 TIMES DAILY
Qty: 14 CAPSULE | Refills: 0 | Status: SHIPPED | OUTPATIENT
Start: 2023-06-07

## 2023-06-07 NOTE — PROGRESS NOTES
Office Note     Name: Leslee Wu    : 1940     MRN: [id number]     Chief Complaint  Urinary Tract Infection (Back pain . Abnormal urine smell. X 2 days. )    Subjective     History of Present Illness:  Leslee Wu is a 82 y.o. female who presents today for burning with urination.    She complains of back pain and vaginal itching.     She denies any allergies to any antibiotics. She has take cefdinir and Macrobid previously.     She would like to have her hemoglobin A1c checked. Her last hemoglobin A1c was 5.5 percent in 2023.     Review of Systems:   Review of Systems    Past Medical History:   Past Medical History:   Diagnosis Date    Anemia     Anxiety     Atypical chest pain     Normal exercise Cardiolite stress test and echocardiogram in .    Atypical chest pain     Atypical chest pain     History of atypical chest pain: Echocardiogram, ; LVEF 90%. Abnormal calcium score, 2013. MPS, 2013: No inducible ischemia, LVEF 70%.    Diverticulosis     Diverticulosis     History of diverticulosis.    Diverticulosis of large intestine without hemorrhage 10/21/2016    Dyslipidemia     Former smoker 2016    GERD (gastroesophageal reflux disease)     GERD without esophagitis     Gout     Gout     History of gout.     4 para 4     Hypertension     Hypothyroidism     On chronic replacement therapy.    Hypothyroidism     Hypothyroidism/chronic replacement.    Iron deficiency anemia     Iron deficiency anemia.    Joint pain, knee     Menopause 10/9/2019    Mild anxiety     Mild chronic anxiety.    Nontoxic single thyroid nodule     Osteoarthritis     Pain, lower leg     Parathyroid adenoma     Pelvic organ prolapse quantification stage 1 cystocele 10/20/2019    Follows with Dr. Felipe GYN evaluation 10/9 no specific treatment at this time.    Pharyngitis     Pulmonary nodule     CT scan of the chest of 2013 reports new noncalcified nodule in the right lung base measuring 8 x 5  mm and a stable noncalcified nodule in the left mid lung field measuring 4-5 mm, follow-up CT scan of 08/06/2014 reports interval resolution of the right basilar lung nodule and stable calcified nodule in the left upper lobe.    Rectocele 1/23/2017    Spondylolisthesis     Spondylolisthesis/degenerative disc disease.    Tobacco use     History of brief tobacco use, in the patient’s 20s:     Tobacco use     History of brief tobacco use, in the patient’s 20s: Noncalcified lung nodule found on chest CT, June 2013, at the left lung base and the mid left lung field.  Evaluation by Dr. Ivan Mota, August 2013 with serial CT scans. Chest CT, November 2013: Noncalcified nodules smaller than prior studies. Essentially “normal” CT of the chest, 08/06/2014.     Urinary tract infection     Vaginal candidiasis        Past Surgical History:   Past Surgical History:   Procedure Laterality Date    BACK SURGERY      CATARACT EXTRACTION Left     TONSILLECTOMY AND ADENOIDECTOMY      TONSILLECTOMY AND ADENOIDECTOMY      TOTAL HIP ARTHROPLASTY      TOTAL HIP ARTHROPLASTY Right     Right hip replacement.    TOTAL KNEE ARTHROPLASTY Right     TOTAL KNEE ARTHROPLASTY Left     TOTAL KNEE ARTHROPLASTY REVISION Left        Immunizations:   Immunization History   Administered Date(s) Administered    COVID-19 (PFIZER) Purple Cap Monovalent 02/03/2021    FLUAD TRI 65YR+ 11/18/2019    Flu Vaccine Quad PF >36MO 10/26/2016, 10/26/2017    FluMist 2-49yrs 12/12/2013, 11/04/2014, 12/11/2015    Fluad Quad 65+ 11/24/2020    Fluzone High Dose =>65 Years ([employer] ONLY) 12/11/2015    Fluzone Quad >6mos (Multi-dose) 10/26/2017    Influenza Quad Vaccine (Inpatient) 10/26/2017    Meningococcal Polysaccharide 03/15/2013    Pneumococcal Conjugate 13-Valent (PCV13) 12/11/2015    Pneumococcal Polysaccharide (PPSV23) 07/08/2020    Zostavax 12/29/2010    influenza Split 10/26/2016        Medications:     Current Outpatient Medications:     amLODIPine (NORVASC) 5 MG  tablet, Take 1 tablet by mouth Daily., Disp: 30 tablet, Rfl: 11    aspirin 81 MG EC tablet, Take 1 tablet by mouth Daily., Disp: , Rfl:     Bacillus Coagulans-Inulin (Probiotic) 1-250 BILLION-MG capsule, Take  by mouth., Disp: , Rfl:     benazepril (LOTENSIN) 20 MG tablet, Take 1 tablet by mouth once daily, Disp: 90 tablet, Rfl: 1    chlorhexidine (PERIDEX) 0.12 % solution, SWISH WITH 15ML IN MOUTH FOR 30 SECONDS TWICE AFTER MEALS THEN SPIT OUT, Disp: , Rfl:     Cholecalciferol (VITAMIN D3) 50 MCG (2000 UT) capsule, Take 1,000 Units by mouth Daily., Disp: , Rfl:     HYDROcodone-acetaminophen (NORCO) 5-325 MG per tablet, Take 1 tablet by mouth Every 6 (Six) Hours As Needed for Severe Pain., Disp: 4 tablet, Rfl: 0    levothyroxine (SYNTHROID, LEVOTHROID) 75 MCG tablet, Take 1 tablet by mouth Daily., Disp: 90 tablet, Rfl: 3    Misc Natural Products (OSTEO BI-FLEX JOINT SHIELD PO), Take  by mouth Daily., Disp: , Rfl:     pantoprazole (PROTONIX) 40 MG EC tablet, Take 1 tablet by mouth once daily, Disp: 90 tablet, Rfl: 0    fexofenadine (Allegra Allergy) 60 MG tablet, Take 1 tablet by mouth 2 (Two) Times a Day for 30 days., Disp: 60 tablet, Rfl: 3    nitrofurantoin, macrocrystal-monohydrate, (Macrobid) 100 MG capsule, Take 1 capsule by mouth 2 (Two) Times a Day., Disp: 14 capsule, Rfl: 0    Allergies:   Allergies   Allergen Reactions    Codeine Nausea And Vomiting    Euthyrox [Levothyroxine] Paresthesia     euthyrox brand    Lipitor [Atorvastatin] Myalgia    Lortab [Hydrocodone-Acetaminophen] Nausea And Vomiting       Family History:   Family History   Problem Relation Age of Onset    Aortic aneurysm Father     Other Father         ruptured AAA    Coronary artery disease Mother     Heart attack Mother     Heart disease Brother         CABG    Heart disease Brother     Coronary artery disease Brother     Other Brother         CABG    Multiple myeloma Brother     Heart disease Sister         Stent placement    Heart disease  "Sister         Stent placement    Pulmonary fibrosis Sister     Other Sister         2 sisters who have had cardiac stent placement    Breast cancer Maternal Aunt 35    Breast cancer Paternal Aunt 70    Ovarian cancer Neg Hx        Social History:   Social History     Socioeconomic History    Marital status:    Tobacco Use    Smoking status: Former     Types: Cigarettes     Quit date: 1960     Years since quittin.4    Smokeless tobacco: Never    Tobacco comments:     Smoked briefly in her 20's   Vaping Use    Vaping Use: Never used   Substance and Sexual Activity    Alcohol use: No    Drug use: No    Sexual activity: Defer     Birth control/protection: Post-menopausal         Objective     Vital Signs  /66 (BP Location: Right arm, Patient Position: Sitting, Cuff Size: Adult)   Pulse 76   Temp 97.8 °F (36.6 °C) (Infrared)   Resp 18   Ht 152.4 cm (60\")   Wt 60.3 kg (133 lb)   BMI 25.97 kg/m²   Estimated body mass index is 25.97 kg/m² as calculated from the following:    Height as of this encounter: 152.4 cm (60\").    Weight as of this encounter: 60.3 kg (133 lb).    [unfilled]    Physical Exam  Vitals and nursing note reviewed.   Constitutional:       Appearance: Normal appearance.   Cardiovascular:      Rate and Rhythm: Normal rate and regular rhythm.      Pulses: Normal pulses.      Heart sounds: Normal heart sounds.   Pulmonary:      Effort: Pulmonary effort is normal.      Breath sounds: Normal breath sounds.   Abdominal:      General: Bowel sounds are normal.      Palpations: Abdomen is soft.   Skin:     General: Skin is warm and dry.   Neurological:      General: No focal deficit present.      Mental Status: She is alert.   Psychiatric:         Mood and Affect: Mood normal.         Behavior: Behavior normal.        Assessment and Plan     1. Dysuria    - Urine Culture - , Urine, Clean Catch; Future  - POC Urinalysis Dipstick, Multipro; Future  - nitrofurantoin, " macrocrystal-monohydrate, (Macrobid) 100 MG capsule; Take 1 capsule by mouth 2 (Two) Times a Day.  Dispense: 14 capsule; Refill: 0  - POC Urinalysis Dipstick, Multipro  - Urine Culture - Urine, Urine, Clean Catch  Her urine will be sent for a culture to examine what type of bacteria is within her sample. She will be prescribed Macrobid.     2. Acute bilateral low back pain without sciatica    - nitrofurantoin, macrocrystal-monohydrate, (Macrobid) 100 MG capsule; Take 1 capsule by mouth 2 (Two) Times a Day.  Dispense: 14 capsule; Refill: 0     Patient counseled on the side effects of prescribed medication and verbalized good understanding.  Recommended taking probiotics while taking antibiotics.  Patient is to call office for any new or worsening symptoms.  Patient verbalized understanding of indications to report to the ER.    Patient verbalized good understanding of diagnosis and treatment plan.  All questions answered to their satisfaction.        Follow Up  No follow-ups on file.    NOEL Manuel [facility] INTER[facility] MEDICINE & PEDIATRICS  [address] [place] [id number] [phone]  Transcribed from ambient dictation for Mallory Puentes PA-C by Amparo Yao.  06/07/23   10:24 EDT    Patient or patient representative verbalized consent to the visit recording.  I have personally performed the services described in this document as transcribed by the above individual, and it is both accurate and complete.

## 2023-06-08 LAB — BACTERIA SPEC AEROBE CULT: NORMAL

## 2023-06-16 DIAGNOSIS — K21.9 GASTROESOPHAGEAL REFLUX DISEASE WITHOUT ESOPHAGITIS: ICD-10-CM

## 2023-06-16 RX ORDER — AMLODIPINE BESYLATE 5 MG/1
5 TABLET ORAL DAILY
Qty: 30 TABLET | Refills: 11 | Status: SHIPPED | OUTPATIENT
Start: 2023-06-16

## 2023-06-16 RX ORDER — PANTOPRAZOLE SODIUM 40 MG/1
TABLET, DELAYED RELEASE ORAL
Qty: 90 TABLET | Refills: 0 | Status: SHIPPED | OUTPATIENT
Start: 2023-06-16

## 2023-06-16 NOTE — TELEPHONE ENCOUNTER
Caller: Leslee Wu    Relationship: Self    Best call back number: [phone]    Requested Prescriptions:   Requested Prescriptions     Pending Prescriptions Disp Refills    pantoprazole (PROTONIX) 40 MG EC tablet [Pharmacy Med Name: Pantoprazole Sodium 40 MG Oral Tablet Delayed Release] 90 tablet 0     Sig: Take 1 tablet by mouth once daily    amLODIPine (NORVASC) 5 MG tablet 30 tablet 11     Sig: Take 1 tablet by mouth Daily.        Pharmacy where request should be sent: [address] [phone] [facility] [phone] [facility]     Last office visit with prescribing clinician: 3/29/2023   Last telemedicine visit with prescribing clinician: Visit date not found   Next office visit with prescribing clinician: 9/11/2023     Additional details provided by patient: PATIENT STATES THAT SHE NEEDS THIS REFILLED.    Does the patient have less than a 3 day supply:  [x] Yes  [] No    Would you like a call back once the refill request has been completed: [x] Yes [] No    If the office needs to give you a call back, can they leave a voicemail: [x] Yes [] No    Joann Martinez Rep   06/16/23 13:29 EDT

## 2023-08-14 ENCOUNTER — TELEPHONE (OUTPATIENT)
Dept: INTERNAL MEDICINE | Facility: CLINIC | Age: 83
End: 2023-08-14
Payer: MEDICARE

## 2023-08-14 NOTE — TELEPHONE ENCOUNTER
Caller: Leslee Wu    Relationship: Self    Best call back number: [phone]    What orders are you requesting (i.e. lab or imaging): LAB ORDERS    In what timeframe would the patient need to come in: ASAP    Where will you receive your lab/imaging services: IN OFFICE    Additional notes: PATIENT HAS BEEN FEELING EXTREME FATIGUE AND HAS A HISTORY OF ANEMIA AND WOULD LIKE TO GET LABS DRAWN TO CHECK. PLEASE ADVISE AND CALL PATIENT BACK

## 2023-08-16 ENCOUNTER — OFFICE VISIT (OUTPATIENT)
Dept: INTERNAL MEDICINE | Facility: CLINIC | Age: 83
End: 2023-08-16
Payer: MEDICARE

## 2023-08-16 VITALS
HEART RATE: 82 BPM | HEIGHT: 60 IN | SYSTOLIC BLOOD PRESSURE: 118 MMHG | TEMPERATURE: 98.2 F | BODY MASS INDEX: 24.94 KG/M2 | RESPIRATION RATE: 18 BRPM | WEIGHT: 127 LBS | DIASTOLIC BLOOD PRESSURE: 70 MMHG

## 2023-08-16 DIAGNOSIS — I10 ESSENTIAL HYPERTENSION: ICD-10-CM

## 2023-08-16 DIAGNOSIS — E03.9 ACQUIRED HYPOTHYROIDISM: ICD-10-CM

## 2023-08-16 DIAGNOSIS — R73.03 PREDIABETES: ICD-10-CM

## 2023-08-16 DIAGNOSIS — M85.89 OSTEOPENIA OF MULTIPLE SITES: ICD-10-CM

## 2023-08-16 DIAGNOSIS — E21.3 HYPERPARATHYROIDISM: ICD-10-CM

## 2023-08-16 DIAGNOSIS — I25.10 CORONARY ARTERY DISEASE INVOLVING NATIVE CORONARY ARTERY OF NATIVE HEART WITHOUT ANGINA PECTORIS: ICD-10-CM

## 2023-08-16 DIAGNOSIS — D50.9 IRON DEFICIENCY ANEMIA, UNSPECIFIED IRON DEFICIENCY ANEMIA TYPE: ICD-10-CM

## 2023-08-16 DIAGNOSIS — R31.9 HEMATURIA, UNSPECIFIED TYPE: ICD-10-CM

## 2023-08-16 DIAGNOSIS — E78.2 MIXED HYPERLIPIDEMIA: ICD-10-CM

## 2023-08-16 DIAGNOSIS — R53.83 OTHER FATIGUE: Primary | ICD-10-CM

## 2023-08-16 DIAGNOSIS — M79.642 LEFT HAND PAIN: ICD-10-CM

## 2023-08-16 DIAGNOSIS — E55.9 VITAMIN D DEFICIENCY: ICD-10-CM

## 2023-08-16 DIAGNOSIS — R63.4 WEIGHT LOSS: ICD-10-CM

## 2023-08-16 DIAGNOSIS — R82.998 LEUKOCYTES IN URINE: ICD-10-CM

## 2023-08-16 DIAGNOSIS — K21.9 GASTROESOPHAGEAL REFLUX DISEASE WITHOUT ESOPHAGITIS: ICD-10-CM

## 2023-08-16 LAB
ALBUMIN SERPL-MCNC: 4.3 G/DL (ref 3.5–5.2)
ALBUMIN/CREATININE RATIO, URINE: NORMAL
ALBUMIN/GLOB SERPL: 1.4 G/DL
ALP SERPL-CCNC: 111 U/L (ref 39–117)
ALT SERPL W P-5'-P-CCNC: 10 U/L (ref 1–33)
ANION GAP SERPL CALCULATED.3IONS-SCNC: 11.9 MMOL/L (ref 5–15)
AST SERPL-CCNC: 18 U/L (ref 1–32)
BASOPHILS # BLD AUTO: 0.07 10*3/MM3 (ref 0–0.2)
BASOPHILS NFR BLD AUTO: 1.2 % (ref 0–1.5)
BILIRUB BLD-MCNC: NEGATIVE MG/DL
BILIRUB SERPL-MCNC: 0.3 MG/DL (ref 0–1.2)
BUN SERPL-MCNC: 15 MG/DL (ref 8–23)
BUN/CREAT SERPL: 17.6 (ref 7–25)
CALCIUM SPEC-SCNC: 12.8 MG/DL (ref 8.6–10.5)
CHLORIDE SERPL-SCNC: 103 MMOL/L (ref 98–107)
CHOLEST SERPL-MCNC: 162 MG/DL (ref 0–200)
CLARITY, POC: ABNORMAL
CO2 SERPL-SCNC: 24.1 MMOL/L (ref 22–29)
COLOR UR: YELLOW
CREAT SERPL-MCNC: 0.85 MG/DL (ref 0.57–1)
DEPRECATED RDW RBC AUTO: 39.8 FL (ref 37–54)
EGFRCR SERPLBLD CKD-EPI 2021: 68.1 ML/MIN/1.73
EOSINOPHIL # BLD AUTO: 0.1 10*3/MM3 (ref 0–0.4)
EOSINOPHIL NFR BLD AUTO: 1.7 % (ref 0.3–6.2)
ERYTHROCYTE [DISTWIDTH] IN BLOOD BY AUTOMATED COUNT: 12.2 % (ref 12.3–15.4)
EXPIRATION DATE: ABNORMAL
EXPIRATION DATE: NORMAL
EXPIRATION DATE: NORMAL
GLOBULIN UR ELPH-MCNC: 3.1 GM/DL
GLUCOSE SERPL-MCNC: 91 MG/DL (ref 65–99)
GLUCOSE UR STRIP-MCNC: NEGATIVE MG/DL
HBA1C MFR BLD: 5.6 %
HCT VFR BLD AUTO: 43.6 % (ref 34–46.6)
HDLC SERPL-MCNC: 45 MG/DL (ref 40–60)
HGB BLD-MCNC: 14 G/DL (ref 12–15.9)
IMM GRANULOCYTES # BLD AUTO: 0.03 10*3/MM3 (ref 0–0.05)
IMM GRANULOCYTES NFR BLD AUTO: 0.5 % (ref 0–0.5)
IRON 24H UR-MRATE: 76 MCG/DL (ref 37–145)
IRON SATN MFR SERPL: 20 % (ref 20–50)
KETONES UR QL: NEGATIVE
LDLC SERPL CALC-MCNC: 99 MG/DL (ref 0–100)
LDLC/HDLC SERPL: 2.17 {RATIO}
LEUKOCYTE EST, POC: ABNORMAL
LYMPHOCYTES # BLD AUTO: 0.71 10*3/MM3 (ref 0.7–3.1)
LYMPHOCYTES NFR BLD AUTO: 11.8 % (ref 19.6–45.3)
Lab: ABNORMAL
Lab: NORMAL
Lab: NORMAL
MCH RBC QN AUTO: 28.9 PG (ref 26.6–33)
MCHC RBC AUTO-ENTMCNC: 32.1 G/DL (ref 31.5–35.7)
MCV RBC AUTO: 90.1 FL (ref 79–97)
MONOCYTES # BLD AUTO: 0.47 10*3/MM3 (ref 0.1–0.9)
MONOCYTES NFR BLD AUTO: 7.8 % (ref 5–12)
NEUTROPHILS NFR BLD AUTO: 4.65 10*3/MM3 (ref 1.7–7)
NEUTROPHILS NFR BLD AUTO: 77 % (ref 42.7–76)
NITRITE UR-MCNC: NEGATIVE MG/ML
NRBC BLD AUTO-RTO: 0 /100 WBC (ref 0–0.2)
PH UR: 5 [PH] (ref 5–8)
PLATELET # BLD AUTO: 444 10*3/MM3 (ref 140–450)
PMV BLD AUTO: 10.8 FL (ref 6–12)
POC CREATININE URINE: 50
POC MICROALBUMIN URINE: 30
POTASSIUM SERPL-SCNC: 4.9 MMOL/L (ref 3.5–5.2)
PROT SERPL-MCNC: 7.4 G/DL (ref 6–8.5)
PROT UR STRIP-MCNC: NEGATIVE MG/DL
RBC # BLD AUTO: 4.84 10*6/MM3 (ref 3.77–5.28)
RBC # UR STRIP: ABNORMAL /UL
RETICS # AUTO: 0.06 10*6/MM3 (ref 0.02–0.13)
RETICS/RBC NFR AUTO: 1.16 % (ref 0.7–1.9)
SODIUM SERPL-SCNC: 139 MMOL/L (ref 136–145)
SP GR UR: 1.01 (ref 1–1.03)
TIBC SERPL-MCNC: 386 MCG/DL (ref 298–536)
TRANSFERRIN SERPL-MCNC: 259 MG/DL (ref 200–360)
TRIGL SERPL-MCNC: 97 MG/DL (ref 0–150)
TSH SERPL DL<=0.05 MIU/L-ACNC: 0.68 UIU/ML (ref 0.27–4.2)
UROBILINOGEN UR QL: NORMAL
VLDLC SERPL-MCNC: 18 MG/DL (ref 5–40)
WBC NRBC COR # BLD: 6.03 10*3/MM3 (ref 3.4–10.8)

## 2023-08-16 PROCEDURE — 1159F MED LIST DOCD IN RCRD: CPT | Performed by: NURSE PRACTITIONER

## 2023-08-16 PROCEDURE — 82306 VITAMIN D 25 HYDROXY: CPT | Performed by: NURSE PRACTITIONER

## 2023-08-16 PROCEDURE — 84443 ASSAY THYROID STIM HORMONE: CPT | Performed by: NURSE PRACTITIONER

## 2023-08-16 PROCEDURE — 83036 HEMOGLOBIN GLYCOSYLATED A1C: CPT | Performed by: NURSE PRACTITIONER

## 2023-08-16 PROCEDURE — 1160F RVW MEDS BY RX/DR IN RCRD: CPT | Performed by: NURSE PRACTITIONER

## 2023-08-16 PROCEDURE — 87186 SC STD MICRODIL/AGAR DIL: CPT | Performed by: NURSE PRACTITIONER

## 2023-08-16 PROCEDURE — 3074F SYST BP LT 130 MM HG: CPT | Performed by: NURSE PRACTITIONER

## 2023-08-16 PROCEDURE — 99214 OFFICE O/P EST MOD 30 MIN: CPT | Performed by: NURSE PRACTITIONER

## 2023-08-16 PROCEDURE — 82607 VITAMIN B-12: CPT | Performed by: NURSE PRACTITIONER

## 2023-08-16 PROCEDURE — 82044 UR ALBUMIN SEMIQUANTITATIVE: CPT | Performed by: NURSE PRACTITIONER

## 2023-08-16 PROCEDURE — 83540 ASSAY OF IRON: CPT | Performed by: NURSE PRACTITIONER

## 2023-08-16 PROCEDURE — 85045 AUTOMATED RETICULOCYTE COUNT: CPT | Performed by: NURSE PRACTITIONER

## 2023-08-16 PROCEDURE — 82746 ASSAY OF FOLIC ACID SERUM: CPT | Performed by: NURSE PRACTITIONER

## 2023-08-16 PROCEDURE — 85025 COMPLETE CBC W/AUTO DIFF WBC: CPT | Performed by: NURSE PRACTITIONER

## 2023-08-16 PROCEDURE — 80061 LIPID PANEL: CPT | Performed by: NURSE PRACTITIONER

## 2023-08-16 PROCEDURE — 86334 IMMUNOFIX E-PHORESIS SERUM: CPT | Performed by: NURSE PRACTITIONER

## 2023-08-16 PROCEDURE — 84466 ASSAY OF TRANSFERRIN: CPT | Performed by: NURSE PRACTITIONER

## 2023-08-16 PROCEDURE — 81015 MICROSCOPIC EXAM OF URINE: CPT | Performed by: NURSE PRACTITIONER

## 2023-08-16 PROCEDURE — 84165 PROTEIN E-PHORESIS SERUM: CPT | Performed by: NURSE PRACTITIONER

## 2023-08-16 PROCEDURE — 81003 URINALYSIS AUTO W/O SCOPE: CPT | Performed by: NURSE PRACTITIONER

## 2023-08-16 PROCEDURE — 82784 ASSAY IGA/IGD/IGG/IGM EACH: CPT | Performed by: NURSE PRACTITIONER

## 2023-08-16 PROCEDURE — 87086 URINE CULTURE/COLONY COUNT: CPT | Performed by: NURSE PRACTITIONER

## 2023-08-16 PROCEDURE — 3078F DIAST BP <80 MM HG: CPT | Performed by: NURSE PRACTITIONER

## 2023-08-16 PROCEDURE — 80053 COMPREHEN METABOLIC PANEL: CPT | Performed by: NURSE PRACTITIONER

## 2023-08-16 PROCEDURE — 36415 COLL VENOUS BLD VENIPUNCTURE: CPT | Performed by: NURSE PRACTITIONER

## 2023-08-16 NOTE — PATIENT INSTRUCTIONS
MyPlate from USDA    MyPlate is an outline of a general healthy diet based on the Dietary Guidelines for Americans, 2567-4892, from the U.S. Department of Agriculture (USDA). It sets guidelines for how much food you should eat from each food group based on your age, sex, and level of physical activity.  What are tips for following MyPlate?  To follow MyPlate recommendations:  Eat a wide variety of fruits and vegetables, grains, and protein foods.  Serve smaller portions and eat less food throughout the day.  Limit portion sizes to avoid overeating.  Enjoy your food.  Get at least 150 minutes of exercise every week. This is about 30 minutes each day, 5 or more days per week.  It can be difficult to have every meal look like MyPlate. Think about MyPlate as eating guidelines for an entire day, rather than each individual meal.  Fruits and vegetables  Make one half of your plate fruits and vegetables.  Eat many different colors of fruits and vegetables each day.  For a 2,000-calorie daily food plan, eat:  2« cups of vegetables every day.  2 cups of fruit every day.  1 cup is equal to:  1 cup raw or cooked vegetables.  1 cup raw fruit.  1 medium-sized orange, apple, or banana.  1 cup 100% fruit or vegetable juice.  2 cups raw leafy greens, such as lettuce, spinach, or kale.  « cup dried fruit.  Grains  One fourth of your plate should be grains.  Make at least half of the grains you eat each day whole grains.  For a 2,000-calorie daily food plan, eat 6 oz of grains every day.  1 oz is equal to:  1 slice bread.  1 cup cereal.  « cup cooked rice, cereal, or pasta.  Protein  One fourth of your plate should be protein.  Eat a wide variety of protein foods, including meat, poultry, fish, eggs, beans, nuts, and tofu.  For a 2,000-calorie daily food plan, eat 5« oz of protein every day.  1 oz is equal to:  1 oz meat, poultry, or fish.  ¬ cup cooked beans.  1 egg.  « oz nuts or seeds.  1 Tbsp peanut butter.  Dairy  Drink fat-free  or low-fat (1%) milk.  Eat or drink dairy as a side to meals.  For a 2,000-calorie daily food plan, eat or drink 3 cups of dairy every day.  1 cup is equal to:  1 cup milk, yogurt, cottage cheese, or soy milk (soy beverage).  2 oz processed cheese.  1« oz natural cheese.  Fats, oils, salt, and sugars  Only small amounts of oils are recommended.  Avoid foods that are high in calories and low in nutritional value (empty calories), like foods high in fat or added sugars.  Choose foods that are low in salt (sodium). Choose foods that have less than 140 milligrams (mg) of sodium per serving.  Drink water instead of sugary drinks. Drink enough fluid to keep your urine pale yellow.  Where to find support  Work with your health care provider or a dietitian to develop a customized eating plan that is right for you.  Download an eduardo (mobile application) to help you track your daily food intake.  Where to find more information  USDA: ChooseMyPlate.gov  Summary  MyPlate is a general guideline for healthy eating from the USDA. It is based on the Dietary Guidelines for Americans, 9586-4595.  In general, fruits and vegetables should take up one half of your plate, grains should take up one fourth of your plate, and protein should take up one fourth of your plate.  This information is not intended to replace advice given to you by your health care provider. Make sure you discuss any questions you have with your health care provider.  Document Revised: 11/08/2021 Document Reviewed: 11/08/2021  [employer] Patient Education c 2023 [employer]

## 2023-08-16 NOTE — PROGRESS NOTES
Chief Complaint  Fatigue (SINCE 8/7/23) and Night Sweats (SINCE 8/7/23)    Subjective          Leslee Wu presents to [facility]        The patient presents today with complaints of fatigue and night sweats.    Night sweats  The patient had not experienced night sweats before. Their thermostat is usually at 75 degrees because both she and her  get cold, especially her  since he is on blood thinners.    Hand pain  The patient was last seen in 07/2023 for pain in her hand. She received an injection in her hand, which did alleviate her pain.     Flank pain  The patient is not having pain in her back. She exercises it and has gone to therapy for it. She has scoliosis. The pain has alleviated since exercising.     Fatigue  The patient feels exhausted when she comes home from going anywhere, and does take a nap. Her energy level is slowing down. The patient sleeps well at night. She will get up once to urinate throughout the night. She has been taking 2 to 3-hour naps.     Vitamin D deficiency  The patient is taking 800 mg of vitamin D.    Hyperparathyroidism  The patient has an appointment with her endocrinologist for the end of 08/2023. Her calcium count has been elevated.     Osteopenia  The patient is not doing muscle strengthening currently. She does have stretch bands at home though.     GERD  The patient continues taking pantoprazole once a day, in the morning. She is doing well right now. Her appetite is not as great. She has also changed her dietary habbits.    Hypertension  The patient is taking amlodipine 5 mg, aspirin 81 mg, and benazepril 20 mg for her blood pressure.    Anemia  The patient has been anemic intermittently.    Cyst  The patient has a history of rectocele cysts.     Musculoskeletal   The patient dislocated her shoulder in 03/2023.    Mood  The patient has increased anxiety.     Health maintenance  The patient's last colonoscopy was  in 07/2021. She had blood in her stool from the arthritis medication, so she discontinued that medicine; that seemed to help. She also had an EGD at the same time of her colonoscopy. Her gastroenterologist has sent her a letter that she is due for another colonoscopy. She is scheduled to have her wellness exam in 09/11/2023.    She denies any visual changes. She does not have any headaches, dizziness, passing out, chest pain, shortness of breath or swelling. She has been lightheaded, but has not been syncope. She denies abdominal pain, changes in bowel or bladder habits, blood in the urine or stool. The patient is not feeling dizzy when she goes from laying or sitting down to standing up. She does have allergy symptoms.       Current Outpatient Medications:     amLODIPine (NORVASC) 5 MG tablet, Take 1 tablet by mouth Daily., Disp: 30 tablet, Rfl: 11    aspirin 81 MG EC tablet, Take 1 tablet by mouth Daily., Disp: , Rfl:     Bacillus Coagulans-Inulin (Probiotic) 1-250 BILLION-MG capsule, Take  by mouth., Disp: , Rfl:     benazepril (LOTENSIN) 20 MG tablet, Take 1 tablet by mouth once daily, Disp: 90 tablet, Rfl: 1    Cholecalciferol (VITAMIN D3) 50 MCG (2000 UT) capsule, Take 800 Units by mouth Daily., Disp: , Rfl:     doxycycline (VIBRAMYCIN) 100 MG capsule, Take 1 capsule by mouth 2 (Two) Times a Day., Disp: 20 capsule, Rfl: 0    levothyroxine (SYNTHROID, LEVOTHROID) 75 MCG tablet, Take 1 tablet by mouth Daily., Disp: 90 tablet, Rfl: 3    Misc Natural Products (OSTEO BI-FLEX JOINT SHIELD PO), Take  by mouth Daily., Disp: , Rfl:     pantoprazole (PROTONIX) 40 MG EC tablet, Take 1 tablet by mouth once daily, Disp: 90 tablet, Rfl: 0    chlorhexidine (PERIDEX) 0.12 % solution, SWISH WITH 15ML IN MOUTH FOR 30 SECONDS TWICE AFTER MEALS THEN SPIT OUT (Patient not taking: Reported on 7/18/2023), Disp: , Rfl:          Objective     Review of Systems   Constitutional:  Positive for fatigue and night sweats. Negative for  "chills and fever.   HENT:  Positive for congestion. Negative for dental problem, mouth sores, nosebleeds, postnasal drip, rhinorrhea, sinus pressure, sinus pain, sneezing and sore throat.         Denies snoring.   Eyes:  Negative for pain, discharge, redness and itching.   Respiratory:  Negative for cough, shortness of breath and wheezing.    Cardiovascular:  Negative for chest pain, palpitations and leg swelling.        No ZARAGOZA, orthopnea, PND, or claudication.   Gastrointestinal:  Negative for abdominal distention, abdominal pain, blood in stool, diarrhea, nausea and vomiting.   Endocrine: Positive for heat intolerance. Negative for cold intolerance, polydipsia and polyuria.   Genitourinary:  Negative for difficulty urinating, dysuria, frequency, hematuria and urgency.   Musculoskeletal:  Negative for arthralgias, gait problem, joint swelling and myalgias.   Skin:  Negative for color change, rash and wound.   Allergic/Immunologic: Positive for environmental allergies.   Neurological:  Positive for light-headedness. Negative for dizziness, syncope, weakness, numbness and headaches.   Hematological:  Negative for adenopathy. Does not bruise/bleed easily.   Psychiatric/Behavioral:  The patient is nervous/anxious.      Vital Signs:   /70 (BP Location: Right arm, Patient Position: Sitting, Cuff Size: Adult)   Pulse 82   Temp 98.2 øF (36.8 øC) (Infrared)   Resp 18   Ht 152.4 cm (60\")   Wt 57.6 kg (127 lb)   BMI 24.80 kg/mý     Physical Exam  Vitals and nursing note reviewed.   Constitutional:       General: She is not in acute distress.     Appearance: Normal appearance. She is well-developed. She is not ill-appearing.   HENT:      Head: Normocephalic and atraumatic.   Eyes:      General: No scleral icterus.  Neck:      Thyroid: No thyromegaly.      Comments: No carotid bruit.  Cardiovascular:      Rate and Rhythm: Normal rate and regular rhythm.   Pulmonary:      Effort: Pulmonary effort is normal.      " Breath sounds: Normal breath sounds.   Abdominal:      General: Bowel sounds are normal. There is no distension.      Palpations: Abdomen is soft.      Tenderness: There is no abdominal tenderness.   Lymphadenopathy:      Cervical: No cervical adenopathy.   Skin:     Capillary Refill: Capillary refill takes 2 to 3 seconds.      Coloration: Skin is not pale.   Neurological:      Mental Status: She is alert and oriented to person, place, and time.   Psychiatric:         Mood and Affect: Mood normal.         Behavior: Behavior normal.      Result Review :                 Assessment and Plan    Diagnoses and all orders for this visit:    1. Other fatigue (Primary)  Assessment & Plan:  The patient will be having multiple testing done.       2. Left hand pain    3. Essential hypertension  -     CBC & Differential; Future  -     Reticulocytes; Future  -     Iron; Future  -     Iron Profile; Future  -     Vitamin B12; Future  -     Folate; Future  -     HUNTER + PE; Future  -     POC Occult Blood X 3, Stool; Future  -     Vitamin D,25-Hydroxy; Future  -     Comprehensive Metabolic Panel; Future  -     POC Urinalysis Dipstick, Automated  -     Lipid Panel; Future  -     TSH; Future  -     POC Microalbumin; Future  -     POC Glycosylated Hemoglobin (Hb A1C); Future  -     CBC & Differential  -     Reticulocytes  -     Cancel: Iron  -     Iron Profile  -     Vitamin B12  -     Folate  -     HUNTER + PE  -     Vitamin D,25-Hydroxy  -     Comprehensive Metabolic Panel  -     Lipid Panel  -     TSH  -     POC Glycosylated Hemoglobin (Hb A1C)  -     POC Microalbumin    4. Mixed hyperlipidemia  -     CBC & Differential; Future  -     Reticulocytes; Future  -     Iron; Future  -     Iron Profile; Future  -     Vitamin B12; Future  -     Folate; Future  -     HUNTER + PE; Future  -     POC Occult Blood X 3, Stool; Future  -     Vitamin D,25-Hydroxy; Future  -     Comprehensive Metabolic Panel; Future  -     POC Urinalysis Dipstick,  Automated  -     Lipid Panel; Future  -     TSH; Future  -     POC Microalbumin; Future  -     POC Glycosylated Hemoglobin (Hb A1C); Future  -     CBC & Differential  -     Reticulocytes  -     Cancel: Iron  -     Iron Profile  -     Vitamin B12  -     Folate  -     HUNTER + PE  -     Vitamin D,25-Hydroxy  -     Comprehensive Metabolic Panel  -     Lipid Panel  -     TSH  -     POC Glycosylated Hemoglobin (Hb A1C)  -     POC Microalbumin    5. Gastroesophageal reflux disease without esophagitis  -     CBC & Differential; Future  -     Reticulocytes; Future  -     Iron; Future  -     Iron Profile; Future  -     Vitamin B12; Future  -     Folate; Future  -     HUNTER + PE; Future  -     POC Occult Blood X 3, Stool; Future  -     Vitamin D,25-Hydroxy; Future  -     Comprehensive Metabolic Panel; Future  -     POC Urinalysis Dipstick, Automated  -     Lipid Panel; Future  -     TSH; Future  -     POC Microalbumin; Future  -     POC Glycosylated Hemoglobin (Hb A1C); Future  -     CBC & Differential  -     Reticulocytes  -     Cancel: Iron  -     Iron Profile  -     Vitamin B12  -     Folate  -     HUNTER + PE  -     Vitamin D,25-Hydroxy  -     Comprehensive Metabolic Panel  -     Lipid Panel  -     TSH  -     POC Glycosylated Hemoglobin (Hb A1C)  -     POC Microalbumin    6. Prediabetes  -     CBC & Differential; Future  -     Reticulocytes; Future  -     Iron; Future  -     Iron Profile; Future  -     Vitamin B12; Future  -     Folate; Future  -     HUNTER + PE; Future  -     POC Occult Blood X 3, Stool; Future  -     Vitamin D,25-Hydroxy; Future  -     Comprehensive Metabolic Panel; Future  -     POC Urinalysis Dipstick, Automated  -     Lipid Panel; Future  -     TSH; Future  -     POC Microalbumin; Future  -     POC Glycosylated Hemoglobin (Hb A1C); Future  -     CBC & Differential  -     Reticulocytes  -     Cancel: Iron  -     Iron Profile  -     Vitamin B12  -     Folate  -     HUNTER + PE  -     Vitamin D,25-Hydroxy  -      Comprehensive Metabolic Panel  -     Lipid Panel  -     TSH  -     POC Glycosylated Hemoglobin (Hb A1C)  -     POC Microalbumin    7. Coronary artery disease involving native coronary artery of native heart without angina pectoris    8. Vitamin D deficiency    9. Hyperparathyroidism  -     CBC & Differential; Future  -     Reticulocytes; Future  -     Iron; Future  -     Iron Profile; Future  -     Vitamin B12; Future  -     Folate; Future  -     HUNTER + PE; Future  -     POC Occult Blood X 3, Stool; Future  -     Vitamin D,25-Hydroxy; Future  -     Comprehensive Metabolic Panel; Future  -     POC Urinalysis Dipstick, Automated  -     Lipid Panel; Future  -     TSH; Future  -     POC Microalbumin; Future  -     POC Glycosylated Hemoglobin (Hb A1C); Future  -     CBC & Differential  -     Reticulocytes  -     Cancel: Iron  -     Iron Profile  -     Vitamin B12  -     Folate  -     HUNTER + PE  -     Vitamin D,25-Hydroxy  -     Comprehensive Metabolic Panel  -     Lipid Panel  -     TSH  -     POC Glycosylated Hemoglobin (Hb A1C)  -     POC Microalbumin    10. Acquired hypothyroidism  Assessment & Plan:  The patient will be seeing her endocrinologist at the end of 08/2023.      11. Osteopenia of multiple sites  Assessment & Plan:  The patient was encouraged to do muscle strengthening exercises with her stretch bands.       12. Iron deficiency anemia, unspecified iron deficiency anemia type  -     CBC & Differential; Future  -     Reticulocytes; Future  -     Iron; Future  -     Iron Profile; Future  -     Vitamin B12; Future  -     Folate; Future  -     HUNTER + PE; Future  -     POC Occult Blood X 3, Stool; Future  -     Vitamin D,25-Hydroxy; Future  -     Comprehensive Metabolic Panel; Future  -     POC Urinalysis Dipstick, Automated  -     Lipid Panel; Future  -     TSH; Future  -     POC Microalbumin; Future  -     POC Glycosylated Hemoglobin (Hb A1C); Future  -     CBC & Differential  -     Reticulocytes  -     Cancel:  Iron  -     Iron Profile  -     Vitamin B12  -     Folate  -     HUNTER + PE  -     Vitamin D,25-Hydroxy  -     Comprehensive Metabolic Panel  -     Lipid Panel  -     TSH  -     POC Glycosylated Hemoglobin (Hb A1C)  -     POC Microalbumin    13. Weight loss  -     CBC & Differential; Future  -     Reticulocytes; Future  -     Iron; Future  -     Iron Profile; Future  -     Vitamin B12; Future  -     Folate; Future  -     HUNTER + PE; Future  -     POC Occult Blood X 3, Stool; Future  -     Vitamin D,25-Hydroxy; Future  -     Comprehensive Metabolic Panel; Future  -     POC Urinalysis Dipstick, Automated  -     Lipid Panel; Future  -     TSH; Future  -     POC Microalbumin; Future  -     POC Glycosylated Hemoglobin (Hb A1C); Future  -     CBC & Differential  -     Reticulocytes  -     Cancel: Iron  -     Iron Profile  -     Vitamin B12  -     Folate  -     HUNTER + PE  -     Vitamin D,25-Hydroxy  -     Comprehensive Metabolic Panel  -     Lipid Panel  -     TSH  -     POC Glycosylated Hemoglobin (Hb A1C)  -     POC Microalbumin    14. Leukocytes in urine  -     Urine Culture - Urine, Urine, Clean Catch    15. Hematuria, unspecified type  -     Urinalysis, Microscopic Only - Urine, Clean Catch    Urinary tract infection noted on urine culture.  Likely explain patient's frequency.  This could also be from any sweats.      BMI is within normal parameters. No other follow-up for BMI required.      Follow Up   Return if symptoms worsen or fail to improve, for has appt.  Patient was given instructions and counseling regarding her condition or for health maintenance advice. Please see specific information pulled into the AVS if appropriate.     RTC/call  If symptoms worsen  Meds MOA and SE's reviewed and pt v/u    SANDOVAL Lucas [facility]  [address]  [facility] [address]  Fax [phone]  Phone [phone]    Transcribed  from ambient dictation for SANDOVAL Lucas by Dayna Sullivan.  08/16/23   17:30 EDT    Patient or patient representative verbalized consent to the visit recording.  I have personally performed the services described in this document as transcribed by the above individual, and it is both accurate and complete.

## 2023-08-17 ENCOUNTER — TELEPHONE (OUTPATIENT)
Dept: INTERNAL MEDICINE | Facility: CLINIC | Age: 83
End: 2023-08-17
Payer: MEDICARE

## 2023-08-17 LAB
25(OH)D3 SERPL-MCNC: 60.4 NG/ML (ref 30–100)
BACTERIA UR QL AUTO: ABNORMAL /HPF
FOLATE SERPL-MCNC: 16.7 NG/ML (ref 4.78–24.2)
HYALINE CASTS UR QL AUTO: ABNORMAL /LPF
RBC # UR STRIP: ABNORMAL /HPF
REF LAB TEST METHOD: ABNORMAL
SQUAMOUS #/AREA URNS HPF: ABNORMAL /HPF
VIT B12 BLD-MCNC: 481 PG/ML (ref 211–946)
WBC # UR STRIP: ABNORMAL /HPF

## 2023-08-17 NOTE — TELEPHONE ENCOUNTER
Caller: Leslee Wu    Relationship: Self    Best call back number: [phone]     What was the call regarding: PATIENT HAS A UTI AND NEEDS MEDICATION CALLED IN BEFORE THE WEEKEND. PLEASE CHECK LABS. PATIENT WOULD LIKE A CALL BACK.    [address] - [phone] [facility] [phone] FX

## 2023-08-18 ENCOUNTER — TELEPHONE (OUTPATIENT)
Dept: INTERNAL MEDICINE | Facility: CLINIC | Age: 83
End: 2023-08-18
Payer: MEDICARE

## 2023-08-18 LAB
ALBUMIN SERPL ELPH-MCNC: 3.6 G/DL (ref 2.9–4.4)
ALBUMIN/GLOB SERPL: 1.1 {RATIO} (ref 0.7–1.7)
ALPHA1 GLOB SERPL ELPH-MCNC: 0.3 G/DL (ref 0–0.4)
ALPHA2 GLOB SERPL ELPH-MCNC: 0.9 G/DL (ref 0.4–1)
B-GLOBULIN SERPL ELPH-MCNC: 1.1 G/DL (ref 0.7–1.3)
BACTERIA SPEC AEROBE CULT: ABNORMAL
GAMMA GLOB SERPL ELPH-MCNC: 0.9 G/DL (ref 0.4–1.8)
GLOBULIN SER-MCNC: 3.3 G/DL (ref 2.2–3.9)
IGA SERPL-MCNC: 341 MG/DL (ref 64–422)
IGG SERPL-MCNC: 985 MG/DL (ref 586–1602)
IGM SERPL-MCNC: 46 MG/DL (ref 26–217)
INTERPRETATION SERPL IEP-IMP: NORMAL
LABORATORY COMMENT REPORT: NORMAL
M PROTEIN SERPL ELPH-MCNC: NORMAL G/DL
PROT SERPL-MCNC: 6.9 G/DL (ref 6–8.5)

## 2023-08-18 RX ORDER — CEFDINIR 300 MG/1
300 CAPSULE ORAL 2 TIMES DAILY
Qty: 20 CAPSULE | Refills: 0 | Status: SHIPPED | OUTPATIENT
Start: 2023-08-18

## 2023-08-18 NOTE — TELEPHONE ENCOUNTER
Attempted to call patient , left voice message for patient to return call.     HUB:  please let the patient  know she has urinary tract infection I sent an antibiotic to her pharmacy.   please document when notified.

## 2023-08-18 NOTE — TELEPHONE ENCOUNTER
----- Message from SANDOVAL Oropeza sent at 8/18/2023 12:51 PM EDT -----  Please call the patient to let her know she has urinary tract infection I sent an antibiotic to her pharmacy.

## 2023-08-18 NOTE — TELEPHONE ENCOUNTER
PATIENT IS CALLING BACK BECAUSE SHE HAS NOT HEARD ANYTHING YET. THE PATIENT SAID HER BACK IS HURTING. WITH THE WEEKEND COMING UP SHE IS WANTING TO HAVE THIS TAKEN CARE OF TODAY IF POSSIBLE. THE PATIENT WOULD STILL LIKE SOMEONE TO CALL HER TODAY 08.18.23 TO LET HER KNOW WHAT IS GOING ON WITH HER TEST RESULTS AND IF MEDICATION IS BEING SENT OR NOT.     PHONE: [phone]

## 2023-08-21 NOTE — TELEPHONE ENCOUNTER
Leslee  notified   she states she has picked up the antibiotic and she is feeling improvement   she will call if she has further symptoms .  Verbal understanding given

## 2023-08-22 ENCOUNTER — HOSPITAL ENCOUNTER (OUTPATIENT)
Dept: MAMMOGRAPHY | Facility: HOSPITAL | Age: 83
Discharge: HOME | End: 2023-08-22
Admitting: NURSE PRACTITIONER
Payer: MEDICARE

## 2023-08-22 DIAGNOSIS — Z12.31 SCREENING MAMMOGRAM FOR BREAST CANCER: ICD-10-CM

## 2023-08-22 PROCEDURE — 77067 SCR MAMMO BI INCL CAD: CPT

## 2023-08-22 PROCEDURE — 77063 BREAST TOMOSYNTHESIS BI: CPT

## 2023-08-30 ENCOUNTER — OFFICE VISIT (OUTPATIENT)
Dept: ENDOCRINOLOGY | Facility: CLINIC | Age: 83
End: 2023-08-30
Payer: MEDICARE

## 2023-08-30 VITALS
OXYGEN SATURATION: 98 % | DIASTOLIC BLOOD PRESSURE: 68 MMHG | HEART RATE: 79 BPM | HEIGHT: 60 IN | SYSTOLIC BLOOD PRESSURE: 112 MMHG | BODY MASS INDEX: 25.52 KG/M2 | WEIGHT: 130 LBS

## 2023-08-30 DIAGNOSIS — E03.9 ACQUIRED HYPOTHYROIDISM: ICD-10-CM

## 2023-08-30 DIAGNOSIS — E21.3 HYPERPARATHYROIDISM: Primary | ICD-10-CM

## 2023-08-30 DIAGNOSIS — D35.1 PARATHYROID ADENOMA: ICD-10-CM

## 2023-08-30 PROCEDURE — 3074F SYST BP LT 130 MM HG: CPT | Performed by: INTERNAL MEDICINE

## 2023-08-30 PROCEDURE — 3078F DIAST BP <80 MM HG: CPT | Performed by: INTERNAL MEDICINE

## 2023-08-30 PROCEDURE — 99214 OFFICE O/P EST MOD 30 MIN: CPT | Performed by: INTERNAL MEDICINE

## 2023-08-30 NOTE — PROGRESS NOTES
Hyperparathyroidism (Follow up )    Subjective   Leslee Wu is a 82 y.o. female is here today for follow-up and ultrasound.  Hypercalcemia and primary hyperparathyroidism. She has seen parathyroid surgeon and endocrinologist in 2000 and deferred surgery at that time.     BMD 2021: T-score -1.2 at the hip and - 1.6 at the femoral neck   Calcium and PTH slowly increasing during the last several years.  Parathyroid adenoma is slowly growing according to the u/s - 1 cm right inferior parathyroid adenoma. She reported that her sister had parathyroid surgery and 3 glands were removed by Dr Perez. .     History of thyroid nodules since 1995 and 2000, s/p 2 benign biopsies. Stable in size since then.       Hypothyroidism dx in 1995, on synthroid 75 mcg.     Patient was found to have high calcium level is 12.8 on the last labs. She reported extreme fatigue and concentration problems.         Medications:    Current Outpatient Medications:     amLODIPine (NORVASC) 5 MG tablet, Take 1 tablet by mouth Daily., Disp: 30 tablet, Rfl: 11    aspirin 81 MG EC tablet, Take 1 tablet by mouth Daily., Disp: , Rfl:     Bacillus Coagulans-Inulin (Probiotic) 1-250 BILLION-MG capsule, Take  by mouth., Disp: , Rfl:     benazepril (LOTENSIN) 20 MG tablet, Take 1 tablet by mouth once daily, Disp: 90 tablet, Rfl: 1    Cholecalciferol (VITAMIN D3) 50 MCG (2000 UT) capsule, Take 800 Units by mouth Daily., Disp: , Rfl:     levothyroxine (SYNTHROID, LEVOTHROID) 75 MCG tablet, Take 1 tablet by mouth Daily., Disp: 90 tablet, Rfl: 3    Misc Natural Products (OSTEO BI-FLEX JOINT SHIELD PO), Take  by mouth Daily., Disp: , Rfl:     pantoprazole (PROTONIX) 40 MG EC tablet, Take 1 tablet by mouth once daily, Disp: 90 tablet, Rfl: 0    Review of Systems   Constitutional:  Positive for fatigue.   Musculoskeletal:  Positive for arthralgias (knee pain ) and back pain.   All other systems reviewed and are negative.    Objective   /68   Pulse 79   Ht  "152.4 cm (60\")   Wt 59 kg (130 lb)   LMP  (LMP Unknown)   SpO2 98%   BMI 25.39 kg/mý    Physical Exam   Constitutional: She is oriented to person, place, and time. She appears well-developed.   HENT:   Head: Normocephalic and atraumatic.   Eyes: Conjunctivae are normal.   Neck: Thyromegaly (left 1 cm hard easy movable nodule. ) present. No thyroid mass present.   The neck is supple and no assimetry visualized   Cardiovascular: Normal rate, regular rhythm, normal heart sounds and normal pulses.   Pulmonary/Chest: Effort normal and breath sounds normal.   Neurological: She is alert and oriented to person, place, and time.   Psychiatric: Thought content normal.   Vitals reviewed.                Thyroid ultrasound 10/25/22            Real time high resolution imaging of the thyroid gland was performed in transverse and longitudinal planes.      The right lobe measured 4.16 cm L x 1.17 cm AP x 1.4 cm in TV dimension.    The isthmus measured 0.22 cm in thickness.    The left thyroid lobe measured 3.97 cm L x 1.17 cm AP x 1.62 cm in TV dimension.    Thyroid gland is homogeneous and contains single nodule in the left lobe and small nodule  On the right .    Nodule 1   is located in the left mid lobe and measures 1.81 x 1.14 x 1.24 cm (L X AP X TV).  This nodule is cystic, homogeneous, hypoechoic with well-defined borders and egg-shell calcifications, and Grade I vascularity on Color Flow Doppler.  It has artifacts:  posterior acoustic enhancement    Nodule 2  is located in the right upper lobe and measures 0.54 x 0.44 x 0.35 cm (L X AP X TV).  This nodule is solid, homogeneous, hypoechoic with well-defined margins, and Grade II vascularity on Color Flow Doppler.  It has no artifacts    Posterior to the right thyroid lobe hypoechoic extrathyroidal structure is located inferiorly, representing parathyroid gland and measures 1 x 0.57 x and 0.65 L x AP x TV cm.  Doppler flow representing polar artery is seen.       No " pathologic lymph nodes were seen.      Assessment: Stable left calcified nodule. Stable right inferior parathyroid adenoma.   Repeat ultrasound in 18 months.         Assessment/Plan:    Problem List Items Addressed This Visit          Other    Hyperparathyroidism - Primary    Acquired hypothyroidism    Overview     Description: A.  On chronic replacement therapy.        Primary hyperparathyroidism - increasing calcium and PTH over the last year 62-->71-->77.2--> 90-->98.4, calcium 10.8 --> 12.8. Parathyroid adenoma is slowly growing according to the u/s and biochemical evaluation  Bone density 2/2021 showed lowest T-score of -1.6  I have recommended the patient to see parathyroid surgeon. Patient requested to see Dr Preez since her sister was operated by him.       Continue with increased fluid intake, Vit D supplements 1000 daily .   Hypothyroidism   Cont  levothyroxine 75, levels are normal.     Labs ordered - parathyroid, thyroid function and vit D, renal function.     F/u in postoperatively.

## 2023-09-07 DIAGNOSIS — K21.9 GASTROESOPHAGEAL REFLUX DISEASE WITHOUT ESOPHAGITIS: ICD-10-CM

## 2023-09-07 RX ORDER — PANTOPRAZOLE SODIUM 40 MG/1
TABLET, DELAYED RELEASE ORAL
Qty: 90 TABLET | Refills: 0 | Status: SHIPPED | OUTPATIENT
Start: 2023-09-07

## 2023-09-11 ENCOUNTER — OFFICE VISIT (OUTPATIENT)
Dept: INTERNAL MEDICINE | Facility: CLINIC | Age: 83
End: 2023-09-11
Payer: MEDICARE

## 2023-09-11 VITALS
WEIGHT: 129 LBS | RESPIRATION RATE: 16 BRPM | TEMPERATURE: 97.8 F | HEIGHT: 60 IN | BODY MASS INDEX: 25.32 KG/M2 | DIASTOLIC BLOOD PRESSURE: 72 MMHG | HEART RATE: 76 BPM | SYSTOLIC BLOOD PRESSURE: 124 MMHG

## 2023-09-11 DIAGNOSIS — Z13.820 SCREENING FOR OSTEOPOROSIS: ICD-10-CM

## 2023-09-11 DIAGNOSIS — M85.89 OSTEOPENIA OF MULTIPLE SITES: ICD-10-CM

## 2023-09-11 DIAGNOSIS — Z00.00 ANNUAL PHYSICAL EXAM: ICD-10-CM

## 2023-09-11 DIAGNOSIS — K21.9 GASTROESOPHAGEAL REFLUX DISEASE WITHOUT ESOPHAGITIS: ICD-10-CM

## 2023-09-11 DIAGNOSIS — E78.2 MIXED HYPERLIPIDEMIA: ICD-10-CM

## 2023-09-11 DIAGNOSIS — R73.03 PREDIABETES: ICD-10-CM

## 2023-09-11 DIAGNOSIS — Z00.00 MEDICARE ANNUAL WELLNESS VISIT, SUBSEQUENT: ICD-10-CM

## 2023-09-11 DIAGNOSIS — I10 ESSENTIAL HYPERTENSION: ICD-10-CM

## 2023-09-11 DIAGNOSIS — Z78.0 POST-MENOPAUSAL: Primary | ICD-10-CM

## 2023-09-11 DIAGNOSIS — D22.9 ATYPICAL NEVI: ICD-10-CM

## 2023-09-11 DIAGNOSIS — E21.3 HYPERPARATHYROIDISM: ICD-10-CM

## 2023-09-11 DIAGNOSIS — E55.9 VITAMIN D DEFICIENCY: ICD-10-CM

## 2023-09-11 DIAGNOSIS — E03.9 ACQUIRED HYPOTHYROIDISM: ICD-10-CM

## 2023-09-11 LAB
25(OH)D3 SERPL-MCNC: 61.1 NG/ML (ref 30–100)
ALBUMIN SERPL-MCNC: 4.2 G/DL (ref 3.5–5.2)
ALBUMIN/CREATININE RATIO, URINE: <30
ALBUMIN/GLOB SERPL: 1.6 G/DL
ALP SERPL-CCNC: 121 U/L (ref 39–117)
ALT SERPL W P-5'-P-CCNC: 8 U/L (ref 1–33)
ANION GAP SERPL CALCULATED.3IONS-SCNC: 11.5 MMOL/L (ref 5–15)
AST SERPL-CCNC: 19 U/L (ref 1–32)
BASOPHILS # BLD AUTO: 0.07 10*3/MM3 (ref 0–0.2)
BASOPHILS NFR BLD AUTO: 1.3 % (ref 0–1.5)
BILIRUB BLD-MCNC: NEGATIVE MG/DL
BILIRUB SERPL-MCNC: 0.4 MG/DL (ref 0–1.2)
BUN SERPL-MCNC: 13 MG/DL (ref 8–23)
BUN/CREAT SERPL: 14.6 (ref 7–25)
CALCIUM SPEC-SCNC: 11.6 MG/DL (ref 8.6–10.5)
CHLORIDE SERPL-SCNC: 104 MMOL/L (ref 98–107)
CHOLEST SERPL-MCNC: 163 MG/DL (ref 0–200)
CLARITY, POC: CLEAR
CO2 SERPL-SCNC: 24.5 MMOL/L (ref 22–29)
COLOR UR: YELLOW
CREAT SERPL-MCNC: 0.89 MG/DL (ref 0.57–1)
DEPRECATED RDW RBC AUTO: 40.2 FL (ref 37–54)
EGFRCR SERPLBLD CKD-EPI 2021: 64.4 ML/MIN/1.73
EOSINOPHIL # BLD AUTO: 0.11 10*3/MM3 (ref 0–0.4)
EOSINOPHIL NFR BLD AUTO: 2 % (ref 0.3–6.2)
ERYTHROCYTE [DISTWIDTH] IN BLOOD BY AUTOMATED COUNT: 12.4 % (ref 12.3–15.4)
EXPIRATION DATE: NORMAL
GLOBULIN UR ELPH-MCNC: 2.6 GM/DL
GLUCOSE SERPL-MCNC: 97 MG/DL (ref 65–99)
GLUCOSE UR STRIP-MCNC: NEGATIVE MG/DL
HBA1C MFR BLD: 5.4 %
HCT VFR BLD AUTO: 40.1 % (ref 34–46.6)
HDLC SERPL-MCNC: 55 MG/DL (ref 40–60)
HGB BLD-MCNC: 13.4 G/DL (ref 12–15.9)
IMM GRANULOCYTES # BLD AUTO: 0.01 10*3/MM3 (ref 0–0.05)
IMM GRANULOCYTES NFR BLD AUTO: 0.2 % (ref 0–0.5)
KETONES UR QL: NEGATIVE
LDLC SERPL CALC-MCNC: 92 MG/DL (ref 0–100)
LDLC/HDLC SERPL: 1.64 {RATIO}
LEUKOCYTE EST, POC: NEGATIVE
LYMPHOCYTES # BLD AUTO: 0.76 10*3/MM3 (ref 0.7–3.1)
LYMPHOCYTES NFR BLD AUTO: 13.8 % (ref 19.6–45.3)
Lab: NORMAL
MCH RBC QN AUTO: 29.8 PG (ref 26.6–33)
MCHC RBC AUTO-ENTMCNC: 33.4 G/DL (ref 31.5–35.7)
MCV RBC AUTO: 89.1 FL (ref 79–97)
MONOCYTES # BLD AUTO: 0.49 10*3/MM3 (ref 0.1–0.9)
MONOCYTES NFR BLD AUTO: 8.9 % (ref 5–12)
NEUTROPHILS NFR BLD AUTO: 4.06 10*3/MM3 (ref 1.7–7)
NEUTROPHILS NFR BLD AUTO: 73.8 % (ref 42.7–76)
NITRITE UR-MCNC: NEGATIVE MG/ML
NRBC BLD AUTO-RTO: 0 /100 WBC (ref 0–0.2)
PH UR: 6 [PH] (ref 5–8)
PLATELET # BLD AUTO: 279 10*3/MM3 (ref 140–450)
PMV BLD AUTO: 11.4 FL (ref 6–12)
POC CREATININE URINE: 50
POC MICROALBUMIN URINE: 10
POTASSIUM SERPL-SCNC: 3.9 MMOL/L (ref 3.5–5.2)
PROT SERPL-MCNC: 6.8 G/DL (ref 6–8.5)
PROT UR STRIP-MCNC: NEGATIVE MG/DL
RBC # BLD AUTO: 4.5 10*6/MM3 (ref 3.77–5.28)
RBC # UR STRIP: NEGATIVE /UL
SODIUM SERPL-SCNC: 140 MMOL/L (ref 136–145)
SP GR UR: 1.01 (ref 1–1.03)
TRIGL SERPL-MCNC: 88 MG/DL (ref 0–150)
TSH SERPL DL<=0.05 MIU/L-ACNC: 1.13 UIU/ML (ref 0.27–4.2)
UROBILINOGEN UR QL: NORMAL
VLDLC SERPL-MCNC: 16 MG/DL (ref 5–40)
WBC NRBC COR # BLD: 5.5 10*3/MM3 (ref 3.4–10.8)

## 2023-09-11 PROCEDURE — 83036 HEMOGLOBIN GLYCOSYLATED A1C: CPT | Performed by: NURSE PRACTITIONER

## 2023-09-11 PROCEDURE — 3078F DIAST BP <80 MM HG: CPT | Performed by: NURSE PRACTITIONER

## 2023-09-11 PROCEDURE — 1170F FXNL STATUS ASSESSED: CPT | Performed by: NURSE PRACTITIONER

## 2023-09-11 PROCEDURE — 82306 VITAMIN D 25 HYDROXY: CPT | Performed by: NURSE PRACTITIONER

## 2023-09-11 PROCEDURE — 80061 LIPID PANEL: CPT | Performed by: NURSE PRACTITIONER

## 2023-09-11 PROCEDURE — 3074F SYST BP LT 130 MM HG: CPT | Performed by: NURSE PRACTITIONER

## 2023-09-11 PROCEDURE — 36415 COLL VENOUS BLD VENIPUNCTURE: CPT | Performed by: NURSE PRACTITIONER

## 2023-09-11 PROCEDURE — 99397 PER PM REEVAL EST PAT 65+ YR: CPT | Performed by: NURSE PRACTITIONER

## 2023-09-11 PROCEDURE — 81003 URINALYSIS AUTO W/O SCOPE: CPT | Performed by: NURSE PRACTITIONER

## 2023-09-11 PROCEDURE — 84443 ASSAY THYROID STIM HORMONE: CPT | Performed by: NURSE PRACTITIONER

## 2023-09-11 PROCEDURE — 1159F MED LIST DOCD IN RCRD: CPT | Performed by: NURSE PRACTITIONER

## 2023-09-11 PROCEDURE — 3044F HG A1C LEVEL LT 7.0%: CPT | Performed by: NURSE PRACTITIONER

## 2023-09-11 PROCEDURE — 80053 COMPREHEN METABOLIC PANEL: CPT | Performed by: NURSE PRACTITIONER

## 2023-09-11 PROCEDURE — G0439 PPPS, SUBSEQ VISIT: HCPCS | Performed by: NURSE PRACTITIONER

## 2023-09-11 PROCEDURE — 1160F RVW MEDS BY RX/DR IN RCRD: CPT | Performed by: NURSE PRACTITIONER

## 2023-09-11 PROCEDURE — 85025 COMPLETE CBC W/AUTO DIFF WBC: CPT | Performed by: NURSE PRACTITIONER

## 2023-09-11 PROCEDURE — 82044 UR ALBUMIN SEMIQUANTITATIVE: CPT | Performed by: NURSE PRACTITIONER

## 2023-09-11 NOTE — PATIENT INSTRUCTIONS
"DASH Eating Plan  DASH stands for Dietary Approaches to Stop Hypertension. The DASH eating plan is a healthy eating plan that has been shown to:  Reduce high blood pressure (hypertension).  Reduce your risk for type 2 diabetes, heart disease, and stroke.  Help with weight loss.  What are tips for following this plan?  Reading food labels  Check food labels for the amount of salt (sodium) per serving. Choose foods with less than 5 percent of the Daily Value of sodium. Generally, foods with less than 300 milligrams (mg) of sodium per serving fit into this eating plan.  To find whole grains, look for the word \"whole\" as the first word in the ingredient list.  Shopping  Buy products labeled as \"low-sodium\" or \"no salt added.\"  Buy fresh foods. Avoid canned foods and pre-made or frozen meals.  Cooking  Avoid adding salt when cooking. Use salt-free seasonings or herbs instead of table salt or sea salt. Check with your health care provider or pharmacist before using salt substitutes.  Do not champagne foods. Cook foods using healthy methods such as baking, boiling, grilling, roasting, and broiling instead.  Cook with heart-healthy oils, such as olive, canola, avocado, soybean, or sunflower oil.  Meal planning    Eat a balanced diet that includes:  4 or more servings of fruits and 4 or more servings of vegetables each day. Try to fill one-half of your plate with fruits and vegetables.  6-8 servings of whole grains each day.  Less than 6 oz (170 g) of lean meat, poultry, or fish each day. A 3-oz (85-g) serving of meat is about the same size as a deck of cards. One egg equals 1 oz (28 g).  2-3 servings of low-fat dairy each day. One serving is 1 cup (237 mL).  1 serving of nuts, seeds, or beans 5 times each week.  2-3 servings of heart-healthy fats. Healthy fats called omega-3 fatty acids are found in foods such as walnuts, flaxseeds, fortified milks, and eggs. These fats are also found in cold-water fish, such as sardines, salmon, " and mackerel.  Limit how much you eat of:  Canned or prepackaged foods.  Food that is high in trans fat, such as some fried foods.  Food that is high in saturated fat, such as fatty meat.  Desserts and other sweets, sugary drinks, and other foods with added sugar.  Full-fat dairy products.  Do not salt foods before eating.  Do not eat more than 4 egg yolks a week.  Try to eat at least 2 vegetarian meals a week.  Eat more home-cooked food and less restaurant, buffet, and fast food.  Lifestyle  When eating at a restaurant, ask that your food be prepared with less salt or no salt, if possible.  If you drink alcohol:  Limit how much you use to:  0-1 drink a day for women who are not pregnant.  0-2 drinks a day for men.  Be aware of how much alcohol is in your drink. In the [place] one drink equals one 12 oz bottle of beer (355 mL), one 5 oz glass of wine (148 mL), or one 1½ oz glass of hard liquor (44 mL).  General information  Avoid eating more than 2,300 mg of salt a day. If you have hypertension, you may need to reduce your sodium intake to 1,500 mg a day.  Work with your health care provider to maintain a healthy body weight or to lose weight. Ask what an ideal weight is for you.  Get at least 30 minutes of exercise that causes your heart to beat faster (aerobic exercise) most days of the week. Activities may include walking, swimming, or biking.  Work with your health care provider or dietitian to adjust your eating plan to your individual calorie needs.  What foods should I eat?  Fruits  All fresh, dried, or frozen fruit. Canned fruit in natural juice (without added sugar).  Vegetables  Fresh or frozen vegetables (raw, steamed, roasted, or grilled). Low-sodium or reduced-sodium tomato and vegetable juice. Low-sodium or reduced-sodium tomato sauce and tomato paste. Low-sodium or reduced-sodium canned vegetables.  Grains  Whole-grain or whole-wheat bread. Whole-grain or whole-wheat pasta. Brown rice. Oatmeal. Quinoa.  Bulgur. Whole-grain and low-sodium cereals. Laura bread. Low-fat, low-sodium crackers. Whole-wheat flour tortillas.  Meats and other proteins  Skinless chicken or turkey. Ground chicken or turkey. Pork with fat trimmed off. Fish and seafood. Egg whites. Dried beans, peas, or lentils. Unsalted nuts, nut butters, and seeds. Unsalted canned beans. Lean cuts of beef with fat trimmed off. Low-sodium, lean precooked or cured meat, such as sausages or meat loaves.  Dairy  Low-fat (1%) or fat-free (skim) milk. Reduced-fat, low-fat, or fat-free cheeses. Nonfat, low-sodium ricotta or cottage cheese. Low-fat or nonfat yogurt. Low-fat, low-sodium cheese.  Fats and oils  Soft margarine without trans fats. Vegetable oil. Reduced-fat, low-fat, or light mayonnaise and salad dressings (reduced-sodium). Canola, safflower, olive, avocado, soybean, and sunflower oils. Avocado.  Seasonings and condiments  Herbs. Spices. Seasoning mixes without salt.  Other foods  Unsalted popcorn and pretzels. Fat-free sweets.  The items listed above may not be a complete list of foods and beverages you can eat. Contact a dietitian for more information.  What foods should I avoid?  Fruits  Canned fruit in a light or heavy syrup. Fried fruit. Fruit in cream or butter sauce.  Vegetables  Creamed or fried vegetables. Vegetables in a cheese sauce. Regular canned vegetables (not low-sodium or reduced-sodium). Regular canned tomato sauce and paste (not low-sodium or reduced-sodium). Regular tomato and vegetable juice (not low-sodium or reduced-sodium). Pickles. Olives.  Grains  Baked goods made with fat, such as croissants, muffins, or some breads. Dry pasta or rice meal packs.  Meats and other proteins  Fatty cuts of meat. Ribs. Fried meat. Reyes. Bologna, salami, and other precooked or cured meats, such as sausages or meat loaves. Fat from the back of a pig (fatback). Bratwurst. Salted nuts and seeds. Canned beans with added salt. Canned or smoked fish.  Whole eggs or egg yolks. Chicken or turkey with skin.  Dairy  Whole or 2% milk, cream, and half-and-half. Whole or full-fat cream cheese. Whole-fat or sweetened yogurt. Full-fat cheese. Nondairy creamers. Whipped toppings. Processed cheese and cheese spreads.  Fats and oils  Butter. Stick margarine. Lard. Shortening. Ghee. Reyes fat. Tropical oils, such as coconut, palm kernel, or palm oil.  Seasonings and condiments  Onion salt, garlic salt, seasoned salt, table salt, and sea salt. Worcestershire sauce. Tartar sauce. Barbecue sauce. Teriyaki sauce. Soy sauce, including reduced-sodium. Steak sauce. Canned and packaged gravies. Fish sauce. Oyster sauce. Cocktail sauce. Store-bought horseradish. Ketchup. Mustard. Meat flavorings and tenderizers. Bouillon cubes. Hot sauces. Pre-made or packaged marinades. Pre-made or packaged taco seasonings. Relishes. Regular salad dressings.  Other foods  Salted popcorn and pretzels.  The items listed above may not be a complete list of foods and beverages you should avoid. Contact a dietitian for more information.  Where to find more information  National Heart, Lung, and Blood [place]: www.nhlbi.nih.gov  American Heart Association: www.heart.org  Academy of Nutrition and Dietetics: www.eatright.org  National Kidney Foundation: www.kidney.org  Summary  The DASH eating plan is a healthy eating plan that has been shown to reduce high blood pressure (hypertension). It may also reduce your risk for type 2 diabetes, heart disease, and stroke.  When on the DASH eating plan, aim to eat more fresh fruits and vegetables, whole grains, lean proteins, low-fat dairy, and heart-healthy fats.  With the DASH eating plan, you should limit salt (sodium) intake to 2,300 mg a day. If you have hypertension, you may need to reduce your sodium intake to 1,500 mg a day.  Work with your health care provider or dietitian to adjust your eating plan to your individual calorie needs.  This information is not  intended to replace advice given to you by your health care provider. Make sure you discuss any questions you have with your health care provider.  Document Revised: 11/20/2020 Document Reviewed: 11/20/2020  Elsevier Patient Education © 2023 [employer]

## 2023-09-11 NOTE — PROGRESS NOTES
The ABCs of the Annual Wellness Visit  Subsequent Medicare Wellness Visit    Subjective    Leslee Wu is a 82 y.o. female who presents for a Subsequent Medicare Wellness Visit.          Hyperparathyroidism  The patient is scheduled to undergo surgery on her thyroid on 10/16/2023. She was evaluated by her endocrinologist, Dr. Pretty, on 09/30/2023. The patient states that her calcium count has increased to 12. She is currently taking 75 mg of levothyroxine.    Hemorrhoids  The patient has a history of hemorrhoids. She is followed by her gynecologist. She has an appointment in 12/2023.     Hypertension  The patient is currently taking benazepril 20 mg, amlodipine 5 mg, and low dose aspirin.    GERD  The patient is currently taking pantoprazole. She denies any issues with this medication.    Vitamin D deficiency  The patient is currently taking 800 units of vitamin D     Vaginal itching  The patient complains of intermittent vaginal itching.    Skin lesion  The patient has a lesion on her right nares. She has not been evaluated by a dermatologist recently.    Health maintenance  The patient denies any new skin changes, lumps, bumps, or rashes. She has been taking 2 Tylenol daily for arthritis. The patient has received her Shingrix vaccine. Her last DEXA scan was approximately 2 years ago. She had a mammogram completed on 08/22/2023. She has had cataract surgery, and she does not need the prescription for the bifocal part. She states her health is overall the same as last year. She denied any hospital admissions this year. She has not had a colonoscopy. She does have a living will. She will have a breast exam performed today.      Finger Rub Hearing{Test (right ear):passed  Finger Rub Hearing{Test (left ear):passed   She has recently misplaced her eyeglasses.    No new chest pain, shortness of breath, headaches, visual changes, dizziness, palpitations, syncope, swelling in the lower extremities, or shortness of breath  when laying down No abdominal pain, constipation, hematuria, hematochezia, or dysuria No new lumps, bumps, or rashes.    The following portions of the patient's history were reviewed and   updated as appropriate: allergies, current medications, past family history, past medical history, past social history, past surgical history, and problem list.Patient comes to clinic today for annual wellness exam and physical exam.  Patient has normal vital signs normal BMI patient takes amlodipine 5 and aspirin 81 for blood pressure levothyroxine 75 for thyroid and benazepril 20 GERD pantoprazole vitamin D 2000 needs DEXA colonoscopy orderedMammogram 8/22/2023 seen in [facility] for shoulder pain 323Along with CT of the head which was normal after she fell and hit her head chest reduction of an anterior shoulder dislocation at that time    Compared to one year ago, the patient feels her physical   health is the same.    Compared to one year ago, the patient feels her mental   health is the same.    Recent Hospitalizations:  She was not admitted to the hospital during the last year.       Current Medical Providers:  Patient Care Team:  Josephine Johnson APRN as PCP - General (Internal Medicine)  Eric Angulo MD (Inactive)  Jasiel Wells MD as Consulting Physician (Orthopedic Surgery)  Salima Pretty MD as Consulting Physician (Endocrinology)  Cleveland Nicholson MD as Consulting Physician (Otolaryngology)  Tanner Saavedra MD as Consulting Physician (Cardiology)  Huong Jaramillo APRN as Nurse Practitioner (Obstetrics and Gynecology)    Outpatient Medications Prior to Visit   Medication Sig Dispense Refill   • amLODIPine (NORVASC) 5 MG tablet Take 1 tablet by mouth Daily. 30 tablet 11   • aspirin 81 MG EC tablet Take 1 tablet by mouth Daily.     • Bacillus Coagulans-Inulin (Probiotic) 1-250 BILLION-MG capsule Take  by mouth.     • benazepril (LOTENSIN) 20 MG tablet Take 1 tablet by mouth once daily 90 tablet 1    • Cholecalciferol (VITAMIN D3) 50 MCG (2000 UT) capsule Take 800 Units by mouth Daily.     • levothyroxine (SYNTHROID, LEVOTHROID) 75 MCG tablet Take 1 tablet by mouth Daily. 90 tablet 3   • Misc Natural Products (OSTEO BI-FLEX JOINT SHIELD PO) Take  by mouth Daily.     • pantoprazole (PROTONIX) 40 MG EC tablet Take 1 tablet by mouth once daily 90 tablet 0     No facility-administered medications prior to visit.       No opioid medication identified on active medication list. I have reviewed chart for other potential  high risk medication/s and harmful drug interactions in the elderly.        Aspirin is on active medication list. Aspirin use is indicated based on review of current medical condition/s. Pros and cons of this therapy have been discussed today. Benefits of this medication outweigh potential harm.  Patient has been encouraged to continue taking this medication.  .      Patient Active Problem List   Diagnosis   • Anxiety   • Gastroesophageal reflux disease without esophagitis   • Hyperparathyroidism   • Essential hypertension   • Acquired hypothyroidism   • Iron deficiency anemia   • Spondylolisthesis   • Arthritis   • Coronary artery disease involving native coronary artery of native heart without angina pectoris   • Vitamin D deficiency   • Mixed hyperlipidemia   • Prediabetes   • High risk medication use   • Acute UTI   • Menopause   • Osteopenia of multiple sites   • Rectocele   • Midline cystocele   • Nasal congestion   • COVID   • Other fatigue     Advance Care Planning   Advance Care Planning     Advance Directive is not on file.  ACP discussion was held with the patient during this visit. Patient does not have an advance directive, declines further assistance.     Objective    Vitals:    09/11/23 1011   BP: 124/72   BP Location: Right arm   Patient Position: Sitting   Cuff Size: Adult   Pulse: 76   Resp: 16   Temp: 97.8 °F (36.6 °C)   TempSrc: Infrared   Weight: 58.5 kg (129 lb)   Height: 152.4  "cm (60\")   PainSc: 0-No pain     Estimated body mass index is 25.19 kg/m² as calculated from the following:    Height as of this encounter: 152.4 cm (60\").    Weight as of this encounter: 58.5 kg (129 lb).           Does the patient have evidence of cognitive impairment? No    Lab Results   Component Value Date    TRIG 97 2023    HDL 45 2023    LDL 99 2023    VLDL 18 2023    HGBA1C 5.6 2023        HEALTH RISK ASSESSMENT    Smoking Status:  Social History     Tobacco Use   Smoking Status Former   • Types: Cigarettes   • Quit date:    • Years since quittin.7   Smokeless Tobacco Never   Tobacco Comments    Smoked briefly in her 20's     Alcohol Consumption:  Social History     Substance and Sexual Activity   Alcohol Use No     Fall Risk Screen:    MILES Fall Risk Assessment was completed, and patient is at HIGH risk for falls. Assessment completed on:2023    Depression Screenin/11/2023    10:10 AM   PHQ-2/PHQ-9 Depression Screening   Little Interest or Pleasure in Doing Things 0-->not at all   Feeling Down, Depressed or Hopeless 0-->not at all   PHQ-9: Brief Depression Severity Measure Score 0       Health Habits and Functional and Cognitive Screenin/11/2023    10:09 AM   Functional & Cognitive Status   Do you have difficulty preparing food and eating? No   Do you have difficulty bathing yourself, getting dressed or grooming yourself? No   Do you have difficulty using the toilet? No   Do you have difficulty moving around from place to place? No   Do you have trouble with steps or getting out of a bed or a chair? No   Current Diet Well Balanced Diet   Dental Exam Up to date   Eye Exam Up to date   Exercise (times per week) 3 times per week   Current Exercises Include Aerobics   Do you need help using the phone?  No   Are you deaf or do you have serious difficulty hearing?  No   Do you need help to go to places out of walking distance? No   Do you need help " shopping? No   Do you need help preparing meals?  No   Do you need help with housework?  No   Do you need help with laundry? No   Do you need help taking your medications? No   Do you need help managing money? No   Do you ever drive or ride in a car without wearing a seat belt? No   Have you felt unusual stress, anger or loneliness in the last month? No   Who do you live with? Spouse   If you need help, do you have trouble finding someone available to you? No   Have you been bothered in the last four weeks by sexual problems? No   Do you have difficulty concentrating, remembering or making decisions? No       Age-appropriate Screening Schedule:  Refer to the list below for future screening recommendations based on patient's age, sex and/or medical conditions. Orders for these recommended tests are listed in the plan section. The patient has been provided with a written plan.    Health Maintenance   Topic Date Due   • ZOSTER VACCINE (3 of 3) 06/17/2023   • COLORECTAL CANCER SCREENING  07/15/2023   • DXA SCAN  07/16/2023   • COVID-19 Vaccine (4 - Pfizer series) 01/01/2024 (Originally 1/4/2022)   • TDAP/TD VACCINES (1 - Tdap) 03/08/2024 (Originally 10/31/1959)   • INFLUENZA VACCINE  10/01/2023   • LIPID PANEL  08/16/2024   • BMI FOLLOWUP  08/16/2024   • ANNUAL WELLNESS VISIT  09/11/2024   • Pneumococcal Vaccine 65+  Completed                  CMS Preventative Services Quick Reference  Risk Factors Identified During Encounter  Polypharmacy: Medication List reviewed  The above risks/problems have been discussed with the patient.  Pertinent information has been shared with the patient in the After Visit Summary.  An After Visit Summary and PPPS were made available to the patient.    Follow Up:   Next Medicare Wellness visit to be scheduled in 1 year.       Additional E&M Note during same encounter follows:  Patient has multiple medical problems which are significant and separately identifiable that require additional work  "above and beyond the Medicare Wellness Visit.      Chief Complaint  Medicare Wellness-subsequent    Subjective        HPI           Objective   Vital Signs:  /72 (BP Location: Right arm, Patient Position: Sitting, Cuff Size: Adult)   Pulse 76   Temp 97.8 °F (36.6 °C) (Infrared)   Resp 16   Ht 152.4 cm (60\")   Wt 58.5 kg (129 lb)   BMI 25.19 kg/m²     Physical Exam  Vitals and nursing note reviewed.   Constitutional:       General: She is not in acute distress.     Appearance: Normal appearance. She is well-developed. She is not ill-appearing.   HENT:      Head: Normocephalic and atraumatic.      Right Ear: Tympanic membrane and external ear normal.      Left Ear: Tympanic membrane and external ear normal.      Nose: Nose normal.      Mouth/Throat:      Mouth: Mucous membranes are moist.      Pharynx: Oropharynx is clear. No oropharyngeal exudate or posterior oropharyngeal erythema.   Eyes:      General: No scleral icterus.        Right eye: No discharge.         Left eye: No discharge.      Conjunctiva/sclera: Conjunctivae normal.      Pupils: Pupils are equal, round, and reactive to light.   Neck:      Thyroid: No thyromegaly.      Vascular: No carotid bruit.   Cardiovascular:      Rate and Rhythm: Normal rate and regular rhythm.      Heart sounds: Normal heart sounds. No murmur heard.    No friction rub. No gallop.   Pulmonary:      Effort: Pulmonary effort is normal.      Breath sounds: Normal breath sounds.   Chest:   Breasts:     Right: Normal.      Left: Normal.   Abdominal:      General: Bowel sounds are normal. There is no distension.      Palpations: Abdomen is soft. There is no mass.      Tenderness: There is no abdominal tenderness. There is no guarding or rebound.      Hernia: No hernia is present.   Musculoskeletal:         General: Normal range of motion.      Cervical back: Normal range of motion and neck supple.   Lymphadenopathy:      Head:      Right side of head: No submental, " submandibular, tonsillar, preauricular, posterior auricular or occipital adenopathy.      Left side of head: No submental, submandibular, tonsillar, preauricular, posterior auricular or occipital adenopathy.      Cervical: No cervical adenopathy.      Right cervical: No superficial, deep or posterior cervical adenopathy.     Left cervical: No superficial, deep or posterior cervical adenopathy.      Upper Body:      Right upper body: No supraclavicular, axillary, pectoral or epitrochlear adenopathy.      Left upper body: No supraclavicular, axillary, pectoral or epitrochlear adenopathy.      Lower Body: No right inguinal adenopathy. No left inguinal adenopathy.   Skin:     General: Skin is warm and dry.      Capillary Refill: Capillary refill takes 2 to 3 seconds.   Neurological:      Mental Status: She is alert and oriented to person, place, and time.      Deep Tendon Reflexes: Reflexes normal.   Psychiatric:         Behavior: Behavior normal.         Thought Content: Thought content normal.         Judgment: Judgment normal.      Right nare: 1 mm brown round area.                   Assessment and Plan   Diagnoses and all orders for this visit:    1. Post-menopausal (Primary)  -     DEXA Bone Density Axial; Future    2. Screening for osteoporosis  -     DEXA Bone Density Axial; Future    3. Medicare annual wellness visit, subsequent    4. Annual physical exam    5. Gastroesophageal reflux disease without esophagitis  -     Comprehensive Metabolic Panel; Future  -     CBC & Differential; Future  -     POC Urinalysis Dipstick, Automated    6. Essential hypertension  -     Comprehensive Metabolic Panel; Future  -     POC Urinalysis Dipstick, Automated  -     TSH; Future    7. Prediabetes  -     POC Microalbumin; Future  -     POC Glycosylated Hemoglobin (Hb A1C); Future    8. Mixed hyperlipidemia  -     Lipid Panel; Future    9. Vitamin D deficiency  -     Vitamin D,25-Hydroxy; Future    10. Acquired  hypothyroidism    11. Hyperparathyroidism    12. Osteopenia of multiple sites    Other orders  -     terconazole (TERAZOL 7) 0.4 % vaginal cream; Insert 1 applicator into the vagina Every Night.  Dispense: 45 g; Refill: 0      1. Medicare annual wellness visit, subsequent (Primary)  - CBC & Differential; Future  - Comprehensive Metabolic Panel; Future  - Lipid Panel; Future  - TSH; Future  - Vitamin D, Deficiency Screen (25-Hydroxy); Future  - Hepatitis C Antibody; Future  - POC Urinalysis Dipstick, Automated; Future    2. Essential hypertension  - Comprehensive Metabolic Panel; Future  - CBC & Differential; Future  - Lipid Panel; Future    3. Gastroesophageal reflux disease without esophagitis  - pantoprazole (PROTONIX) 40 MG DR tablet; Take 1 tablet by mouth .    4. Hypothyroidism, unspecified type  - levothyroxine (SYNTHROID) 75 mcg tablet.  - TSH; Future    5. Vitamin D deficiency  - Vitamin D, 25-Hydroxy; Future    6. Skin lesion  - Ambulatory Referral to Dermatology    7.Post menopausal   - DEX bone density ordered.     8. Skin lesion on nares  - Ambulatory Referral to Dermatology.    9. Vaginal itching  - Vaginal cream will be ordered.    Addendum patient had at the end of the visit.  She would like me to evaluate her to 2 mm brown round area on the right lateral nose.  Will send her to dermatology for atypical nevi to evaluate.       Follow Up   Return in about 6 months (around 3/11/2024) for fasting, Next scheduled follow up.  Patient was given instructions and counseling regarding her condition or for health maintenance advice. Please see specific information pulled into the AVS if appropriate.         Transcribed from ambient dictation for SANDOVAL Lucas by Juwan Feng.  09/11/23   13:06 EDT    Patient or patient representative verbalized consent to the visit recording.  I have personally performed the services described in this document as transcribed by the above individual, and it is both  accurate and complete.

## 2023-10-09 RX ORDER — LEVOTHYROXINE SODIUM 0.07 MG/1
75 TABLET ORAL DAILY
Qty: 90 TABLET | Refills: 0 | Status: SHIPPED | OUTPATIENT
Start: 2023-10-09

## 2023-10-09 NOTE — TELEPHONE ENCOUNTER
Rx Refill Note  Requested Prescriptions     Pending Prescriptions Disp Refills    levothyroxine (SYNTHROID, LEVOTHROID) 75 MCG tablet [Pharmacy Med Name: Levothyroxine Sodium 75 MCG Oral Tablet] 90 tablet 0     Sig: Take 1 tablet by mouth once daily      Last office visit with prescribing clinician: 8/30/2023   Last telemedicine visit with prescribing clinician: Visit date not found   Next office visit with prescribing clinician: Visit date not found                         Would you like a call back once the refill request has been completed: [] Yes [] No    If the office needs to give you a call back, can they leave a voicemail: [] Yes [] No    Jaida Quiros MA  10/09/23, 10:29 EDT

## 2023-10-27 ENCOUNTER — TELEPHONE (OUTPATIENT)
Dept: ENDOCRINOLOGY | Facility: CLINIC | Age: 83
End: 2023-10-27
Payer: MEDICARE

## 2023-10-27 ENCOUNTER — HOSPITAL ENCOUNTER (EMERGENCY)
Facility: HOSPITAL | Age: 83
Discharge: HOME | End: 2023-10-27
Attending: EMERGENCY MEDICINE
Payer: MEDICARE

## 2023-10-27 VITALS
SYSTOLIC BLOOD PRESSURE: 139 MMHG | OXYGEN SATURATION: 95 % | WEIGHT: 128 LBS | DIASTOLIC BLOOD PRESSURE: 77 MMHG | RESPIRATION RATE: 16 BRPM | HEIGHT: 60 IN | HEART RATE: 67 BPM | BODY MASS INDEX: 25.13 KG/M2 | TEMPERATURE: 97.7 F

## 2023-10-27 DIAGNOSIS — E03.9 ACQUIRED HYPOTHYROIDISM: Primary | ICD-10-CM

## 2023-10-27 DIAGNOSIS — Z82.49 FAMILY HISTORY OF CORONARY ARTERY DISEASE: ICD-10-CM

## 2023-10-27 DIAGNOSIS — I10 ELEVATED BLOOD PRESSURE READING WITH DIAGNOSIS OF HYPERTENSION: ICD-10-CM

## 2023-10-27 DIAGNOSIS — R20.2 FACIAL TINGLING: Primary | ICD-10-CM

## 2023-10-27 LAB
ALBUMIN SERPL-MCNC: 3.7 G/DL (ref 3.5–5.2)
ALBUMIN/GLOB SERPL: 1.3 G/DL
ALP SERPL-CCNC: 120 U/L (ref 39–117)
ALT SERPL W P-5'-P-CCNC: 11 U/L (ref 1–33)
ANION GAP SERPL CALCULATED.3IONS-SCNC: 9 MMOL/L (ref 5–15)
AST SERPL-CCNC: 18 U/L (ref 1–32)
BASOPHILS # BLD AUTO: 0.06 10*3/MM3 (ref 0–0.2)
BASOPHILS NFR BLD AUTO: 1.2 % (ref 0–1.5)
BILIRUB SERPL-MCNC: 0.2 MG/DL (ref 0–1.2)
BUN SERPL-MCNC: 11 MG/DL (ref 8–23)
BUN/CREAT SERPL: 15.3 (ref 7–25)
CA-I SERPL ISE-MCNC: 1.32 MMOL/L (ref 1.12–1.32)
CALCIUM SPEC-SCNC: 9.3 MG/DL (ref 8.6–10.5)
CHLORIDE SERPL-SCNC: 106 MMOL/L (ref 98–107)
CO2 SERPL-SCNC: 26 MMOL/L (ref 22–29)
CREAT SERPL-MCNC: 0.72 MG/DL (ref 0.57–1)
DEPRECATED RDW RBC AUTO: 47.1 FL (ref 37–54)
EGFRCR SERPLBLD CKD-EPI 2021: 83.1 ML/MIN/1.73
EOSINOPHIL # BLD AUTO: 0.1 10*3/MM3 (ref 0–0.4)
EOSINOPHIL NFR BLD AUTO: 2 % (ref 0.3–6.2)
ERYTHROCYTE [DISTWIDTH] IN BLOOD BY AUTOMATED COUNT: 13.8 % (ref 12.3–15.4)
GEN 5 2HR TROPONIN T REFLEX: 25 NG/L
GLOBULIN UR ELPH-MCNC: 2.8 GM/DL
GLUCOSE SERPL-MCNC: 105 MG/DL (ref 65–99)
HCT VFR BLD AUTO: 41.5 % (ref 34–46.6)
HGB BLD-MCNC: 13 G/DL (ref 12–15.9)
IMM GRANULOCYTES # BLD AUTO: 0.01 10*3/MM3 (ref 0–0.05)
IMM GRANULOCYTES NFR BLD AUTO: 0.2 % (ref 0–0.5)
LYMPHOCYTES # BLD AUTO: 0.8 10*3/MM3 (ref 0.7–3.1)
LYMPHOCYTES NFR BLD AUTO: 16.4 % (ref 19.6–45.3)
MCH RBC QN AUTO: 29.1 PG (ref 26.6–33)
MCHC RBC AUTO-ENTMCNC: 31.3 G/DL (ref 31.5–35.7)
MCV RBC AUTO: 93 FL (ref 79–97)
MONOCYTES # BLD AUTO: 0.52 10*3/MM3 (ref 0.1–0.9)
MONOCYTES NFR BLD AUTO: 10.7 % (ref 5–12)
NEUTROPHILS NFR BLD AUTO: 3.39 10*3/MM3 (ref 1.7–7)
NEUTROPHILS NFR BLD AUTO: 69.5 % (ref 42.7–76)
NRBC BLD AUTO-RTO: 0 /100 WBC (ref 0–0.2)
PLATELET # BLD AUTO: 270 10*3/MM3 (ref 140–450)
PMV BLD AUTO: 10.6 FL (ref 6–12)
POTASSIUM SERPL-SCNC: 3.9 MMOL/L (ref 3.5–5.2)
PROT SERPL-MCNC: 6.5 G/DL (ref 6–8.5)
QT INTERVAL: 388 MS
QTC INTERVAL: 453 MS
RBC # BLD AUTO: 4.46 10*6/MM3 (ref 3.77–5.28)
SODIUM SERPL-SCNC: 141 MMOL/L (ref 136–145)
TROPONIN T DELTA: 1 NG/L
TROPONIN T SERPL HS-MCNC: 24 NG/L
TSH SERPL DL<=0.05 MIU/L-ACNC: 0.24 UIU/ML (ref 0.27–4.2)
WBC NRBC COR # BLD: 4.88 10*3/MM3 (ref 3.4–10.8)

## 2023-10-27 PROCEDURE — 85025 COMPLETE CBC W/AUTO DIFF WBC: CPT | Performed by: EMERGENCY MEDICINE

## 2023-10-27 PROCEDURE — 82330 ASSAY OF CALCIUM: CPT | Performed by: EMERGENCY MEDICINE

## 2023-10-27 PROCEDURE — 93005 ELECTROCARDIOGRAM TRACING: CPT | Performed by: EMERGENCY MEDICINE

## 2023-10-27 PROCEDURE — 84484 ASSAY OF TROPONIN QUANT: CPT | Performed by: EMERGENCY MEDICINE

## 2023-10-27 PROCEDURE — 99283 EMERGENCY DEPT VISIT LOW MDM: CPT

## 2023-10-27 PROCEDURE — 84443 ASSAY THYROID STIM HORMONE: CPT | Performed by: EMERGENCY MEDICINE

## 2023-10-27 PROCEDURE — 36415 COLL VENOUS BLD VENIPUNCTURE: CPT

## 2023-10-27 PROCEDURE — 80053 COMPREHEN METABOLIC PANEL: CPT | Performed by: EMERGENCY MEDICINE

## 2023-10-27 RX ORDER — CALCIUM CARBONATE 500 MG/1
4 TABLET, CHEWABLE ORAL ONCE
Status: COMPLETED | OUTPATIENT
Start: 2023-10-27 | End: 2023-10-27

## 2023-10-27 RX ORDER — AMLODIPINE BESYLATE 5 MG/1
5 TABLET ORAL ONCE
Status: COMPLETED | OUTPATIENT
Start: 2023-10-27 | End: 2023-10-27

## 2023-10-27 RX ORDER — LISINOPRIL 10 MG/1
20 TABLET ORAL
Status: DISCONTINUED | OUTPATIENT
Start: 2023-10-27 | End: 2023-10-27 | Stop reason: HOSPADM

## 2023-10-27 RX ADMIN — LISINOPRIL 20 MG: 10 TABLET ORAL at 10:35

## 2023-10-27 RX ADMIN — CALCIUM CARBONATE (ANTACID) CHEW TAB 500 MG 4 TABLET: 500 CHEW TAB at 07:21

## 2023-10-27 RX ADMIN — AMLODIPINE BESYLATE 5 MG: 5 TABLET ORAL at 10:35

## 2023-10-27 NOTE — TELEPHONE ENCOUNTER
Chart reviewed.  She had parathyroid surgery at  earlier this month.   ER note from today reviewed - she went to the ER due to elevated BP. She woke up with face tingling but took an extra Tums and it helped. Calcium was normal in the ER. Continue with Tums.  Thyroid labs (TSH) was just minimally low, which could mean she is getting too much thyroid medicine. It was normal last month. If she is not having any new palpitations, tremors, restlessness then I would just repeat the labs in 4 weeks. Orders added to chart.

## 2023-10-27 NOTE — TELEPHONE ENCOUNTER
PT CALLED STATING SHE HAD SURGERY AT  FOR HER THYROID EARLIER IN OCTOBER. PT STATED SHE WAS ADMITTED TO ER EARLIER TODAY AT  AND HAD LABS DRAWN. SHE REQUESTED A CALL BACK TO CONSULT. NO OPENINGS IN NEAR FUTURE.

## 2023-10-27 NOTE — TELEPHONE ENCOUNTER
Called the pt and read her the following:  She had parathyroid surgery at  earlier this month.   ER note from today reviewed - she went to the ER due to elevated BP. She woke up with face tingling but took an extra Tums and it helped. Calcium was normal in the ER. Continue with Tums.  Thyroid labs (TSH) was just minimally low, which could mean she is getting too much thyroid medicine. It was normal last month. If she is not having any new palpitations, tremors, restlessness then I would just repeat the labs in 4 weeks. Orders added to chart.    She is going to call back on Monday to be scheduled for [facility].

## 2023-10-27 NOTE — ED PROVIDER NOTES
Subjective   History of Present Illness  Mrs. Wu presents with tingling in her face and concerns about her blood pressure.  She had parathyroidectomy on  at [facility].  She tells me she immediately developed tingling in her face before she left the hospital.  It is symmetric.  It has been intermittent since the surgery.  She has been told that she is to take extra Tums when she experiences that.  She is currently taking 4 Tums 3 times a day.  She reports that she was awakened with indigestion and tingling early this morning.  She took Tums and the tingling went away but she checked her blood pressure and it was 197 systolic.  She tells me that scared her and she ultimately decided to come to the hospital.  She tells me she feels better now.  She denies any chronic cardiac problems although tells me numerous siblings have heart trouble.      Review of Systems    Past Medical History:   Diagnosis Date    Anemia     Anxiety     Atypical chest pain     Normal exercise Cardiolite stress test and echocardiogram in .    Atypical chest pain     Atypical chest pain     History of atypical chest pain: Echocardiogram, ; LVEF 90%. Abnormal calcium score, 2013. MPS, 2013: No inducible ischemia, LVEF 70%.    Diverticulosis     Diverticulosis     History of diverticulosis.    Diverticulosis of large intestine without hemorrhage 10/21/2016    Dyslipidemia     Former smoker 2016    GERD (gastroesophageal reflux disease)     GERD without esophagitis     Gout     Gout     History of gout.     4 para 4     Hypertension     Hypothyroidism     On chronic replacement therapy.    Hypothyroidism     Hypothyroidism/chronic replacement.    Iron deficiency anemia     Iron deficiency anemia.    Joint pain, knee     Menopause 10/9/2019    Mild anxiety     Mild chronic anxiety.    Nontoxic single thyroid nodule     Osteoarthritis     Pain, lower leg     Parathyroid adenoma     Pelvic organ  prolapse quantification stage 1 cystocele 10/20/2019    Follows with Dr. Felipe GYN evaluation 10/9 no specific treatment at this time.    Pharyngitis     Pulmonary nodule     CT scan of the chest of 08/14/2013 reports new noncalcified nodule in the right lung base measuring 8 x 5 mm and a stable noncalcified nodule in the left mid lung field measuring 4-5 mm, follow-up CT scan of 08/06/2014 reports interval resolution of the right basilar lung nodule and stable calcified nodule in the left upper lobe.    Rectocele 1/23/2017    Spondylolisthesis     Spondylolisthesis/degenerative disc disease.    Tobacco use     History of brief tobacco use, in the patient’s 20s:     Tobacco use     History of brief tobacco use, in the patient’s 20s: Noncalcified lung nodule found on chest CT, June 2013, at the left lung base and the mid left lung field.  Evaluation by Dr. Ivan Mota, August 2013 with serial CT scans. Chest CT, November 2013: Noncalcified nodules smaller than prior studies. Essentially “normal” CT of the chest, 08/06/2014.     Urinary tract infection     Vaginal candidiasis        Allergies   Allergen Reactions    Macrobid [Nitrofurantoin] Other (See Comments)     Tongue and mouth felt funny     Codeine Nausea And Vomiting    Euthyrox [Levothyroxine] Paresthesia     euthyrox brand    Lipitor [Atorvastatin] Myalgia    Lortab [Hydrocodone-Acetaminophen] Nausea And Vomiting       Past Surgical History:   Procedure Laterality Date    BACK SURGERY      CATARACT EXTRACTION Left     TONSILLECTOMY AND ADENOIDECTOMY      TONSILLECTOMY AND ADENOIDECTOMY      TOTAL HIP ARTHROPLASTY      TOTAL HIP ARTHROPLASTY Right     Right hip replacement.    TOTAL KNEE ARTHROPLASTY Right     TOTAL KNEE ARTHROPLASTY Left     TOTAL KNEE ARTHROPLASTY REVISION Left        Family History   Problem Relation Age of Onset    Aortic aneurysm Father     Other Father         ruptured AAA    Coronary artery disease Mother     Heart attack Mother      Heart disease Brother         CABG    Heart disease Brother     Coronary artery disease Brother     Other Brother         CABG    Multiple myeloma Brother     Heart disease Sister         Stent placement    Heart disease Sister         Stent placement    Pulmonary fibrosis Sister     Other Sister         2 sisters who have had cardiac stent placement    Breast cancer Maternal Aunt 35    Breast cancer Paternal Aunt 70    Ovarian cancer Neg Hx        Social History     Socioeconomic History    Marital status:    Tobacco Use    Smoking status: Former     Types: Cigarettes     Quit date: 1960     Years since quittin.8    Smokeless tobacco: Never    Tobacco comments:     Smoked briefly in her 20's   Vaping Use    Vaping Use: Never used   Substance and Sexual Activity    Alcohol use: No    Drug use: No    Sexual activity: Defer     Birth control/protection: Post-menopausal           Objective   Physical Exam  Vitals and nursing note reviewed.   Constitutional:       General: She is not in acute distress.     Appearance: Normal appearance.   HENT:      Head: Normocephalic and atraumatic.      Nose: Nose normal. No congestion or rhinorrhea.   Eyes:      General: No scleral icterus.     Conjunctiva/sclera: Conjunctivae normal.   Neck:      Comments: No JVD, scar over her thyroid is healing well with no erythema or significant swelling  Cardiovascular:      Rate and Rhythm: Normal rate and regular rhythm.      Heart sounds: No murmur heard.     No friction rub.   Pulmonary:      Effort: Pulmonary effort is normal.      Breath sounds: Normal breath sounds. No wheezing or rales.   Abdominal:      General: Bowel sounds are normal.      Palpations: Abdomen is soft.      Tenderness: There is no abdominal tenderness. There is no guarding or rebound.   Musculoskeletal:         General: No tenderness.      Cervical back: Normal range of motion and neck supple.      Right lower leg: No edema.      Left lower leg: No edema.    Skin:     General: Skin is warm and dry.      Coloration: Skin is not pale.      Findings: No erythema.   Neurological:      General: No focal deficit present.      Mental Status: She is alert and oriented to person, place, and time.      Motor: No weakness.      Coordination: Coordination normal.   Psychiatric:         Mood and Affect: Mood normal.         Behavior: Behavior normal.         Thought Content: Thought content normal.         Procedures           ED Course  ED Course as of 10/27/23 1409   Fri Oct 27, 2023   1028 Awaiting second troponin.  She is overdue for her morning blood pressure medicines, I have ordered those.  On repeat exam she is comfortable.  I spoke with her about findings thus far.  Symptoms possibly from elevated blood pressure or recent surgery on her neck.  As long as second troponin is reassuring will discharge [DT]   1119 Troponin not trending up significantly and remains low.  Will discharge [DT]   1132 Spoke with her and her daughter.  She tells me her blood pressure typically runs around 130 systolic, I do not think any changes in her medicines are indicated therefore.  She has an appointment on Monday which is 3 days from now with her surgeon at .  I recommended she discuss the tingling as I suspect its from the surgery.  Due to her family history will refer her to the chest pain clinic.  Her cardiologist is Dr. Saavedra with whom she has an appointment in December. [DT]      ED Course User Index  [DT] Luis Stephen MD                                           Medical Decision Making  Please see course notes.  I ordered and interpreted multiple labs.  I reviewed and interpreted records from [facility] regarding her recent parathyroidectomy on 1016.  I reviewed and interpreted an EKG and multiple blood tests including serial troponins.    Problems Addressed:  Elevated blood pressure reading with diagnosis of hypertension: complicated acute illness or injury  Facial  tingling: complicated acute illness or injury  Family history of coronary artery disease: chronic illness or injury    Amount and/or Complexity of Data Reviewed  Labs: ordered. Decision-making details documented in ED Course.  ECG/medicine tests: ordered. Decision-making details documented in ED Course.    Risk  OTC drugs.  Prescription drug management.        Final diagnoses:   Elevated blood pressure reading with diagnosis of hypertension   Facial tingling   Family history of coronary artery disease       ED Disposition  ED Disposition       ED Disposition   Discharge    Condition   Stable    Comment   --               [facility]  [address]  [address]  [place] [id number] [phone]        Salima Pretty MD  [address]  [address]  [place] [id number]  [phone]               Medication List      No changes were made to your prescriptions during this visit.            Luis Stephen MD  10/27/23 2838

## 2023-10-27 NOTE — DISCHARGE INSTRUCTIONS
Return if any concerns.  Monitor your blood pressure at home and report it to your primary care provider upon follow-up.  Return if you have weakness in any extremity or any other concerns.

## 2023-10-31 RX ORDER — BENAZEPRIL HYDROCHLORIDE 20 MG/1
TABLET ORAL
Qty: 90 TABLET | Refills: 3 | Status: SHIPPED | OUTPATIENT
Start: 2023-10-31

## 2023-11-01 ENCOUNTER — OFFICE VISIT (OUTPATIENT)
Dept: ENDOCRINOLOGY | Facility: CLINIC | Age: 83
End: 2023-11-01
Payer: MEDICARE

## 2023-11-01 ENCOUNTER — OFFICE VISIT (OUTPATIENT)
Dept: CARDIOLOGY | Facility: HOSPITAL | Age: 83
End: 2023-11-01
Payer: MEDICARE

## 2023-11-01 VITALS
BODY MASS INDEX: 25.8 KG/M2 | RESPIRATION RATE: 16 BRPM | DIASTOLIC BLOOD PRESSURE: 59 MMHG | SYSTOLIC BLOOD PRESSURE: 132 MMHG | TEMPERATURE: 97.6 F | HEART RATE: 83 BPM | OXYGEN SATURATION: 98 % | WEIGHT: 131.4 LBS | HEIGHT: 60 IN

## 2023-11-01 VITALS
DIASTOLIC BLOOD PRESSURE: 56 MMHG | SYSTOLIC BLOOD PRESSURE: 100 MMHG | WEIGHT: 130 LBS | BODY MASS INDEX: 25.52 KG/M2 | HEIGHT: 60 IN | HEART RATE: 70 BPM

## 2023-11-01 DIAGNOSIS — I10 PRIMARY HYPERTENSION: ICD-10-CM

## 2023-11-01 DIAGNOSIS — R07.89 OTHER CHEST PAIN: ICD-10-CM

## 2023-11-01 DIAGNOSIS — E21.3 HYPERPARATHYROIDISM: Primary | ICD-10-CM

## 2023-11-01 DIAGNOSIS — E03.9 ACQUIRED HYPOTHYROIDISM: ICD-10-CM

## 2023-11-01 DIAGNOSIS — I35.0 NONRHEUMATIC AORTIC VALVE STENOSIS: ICD-10-CM

## 2023-11-01 DIAGNOSIS — I25.10 CORONARY ARTERY DISEASE INVOLVING NATIVE CORONARY ARTERY OF NATIVE HEART WITHOUT ANGINA PECTORIS: Primary | ICD-10-CM

## 2023-11-01 LAB
ALBUMIN SERPL-MCNC: 4.3 G/DL (ref 3.5–5.2)
ANION GAP SERPL CALCULATED.3IONS-SCNC: 8 MMOL/L (ref 5–15)
BUN SERPL-MCNC: 15 MG/DL (ref 8–23)
BUN/CREAT SERPL: 17 (ref 7–25)
CA-I BLD-MCNC: 5.6 MG/DL (ref 4.6–5.4)
CA-I SERPL ISE-MCNC: 1.39 MMOL/L (ref 1.15–1.35)
CALCIUM SPEC-SCNC: 10.3 MG/DL (ref 8.6–10.5)
CHLORIDE SERPL-SCNC: 106 MMOL/L (ref 98–107)
CO2 SERPL-SCNC: 26 MMOL/L (ref 22–29)
CREAT SERPL-MCNC: 0.88 MG/DL (ref 0.57–1)
EGFRCR SERPLBLD CKD-EPI 2021: 64.9 ML/MIN/1.73
GLUCOSE SERPL-MCNC: 132 MG/DL (ref 65–99)
MAGNESIUM SERPL-MCNC: 2.2 MG/DL (ref 1.6–2.4)
PHOSPHATE SERPL-MCNC: 2.8 MG/DL (ref 2.5–4.5)
POTASSIUM SERPL-SCNC: 4.8 MMOL/L (ref 3.5–5.2)
SODIUM SERPL-SCNC: 140 MMOL/L (ref 136–145)

## 2023-11-01 PROCEDURE — 3078F DIAST BP <80 MM HG: CPT | Performed by: INTERNAL MEDICINE

## 2023-11-01 PROCEDURE — 82330 ASSAY OF CALCIUM: CPT | Performed by: INTERNAL MEDICINE

## 2023-11-01 PROCEDURE — 84443 ASSAY THYROID STIM HORMONE: CPT | Performed by: INTERNAL MEDICINE

## 2023-11-01 PROCEDURE — 82306 VITAMIN D 25 HYDROXY: CPT | Performed by: INTERNAL MEDICINE

## 2023-11-01 PROCEDURE — 83735 ASSAY OF MAGNESIUM: CPT | Performed by: INTERNAL MEDICINE

## 2023-11-01 PROCEDURE — 36415 COLL VENOUS BLD VENIPUNCTURE: CPT | Performed by: INTERNAL MEDICINE

## 2023-11-01 PROCEDURE — 3074F SYST BP LT 130 MM HG: CPT | Performed by: INTERNAL MEDICINE

## 2023-11-01 PROCEDURE — 99214 OFFICE O/P EST MOD 30 MIN: CPT | Performed by: INTERNAL MEDICINE

## 2023-11-01 PROCEDURE — 80069 RENAL FUNCTION PANEL: CPT | Performed by: INTERNAL MEDICINE

## 2023-11-01 PROCEDURE — 84439 ASSAY OF FREE THYROXINE: CPT | Performed by: INTERNAL MEDICINE

## 2023-11-01 RX ORDER — ACETAMINOPHEN 500 MG
1000 TABLET ORAL EVERY 6 HOURS PRN
COMMUNITY

## 2023-11-01 RX ORDER — CALCIUM CARBONATE 500 MG/1
2000 TABLET, CHEWABLE ORAL 3 TIMES DAILY
COMMUNITY
Start: 2023-10-17 | End: 2023-12-16

## 2023-11-01 RX ORDER — LANOLIN ALCOHOL/MO/W.PET/CERES
400 CREAM (GRAM) TOPICAL DAILY
COMMUNITY

## 2023-11-01 NOTE — PROGRESS NOTES
Hyperthyroidism    Subjective   Leslee Wu is a 83 y.o. female is here today for follow-up and ultrasound.  Hypercalcemia and primary hyperparathyroidism. She has seen parathyroid surgeon and endocrinologist in 2000 and deferred surgery at that time.     BMD 2021: T-score -1.2 at the hip and - 1.6 at the femoral neck   Herpid calcium level and PTH increased over the following years and she was resurged third for surgery when her calcium level was 12.1.  Patient underwent parathyroidectomy 3-1/2 glands, her postoperative.  Was complicated by tingling in the face for which she was started on calcium carbonate 2000 mg 3 times a day and vitamin D 2000 units daily.  Her calcium level postoperatively was 9.2  Today she has reduced the calcium to 2000 mg twice a day with no change in the symptoms.  Patient has been in the ER 4 days ago because of elevated blood pressure and headache.  She had borderline troponins, unremarkable EKG and normal calcium level.  She complains of the headache at today's appointment and continues having some tingling of the face  Labs during ER visit showed abnormal TSH of 0.24      Hypothyroidism dx in 1995, on synthroid 75 mcg.         Medications:    Current Outpatient Medications:     acetaminophen (TYLENOL) 500 MG tablet, Take 2 tablets by mouth Every 6 (Six) Hours As Needed for Mild Pain (for arthritis)., Disp: , Rfl:     amLODIPine (NORVASC) 5 MG tablet, Take 1 tablet by mouth Daily., Disp: 30 tablet, Rfl: 11    aspirin 81 MG EC tablet, Take 1 tablet by mouth Daily., Disp: , Rfl:     Bacillus Coagulans-Inulin (Probiotic) 1-250 BILLION-MG capsule, Take  by mouth., Disp: , Rfl:     benazepril (LOTENSIN) 20 MG tablet, Take 1 tablet by mouth once daily, Disp: 90 tablet, Rfl: 3    calcium carbonate (TUMS) 500 MG chewable tablet, Chew 4 tablets 3 (Three) Times a Day., Disp: , Rfl:     Cholecalciferol (VITAMIN D3) 50 MCG (2000 UT) capsule, Take 800 Units by mouth Daily., Disp: , Rfl:      "levothyroxine (SYNTHROID, LEVOTHROID) 75 MCG tablet, Take 1 tablet by mouth once daily, Disp: 90 tablet, Rfl: 0    Magnesium Oxide -Mg Supplement 400 (240 Mg) MG tablet, Take 1 tablet by mouth Daily., Disp: , Rfl:     Misc Natural Products (OSTEO BI-FLEX JOINT SHIELD PO), Take  by mouth Daily., Disp: , Rfl:     pantoprazole (PROTONIX) 40 MG EC tablet, Take 1 tablet by mouth once daily, Disp: 90 tablet, Rfl: 0    Review of Systems   Constitutional:  Positive for fatigue.   Musculoskeletal:  Positive for arthralgias (knee pain ) and back pain.   Neurological:  Positive for numbness and headaches.   All other systems reviewed and are negative.      Objective   /56   Pulse 70   Ht 152.4 cm (60\")   Wt 59 kg (130 lb)   LMP  (LMP Unknown)   BMI 25.39 kg/m²    Physical Exam   Constitutional: She is oriented to person, place, and time. She appears well-developed.   HENT:   Head: Normocephalic and atraumatic.   Eyes: Conjunctivae are normal.   Neck:   Postop scar is in good condition   Cardiovascular: Normal rate, regular rhythm, normal heart sounds and normal pulses.   Pulmonary/Chest: Effort normal and breath sounds normal.   Neurological: She is alert and oriented to person, place, and time.   Psychiatric: Thought content normal.   Vitals reviewed.      Results for orders placed or performed during the hospital encounter of 10/27/23   Comprehensive Metabolic Panel    Specimen: Arm, Left; Blood   Result Value Ref Range    Glucose 105 (H) 65 - 99 mg/dL    BUN 11 8 - 23 mg/dL    Creatinine 0.72 0.57 - 1.00 mg/dL    Sodium 141 136 - 145 mmol/L    Potassium 3.9 3.5 - 5.2 mmol/L    Chloride 106 98 - 107 mmol/L    CO2 26.0 22.0 - 29.0 mmol/L    Calcium 9.3 8.6 - 10.5 mg/dL    Total Protein 6.5 6.0 - 8.5 g/dL    Albumin 3.7 3.5 - 5.2 g/dL    ALT (SGPT) 11 1 - 33 U/L    AST (SGOT) 18 1 - 32 U/L    Alkaline Phosphatase 120 (H) 39 - 117 U/L    Total Bilirubin 0.2 0.0 - 1.2 mg/dL    Globulin 2.8 gm/dL    A/G Ratio 1.3 g/dL "    BUN/Creatinine Ratio 15.3 7.0 - 25.0    Anion Gap 9.0 5.0 - 15.0 mmol/L    eGFR 83.1 >60.0 mL/min/1.73   CBC Auto Differential    Specimen: Blood   Result Value Ref Range    WBC 4.88 3.40 - 10.80 10*3/mm3    RBC 4.46 3.77 - 5.28 10*6/mm3    Hemoglobin 13.0 12.0 - 15.9 g/dL    Hematocrit 41.5 34.0 - 46.6 %    MCV 93.0 79.0 - 97.0 fL    MCH 29.1 26.6 - 33.0 pg    MCHC 31.3 (L) 31.5 - 35.7 g/dL    RDW 13.8 12.3 - 15.4 %    RDW-SD 47.1 37.0 - 54.0 fl    MPV 10.6 6.0 - 12.0 fL    Platelets 270 140 - 450 10*3/mm3    Neutrophil % 69.5 42.7 - 76.0 %    Lymphocyte % 16.4 (L) 19.6 - 45.3 %    Monocyte % 10.7 5.0 - 12.0 %    Eosinophil % 2.0 0.3 - 6.2 %    Basophil % 1.2 0.0 - 1.5 %    Immature Grans % 0.2 0.0 - 0.5 %    Neutrophils, Absolute 3.39 1.70 - 7.00 10*3/mm3    Lymphocytes, Absolute 0.80 0.70 - 3.10 10*3/mm3    Monocytes, Absolute 0.52 0.10 - 0.90 10*3/mm3    Eosinophils, Absolute 0.10 0.00 - 0.40 10*3/mm3    Basophils, Absolute 0.06 0.00 - 0.20 10*3/mm3    Immature Grans, Absolute 0.01 0.00 - 0.05 10*3/mm3    nRBC 0.0 0.0 - 0.2 /100 WBC   High Sensitivity Troponin T    Specimen: Arm, Left; Blood   Result Value Ref Range    HS Troponin T 24 (H) <10 ng/L   Calcium, Ionized    Specimen: Blood   Result Value Ref Range    Ionized Calcium 1.32 1.12 - 1.32 mmol/L   TSH    Specimen: Arm, Left; Blood   Result Value Ref Range    TSH 0.244 (L) 0.270 - 4.200 uIU/mL   High Sensitivity Troponin T 2Hr    Specimen: Blood   Result Value Ref Range    HS Troponin T 25 (H) <10 ng/L    Troponin T Delta 1 >=-4 - <+4 ng/L   ECG 12 Lead Stroke Evaluation   Result Value Ref Range    QT Interval 388 ms    QTC Interval 453 ms        Assessment/Plan:    Problem List Items Addressed This Visit          Other    Hyperparathyroidism - Primary    Acquired hypothyroidism    Overview     Description: A.  On chronic replacement therapy.        Primary hyperparathyroidism -s/p parathyroidectomy.   Calcium 2000 mg BID and Vit D 2000 IU daily.    Magnesium 200 mg daily.         Hypothyroidism   Cont  levothyroxine 75, levels were high and sampel of 50 mcg Synthroid given.     Her sx of tingling of the face, BP fluctuation and headaches could be a sign of hyperthyroidism.   Repeat thyroid levels and calcium.

## 2023-11-01 NOTE — PROGRESS NOTES
Chief Complaint  Coronary Artery Disease, Numbness, and Hypertension    Subjective      History of Present Illness {CC  Problem List  Visit  Diagnosis   Encounters  Notes  Medications  Labs  Result Review Imaging  Media :23}     Leslee Wu, 83 y.o. female with past medical history significant for anemia, anxiety, atypical chest pain, diverticulosis, hyperlipidemia, GERD, gout, hypertension, hypothyroidism, and pulmonary nodule, who presents to [facility] for Coronary Artery Disease, Numbness, and Hypertension. Patient was seen and evaluated at the ED on 10/27/2023 with chief complaint of facial tingling. Patient stated she underwent parathyroidectomy on 10/16/2023 at [facility]. Since that time, she has been having facial numbness which is symmetrical.  She was encouraged to take extra times when she experienced that sensation.  Prior to presentation to the ED, patient checked her blood pressure at home which showed 197 systolic. 12 lead EKG showed normal sinus rhythm, rate 82, normal EKG. high-sensitivity troponin initially 24 with repeat of 25.  CBC, CMP, and ionized calcium all noted to be within normal limits.    Since time of evaluation in ED, she has followed up with  for parathyroid follow-up.  Patient was told at that time that her facial tingling is likely related to her normalized calcium levels and that should improve over time.    Patient has had one episode of chest pressure which occurred while laying down. This was located in the center of her chest without radiation. Patient states she does have reported GERD, but this felt differently. Currently denies shortness of air, syncope, palpitations, and lower extremity edema. Does endorse dizziness which is not associated to position changes, and also fatigue. She is checking blood pressure before medications which has been elevated.     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 Saavedra with cardiology and was last seen in November 2022. He recommended echocardiogram at time of next visit    Caffeine intake of 3-4 cups of coffee daily  Water intake 4 botlles of water daily  No ETOH, nictoine    Mother with CAD at 73  Siblings with CABG/CAD with PCI in the 50's-60's.       Objective     Vital Signs:   Vitals:    11/01/23 1107 11/01/23 1108 11/01/23 1109   BP: 126/62 127/62 132/59   BP Location: Right arm Left arm Left arm   Patient Position: Sitting Standing Sitting   Cuff Size: Adult Adult Adult   Pulse: 84 95 83   Resp:   16   Temp: 97.6 °F (36.4 °C) 97.6 °F (36.4 °C) 97.6 °F (36.4 °C)   TempSrc:   Temporal   SpO2: 96% 99% 98%   Weight:   59.6 kg (131 lb 6.4 oz)   Height:   152.4 cm (60\")     Body mass index is 25.66 kg/m².  Physical Exam  Vitals and nursing note reviewed.   Constitutional:       Appearance: Normal appearance.   HENT:      Head: Normocephalic.   Eyes:      Pupils: Pupils are equal, round, and reactive to light.   Cardiovascular:      Rate and Rhythm: Normal rate and regular rhythm.      Pulses: Normal pulses.      Heart sounds: Normal heart sounds. No murmur heard.  Pulmonary:      Effort: Pulmonary effort is normal.      Breath sounds: Normal breath sounds.   Abdominal:      General: Bowel sounds are normal.      Palpations: Abdomen is soft.   Musculoskeletal:         General: Normal range of motion.      Cervical back: Normal range of motion.      Right lower leg: No edema.      Left lower leg: No edema.   Skin:     General: Skin is warm and dry.      Capillary Refill: Capillary refill takes less than 2 seconds.   Neurological:      Mental Status: She is alert and oriented to person, place, and time.   Psychiatric:         Mood and Affect: Mood normal.         Thought Content: Thought content normal.          Data Reviewed:{ Labs  Result Review  Imaging  Med Tab  Media :23}   Lab Results   Component Value Date    WBC 4.88 " 10/27/2023    HGB 13.0 10/27/2023    HCT 41.5 10/27/2023    MCV 93.0 10/27/2023     10/27/2023      Lab Results   Component Value Date    GLUCOSE 105 (H) 10/27/2023    BUN 11 10/27/2023    CREATININE 0.72 10/27/2023    EGFR 83.1 10/27/2023    BCR 15.3 10/27/2023    K 3.9 10/27/2023    CO2 26.0 10/27/2023    CALCIUM 9.3 10/27/2023    PROTENTOTREF 6.9 08/16/2023    ALBUMIN 3.7 10/27/2023    BILITOT 0.2 10/27/2023    AST 18 10/27/2023    ALT 11 10/27/2023      Lab Results   Component Value Date    TROPONINT 25 (H) 10/27/2023      Lab Results   Component Value Date    CHOL 163 09/11/2023    CHLPL 177 12/11/2015    TRIG 88 09/11/2023    HDL 55 09/11/2023    LDL 92 09/11/2023    ECG 12 Lead Stroke Evaluation (10/27/2023 06:51)         Assessment & Plan   Assessment and Plan {CC Problem List  Visit Diagnosis  ROS  Review (Popup)  Health Maintenance  Quality  BestPractice  Medications  SmartSets  SnapShot Encounters  Media :23}     1. Coronary artery disease involving native coronary artery of native heart without angina pectoris  -Patient believes she is due for a stress test since her most recent stress test was many years prior  -Patient does have significant family history of coronary artery disease, and is now also having anginal equivalent symptoms  -Will obtain PET stress test to assess for any ischemic causes for patient's symptoms  - Adult Transthoracic Echo Complete W/ Cont if Necessary Per Protocol; Future  - Stress Test With Pet Myocardial Perfusion; Future  -Patient to follow up with Dr. Saavedra as scheduled in approximately 1 month    2. Nonrheumatic aortic valve stenosis  -Will obtain echocardiogram to assess for new/existing abnormalities   - Adult Transthoracic Echo Complete W/ Cont if Necessary Per Protocol; Future  - Stress Test With Pet Myocardial Perfusion; Future    3. Primary hypertension  -Blood pressure currently well controlled  -Patient to continue benazepril at 20mg daily and  amlodipine 5mg daily  -Encouraged patient to behind taking blood pressure readings after medication, and keep documentation of readings  - Adult Transthoracic Echo Complete W/ Cont if Necessary Per Protocol; Future  - Stress Test With Pet Myocardial Perfusion; Future  -Patient to follow up with Dr. Saavedra as scheduled in approximately 1 month    4. Chest pain  -Chest pain is atypical in nature, however this is concerning to patient and she feels as though she is due for a stress test.   -She has significant family history for coronary artery disease  -Will obtain PET stress test to assess for any ischemic causes of pain      Follow Up {Instructions Charge Capture  Follow-up Communications :23}     Return if symptoms worsen or fail to improve.      Patient was given instructions and counseling regarding her condition or for health maintenance advice. Please see specific information pulled into the AVS if appropriate.  Patient was instructed to call the [facility] with any questions, concerns, or worsening symptoms.    Dictated Utilizing Dragon Dictation   Please note that portions of this note were completed with a voice recognition program.   Part of this note may be an electronic transcription/translation of spoken language to printed text using the Dragon Dictation System.

## 2023-11-02 LAB
25(OH)D3 SERPL-MCNC: 55 NG/ML (ref 30–100)
T4 FREE SERPL-MCNC: 1.49 NG/DL (ref 0.93–1.7)
TSH SERPL DL<=0.05 MIU/L-ACNC: 0.49 UIU/ML (ref 0.27–4.2)

## 2023-11-03 ENCOUNTER — HOSPITAL ENCOUNTER (OUTPATIENT)
Dept: BONE DENSITY | Facility: HOSPITAL | Age: 83
Discharge: HOME | End: 2023-11-03
Admitting: NURSE PRACTITIONER
Payer: MEDICARE

## 2023-11-03 DIAGNOSIS — Z78.0 POST-MENOPAUSAL: ICD-10-CM

## 2023-11-03 DIAGNOSIS — Z13.820 SCREENING FOR OSTEOPOROSIS: ICD-10-CM

## 2023-11-03 PROCEDURE — 77080 DXA BONE DENSITY AXIAL: CPT

## 2023-11-07 ENCOUNTER — OFFICE VISIT (OUTPATIENT)
Dept: INTERNAL MEDICINE | Facility: CLINIC | Age: 83
End: 2023-11-07
Payer: MEDICARE

## 2023-11-07 VITALS
WEIGHT: 131 LBS | TEMPERATURE: 97.5 F | BODY MASS INDEX: 25.72 KG/M2 | HEIGHT: 60 IN | DIASTOLIC BLOOD PRESSURE: 82 MMHG | RESPIRATION RATE: 18 BRPM | HEART RATE: 68 BPM | SYSTOLIC BLOOD PRESSURE: 130 MMHG

## 2023-11-07 DIAGNOSIS — E21.3 HYPERPARATHYROIDISM: Primary | ICD-10-CM

## 2023-11-07 DIAGNOSIS — I10 ESSENTIAL HYPERTENSION: ICD-10-CM

## 2023-11-07 DIAGNOSIS — E03.9 ACQUIRED HYPOTHYROIDISM: ICD-10-CM

## 2023-11-07 RX ORDER — AMLODIPINE BESYLATE 10 MG/1
10 TABLET ORAL DAILY
Qty: 30 TABLET | Refills: 2 | Status: SHIPPED | OUTPATIENT
Start: 2023-11-07

## 2023-11-07 NOTE — PROGRESS NOTES
Chief Complaint  Hypertension (Hospital follow up.)    Subjective          Leslee Wu presents to [facility]  Hypertension      The patient presents today for a hospital follow-up.    Hypertension  On 10/27/2023 her blood pressure was 198/111 mmHg at 4:00 AM. It did not lower, so she called 911 and went to the emergency room. Ever since her blood pressure has been elevated. Since the parathyroidectomy, her blood pressure has been fluctuating. This morning her blood pressure was elevated, but at the office it was normal. Her blood pressure typically runs 130 systolic. Blood pressure log reveals systolic readings of 148, 143, 156, 164, and 151. She was referred to the chest pain clinic to be evaluated due to family history. The patient was seen by the chest pain clinic. On 11/21/2023 she is scheduled for a nuclear medicine stress test with PET myocardial perfusion and a echocardiogram. Her blood pressure fluctuates at home. Her second troponin results were reassuring. It was not trending up significantly and remained low. Her amlodipine used to be 10 mg then it decreased. Her blood pressure was 100/56 mmHg during her visit with Dr. Pretty on 11/01/2023.    Tingling  A parathyroidectomy was completed on 10/16/2023. Her endocrinologist wanted her to do a calcium and wean. The wean provides instructions if she does not experience any numbness or tingling, she may reduce the dose every 5 days in increments. The patient has been experiencing tingling and it makes her head feel full. She is weaning herself off calcium. The tingling has not resolved, and her face feels flushed. The sensation feels like when a foot falls asleep, but it is constant. She reports going tapering down to the neck dose. The patient was on the first one up until Friday for 5 days. Yesterday, 11/06/2023, she was on 1000 mcg.    Hypothyroidism  On 11/01/2023 she saw Dr. Pretty and she lowered her  Synthroid to 50 mcg.  Instead of 75 mcg. She has been taking it since last Wednesday, 11/01/2023. The patient is not sure if she needs a new prescription for the 50 mcg. Per her last visit with Dr. Pretty the note states continue levothyroxine 75 mcg, levels were high and a sample of 50 mcg Synthroid was given. Her symptoms of tingling of the face, blood pressure fluctuation and headaches are a sign of high thyroid, repeat thyroid levels and calcium. She was given 2 samples of Synthroid 50 mcg. Calcium was elevated so she will reduce her dose of calcium by 1000mg twice daily. Continue the same vitamin D. Labs on 11/01/2023 revealed normal thyroid, magnesium, and kidney function levels. The patient has not spoken to Dr. Pretty since her last visit.    Surgery  Last week the patient saw SANDOVAL Willis and it was after surgery.    Anxiety  The patient reports she has been experiencing anxiety.    Health maintenance  No new angina, dyspnea, headaches, visual changes, dizziness, palpitations, syncope, swelling in the lower extremities, dyspnea when laying down, abdominal pain, constipation, hematuria, hematochezia, or dysuria.        Current Outpatient Medications:     acetaminophen (TYLENOL) 500 MG tablet, Take 2 tablets by mouth Every 6 (Six) Hours As Needed for Mild Pain (for arthritis)., Disp: , Rfl:     amLODIPine (NORVASC) 10 MG tablet, Take 1 tablet by mouth Daily., Disp: 30 tablet, Rfl: 2    aspirin 81 MG EC tablet, Take 1 tablet by mouth Daily., Disp: , Rfl:     Bacillus Coagulans-Inulin (Probiotic) 1-250 BILLION-MG capsule, Take  by mouth., Disp: , Rfl:     benazepril (LOTENSIN) 20 MG tablet, Take 1 tablet by mouth once daily, Disp: 90 tablet, Rfl: 3    calcium carbonate (TUMS) 500 MG chewable tablet, Chew 4 tablets 3 (Three) Times a Day., Disp: , Rfl:     Cholecalciferol (VITAMIN D3) 50 MCG (2000 UT) capsule, Take 800 Units by mouth Daily., Disp: , Rfl:     levothyroxine (SYNTHROID, LEVOTHROID) 75 MCG  "tablet, Take 1 tablet by mouth once daily, Disp: 90 tablet, Rfl: 0    Magnesium Oxide -Mg Supplement 400 (240 Mg) MG tablet, Take 1 tablet by mouth Daily., Disp: , Rfl:     Misc Natural Products (OSTEO BI-FLEX JOINT SHIELD PO), Take  by mouth Daily., Disp: , Rfl:     pantoprazole (PROTONIX) 40 MG EC tablet, Take 1 tablet by mouth once daily, Disp: 90 tablet, Rfl: 0         Objective   No new angina, dyspnea, headaches, visual changes, dizziness, palpitations, syncope, swelling in the lower extremities, dyspnea when laying down, abdominal pain, constipation, hematuria, hematochezia, or dysuria.  Vital Signs:   /82 (BP Location: Right arm, Patient Position: Sitting, Cuff Size: Adult)   Pulse 68   Temp 97.5 °F (36.4 °C) (Infrared)   Resp 18   Ht 152.4 cm (60\")   Wt 59.4 kg (131 lb)   BMI 25.58 kg/m²     Physical Exam  Vitals and nursing note reviewed.   Constitutional:       General: She is not in acute distress.     Appearance: Normal appearance. She is well-developed. She is not ill-appearing.   HENT:      Head: Normocephalic and atraumatic.   Eyes:      General: No scleral icterus.  Neck:      Thyroid: No thyromegaly.   Cardiovascular:      Rate and Rhythm: Normal rate and regular rhythm.   Pulmonary:      Effort: Pulmonary effort is normal.      Breath sounds: Normal breath sounds.   Abdominal:      General: Bowel sounds are normal. There is no distension.      Palpations: Abdomen is soft.      Tenderness: There is no abdominal tenderness.   Lymphadenopathy:      Cervical: No cervical adenopathy.   Skin:     Capillary Refill: Capillary refill takes 2 to 3 seconds.      Coloration: Skin is not pale.   Neurological:      Mental Status: She is alert and oriented to person, place, and time.   Psychiatric:         Mood and Affect: Mood normal.         Behavior: Behavior normal.        Result Review :                 Assessment and Plan    Diagnoses and all orders for this visit:    1. Hyperparathyroidism " (Primary)    2. Acquired hypothyroidism    3. Essential hypertension    Other orders  -     amLODIPine (NORVASC) 10 MG tablet; Take 1 tablet by mouth Daily.  Dispense: 30 tablet; Refill: 2      Acquired hypothyroidism  -     Continue Synthroid 75 mcg daily.    Essential hypertension  -     We will increase her Norvasc to 10 mg daily.  We will monitor how she tolerates dosage change.  -     Continue to monitor her blood pressure at home.  -     She will bring her blood pressure cuff to her next visit.    Numbness and tingling  -     She will follow up with her endocrinologist, Dr. Pretty, in 6 months.  -     Advised to contact Dr. Pretty if numbness and tingling has not resolved in 5 days.  -     Advised the patient to not go further with the medication until she contacts endocrinology.    Health maintenance  -     We will maintain appointment scheduled in 03/2023.    Follow Up   Return if symptoms worsen or fail to improve.  Patient was given instructions and counseling regarding her condition or for health maintenance advice. Please see specific information pulled into the AVS if appropriate.     RTC/call  If symptoms worsen  Meds MOA and SE's reviewed and pt v/u    [facility]  [address] [address]  Fax [phone]  Phone [phone]    Transcribed from ambient dictation for SANDOVAL Lucas by Colin Galvez.  11/07/23   10:09 EST    Patient or patient representative verbalized consent to the visit recording.  I have personally performed the services described in this document as transcribed by the above individual, and it is both accurate and complete.

## 2023-11-08 ENCOUNTER — TELEPHONE (OUTPATIENT)
Dept: ENDOCRINOLOGY | Facility: CLINIC | Age: 83
End: 2023-11-08
Payer: MEDICARE

## 2023-11-08 NOTE — TELEPHONE ENCOUNTER
Spoke with patient about scheduling a follow up. She stated she needed to get in to see Dr. Sukhwinder GUZMÁN for her face tingling? Dr. Pretty does she need to come back soon and see you for this? Looks like she was just seen on 11/1/23. Please advise.

## 2023-11-10 RX ORDER — LEVOTHYROXINE SODIUM 0.05 MG/1
50 TABLET ORAL DAILY
Qty: 30 TABLET | Refills: 6 | Status: SHIPPED | OUTPATIENT
Start: 2023-11-10

## 2023-11-10 NOTE — TELEPHONE ENCOUNTER
And I can fir her in for appointment in 1-2 weeks, but I would also like her to call her other doctors as well. It could be cardiology issues. Pleas bebetok if she has preference on time of the day or day of appt

## 2023-11-10 NOTE — TELEPHONE ENCOUNTER
PT CALLED STATING HER FACE IS STILL TINGLING AND IT OFTEN GOES INTO HER HEAD. PT STATED HER BP HAS BEEN RUNNING 173/89 /88 TODAY. I ENCOURAGED PT TO REACH OUT TO SURGEON WHO PERFORMED HER THYROID SURGERY AND HER CARDIOLOGIST. PT REQUESTED A CALL BACK TO CONSULT. SHE SEEMED VERY CONCERNED.

## 2023-11-10 NOTE — TELEPHONE ENCOUNTER
I talked to the patient that she reported tingling in the face located to the back of the head, fluctuation in the blood pressure.  She thinks the symptoms started shortly after she reduced the dose of calcium.  Her preappointment calcium dose was 2000 mg twice a day.  After labs she was told to reduce to 1000 mg twice a day.   Current recommendations are to take 3000 mg of calcium in a day, divided in 2-3 doses.   Continue levothyroxine 50 mcg daily.   I have advised the patient to call cardiologist and reschedule appointment for earlier since her blood pressure has been fluctuating significantly.  Her symptoms could be related to cardiac issues rather than calcium, since the calcium level was normal.  I have scheduled patient appointment in 1 week

## 2023-11-10 NOTE — TELEPHONE ENCOUNTER
So did she reduce the dose of her thyroid medication and is taking 50 mcg now? I would continue with that dose since she had elevated BP and her levels were high last time. Did she reduce calcium dose few days ago and the tingling worsened?   I would recommend to monitor BP, continue 50 mcg levothyroxine, take calcium 1000 mg twice a day and call cardiologist. It doesn't appear to be calcium related problem because her calcium is normal.

## 2023-11-14 ENCOUNTER — TELEPHONE (OUTPATIENT)
Dept: ENDOCRINOLOGY | Facility: CLINIC | Age: 83
End: 2023-11-14
Payer: MEDICARE

## 2023-11-14 NOTE — TELEPHONE ENCOUNTER
----- Message from Salima LONG MD sent at 11/13/2023  6:18 PM EST -----  Please call patient and ask how she is doing: is her symptoms improved with higher calcium dose?   I have received a message from Dr Saavedra and he found appointment for her on the same time as my appointment. I would like Leslee to see DR Saavedra and I can reschedule your appointment with me to Nov 29 at 10 am.

## 2023-11-14 NOTE — TELEPHONE ENCOUNTER
Spoke to pt- read her the message.  She states she spoke with zoey and was told to come in on Friday.

## 2023-11-17 ENCOUNTER — OFFICE VISIT (OUTPATIENT)
Dept: CARDIOLOGY | Facility: CLINIC | Age: 83
End: 2023-11-17
Payer: MEDICARE

## 2023-11-17 ENCOUNTER — OFFICE VISIT (OUTPATIENT)
Dept: ENDOCRINOLOGY | Facility: CLINIC | Age: 83
End: 2023-11-17
Payer: MEDICARE

## 2023-11-17 VITALS
DIASTOLIC BLOOD PRESSURE: 68 MMHG | WEIGHT: 132 LBS | BODY MASS INDEX: 25.91 KG/M2 | SYSTOLIC BLOOD PRESSURE: 120 MMHG | HEIGHT: 60 IN | HEART RATE: 69 BPM | OXYGEN SATURATION: 98 %

## 2023-11-17 VITALS
SYSTOLIC BLOOD PRESSURE: 110 MMHG | WEIGHT: 133 LBS | DIASTOLIC BLOOD PRESSURE: 62 MMHG | BODY MASS INDEX: 26.11 KG/M2 | HEART RATE: 71 BPM | HEIGHT: 60 IN

## 2023-11-17 DIAGNOSIS — I10 ESSENTIAL HYPERTENSION: Primary | ICD-10-CM

## 2023-11-17 DIAGNOSIS — E03.9 ACQUIRED HYPOTHYROIDISM: ICD-10-CM

## 2023-11-17 DIAGNOSIS — R07.89 OTHER CHEST PAIN: ICD-10-CM

## 2023-11-17 DIAGNOSIS — E21.3 HYPERPARATHYROIDISM: Primary | ICD-10-CM

## 2023-11-17 DIAGNOSIS — R20.2 TINGLING OF FACE: ICD-10-CM

## 2023-11-17 PROCEDURE — 3078F DIAST BP <80 MM HG: CPT | Performed by: INTERNAL MEDICINE

## 2023-11-17 PROCEDURE — 99214 OFFICE O/P EST MOD 30 MIN: CPT | Performed by: INTERNAL MEDICINE

## 2023-11-17 PROCEDURE — 3074F SYST BP LT 130 MM HG: CPT | Performed by: INTERNAL MEDICINE

## 2023-11-17 PROCEDURE — 82330 ASSAY OF CALCIUM: CPT | Performed by: INTERNAL MEDICINE

## 2023-11-17 PROCEDURE — 36415 COLL VENOUS BLD VENIPUNCTURE: CPT | Performed by: INTERNAL MEDICINE

## 2023-11-17 PROCEDURE — 80069 RENAL FUNCTION PANEL: CPT | Performed by: INTERNAL MEDICINE

## 2023-11-17 PROCEDURE — 83970 ASSAY OF PARATHORMONE: CPT | Performed by: INTERNAL MEDICINE
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
## 2023-11-17 NOTE — PROGRESS NOTES
"[facility] Cardiology  Office Progress Note  Leslee ARANDA Bryce  1940  [address] [facility] [address]       Visit Date: 11/17/23    PCP: Josephine Johnson, APRN  [address] [facility] [address]  [facility] [address]    IDENTIFICATION: A 83 y.o. female former Will patient     PROBLEM LIST:   1.   History of atypical chest pain  A. Echocardiogram, 2010: LVEF 90%  B. Abnormal calcium score, August 2013.  C. MPS, 08/2013: No inducible ischemia, LVEF 70%  Lipid Status 9/23 163/88/55/92  2.   Hypertension  3.   Hypothyroidism/chronic replacement  4.   GERD  5.   History of brief tobacco use, in patient's 20s:  A. Essentially \"normal\" CT of the chest, 08/06/2014.  6.  Hyperparathyroidism followed per Dr. Sukhwinder GO S/p parathyroidectomy 10/23 at   7.   Iron deficiency anemia.  8.   Spondylolisthesis/degenerative disc disease.  9.   Mild chronic anxiety.  10. History of diverticulosis  11. History of gout.  12. Left lower extremity ?DVT October 2019    A. LE venous duplex 10/17/19: discordant data superficial vs DVT   B. LE venous duplex - normal, 11/15/2019  12. Surgical History:                        A. Back surgery                        B. Right TKR.                        C. T&A.                        D. Right hip replacement.                        E. Left TKR.             F.  Parathyroidectomy October 2023      CC:   Chief Complaint   Patient presents with    Tingling     Facial      Hypertension       Allergies  Allergies   Allergen Reactions    Macrobid [Nitrofurantoin] Other (See Comments)     Tongue and mouth felt funny     Codeine Nausea And Vomiting    Euthyrox [Levothyroxine] Paresthesia     euthyrox brand    Lipitor [Atorvastatin] Myalgia    Lortab [Hydrocodone-Acetaminophen] Nausea And Vomiting       Current Medications  Current Outpatient Medications   Medication Instructions    acetaminophen (TYLENOL) 1,000 mg, Oral, Every 6 Hours PRN    amLODIPine (NORVASC) 10 mg, Oral, Daily    " "aspirin 81 mg, Oral, Daily    Bacillus Coagulans-Inulin (Probiotic) 1-250 BILLION-MG capsule Oral    benazepril (LOTENSIN) 20 MG tablet Take 1 tablet by mouth once daily    calcium carbonate (TUMS) 2,000 mg, Oral, 3 Times Daily    levothyroxine (SYNTHROID, LEVOTHROID) 50 mcg, Oral, Daily    Magnesium Oxide -Mg Supplement 400 mg, Daily    Misc Natural Products (OSTEO BI-FLEX JOINT SHIELD PO) Oral, Daily    pantoprazole (PROTONIX) 40 MG EC tablet Take 1 tablet by mouth once daily    Vitamin D3 800 Units, Oral, Daily        History of Present Illness   Leslee Wu is a 83 y.o. year old female here for follow up on CAD, aortic stenosis and cardiac risk factor management. She was recently seen by [facility] who set her up for a stress test and echo due to reported anginal equivalent that are scheduled to be completed next week.  He had a hyper parathyroidectomy in October follows closely with Dr. Pretty. Her primary concern today is facial tingling and numbness which is constant.  She also notes pressure in the back of her head that waxes and wanes.  She brings a log of blood pressures which have been at target for the last week since increasing her amlodipine to 10 mg daily.  She denies any unilateral weakness, vision loss, gait abnormalities, and speech difficulties.  She did see Dr. Pretty today who has ordered lab work.  She is accompanied by her sister today who is concerned about vascular disease as 8 of the 12 siblings have had vascular disease and stents.    Pt denies any chest pain, dyspnea, dyspnea on exertion, orthopnea, PND, palpitations, lower extremity edema, or claudication.    OBJECTIVE:  Vitals:    11/17/23 1447   BP: 120/68   BP Location: Left arm   Patient Position: Sitting   Pulse: 69   SpO2: 98%   Weight: 59.9 kg (132 lb)   Height: 152.4 cm (60\")     Body mass index is 25.78 kg/m².    Constitutional:       Appearance: Not in distress.   Cardiovascular:      Normal rate. Regular rhythm.      Murmurs: " There is no murmur.      No gallop.  No rub.   Edema:     Ankle: bilateral trace edema of the ankle.     Feet: bilateral trace edema of the feet.  Neurological:      Mental Status: Alert.   Psychiatric:         Behavior: Behavior is cooperative.       Diagnostic Data: pending  Procedures        ASSESSMENT:   Diagnosis Plan   1. Essential hypertension        2. Other chest pain            PLAN:  HTN: Controlled.  Continue benazepril and amlodipine.  Aortic sclerosis: Keep appointment for echo next week.  Atypical chest pain under evaluation.  She will keep scheduled appointment for stress test next week.  Results will be called to her.    She will follow-up in 1 year or sooner on an as-needed basis.     Scribed for Tanner Saavedra MD by Maribell Hernández, APRN. 11/17/2023  15:53 EST       Tanner Saavedra MD, FACC

## 2023-11-18 LAB
ALBUMIN SERPL-MCNC: 4.6 G/DL (ref 3.5–5.2)
ANION GAP SERPL CALCULATED.3IONS-SCNC: 12 MMOL/L (ref 5–15)
BUN SERPL-MCNC: 14 MG/DL (ref 8–23)
BUN/CREAT SERPL: 17.1 (ref 7–25)
CA-I BLD-MCNC: 5.2 MG/DL (ref 4.6–5.4)
CA-I SERPL ISE-MCNC: 1.3 MMOL/L (ref 1.15–1.35)
CALCIUM SPEC-SCNC: 9.9 MG/DL (ref 8.6–10.5)
CHLORIDE SERPL-SCNC: 100 MMOL/L (ref 98–107)
CO2 SERPL-SCNC: 24 MMOL/L (ref 22–29)
CREAT SERPL-MCNC: 0.82 MG/DL (ref 0.57–1)
EGFRCR SERPLBLD CKD-EPI 2021: 70.6 ML/MIN/1.73
GLUCOSE SERPL-MCNC: 101 MG/DL (ref 65–99)
PHOSPHATE SERPL-MCNC: 3.2 MG/DL (ref 2.5–4.5)
POTASSIUM SERPL-SCNC: 4.7 MMOL/L (ref 3.5–5.2)
PTH-INTACT SERPL-MCNC: 39.5 PG/ML (ref 15–65)
SODIUM SERPL-SCNC: 136 MMOL/L (ref 136–145)

## 2023-11-21 ENCOUNTER — HOSPITAL ENCOUNTER (OUTPATIENT)
Dept: CARDIOLOGY | Facility: HOSPITAL | Age: 83
Discharge: HOME | End: 2023-11-21
Payer: MEDICARE

## 2023-11-21 VITALS
DIASTOLIC BLOOD PRESSURE: 62 MMHG | HEART RATE: 74 BPM | BODY MASS INDEX: 25.97 KG/M2 | WEIGHT: 132.28 LBS | HEIGHT: 60 IN | SYSTOLIC BLOOD PRESSURE: 125 MMHG

## 2023-11-21 VITALS
WEIGHT: 132.28 LBS | BODY MASS INDEX: 25.97 KG/M2 | HEIGHT: 60 IN | SYSTOLIC BLOOD PRESSURE: 133 MMHG | DIASTOLIC BLOOD PRESSURE: 67 MMHG

## 2023-11-21 DIAGNOSIS — I25.10 CORONARY ARTERY DISEASE INVOLVING NATIVE CORONARY ARTERY OF NATIVE HEART WITHOUT ANGINA PECTORIS: ICD-10-CM

## 2023-11-21 DIAGNOSIS — I10 PRIMARY HYPERTENSION: ICD-10-CM

## 2023-11-21 DIAGNOSIS — I35.0 NONRHEUMATIC AORTIC VALVE STENOSIS: ICD-10-CM

## 2023-11-21 LAB
ASCENDING AORTA: 2.8 CM
BH CV ECHO MEAS - AO MAX PG: 7.8 MMHG
BH CV ECHO MEAS - AO MEAN PG: 3.7 MMHG
BH CV ECHO MEAS - AO ROOT DIAM: 3.3 CM
BH CV ECHO MEAS - AO V2 MAX: 139.3 CM/SEC
BH CV ECHO MEAS - AO V2 VTI: 30.7 CM
BH CV ECHO MEAS - AVA(I,D): 2.42 CM2
BH CV ECHO MEAS - EDV(CUBED): 59.8 ML
BH CV ECHO MEAS - EDV(MOD-SP2): 56.3 ML
BH CV ECHO MEAS - EDV(MOD-SP4): 50 ML
BH CV ECHO MEAS - EF(MOD-BP): 71 %
BH CV ECHO MEAS - EF(MOD-SP2): 71.2 %
BH CV ECHO MEAS - EF(MOD-SP4): 70.9 %
BH CV ECHO MEAS - ESV(CUBED): 6.3 ML
BH CV ECHO MEAS - ESV(MOD-SP2): 16.2 ML
BH CV ECHO MEAS - ESV(MOD-SP4): 14.5 ML
BH CV ECHO MEAS - FS: 52.7 %
BH CV ECHO MEAS - IVS/LVPW: 1.25 CM
BH CV ECHO MEAS - IVSD: 0.92 CM
BH CV ECHO MEAS - LA DIMENSION: 3.3 CM
BH CV ECHO MEAS - LAT PEAK E' VEL: 7 CM/SEC
BH CV ECHO MEAS - LV DIASTOLIC VOL/BSA (35-75): 32 CM2
BH CV ECHO MEAS - LV MASS(C)D: 94.1 GRAMS
BH CV ECHO MEAS - LV MAX PG: 5.1 MMHG
BH CV ECHO MEAS - LV MEAN PG: 2.8 MMHG
BH CV ECHO MEAS - LV SYSTOLIC VOL/BSA (12-30): 9.3 CM2
BH CV ECHO MEAS - LV V1 MAX: 113.3 CM/SEC
BH CV ECHO MEAS - LV V1 VTI: 24.7 CM
BH CV ECHO MEAS - LVIDD: 3.9 CM
BH CV ECHO MEAS - LVIDS: 1.85 CM
BH CV ECHO MEAS - LVOT AREA: 3 CM2
BH CV ECHO MEAS - LVOT DIAM: 1.96 CM
BH CV ECHO MEAS - LVPWD: 0.73 CM
BH CV ECHO MEAS - MED PEAK E' VEL: 8 CM/SEC
BH CV ECHO MEAS - MV A MAX VEL: 90.7 CM/SEC
BH CV ECHO MEAS - MV DEC SLOPE: 230.4 CM/SEC2
BH CV ECHO MEAS - MV DEC TIME: 0.26 SEC
BH CV ECHO MEAS - MV E MAX VEL: 59.7 CM/SEC
BH CV ECHO MEAS - MV E/A: 0.66
BH CV ECHO MEAS - MV MAX PG: 4.9 MMHG
BH CV ECHO MEAS - MV MEAN PG: 2.28 MMHG
BH CV ECHO MEAS - MV V2 VTI: 29.2 CM
BH CV ECHO MEAS - MVA(VTI): 2.5 CM2
BH CV ECHO MEAS - PA ACC TIME: 0.14 SEC
BH CV ECHO MEAS - PA V2 MAX: 87.2 CM/SEC
BH CV ECHO MEAS - RAP SYSTOLE: 3 MMHG
BH CV ECHO MEAS - RVSP: 27 MMHG
BH CV ECHO MEAS - SI(MOD-SP2): 25.6 ML/M2
BH CV ECHO MEAS - SI(MOD-SP4): 22.7 ML/M2
BH CV ECHO MEAS - SV(LVOT): 74.5 ML
BH CV ECHO MEAS - SV(MOD-SP2): 40.1 ML
BH CV ECHO MEAS - SV(MOD-SP4): 35.4 ML
BH CV ECHO MEAS - TAPSE (>1.6): 2.8 CM
BH CV ECHO MEAS - TR MAX PG: 24.3 MMHG
BH CV ECHO MEAS - TR MAX VEL: 246 CM/SEC
BH CV ECHO MEASUREMENTS AVERAGE E/E' RATIO: 7.96
BH CV REST NUCLEAR ISOTOPE DOSE: 30 MCI
BH CV STRESS BP STAGE 1: NORMAL
BH CV STRESS BP STAGE 3: NORMAL
BH CV STRESS COMMENTS STAGE 1: NORMAL
BH CV STRESS DOSE REGADENOSON STAGE 1: 0.4
BH CV STRESS DURATION MIN STAGE 1: 1
BH CV STRESS DURATION MIN STAGE 2: 1
BH CV STRESS DURATION MIN STAGE 3: 1
BH CV STRESS DURATION MIN STAGE 4: 1
BH CV STRESS DURATION SEC STAGE 1: 0
BH CV STRESS DURATION SEC STAGE 2: 0
BH CV STRESS DURATION SEC STAGE 3: 0
BH CV STRESS DURATION SEC STAGE 4: 0
BH CV STRESS HR STAGE 1: 84
BH CV STRESS HR STAGE 2: 92
BH CV STRESS HR STAGE 3: 94
BH CV STRESS HR STAGE 4: 93
BH CV STRESS NUCLEAR ISOTOPE DOSE: 30 MCI
BH CV STRESS O2 STAGE 1: 97
BH CV STRESS O2 STAGE 2: 99
BH CV STRESS O2 STAGE 3: 99
BH CV STRESS O2 STAGE 4: 99
BH CV STRESS PROTOCOL 1: NORMAL
BH CV STRESS RECOVERY BP: NORMAL MMHG
BH CV STRESS RECOVERY HR: 90 BPM
BH CV STRESS RECOVERY O2: 98 %
BH CV STRESS STAGE 1: 1
BH CV STRESS STAGE 2: 2
BH CV STRESS STAGE 3: 3
BH CV STRESS STAGE 4: 4
BH CV XLRA - RV BASE: 3.1 CM
BH CV XLRA - RV LENGTH: 6.4 CM
BH CV XLRA - RV MID: 1.7 CM
BH CV XLRA - TDI S': 14 CM/SEC
IVRT: 70 MS
LEFT ATRIUM VOLUME INDEX: 20 ML/M2
MAXIMAL PREDICTED HEART RATE: 136 BPM
PERCENT MAX PREDICTED HR: 69.12 %
STRESS BASELINE BP: NORMAL MMHG
STRESS BASELINE HR: 73 BPM
STRESS O2 SAT REST: 97 %
STRESS PERCENT HR: 81 %
STRESS POST ESTIMATED WORKLOAD: 1 METS
STRESS POST EXERCISE DUR MIN: 4 MIN
STRESS POST EXERCISE DUR SEC: 0 SEC
STRESS POST O2 SAT PEAK: 99 %
STRESS POST PEAK BP: NORMAL MMHG
STRESS POST PEAK HR: 94 BPM
STRESS TARGET HR: 116 BPM

## 2023-11-21 PROCEDURE — 78431 MYOCRD IMG PET RST&STRS CT: CPT

## 2023-11-21 PROCEDURE — 0 RUBIDIUM CHLORIDE: Performed by: NURSE PRACTITIONER

## 2023-11-21 PROCEDURE — A9555 RB82 RUBIDIUM: HCPCS | Performed by: NURSE PRACTITIONER

## 2023-11-21 PROCEDURE — 93017 CV STRESS TEST TRACING ONLY: CPT

## 2023-11-21 PROCEDURE — 93306 TTE W/DOPPLER COMPLETE: CPT

## 2023-11-21 PROCEDURE — 25010000002 REGADENOSON 0.4 MG/5ML SOLUTION: Performed by: NURSE PRACTITIONER

## 2023-11-21 RX ORDER — REGADENOSON 0.08 MG/ML
0.4 INJECTION, SOLUTION INTRAVENOUS ONCE
Status: COMPLETED | OUTPATIENT
Start: 2023-11-21 | End: 2023-11-21

## 2023-11-21 RX ADMIN — REGADENOSON 0.4 MG: 0.08 INJECTION, SOLUTION INTRAVENOUS at 12:19

## 2023-11-21 RX ADMIN — RUBIDIUM CHLORIDE RB-82 1 DOSE: 150 INJECTION, SOLUTION INTRAVENOUS at 12:21

## 2023-11-21 RX ADMIN — RUBIDIUM CHLORIDE RB-82 1 DOSE: 150 INJECTION, SOLUTION INTRAVENOUS at 12:10

## 2023-11-22 ENCOUNTER — TELEPHONE (OUTPATIENT)
Dept: CARDIOLOGY | Facility: CLINIC | Age: 83
End: 2023-11-22
Payer: MEDICARE

## 2023-11-22 NOTE — PROGRESS NOTES
"We have received the results of your echocardiogram.  Your heart contracts normally.  You have one heart valve that is noted to be a little \"leaky\" (tricuspid valve). This is something we will continue to monitor as this is a somewhat expected finding for your age.   If you have any questions regarding this, I would recommend following up with Dr. Saavedra as previously scheduled.     Sincerely yours,        Tiarra NICK "

## 2023-11-22 NOTE — TELEPHONE ENCOUNTER
Tanner Saavedra MD Quijada, Alejandro, RN  Stress test low risk    Called patient to relay results. She verbalized understanding.   Awaiting recommendations on Echo.

## 2023-11-29 DIAGNOSIS — K21.9 GASTROESOPHAGEAL REFLUX DISEASE WITHOUT ESOPHAGITIS: ICD-10-CM

## 2023-11-30 RX ORDER — PANTOPRAZOLE SODIUM 40 MG/1
TABLET, DELAYED RELEASE ORAL
Qty: 90 TABLET | Refills: 1 | Status: SHIPPED | OUTPATIENT
Start: 2023-11-30

## 2023-12-11 ENCOUNTER — TELEPHONE (OUTPATIENT)
Dept: OBSTETRICS AND GYNECOLOGY | Facility: CLINIC | Age: 83
End: 2023-12-11

## 2023-12-11 NOTE — TELEPHONE ENCOUNTER
Caller: Leslee Wu    Relationship to patient: Self    Best call back number: [phone]        Type of visit: GYN F/U       If rescheduling, when is the original appointment: 12/13/23    Additional notes: PT WOULD LIKE TO R.S HER APPT TO SOMETIME AFTER CHRISTMAS OR THE FIRST OF THE YEAR PLEASE CALL PT TO R/S UNABLE TO WT CALL AND PT DID NOT WANT TO WAIT UNTIL JUNE UNABLE TO WT CALL

## 2024-01-03 RX ORDER — LEVOTHYROXINE SODIUM 0.07 MG/1
75 TABLET ORAL DAILY
Qty: 90 TABLET | Refills: 0 | Status: SHIPPED | OUTPATIENT
Start: 2024-01-03

## 2024-01-03 NOTE — TELEPHONE ENCOUNTER
Spoke with patient. She is taking levothyroxine 75 mcg qAM.   Electronically Signed: Kay Oleary MD

## 2024-01-30 RX ORDER — AMLODIPINE BESYLATE 10 MG/1
10 TABLET ORAL DAILY
Qty: 90 TABLET | Refills: 1 | Status: SHIPPED | OUTPATIENT
Start: 2024-01-30

## 2024-02-08 ENCOUNTER — OFFICE VISIT (OUTPATIENT)
Dept: OBSTETRICS AND GYNECOLOGY | Facility: CLINIC | Age: 84
End: 2024-02-08
Payer: MEDICARE

## 2024-02-08 VITALS
WEIGHT: 131.4 LBS | SYSTOLIC BLOOD PRESSURE: 130 MMHG | HEIGHT: 60 IN | DIASTOLIC BLOOD PRESSURE: 72 MMHG | BODY MASS INDEX: 25.8 KG/M2

## 2024-02-08 DIAGNOSIS — N81.9 FEMALE GENITAL PROLAPSE, UNSPECIFIED TYPE: ICD-10-CM

## 2024-02-08 DIAGNOSIS — N81.6 CYSTOCELE WITH RECTOCELE: ICD-10-CM

## 2024-02-08 DIAGNOSIS — N81.4 UTERINE PROLAPSE: ICD-10-CM

## 2024-02-08 DIAGNOSIS — Z01.411 ENCOUNTER FOR GYNECOLOGICAL EXAMINATION WITH ABNORMAL FINDING: Primary | ICD-10-CM

## 2024-02-08 DIAGNOSIS — N81.10 CYSTOCELE WITH RECTOCELE: ICD-10-CM

## 2024-02-08 DIAGNOSIS — M81.6 LOCALIZED OSTEOPOROSIS WITHOUT CURRENT PATHOLOGICAL FRACTURE: ICD-10-CM

## 2024-02-08 DIAGNOSIS — N95.2 ATROPHY OF VAGINA: ICD-10-CM

## 2024-02-08 NOTE — PROGRESS NOTES
Chief Complaint  Leslee Wu is a 83 y.o.  female presenting for Gynecologic Exam    History of Present Illness  Leslee is a pleasant, alert & articulate, 82yo woman, , here for gyn exam.  She has no past history of any gynecologic surgeries.  She does not have any pelvic symptoms.  (Despite pelvic organ prolapse, she denies pain or pressure.)  No problems with urinary leaking.  Occasional smear of fecal incontinence.  But, not so often, and it doesn't interfere with ADL.  She has a recent new dx of osteoporosis.  She is to discuss Tx options with PCP soon.  (She has 3 sisters on Prolia, and she is leaning toward that.)  She is intentional with calcium and vitamin D, and walking for BMD.  HTN is well controlled.  Hypothyroidism, compensated.  Otherwise, ROS neg    Mammogram & Colonoscopy up to date.    The following portions of the patient's history were reviewed and updated as appropriate: allergies, current medications, past family history, past medical history, past social history, past surgical history, and problem list.    Allergies   Allergen Reactions    Macrobid [Nitrofurantoin] Other (See Comments)     Tongue and mouth felt funny     Codeine Nausea And Vomiting    Euthyrox [Levothyroxine] Paresthesia     euthyrox brand    Lipitor [Atorvastatin] Myalgia    Lortab [Hydrocodone-Acetaminophen] Nausea And Vomiting         Current Outpatient Medications:     acetaminophen (TYLENOL) 500 MG tablet, Take 2 tablets by mouth Every 6 (Six) Hours As Needed for Mild Pain (for arthritis)., Disp: , Rfl:     amLODIPine (NORVASC) 10 MG tablet, Take 1 tablet by mouth once daily, Disp: 90 tablet, Rfl: 1    aspirin 81 MG EC tablet, Take 1 tablet by mouth Daily., Disp: , Rfl:     Bacillus Coagulans-Inulin (Probiotic) 1-250 BILLION-MG capsule, Take  by mouth., Disp: , Rfl:     benazepril (LOTENSIN) 20 MG tablet, Take 1 tablet by mouth once daily, Disp: 90 tablet, Rfl: 3    Cholecalciferol (VITAMIN D3) 50 MCG ( UT)  capsule, Take 1 capsule by mouth Daily., Disp: , Rfl:     levothyroxine (SYNTHROID, LEVOTHROID) 75 MCG tablet, Take 1 tablet by mouth once daily, Disp: 90 tablet, Rfl: 0    Misc Natural Products (OSTEO BI-FLEX JOINT SHIELD PO), Take  by mouth Daily., Disp: , Rfl:     pantoprazole (PROTONIX) 40 MG EC tablet, Take 1 tablet by mouth once daily, Disp: 90 tablet, Rfl: 1    Past Medical History:   Diagnosis Date    Anemia     Anxiety     Atypical chest pain     Normal exercise Cardiolite stress test and echocardiogram in .    Atypical chest pain     Atypical chest pain     History of atypical chest pain: Echocardiogram, ; LVEF 90%. Abnormal calcium score, 2013. MPS, 2013: No inducible ischemia, LVEF 70%.    Diverticulosis     Diverticulosis     History of diverticulosis.    Diverticulosis of large intestine without hemorrhage 10/21/2016    Dyslipidemia     Former smoker 2016    GERD (gastroesophageal reflux disease)     GERD without esophagitis     Gout     Gout     History of gout.     4 para 4     High risk medication use 2020    Hypertension     Hypothyroidism     On chronic replacement therapy.    Hypothyroidism     Hypothyroidism/chronic replacement.    Iron deficiency anemia     Iron deficiency anemia.    Joint pain, knee     Menopause 10/09/2019    Mild anxiety     Mild chronic anxiety.    Nontoxic single thyroid nodule     Osteoarthritis     Pain, lower leg     Parathyroid adenoma     Pelvic organ prolapse quantification stage 1 cystocele 10/20/2019    Follows with Dr. Felipe GYN evaluation 10/9 no specific treatment at this time.    Pharyngitis     Pulmonary embolism     Pulmonary nodule     CT scan of the chest of 2013 reports new noncalcified nodule in the right lung base measuring 8 x 5 mm and a stable noncalcified nodule in the left mid lung field measuring 4-5 mm, follow-up CT scan of 2014 reports interval resolution of the right basilar lung nodule and stable  "calcified nodule in the left upper lobe.    Rectocele 01/23/2017    Spondylolisthesis     Spondylolisthesis/degenerative disc disease.    Tobacco use     History of brief tobacco use, in the patient’s 20s:     Tobacco use     History of brief tobacco use, in the patient’s 20s: Noncalcified lung nodule found on chest CT, June 2013, at the left lung base and the mid left lung field.  Evaluation by Dr. Ivan Mota, August 2013 with serial CT scans. Chest CT, November 2013: Noncalcified nodules smaller than prior studies. Essentially “normal” CT of the chest, 08/06/2014.     Urinary tract infection     Vaginal candidiasis         Past Surgical History:   Procedure Laterality Date    BACK SURGERY      CATARACT EXTRACTION Left     PARATHYROIDECTOMY  10/16/2023    TONSILLECTOMY AND ADENOIDECTOMY      TONSILLECTOMY AND ADENOIDECTOMY      TOTAL HIP ARTHROPLASTY      TOTAL HIP ARTHROPLASTY Right     Right hip replacement.    TOTAL KNEE ARTHROPLASTY Right     TOTAL KNEE ARTHROPLASTY Left     TOTAL KNEE ARTHROPLASTY REVISION Left        Objective  /72   Ht 152.4 cm (60\")   Wt 59.6 kg (131 lb 6.4 oz)   LMP  (LMP Unknown)   Breastfeeding No   BMI 25.66 kg/m²     Physical Exam  Vitals and nursing note reviewed. Exam conducted with a chaperone present.   Constitutional:       General: She is not in acute distress.     Appearance: Normal appearance. She is not ill-appearing.   HENT:      Head: Normocephalic.   Neck:      Thyroid: No thyroid mass or thyromegaly.   Cardiovascular:      Rate and Rhythm: Normal rate and regular rhythm.      Heart sounds: Normal heart sounds. No murmur heard.  Pulmonary:      Effort: Pulmonary effort is normal. No respiratory distress.      Breath sounds: Normal breath sounds.   Chest:   Breasts:     Right: No inverted nipple, mass or nipple discharge.      Left: No inverted nipple, mass or nipple discharge.   Abdominal:      Palpations: Abdomen is soft. There is no mass.      Tenderness: There " is no abdominal tenderness.   Genitourinary:     General: Normal vulva.      Labia:         Right: No rash, tenderness or lesion.         Left: No rash, tenderness or lesion.       Vagina: Erythema and prolapsed vaginal walls present. No vaginal discharge.      Cervix: No discharge, lesion or erythema.      Uterus: With uterine prolapse. Not enlarged and not tender.       Adnexa:         Right: No mass or tenderness.          Left: No mass or tenderness.        Comments: Moderate prolapse of vaginal walls and uterus.    She is asymptomatic.    Anus appears wnl.  No rectal exam performed.  Lymphadenopathy:      Upper Body:      Right upper body: No supraclavicular or axillary adenopathy.      Left upper body: No supraclavicular or axillary adenopathy.   Skin:     General: Skin is warm and dry.   Neurological:      Mental Status: She is alert and oriented to person, place, and time.   Psychiatric:         Mood and Affect: Mood normal.         Behavior: Behavior normal.         Assessment/Plan   Diagnoses and all orders for this visit:    1. Encounter for gynecological examination with abnormal finding (Primary)    2. Female genital prolapse, unspecified type    3. Cystocele with rectocele    4. Uterine prolapse    5. Atrophy of vagina    6. Localized osteoporosis without current pathological fracture    She is asymptomatic of the prolapse / denies pain or pressure.  Couns in-depth re: calcium, vit D, walking and exercise.    Procedures    40 to 64: Counseling/Anticipatory Guidance Discussed: nutrition, physical activity, screenings, and self-breast exam    No follow-ups on file.    Huong Jaramillo, APRN  02/08/2024

## 2024-02-19 ENCOUNTER — TELEPHONE (OUTPATIENT)
Dept: INTERNAL MEDICINE | Facility: CLINIC | Age: 84
End: 2024-02-19
Payer: MEDICARE

## 2024-02-19 RX ORDER — VALACYCLOVIR HYDROCHLORIDE 1 G/1
2000 TABLET, FILM COATED ORAL 2 TIMES DAILY
Qty: 4 TABLET | Refills: 0 | Status: SHIPPED | OUTPATIENT
Start: 2024-02-19 | End: 2024-02-20

## 2024-02-19 NOTE — TELEPHONE ENCOUNTER
Caller: Leslee Wu    Relationship: Self    Best call back number: [phone]     What medication are you requesting: ACYCLOVIR    What are your current symptoms: FEVER BLISTER ON INSIDE OF MOUTH    How long have you been experiencing symptoms: 3 DAYS    Have you had these symptoms before:    [] Yes  [x] No    Have you been treated for these symptoms before:   [] Yes  [x] No    If a prescription is needed, what is your preferred pharmacy and phone number: [address] [place] [address] [phone] [facility] [phone]

## 2024-02-21 ENCOUNTER — OFFICE VISIT (OUTPATIENT)
Dept: ENDOCRINOLOGY | Facility: CLINIC | Age: 84
End: 2024-02-21
Payer: MEDICARE

## 2024-02-21 VITALS
WEIGHT: 131 LBS | SYSTOLIC BLOOD PRESSURE: 110 MMHG | BODY MASS INDEX: 25.72 KG/M2 | HEIGHT: 60 IN | DIASTOLIC BLOOD PRESSURE: 57 MMHG | HEART RATE: 83 BPM

## 2024-02-21 DIAGNOSIS — E21.3 HYPERPARATHYROIDISM: Primary | ICD-10-CM

## 2024-02-21 DIAGNOSIS — I10 ESSENTIAL HYPERTENSION: ICD-10-CM

## 2024-02-21 DIAGNOSIS — E03.9 ACQUIRED HYPOTHYROIDISM: ICD-10-CM

## 2024-02-21 LAB
ALBUMIN SERPL-MCNC: 4.1 G/DL (ref 3.5–5.2)
ANION GAP SERPL CALCULATED.3IONS-SCNC: 11.8 MMOL/L (ref 5–15)
BUN SERPL-MCNC: 16 MG/DL (ref 8–23)
BUN/CREAT SERPL: 19.5 (ref 7–25)
CALCIUM SPEC-SCNC: 9.7 MG/DL (ref 8.6–10.5)
CHLORIDE SERPL-SCNC: 104 MMOL/L (ref 98–107)
CO2 SERPL-SCNC: 24.2 MMOL/L (ref 22–29)
CREAT SERPL-MCNC: 0.82 MG/DL (ref 0.57–1)
EGFRCR SERPLBLD CKD-EPI 2021: 70.6 ML/MIN/1.73
GLUCOSE SERPL-MCNC: 72 MG/DL (ref 65–99)
PHOSPHATE SERPL-MCNC: 3.2 MG/DL (ref 2.5–4.5)
POTASSIUM SERPL-SCNC: 4.9 MMOL/L (ref 3.5–5.2)
PTH-INTACT SERPL-MCNC: 27.2 PG/ML (ref 15–65)
SODIUM SERPL-SCNC: 140 MMOL/L (ref 136–145)
T4 FREE SERPL-MCNC: 1.58 NG/DL (ref 0.93–1.7)
TSH SERPL DL<=0.05 MIU/L-ACNC: 0.94 UIU/ML (ref 0.27–4.2)

## 2024-02-21 PROCEDURE — 82306 VITAMIN D 25 HYDROXY: CPT | Performed by: INTERNAL MEDICINE

## 2024-02-21 PROCEDURE — 84443 ASSAY THYROID STIM HORMONE: CPT | Performed by: INTERNAL MEDICINE

## 2024-02-21 PROCEDURE — 3078F DIAST BP <80 MM HG: CPT | Performed by: INTERNAL MEDICINE

## 2024-02-21 PROCEDURE — 80069 RENAL FUNCTION PANEL: CPT | Performed by: INTERNAL MEDICINE

## 2024-02-21 PROCEDURE — 83970 ASSAY OF PARATHORMONE: CPT | Performed by: INTERNAL MEDICINE

## 2024-02-21 PROCEDURE — 99214 OFFICE O/P EST MOD 30 MIN: CPT | Performed by: INTERNAL MEDICINE

## 2024-02-21 PROCEDURE — 3074F SYST BP LT 130 MM HG: CPT | Performed by: INTERNAL MEDICINE

## 2024-02-21 PROCEDURE — 36415 COLL VENOUS BLD VENIPUNCTURE: CPT | Performed by: INTERNAL MEDICINE

## 2024-02-21 PROCEDURE — 84439 ASSAY OF FREE THYROXINE: CPT | Performed by: INTERNAL MEDICINE

## 2024-02-21 NOTE — PROGRESS NOTES
"Hypothyroidism (S/p parathyroid surgery )    Subjective   Leslee Wu is a 83 y.o. female is here today for follow-up.  Hypercalcemia and primary hyperparathyroidism. S/p parathyroidectomy 3-1/2 glands 11/2023, her postoperative period was  complicated by tingling in the face, dizziness,  elevated blood pressure and headache.        Hypothyroidism dx in 1995, on synthroid 75 mcg.       She reported improved concentration and the tingling symptoms in the jaw improved. She is feeling well. Cardiac workup was normal. She is taking Calcium supplements - 2000 mg daily in divided doses. Vitamin D 2000 IU daily.         Medications:    Current Outpatient Medications:     acetaminophen (TYLENOL) 500 MG tablet, Take 2 tablets by mouth Every 6 (Six) Hours As Needed for Mild Pain (for arthritis)., Disp: , Rfl:     amLODIPine (NORVASC) 10 MG tablet, Take 1 tablet by mouth once daily, Disp: 90 tablet, Rfl: 1    aspirin 81 MG EC tablet, Take 1 tablet by mouth Daily., Disp: , Rfl:     Bacillus Coagulans-Inulin (Probiotic) 1-250 BILLION-MG capsule, Take  by mouth., Disp: , Rfl:     benazepril (LOTENSIN) 20 MG tablet, Take 1 tablet by mouth once daily, Disp: 90 tablet, Rfl: 3    Cholecalciferol (VITAMIN D3) 50 MCG (2000 UT) capsule, Take 1 capsule by mouth Daily., Disp: , Rfl:     levothyroxine (SYNTHROID, LEVOTHROID) 75 MCG tablet, Take 1 tablet by mouth once daily, Disp: 90 tablet, Rfl: 0    Misc Natural Products (OSTEO BI-FLEX JOINT SHIELD PO), Take  by mouth Daily., Disp: , Rfl:     pantoprazole (PROTONIX) 40 MG EC tablet, Take 1 tablet by mouth once daily, Disp: 90 tablet, Rfl: 1    Review of Systems   Constitutional:  Positive for fatigue.   Musculoskeletal:  Positive for arthralgias (knee pain ) and back pain.   Neurological:  Positive for numbness and headaches.   All other systems reviewed and are negative.      Objective   /57   Pulse 83   Ht 152.4 cm (60\")   Wt 59.4 kg (131 lb)   LMP  (LMP Unknown)   BMI 25.58 " kg/m²    Physical Exam   Constitutional: She is oriented to person, place, and time. She appears well-developed.   HENT:   Head: Normocephalic and atraumatic.   Right Ear: Tympanic membrane normal.   Left Ear: Tympanic membrane normal.   Eyes: Conjunctivae are normal.   Neck:   Postop scar is in good condition   Cardiovascular: Normal rate, regular rhythm, normal heart sounds and normal pulses.   Pulmonary/Chest: Effort normal and breath sounds normal.   Neurological: She is alert and oriented to person, place, and time.   Psychiatric: Thought content normal.   Vitals reviewed.      Results for orders placed or performed in visit on 02/21/24   TSH    Specimen: Blood   Result Value Ref Range    TSH 0.936 0.270 - 4.200 uIU/mL   T4, Free    Specimen: Blood   Result Value Ref Range    Free T4 1.58 0.93 - 1.70 ng/dL   Renal Function Panel    Specimen: Blood   Result Value Ref Range    Glucose 72 65 - 99 mg/dL    BUN 16 8 - 23 mg/dL    Creatinine 0.82 0.57 - 1.00 mg/dL    Sodium 140 136 - 145 mmol/L    Potassium 4.9 3.5 - 5.2 mmol/L    Chloride 104 98 - 107 mmol/L    CO2 24.2 22.0 - 29.0 mmol/L    Calcium 9.7 8.6 - 10.5 mg/dL    Albumin 4.1 3.5 - 5.2 g/dL    Phosphorus 3.2 2.5 - 4.5 mg/dL    Anion Gap 11.8 5.0 - 15.0 mmol/L    BUN/Creatinine Ratio 19.5 7.0 - 25.0    eGFR 70.6 >60.0 mL/min/1.73   Vitamin D,25-Hydroxy    Specimen: Blood   Result Value Ref Range    25 Hydroxy, Vitamin D 78.8 30.0 - 100.0 ng/ml   PTH, Intact    Specimen: Arm, Left; Blood   Result Value Ref Range    PTH, Intact 27.2 15.0 - 65.0 pg/mL        Assessment/Plan:    Problem List Items Addressed This Visit          Other    Hyperparathyroidism - Primary    Relevant Orders    Renal Function Panel (Completed)    Vitamin D,25-Hydroxy (Completed)    PTH, Intact (Completed)    Essential hypertension    Acquired hypothyroidism    Overview     Description: A.  On chronic replacement therapy.         Relevant Orders    TSH (Completed)    T4, Free (Completed)        Primary hyperparathyroidism -s/p parathyroidectomy.   Calcium 3774-0312 mg in divided doses and Vit D 2000 IU daily continued. Labs from last week showed normal calcium and magnesium.      Hypothyroidism   Cont  levothyroxine 75 mcg.     BP is improved on increased amlodipine dose.

## 2024-02-22 LAB — 25(OH)D3 SERPL-MCNC: 78.8 NG/ML (ref 30–100)

## 2024-03-08 ENCOUNTER — TELEPHONE (OUTPATIENT)
Dept: ENDOCRINOLOGY | Facility: CLINIC | Age: 84
End: 2024-03-08
Payer: MEDICARE

## 2024-03-08 ENCOUNTER — TELEPHONE (OUTPATIENT)
Dept: CARDIOLOGY | Facility: CLINIC | Age: 84
End: 2024-03-08
Payer: MEDICARE

## 2024-03-08 DIAGNOSIS — M81.0 OSTEOPOROSIS, UNSPECIFIED OSTEOPOROSIS TYPE, UNSPECIFIED PATHOLOGICAL FRACTURE PRESENCE: Primary | ICD-10-CM

## 2024-03-08 NOTE — TELEPHONE ENCOUNTER
Pt called checking the status on her prolia injection needing to get scheduled. Please contact pt

## 2024-03-08 NOTE — TELEPHONE ENCOUNTER
The patient needs clearance for a carpal tunnel release surgery with Dr. Mukherjee at [facility] to be done on 4/22/24. The patient denies CP, SOA, dizziness, and weight gain. States her fatigue levels are at baseline. She does not monitor her BP and HR at home.    Last office visit 11/17/23:  Office Visit with Tanner Saavedra MD (11/17/2023)     Echo 11/21/23:  Adult Transthoracic Echo Complete W/ Cont if Necessary Per Protocol (11/21/2023 10:40)     Stress test 11/21/23:  Stress Test With [facility] (11/21/2023 12:11)       [facility]  F: [phone]  P: [phone], ext [phone]

## 2024-03-10 NOTE — TELEPHONE ENCOUNTER
I did not order the Prolia and did not discuss it at the appointment. I think she was discussing osteoporosis at the PCP visit.

## 2024-03-11 ENCOUNTER — OFFICE VISIT (OUTPATIENT)
Dept: INTERNAL MEDICINE | Facility: CLINIC | Age: 84
End: 2024-03-11
Payer: MEDICARE

## 2024-03-11 VITALS
BODY MASS INDEX: 25.91 KG/M2 | TEMPERATURE: 97.7 F | SYSTOLIC BLOOD PRESSURE: 112 MMHG | RESPIRATION RATE: 18 BRPM | HEART RATE: 72 BPM | WEIGHT: 132 LBS | DIASTOLIC BLOOD PRESSURE: 60 MMHG | HEIGHT: 60 IN

## 2024-03-11 DIAGNOSIS — E03.9 ACQUIRED HYPOTHYROIDISM: ICD-10-CM

## 2024-03-11 DIAGNOSIS — Z29.11 NEED FOR RSV IMMUNIZATION: Primary | ICD-10-CM

## 2024-03-11 DIAGNOSIS — E55.9 VITAMIN D DEFICIENCY: ICD-10-CM

## 2024-03-11 DIAGNOSIS — E21.3 HYPERPARATHYROIDISM: ICD-10-CM

## 2024-03-11 DIAGNOSIS — I25.10 CORONARY ARTERY DISEASE INVOLVING NATIVE CORONARY ARTERY OF NATIVE HEART WITHOUT ANGINA PECTORIS: ICD-10-CM

## 2024-03-11 DIAGNOSIS — E78.2 MIXED HYPERLIPIDEMIA: ICD-10-CM

## 2024-03-11 DIAGNOSIS — K21.9 GASTROESOPHAGEAL REFLUX DISEASE WITHOUT ESOPHAGITIS: ICD-10-CM

## 2024-03-11 DIAGNOSIS — Z78.0 POST-MENOPAUSAL: ICD-10-CM

## 2024-03-11 DIAGNOSIS — I10 ESSENTIAL HYPERTENSION: ICD-10-CM

## 2024-03-11 NOTE — PATIENT INSTRUCTIONS
MyPlate from USDA    MyPlate is an outline of a general healthy diet based on the Dietary Guidelines for Americans, 6852-8403, from the U.S. Department of Agriculture (USDA). It sets guidelines for how much food you should eat from each food group based on your age, sex, and level of physical activity.  What are tips for following MyPlate?  To follow MyPlate recommendations:  Eat a wide variety of fruits and vegetables, grains, and protein foods.  Serve smaller portions and eat less food throughout the day.  Limit portion sizes to avoid overeating.  Enjoy your food.  Get at least 150 minutes of exercise every week. This is about 30 minutes each day, 5 or more days per week.  It can be difficult to have every meal look like MyPlate. Think about MyPlate as eating guidelines for an entire day, rather than each individual meal.  Fruits and vegetables  Make one half of your plate fruits and vegetables.  Eat many different colors of fruits and vegetables each day.  For a 2,000-calorie daily food plan, eat:  2½ cups of vegetables every day.  2 cups of fruit every day.  1 cup is equal to:  1 cup raw or cooked vegetables.  1 cup raw fruit.  1 medium-sized orange, apple, or banana.  1 cup 100% fruit or vegetable juice.  2 cups raw leafy greens, such as lettuce, spinach, or kale.  ½ cup dried fruit.  Grains  One fourth of your plate should be grains.  Make at least half of the grains you eat each day whole grains.  For a 2,000-calorie daily food plan, eat 6 oz of grains every day.  1 oz is equal to:  1 slice bread.  1 cup cereal.  ½ cup cooked rice, cereal, or pasta.  Protein  One fourth of your plate should be protein.  Eat a wide variety of protein foods, including meat, poultry, fish, eggs, beans, nuts, and tofu.  For a 2,000-calorie daily food plan, eat 5½ oz of protein every day.  1 oz is equal to:  1 oz meat, poultry, or fish.  ¼ cup cooked beans.  1 egg.  ½ oz nuts or seeds.  1 Tbsp peanut butter.  Dairy  Drink fat-free  or low-fat (1%) milk.  Eat or drink dairy as a side to meals.  For a 2,000-calorie daily food plan, eat or drink 3 cups of dairy every day.  1 cup is equal to:  1 cup milk, yogurt, cottage cheese, or soy milk (soy beverage).  2 oz processed cheese.  1½ oz natural cheese.  Fats, oils, salt, and sugars  Only small amounts of oils are recommended.  Avoid foods that are high in calories and low in nutritional value (empty calories), like foods high in fat or added sugars.  Choose foods that are low in salt (sodium). Choose foods that have less than 140 milligrams (mg) of sodium per serving.  Drink water instead of sugary drinks. Drink enough fluid to keep your urine pale yellow.  Where to find support  Work with your health care provider or a dietitian to develop a customized eating plan that is right for you.  Download an eduardo (mobile application) to help you track your daily food intake.  Where to find more information  USDA: ChooseMyPlate.gov  Summary  MyPlate is a general guideline for healthy eating from the USDA. It is based on the Dietary Guidelines for Americans, 7034-8082.  In general, fruits and vegetables should take up one half of your plate, grains should take up one fourth of your plate, and protein should take up one fourth of your plate.  This information is not intended to replace advice given to you by your health care provider. Make sure you discuss any questions you have with your health care provider.  Document Revised: 11/08/2021 Document Reviewed: 11/08/2021  [employer] Patient Education © 2023 [employer].    Advance Care Planning and Advance Directives     You make decisions on a daily basis - decisions about where you want to live, your career, your home, your life. Perhaps one of the most important decisions you face is your choice for future medical care. Take time to talk with your family and your healthcare team and start planning today.  Advance Care Planning is a process that can help  you:  Understand possible future healthcare decisions in light of your own experiences  Reflect on those decision in light of your goals and values  Discuss your decisions with those closest to you and the healthcare professionals that care for you  Make a plan by creating a document that reflects your wishes    Surrogate Decision Maker  In the event of a medical emergency, which has left you unable to communicate or to make your own decisions, you would need someone to make decisions for you.  It is important to discuss your preferences for medical treatment with this person while you are in good health.     Qualities of a surrogate decision maker:  Willing to take on this role and responsibility  Knows what you want for future medical care  Willing to follow your wishes even if they don't agree with them  Able to make difficult medical decisions under stressful circumstances    Advance Directives  These are legal documents you can create that will guide your healthcare team and decision maker(s) when needed. These documents can be stored in the electronic medical record.    Living Will - a legal document to guide your care if you have a terminal condition or a serious illness and are unable to communicate. States vary by statute in document names/types, but most forms may include one or more of the following:        -  Directions regarding life-prolonging treatments        -  Directions regarding artificially provided nutrition/hydration        -  Choosing a healthcare decision maker        -  Direction regarding organ/tissue donation    Durable Power of  for Healthcare - this document names an -in-fact to make medical decisions for you, but it may also allow this person to make personal and financial decisions for you. Please seek the advice of an  if you need this type of document.    **Advance Directives are not required and no one may discriminate against you if you do not sign  one.    Medical Orders  Many states allow specific forms/orders signed by your physician to record your wishes for medical treatment in your current state of health. This form, signed in personal communication with your physician, addresses resuscitation and other medical interventions that you may or may not want.      For more information or to schedule a time with a [facility] Advance Care Planning Facilitator contact: [facility]/ACP or call [phone] and someone will contact you directly.

## 2024-03-11 NOTE — PROGRESS NOTES
Chief Complaint  Hyperparathyroidism (6 month follow up. )    Subjective          Leslee Wu presents to [facility]  History of Present Illness  The patient is an 83-year-old female who comes into clinic today for a follow-up visit. Next visit of course will be our annual wellness physical visit that will be 6 months from this visit.    She is doing okay overall. She denies any new problems. She denies any new headaches, dizziness, chest pain, shortness of breath, abdominal pain, swelling, or passing out. She has had some night sweats, which she never had before, but she thinks maybe she had too much cover on. She denies any new lumps, bumps, or skin changes.    She is doing well on pantoprazole for her heartburn, reflux, and GERD symptoms.    She saw Dr. Pretty recently for her levothyroxine for her hypothyroid and parathyroid. She is going to see her back in 4 months and recheck her labs. She is taking 1500 to 1800 mg of divided calcium doses a day. She still has some tingling in her face. She is taking vitamin D 2000 units a day. She thinks it is because of her parathyroid. It has gotten better.    She is taking benazepril 20 mg and amlodipine 10 mg for her blood pressure. She saw Dr. Virk in 11/2023. She is still taking aspirin every day.    She took a pill for osteoporosis about 15 to 20 years ago, but she does not remember what it was. She would like to get Prolia. She had some teeth pulled over a year ago.      cardiology last seen 11/17/2023.  Questionable DVT 2019discordant data for superficial versus DVT she has had a cardiac calcium study in 2013.  Aortic sclerosis with echo completed?  12/23 atypical chest pain with stress test ordered at her last cardiology visit.  11/21/2023 stress test notes rest EF 77% stress EF 80% no significant ST-T wave changes there is diffuse coronary calcifications noted.  Echocardiogram completed moderate tricuspid valve  "regurgitation with borderline right atrial cavity RVSP less than 35      Osteoporosis agreeable to start Prolia was in education for her to review    Current Outpatient Medications:     acetaminophen (TYLENOL) 500 MG tablet, Take 2 tablets by mouth Every 6 (Six) Hours As Needed for Mild Pain (for arthritis)., Disp: , Rfl:     amLODIPine (NORVASC) 10 MG tablet, Take 1 tablet by mouth once daily, Disp: 90 tablet, Rfl: 1    aspirin 81 MG EC tablet, Take 1 tablet by mouth Daily., Disp: , Rfl:     Bacillus Coagulans-Inulin (Probiotic) 1-250 BILLION-MG capsule, Take  by mouth., Disp: , Rfl:     benazepril (LOTENSIN) 20 MG tablet, Take 1 tablet by mouth once daily, Disp: 90 tablet, Rfl: 3    Cholecalciferol (VITAMIN D3) 50 MCG (2000 UT) capsule, Take 1 capsule by mouth Daily., Disp: , Rfl:     levothyroxine (SYNTHROID, LEVOTHROID) 75 MCG tablet, Take 1 tablet by mouth once daily, Disp: 90 tablet, Rfl: 0    Misc Natural Products (OSTEO BI-FLEX JOINT SHIELD PO), Take  by mouth Daily., Disp: , Rfl:     pantoprazole (PROTONIX) 40 MG EC tablet, Take 1 tablet by mouth once daily, Disp: 90 tablet, Rfl: 1    RSVPreF3 Vac Recomb Adjuvanted (AREXVY) 120 MCG/0.5ML reconstituted suspension injection, Inject 0.5 mL into the appropriate muscle as directed by prescriber 1 (One) Time for 1 dose., Disp: 0.5 mL, Rfl: 0          Objective   Vital Signs:   /60 (BP Location: Right arm, Patient Position: Sitting, Cuff Size: Adult)   Pulse 72   Temp 97.7 °F (36.5 °C) (Infrared)   Resp 18   Ht 152.4 cm (60\")   Wt 59.9 kg (132 lb)   BMI 25.78 kg/m²     Physical Exam  Vitals and nursing note reviewed.   Constitutional:       General: She is not in acute distress.     Appearance: Normal appearance. She is well-developed. She is not ill-appearing.   HENT:      Head: Normocephalic and atraumatic.   Eyes:      General: No scleral icterus.  Neck:      Thyroid: No thyromegaly.   Cardiovascular:      Rate and Rhythm: Normal rate and regular " rhythm.   Pulmonary:      Effort: Pulmonary effort is normal.      Breath sounds: Normal breath sounds.   Abdominal:      General: Bowel sounds are normal. There is no distension.      Palpations: Abdomen is soft.      Tenderness: There is no abdominal tenderness.   Lymphadenopathy:      Cervical: No cervical adenopathy.   Skin:     Capillary Refill: Capillary refill takes 2 to 3 seconds.      Coloration: Skin is not pale.   Neurological:      Mental Status: She is alert and oriented to person, place, and time.   Psychiatric:         Mood and Affect: Mood normal.         Behavior: Behavior normal.        Result Review :                 Assessment and Plan    Diagnoses and all orders for this visit:    1. Need for RSV immunization (Primary)  -     RSVPreF3 Vac Recomb Adjuvanted (AREXVY) 120 MCG/0.5ML reconstituted suspension injection; Inject 0.5 mL into the appropriate muscle as directed by prescriber 1 (One) Time for 1 dose.  Dispense: 0.5 mL; Refill: 0    1. Night sweats.  She will let me know if she is still having night sweats.    2. GERD.  She is doing well on pantoprazole.    3. Hypothyroid and parathyroid.  She saw Dr. Pretty recently for her levothyroxine. She is going to see her back in 4 months and recheck her labs. She was advised to take 1500 to 1800 mg of divided calcium doses a day.    4. Hypertension.  Her blood pressure looks good. Her weight is good and stable.    5. Osteoporosis.  She has noted an osteoporosis of one-third of the right forearm, osteopenia in the left femoral neck, and the left total hip. We discussed the side effects of Prolia. I will send some education about the Prolia to her [employer] account. I will order Prolia.      BMI is >= 25 and <30. (Overweight) The following options were offered after discussion;: weight loss educational material (shared in after visit summary) and exercise counseling/recommendations          Depression: PHQ-2/9 Depression Screening  Little interest or  pleasure in doing things?     Feeling down, depressed, or hopeless?     PHQ-2 Total Score     PHQ-9 Total Score 0        AWV: utd  A1C:   Lab Results   Component Value Date    HGBA1C 5.4 09/11/2023      ACP: Advance Care Planning   ACP discussion was held with the patient during this visit. Patient does not have an advance directive, information provided.   Mammogram: 8/22/2023 due 8/24  Colonoscopy: Ordered 8/16/2023 due every 3 years    Encouraged shingles Tdap and RSV prescription for RSV sent to pharmacy    Follow Up   Return in about 6 months (around 9/11/2024) for Medicare Wellness, Annual, fasting.  Patient was given instructions and counseling regarding her condition or for health maintenance advice. Please see specific information pulled into the AVS if appropriate.     RTC/call  If symptoms worsen  Meds MOA and SE's reviewed and pt v/u    SANDOVAL Lucas [facility] INTERNAL MEDICINE & PEDIATRICS  [address] [address]  Fax [phone]  Phone [phone]    Transcribed from ambient dictation for SANDOVAL Lucas by Seymour Frank.  03/11/24   11:48 EDT    Patient or patient representative verbalized consent to the visit recording.  I have personally performed the services described in this document as transcribed by the above individual, and it is both accurate and complete.

## 2024-03-28 RX ORDER — LEVOTHYROXINE SODIUM 0.07 MG/1
75 TABLET ORAL DAILY
Qty: 90 TABLET | Refills: 0 | Status: SHIPPED | OUTPATIENT
Start: 2024-03-28

## 2024-04-02 ENCOUNTER — TELEPHONE (OUTPATIENT)
Dept: INTERNAL MEDICINE | Facility: CLINIC | Age: 84
End: 2024-04-02
Payer: MEDICARE

## 2024-04-02 NOTE — TELEPHONE ENCOUNTER
Patient states she wants to proceed with the Prolia injection. She states she didn't read the message sent to her from [employer] on 3/21/24 from [place] stating she would prescribe the injection. Patient states she wants a phone call because she doesn't use [employer].

## 2024-04-09 NOTE — TELEPHONE ENCOUNTER
Caller: Leslee Wu    Relationship: Self    Best call back number:      What is the best time to reach you: ANYTIME    Who are you requesting to speak with (clinical staff, provider,  specific staff member): CANDACE    Do you know the name of the person who called: LESLEE    What was the call regarding: PATIENT WAS ASKING ABOUT THE STATUS OF GETTING HER PROLIA INJECTION    Is it okay if the provider responds through MyChart: PLEASE CALL

## 2024-04-18 NOTE — TELEPHONE ENCOUNTER
Caller: [facility]    Relationship to patient: Other    Best call back number:   PROVIDER LINE: [phone]    Patient is needing: A [facility] MEDICARE REPRESENTATIVE STATED THAT THEY WERE CONTACTED BY THE PATIENT ABOUT THE PROLIA INJECTION.    SHE STATED THAT IT WOULD BE COVERED UNDER HER MEDICARE PART B PLAN, BUT SAID HOWEVER, THE INJECTION WOULD HAVE TO BE ADMINISTERED BY CLINICAL STAFF IN OFFICE AND MEET THE CONDITIONS OF MEDICAL NECESSITY.    SHE STATED THAT CLINICAL STAFF CAN CONTACT THE PROVIDER LINE FOR THIS PRIOR AUTHORIZATION.    PLEASE ADVISE

## 2024-04-21 ENCOUNTER — TELEPHONE (OUTPATIENT)
Dept: INTERNAL MEDICINE | Facility: CLINIC | Age: 84
End: 2024-04-21
Payer: MEDICARE

## 2024-04-22 NOTE — TELEPHONE ENCOUNTER
This would be administered at the infusion center.  She will need to clear with her dentist,    Please call the patient and let her know that new is that she will need a dental clearance prior to treatment for osteoporosis.  She will need to let them know that she needs to start medication for osteoporosis and they will need to clear her prior to is ordering the medication.  Please ask her to obtain letter of clearance.

## 2024-04-22 NOTE — TELEPHONE ENCOUNTER
Please call the patient and let her know that new is that she will need a dental clearance prior to treatment for osteoporosis.  She will need to let them know that she needs to start medication for osteoporosis and they will need to clear her prior to is ordering the medication.  Please ask her to obtain letter of clearance.

## 2024-04-22 NOTE — TELEPHONE ENCOUNTER
"Spoke with patients daughter \"kasey dimas\" informed her on information needed for her prolia injections. Daughter verbalized good understanding.  "

## 2024-05-16 ENCOUNTER — PATIENT ROUNDING (BHMG ONLY) (OUTPATIENT)
Dept: URGENT CARE | Facility: CLINIC | Age: 84
End: 2024-05-16
Payer: MEDICARE

## 2024-05-28 ENCOUNTER — HOSPITAL ENCOUNTER (OUTPATIENT)
Dept: GENERAL RADIOLOGY | Facility: HOSPITAL | Age: 84
Discharge: HOME | End: 2024-05-28
Admitting: NURSE PRACTITIONER
Payer: MEDICARE

## 2024-05-28 ENCOUNTER — OFFICE VISIT (OUTPATIENT)
Dept: INTERNAL MEDICINE | Facility: CLINIC | Age: 84
End: 2024-05-28
Payer: MEDICARE

## 2024-05-28 VITALS
BODY MASS INDEX: 25.58 KG/M2 | DIASTOLIC BLOOD PRESSURE: 62 MMHG | RESPIRATION RATE: 20 BRPM | TEMPERATURE: 98.4 F | SYSTOLIC BLOOD PRESSURE: 108 MMHG | WEIGHT: 131 LBS | HEART RATE: 72 BPM

## 2024-05-28 DIAGNOSIS — R82.998 LEUKOCYTES IN URINE: ICD-10-CM

## 2024-05-28 DIAGNOSIS — R10.84 GENERALIZED ABDOMINAL PAIN: ICD-10-CM

## 2024-05-28 DIAGNOSIS — N76.0 ACUTE VAGINITIS: ICD-10-CM

## 2024-05-28 DIAGNOSIS — R10.84 GENERALIZED ABDOMINAL PAIN: Primary | ICD-10-CM

## 2024-05-28 DIAGNOSIS — M81.6 LOCALIZED OSTEOPOROSIS WITHOUT CURRENT PATHOLOGICAL FRACTURE: ICD-10-CM

## 2024-05-28 LAB
BILIRUB BLD-MCNC: NEGATIVE MG/DL
CLARITY, POC: ABNORMAL
COLOR UR: YELLOW
EXPIRATION DATE: ABNORMAL
GLUCOSE UR STRIP-MCNC: NEGATIVE MG/DL
KETONES UR QL: NEGATIVE
LEUKOCYTE EST, POC: ABNORMAL
Lab: ABNORMAL
NITRITE UR-MCNC: NEGATIVE MG/ML
PH UR: 5 [PH] (ref 5–8)
PROT UR STRIP-MCNC: NEGATIVE MG/DL
RBC # UR STRIP: NEGATIVE /UL
SP GR UR: 1 (ref 1–1.03)
UROBILINOGEN UR QL: NORMAL

## 2024-05-28 PROCEDURE — 81003 URINALYSIS AUTO W/O SCOPE: CPT | Performed by: NURSE PRACTITIONER

## 2024-05-28 PROCEDURE — 74018 RADEX ABDOMEN 1 VIEW: CPT

## 2024-05-28 PROCEDURE — 99214 OFFICE O/P EST MOD 30 MIN: CPT | Performed by: NURSE PRACTITIONER

## 2024-05-28 PROCEDURE — 1125F AMNT PAIN NOTED PAIN PRSNT: CPT | Performed by: NURSE PRACTITIONER

## 2024-05-28 PROCEDURE — 87086 URINE CULTURE/COLONY COUNT: CPT | Performed by: NURSE PRACTITIONER

## 2024-05-28 PROCEDURE — 1159F MED LIST DOCD IN RCRD: CPT | Performed by: NURSE PRACTITIONER

## 2024-05-28 PROCEDURE — 3078F DIAST BP <80 MM HG: CPT | Performed by: NURSE PRACTITIONER

## 2024-05-28 PROCEDURE — 3074F SYST BP LT 130 MM HG: CPT | Performed by: NURSE PRACTITIONER

## 2024-05-28 PROCEDURE — 1160F RVW MEDS BY RX/DR IN RCRD: CPT | Performed by: NURSE PRACTITIONER

## 2024-05-28 RX ORDER — FLUCONAZOLE 150 MG/1
150 TABLET ORAL ONCE
Qty: 3 TABLET | Refills: 0 | Status: SHIPPED | OUTPATIENT
Start: 2024-05-28 | End: 2024-05-28

## 2024-05-28 NOTE — PROGRESS NOTES
Chief Complaint  Follow-up (Yeast Infection ), Vaginal Itching, and Abdominal Pain    Subjective          Leslee Wu presents to [facility] INTER[facility] MEDICINE [facility] PEDIATRICS  History of Present Illness  History of Present Illness    Patient here today for vaginal symptoms lower abdominal pain plan to leave urine and obtain x-ray patient has vaginal itching    The patient seen in  [facility] for oral thrush and was treated completing the medication today.    She had parathyroid surgery and has subsequent numbness in face and lip irritation and this is unchanged.    She has frequency and vaginal itching no dysuria. No fever chills or nausea. Lower abdominal pain.    She has osteoporosis and needs treatment will get dental clearance for treatment.  She was on she thinks fosamax years ago and was taken off because her numbness were so good.  She is interested in prolia.     Objective   Vital Signs:   /62 (BP Location: Right arm, Patient Position: Sitting, Cuff Size: Adult)   Pulse 72   Temp 98.4 °F (36.9 °C) (Infrared)   Resp 20   Wt 59.4 kg (131 lb)   BMI 25.58 kg/m²     Physical Exam  Vitals and nursing note reviewed.   Constitutional:       General: She is not in acute distress.     Appearance: Normal appearance. She is well-developed. She is not ill-appearing.   HENT:      Head: Normocephalic and atraumatic.   Eyes:      General: No scleral icterus.  Neck:      Thyroid: No thyromegaly.   Cardiovascular:      Rate and Rhythm: Normal rate and regular rhythm.   Pulmonary:      Effort: Pulmonary effort is normal.      Breath sounds: Normal breath sounds.   Abdominal:      General: Bowel sounds are normal. There is no distension.      Palpations: Abdomen is soft.      Tenderness: There is abdominal tenderness (lower abdominal ttp).   Lymphadenopathy:      Cervical: No cervical adenopathy.   Skin:     Capillary Refill: Capillary refill takes 2 to 3 seconds.      Coloration: Skin is not pale.    Neurological:      Mental Status: She is alert and oriented to person, place, and time.   Psychiatric:         Mood and Affect: Mood normal.         Behavior: Behavior normal.        Result Review :                 Assessment and Plan    Diagnoses and all orders for this visit:    1. Generalized abdominal pain (Primary)  -     POC Urinalysis Dipstick, Automated  -     XR Abdomen KUB; Future    2. Acute vaginitis    3. Localized osteoporosis without current pathological fracture    4. Leukocytes in urine  -     Urine Culture - Urine, Urine, Clean Catch    Other orders  -     fluconazole (Diflucan) 150 MG tablet; Take 1 tablet by mouth 1 (One) Time for 1 dose. Take one tab every 3 days.  Dispense: 3 tablet; Refill: 0      Assessment & Plan      Oral thrush resolving, vaginitis unresolved- await urine culture and treat for underlying fungal infection.  Await dental clearance for prolia.     Follow Up   Return if symptoms worsen or fail to improve.  Patient was given instructions and counseling regarding her condition or for health maintenance advice. Please see specific information pulled into the AVS if appropriate.     RTC/call  If symptoms worsen  Meds MOA and SE's reviewed and pt v/u    05/28/24   12:56 EDT

## 2024-05-30 LAB — BACTERIA SPEC AEROBE CULT: NORMAL

## 2024-06-25 DIAGNOSIS — K21.9 GASTROESOPHAGEAL REFLUX DISEASE WITHOUT ESOPHAGITIS: ICD-10-CM

## 2024-06-25 RX ORDER — PANTOPRAZOLE SODIUM 40 MG/1
TABLET, DELAYED RELEASE ORAL
Qty: 90 TABLET | Refills: 0 | Status: SHIPPED | OUTPATIENT
Start: 2024-06-25

## 2024-06-25 NOTE — TELEPHONE ENCOUNTER
Rx Refill Note  Requested Prescriptions     Pending Prescriptions Disp Refills    levothyroxine (SYNTHROID, LEVOTHROID) 75 MCG tablet [Pharmacy Med Name: Levothyroxine Sodium 75 MCG Oral Tablet] 90 tablet 0     Sig: Take 1 tablet by mouth once daily      Last office visit with prescribing clinician: 2/21/2024     Next office visit with prescribing clinician: 7/9/2024                           Brittany Marroquin MA  06/25/24, 13:14 EDT

## 2024-06-26 RX ORDER — LEVOTHYROXINE SODIUM 0.07 MG/1
75 TABLET ORAL DAILY
Qty: 90 TABLET | Refills: 0 | Status: SHIPPED | OUTPATIENT
Start: 2024-06-26

## 2024-07-09 ENCOUNTER — OFFICE VISIT (OUTPATIENT)
Dept: ENDOCRINOLOGY | Facility: CLINIC | Age: 84
End: 2024-07-09
Payer: MEDICARE

## 2024-07-09 VITALS
SYSTOLIC BLOOD PRESSURE: 108 MMHG | BODY MASS INDEX: 26.31 KG/M2 | DIASTOLIC BLOOD PRESSURE: 65 MMHG | WEIGHT: 134 LBS | HEART RATE: 83 BPM | HEIGHT: 60 IN

## 2024-07-09 DIAGNOSIS — E21.3 HYPERPARATHYROIDISM: Primary | ICD-10-CM

## 2024-07-09 DIAGNOSIS — M81.8 OTHER OSTEOPOROSIS WITHOUT CURRENT PATHOLOGICAL FRACTURE: ICD-10-CM

## 2024-07-09 DIAGNOSIS — I10 BENIGN ESSENTIAL HTN: ICD-10-CM

## 2024-07-09 DIAGNOSIS — E03.9 ACQUIRED HYPOTHYROIDISM: ICD-10-CM

## 2024-07-09 PROCEDURE — 3078F DIAST BP <80 MM HG: CPT | Performed by: INTERNAL MEDICINE

## 2024-07-09 PROCEDURE — 84439 ASSAY OF FREE THYROXINE: CPT | Performed by: INTERNAL MEDICINE

## 2024-07-09 PROCEDURE — 80069 RENAL FUNCTION PANEL: CPT | Performed by: INTERNAL MEDICINE

## 2024-07-09 PROCEDURE — 3074F SYST BP LT 130 MM HG: CPT | Performed by: INTERNAL MEDICINE

## 2024-07-09 PROCEDURE — 99214 OFFICE O/P EST MOD 30 MIN: CPT | Performed by: INTERNAL MEDICINE

## 2024-07-09 PROCEDURE — 84443 ASSAY THYROID STIM HORMONE: CPT | Performed by: INTERNAL MEDICINE

## 2024-07-09 PROCEDURE — 82306 VITAMIN D 25 HYDROXY: CPT | Performed by: INTERNAL MEDICINE

## 2024-07-09 PROCEDURE — 36415 COLL VENOUS BLD VENIPUNCTURE: CPT | Performed by: INTERNAL MEDICINE

## 2024-07-09 PROCEDURE — 83970 ASSAY OF PARATHORMONE: CPT | Performed by: INTERNAL MEDICINE

## 2024-07-09 RX ORDER — LEVOTHYROXINE SODIUM 0.07 MG/1
75 TABLET ORAL DAILY
Qty: 90 TABLET | Refills: 3 | Status: SHIPPED | OUTPATIENT
Start: 2024-07-09

## 2024-07-09 RX ORDER — HYDROCHLOROTHIAZIDE 12.5 MG/1
12.5 CAPSULE, GELATIN COATED ORAL DAILY
Qty: 90 CAPSULE | Refills: 3 | Status: SHIPPED | OUTPATIENT
Start: 2024-07-09

## 2024-07-09 RX ORDER — AMLODIPINE BESYLATE 5 MG/1
5 TABLET ORAL DAILY
Qty: 90 TABLET | Refills: 3 | Status: SHIPPED | OUTPATIENT
Start: 2024-07-09

## 2024-07-09 NOTE — PROGRESS NOTES
Hyperthyroidism    Subjective   Leslee Wu is a 83 y.o. female is here today for follow-up.  Hypercalcemia and primary hyperparathyroidism. S/p parathyroidectomy 3-1/2 glands 11/2023, her postoperative period was  complicated by tingling in the face, dizziness,  elevated blood pressure and headache.        Osteoporosis    BMD om 11/2023 showed T-score of -3.9 at the hip  She is taking Calcium supplements - 2000 mg daily in divided doses. Vitamin D 2000 IU daily.     Hypothyroidism dx in 1995, on synthroid 75 mcg.       She reported intermittent numbness of the lips. She feels that Tums resolves the symptoms. She takes 1500 mg calcium supplement.   She also co swelling in the ankles.         Medications:    Current Outpatient Medications:     acetaminophen (TYLENOL) 500 MG tablet, Take 2 tablets by mouth Every 6 (Six) Hours As Needed for Mild Pain (for arthritis)., Disp: , Rfl:     amLODIPine (NORVASC) 5 MG tablet, Take 1 tablet by mouth Daily., Disp: 90 tablet, Rfl: 3    aspirin 81 MG EC tablet, Take 1 tablet by mouth Daily., Disp: , Rfl:     Bacillus Coagulans-Inulin (Probiotic) 1-250 BILLION-MG capsule, Take  by mouth., Disp: , Rfl:     benazepril (LOTENSIN) 20 MG tablet, Take 1 tablet by mouth once daily, Disp: 90 tablet, Rfl: 3    Cholecalciferol (VITAMIN D3) 50 MCG (2000 UT) capsule, Take 1 capsule by mouth Daily., Disp: , Rfl:     levothyroxine (SYNTHROID, LEVOTHROID) 75 MCG tablet, Take 1 tablet by mouth Daily., Disp: 90 tablet, Rfl: 3    Misc Natural Products (OSTEO BI-FLEX JOINT SHIELD PO), Take  by mouth Daily., Disp: , Rfl:     pantoprazole (PROTONIX) 40 MG EC tablet, Take 1 tablet by mouth once daily, Disp: 90 tablet, Rfl: 0    hydroCHLOROthiazide (MICROZIDE) 12.5 MG capsule, Take 1 capsule by mouth Daily., Disp: 90 capsule, Rfl: 3    Review of Systems   Constitutional:  Positive for fatigue.   Musculoskeletal:  Positive for arthralgias (knee pain ) and back pain.   Neurological:  Positive for numbness  "and headaches.   All other systems reviewed and are negative.      Objective   /65   Pulse 83   Ht 152.4 cm (60\")   Wt 60.8 kg (134 lb)   LMP  (LMP Unknown)   BMI 26.17 kg/m²    Physical Exam   Constitutional: She is oriented to person, place, and time. She appears well-developed.   HENT:   Head: Normocephalic and atraumatic.   Right Ear: Tympanic membrane normal.   Left Ear: Tympanic membrane normal.   Eyes: Conjunctivae are normal.   Neck:   Postop scar is in good condition   Cardiovascular: Normal rate, regular rhythm, normal heart sounds and normal pulses.   Pulmonary/Chest: Effort normal and breath sounds normal.   Musculoskeletal: Swelling (1+) present.   Neurological: She is alert and oriented to person, place, and time.   Psychiatric: Thought content normal.   Vitals reviewed.      Results for orders placed or performed in visit on 05/28/24   Urine Culture - Urine, Urine, Clean Catch    Specimen: Urine, Clean Catch   Result Value Ref Range    Urine Culture 50,000 CFU/mL Normal Urogenital Janki    POC Urinalysis Dipstick, Automated    Specimen: Urine   Result Value Ref Range    Color Yellow Yellow, Straw, Dark Yellow, Amparo    Clarity, UA Cloudy (A) Clear    Specific Gravity  1.005 1.005 - 1.030    pH, Urine 5.0 5.0 - 8.0    Leukocytes 500 Deyanira/ul (A) Negative    Nitrite, UA Negative Negative    Protein, POC Negative Negative mg/dL    Glucose, UA Negative Negative mg/dL    Ketones, UA Negative Negative    Urobilinogen, UA Normal Normal, 0.2 E.U./dL    Bilirubin Negative Negative    Blood, UA Negative Negative    Lot Number [id number]     Expiration Date 10/31/24         Assessment/Plan:    Problem List Items Addressed This Visit          Endocrine and Metabolic    Hyperparathyroidism - Primary    Acquired hypothyroidism    Overview     Description: A.  On chronic replacement therapy.         Relevant Medications    levothyroxine (SYNTHROID, LEVOTHROID) 75 MCG tablet    Other Relevant Orders    TSH "    T4, Free       Musculoskeletal and Injuries    Other osteoporosis without current pathological fracture    Relevant Orders    Renal Function Panel    Vitamin D,25-Hydroxy    PTH, Intact    Ambulatory Referral to Infusion Treatment     Other Visit Diagnoses       Benign essential HTN        Relevant Medications    hydroCHLOROthiazide (MICROZIDE) 12.5 MG capsule    amLODIPine (NORVASC) 5 MG tablet            Primary hyperparathyroidism -s/p parathyroidectomy.   Calcium 3564-1059 mg in divided doses and Vit D 2000 IU daily continued. Labs repeated today.    BMD showed T-score of -3.9 at the hip in 11/2023.   Start Prolia, referral is sent     Hypothyroidism   Cont  levothyroxine 75 mcg.     HTN - decrease amlodipine dose to 5 mg daily and add HCTZ 12.5 mg

## 2024-07-10 LAB
25(OH)D3 SERPL-MCNC: 71.8 NG/ML (ref 30–100)
ALBUMIN SERPL-MCNC: 4.3 G/DL (ref 3.5–5.2)
ANION GAP SERPL CALCULATED.3IONS-SCNC: 13.2 MMOL/L (ref 5–15)
BUN SERPL-MCNC: 14 MG/DL (ref 8–23)
BUN/CREAT SERPL: 16.7 (ref 7–25)
CALCIUM SPEC-SCNC: 9.7 MG/DL (ref 8.6–10.5)
CHLORIDE SERPL-SCNC: 101 MMOL/L (ref 98–107)
CO2 SERPL-SCNC: 22.8 MMOL/L (ref 22–29)
CREAT SERPL-MCNC: 0.84 MG/DL (ref 0.57–1)
EGFRCR SERPLBLD CKD-EPI 2021: 68.6 ML/MIN/1.73
GLUCOSE SERPL-MCNC: 76 MG/DL (ref 65–99)
PHOSPHATE SERPL-MCNC: 2.7 MG/DL (ref 2.5–4.5)
POTASSIUM SERPL-SCNC: 4.5 MMOL/L (ref 3.5–5.2)
PTH-INTACT SERPL-MCNC: 31.1 PG/ML (ref 15–65)
SODIUM SERPL-SCNC: 137 MMOL/L (ref 136–145)
T4 FREE SERPL-MCNC: 1.56 NG/DL (ref 0.92–1.68)
TSH SERPL DL<=0.05 MIU/L-ACNC: 1.21 UIU/ML (ref 0.27–4.2)

## 2024-07-18 ENCOUNTER — TELEPHONE (OUTPATIENT)
Dept: ENDOCRINOLOGY | Facility: CLINIC | Age: 84
End: 2024-07-18
Payer: MEDICARE

## 2024-07-18 DIAGNOSIS — M81.8 OTHER OSTEOPOROSIS WITHOUT CURRENT PATHOLOGICAL FRACTURE: Primary | ICD-10-CM

## 2024-07-18 RX ORDER — SODIUM CHLORIDE 9 MG/ML
20 INJECTION, SOLUTION INTRAVENOUS ONCE
OUTPATIENT
Start: 2024-08-18

## 2024-07-18 RX ORDER — ZOLEDRONIC ACID 5 MG/100ML
5 INJECTION, SOLUTION INTRAVENOUS ONCE
OUTPATIENT
Start: 2024-08-18

## 2024-07-18 NOTE — TELEPHONE ENCOUNTER
----- Message from Salima Pretty sent at 7/17/2024  3:12 PM EDT -----  Regarding: RE: Prolia  Please let patient know that her insurance will not cover Prolia until we try first line therapy (reclast). This is once a year IV infusion and should work to treat osteoporosis. I can arrange for Reclast infusion if she is agreeable to this change in plans.  ----- Message -----  From: Sosa Crandall  Sent: 7/10/2024  11:48 AM EDT  To: Salima LONG MD  Subject: Prolia                                             Dr Pretty,  Her insurance will not cover Prolia unless she has tried and failed an injectable bisphosphonate of either Reclast or Pamidronate or have contraindication to said preferred agents.  I've scanned the denial letter into her chart, please let me know how you would like to proceed.    Benita Swan

## 2024-07-23 ENCOUNTER — TELEPHONE (OUTPATIENT)
Dept: INTERNAL MEDICINE | Facility: CLINIC | Age: 84
End: 2024-07-23
Payer: MEDICARE

## 2024-07-23 NOTE — TELEPHONE ENCOUNTER
Needs appointment please schedule.  May put in same-day tomorrow or work in spot.  This is because she would need culture obtained.

## 2024-07-23 NOTE — TELEPHONE ENCOUNTER
Caller: Leslee Wu    Relationship: Self    Best call back number: [phone]     What medication are you requesting: SOMETHING STRONGER THAN NYSTATIN    What are your current symptoms: ORAL THRUSH    How long have you been experiencing symptoms: STARTED TAKING THE NYSTATIN ON 07.16.24 AND SYMPTOMS STARTED ABOUT 3 DAYS BEFORE THAT.    Have you had these symptoms before:    [x] Yes  [] No    Have you been treated for these symptoms before:   [x] Yes  [] No    If a prescription is needed, what is your preferred pharmacy and phone number: [place] [id number] [facility] - [phone] [facility] [phone]      Additional notes: SHE HAD ORAL THRUSH IN MAY. SHE TOOK MEDICATION FOR IT BUT NOW IT CAME BACK. SHE IS TAKING NYSTATIN NOW FROM HER DENTIST BUT IT IS NOT HELPING IT. SHE IS WANTING SOMETHING BETTER AND MAYBE FOR A LONGER PERIOD OF TIME. SHE WAS GIVEN DIFLUCAN BEFORE. PLEASE LET HER KNOW ASAP.

## 2024-07-25 ENCOUNTER — OFFICE VISIT (OUTPATIENT)
Dept: INTERNAL MEDICINE | Facility: CLINIC | Age: 84
End: 2024-07-25
Payer: MEDICARE

## 2024-07-25 VITALS
HEART RATE: 64 BPM | BODY MASS INDEX: 26.31 KG/M2 | RESPIRATION RATE: 18 BRPM | SYSTOLIC BLOOD PRESSURE: 124 MMHG | TEMPERATURE: 97.5 F | WEIGHT: 134 LBS | DIASTOLIC BLOOD PRESSURE: 68 MMHG | HEIGHT: 60 IN

## 2024-07-25 DIAGNOSIS — R20.8 BURNING SENSATION OF MOUTH: Primary | ICD-10-CM

## 2024-07-25 DIAGNOSIS — R60.0 LOWER EXTREMITY EDEMA: ICD-10-CM

## 2024-07-25 LAB — VIT B12 BLD-MCNC: 430 PG/ML (ref 211–946)

## 2024-07-25 PROCEDURE — 1126F AMNT PAIN NOTED NONE PRSNT: CPT | Performed by: NURSE PRACTITIONER

## 2024-07-25 PROCEDURE — 3078F DIAST BP <80 MM HG: CPT | Performed by: NURSE PRACTITIONER

## 2024-07-25 PROCEDURE — 82607 VITAMIN B-12: CPT | Performed by: NURSE PRACTITIONER

## 2024-07-25 PROCEDURE — 99214 OFFICE O/P EST MOD 30 MIN: CPT | Performed by: NURSE PRACTITIONER

## 2024-07-25 PROCEDURE — 87070 CULTURE OTHR SPECIMN AEROBIC: CPT | Performed by: NURSE PRACTITIONER

## 2024-07-25 PROCEDURE — 36415 COLL VENOUS BLD VENIPUNCTURE: CPT | Performed by: NURSE PRACTITIONER

## 2024-07-25 PROCEDURE — 3074F SYST BP LT 130 MM HG: CPT | Performed by: NURSE PRACTITIONER

## 2024-07-25 RX ORDER — POTASSIUM CHLORIDE 750 MG/1
TABLET, FILM COATED, EXTENDED RELEASE ORAL
Qty: 30 TABLET | Refills: 0 | Status: SHIPPED | OUTPATIENT
Start: 2024-07-25

## 2024-07-25 RX ORDER — FLUCONAZOLE 150 MG/1
150 TABLET ORAL ONCE
Qty: 1 TABLET | Refills: 0 | Status: SHIPPED | OUTPATIENT
Start: 2024-07-25 | End: 2024-07-25

## 2024-07-25 RX ORDER — FUROSEMIDE 20 MG/1
TABLET ORAL
Qty: 30 TABLET | Refills: 0 | Status: SHIPPED | OUTPATIENT
Start: 2024-07-25

## 2024-07-25 NOTE — PROGRESS NOTES
"Chief Complaint  Thrush    Subjective          Leslee Wu presents to [facility]  History of Present Illness  History of Present Illness  Had thrush in may with coated white tongue nystatin and diflucan and the area per patient symtoms went away      10/24 parathyroidetomy and mouth felt numb tingling and lips burn. Symptoms occurred right after surgery and has seen endo with reports of the symptoms.  Taking tums which helped and was told to take a bigger dose and sxs do seem to get better.     Mouth irritation - Questionable due ot thrush, recent parathyroidectomy, b12 deficiency vs other etiology.     Endo had pt started hctz.due to ankle swelling and decreased norvasc from 10 to 5 mg.      7/16/24 her tongue started to burn felt like mouth on fire tongue started to swell and saw white aeras on tongue and thougt was thrush again. Nystatin started 7/16/24 her symptoms started to feel better and mouth is dry and tongue irritated.lips feels irritated. She thought the new HCTZ was causing tongue to swell so she stopped ht emed and then symptoms of tongue swelling reduced.           Objective   Vital Signs:   /68 (BP Location: Right arm, Patient Position: Sitting, Cuff Size: Adult)   Pulse 64   Temp 97.5 °F (36.4 °C) (Infrared)   Resp 18   Ht 152.4 cm (60\")   Wt 60.8 kg (134 lb)   BMI 26.17 kg/m²     Physical Exam  Vitals and nursing note reviewed.   Constitutional:       General: She is not in acute distress.     Appearance: Normal appearance. She is well-developed. She is not ill-appearing.   HENT:      Head: Normocephalic and atraumatic.      Right Ear: Tympanic membrane normal.      Left Ear: Tympanic membrane normal.      Mouth/Throat:      Pharynx: Oropharynx is clear.      Comments: Mild erythema at corners of mouth  Eyes:      General: No scleral icterus.     Conjunctiva/sclera: Conjunctivae normal.   Neck:      Thyroid: No thyromegaly. "   Cardiovascular:      Rate and Rhythm: Normal rate and regular rhythm.   Pulmonary:      Effort: Pulmonary effort is normal.      Breath sounds: Normal breath sounds.   Abdominal:      General: Bowel sounds are normal. There is no distension.      Palpations: Abdomen is soft.      Tenderness: There is no abdominal tenderness.   Musculoskeletal:         General: Swelling present.   Lymphadenopathy:      Cervical: No cervical adenopathy.   Skin:     Capillary Refill: Capillary refill takes 2 to 3 seconds.      Coloration: Skin is not pale.   Neurological:      Mental Status: She is alert and oriented to person, place, and time.   Psychiatric:         Mood and Affect: Mood normal.         Behavior: Behavior normal.        Result Review :                 Assessment and Plan    Diagnoses and all orders for this visit:    1. Burning sensation of mouth (Primary)  -     Culture, Routine - Swab, Oral Cavity; Future  -     Vitamin B12; Future  -     Culture, Routine - Swab, Oral Cavity  -     Vitamin B12    2. Lower extremity edema  -     furosemide (Lasix) 20 MG tablet; 1/2 tab as needed 3 times weekly prn leg edema  Dispense: 30 tablet; Refill: 0  -     potassium chloride 10 MEQ CR tablet; 1 tab as needed 3 times weekly prn leg edema to be take with lasix  Dispense: 30 tablet; Refill: 0    Other orders  -     fluconazole (Diflucan) 150 MG tablet; Take 1 tablet by mouth 1 (One) Time for 1 dose.  Dispense: 1 tablet; Refill: 0      Assessment & Plan          Follow Up   Return if symptoms worsen or fail to improve.  Patient was given instructions and counseling regarding her condition or for health maintenance advice. Please see specific information pulled into the AVS if appropriate.     RTC/call  If symptoms worsen  Meds MOA and SE's reviewed and pt v/u    07/25/24   10:08 EDT

## 2024-07-30 LAB
BACTERIA SPEC CULT: NORMAL
MICROORGANISM/AGENT SPEC: NORMAL

## 2024-08-08 RX ORDER — AMLODIPINE BESYLATE 10 MG/1
10 TABLET ORAL DAILY
Qty: 90 TABLET | Refills: 0 | OUTPATIENT
Start: 2024-08-08

## 2024-08-19 ENCOUNTER — INFUSION (OUTPATIENT)
Dept: ONCOLOGY | Facility: HOSPITAL | Age: 84
End: 2024-08-19
Payer: MEDICARE

## 2024-08-19 VITALS
HEART RATE: 78 BPM | RESPIRATION RATE: 18 BRPM | WEIGHT: 134.4 LBS | SYSTOLIC BLOOD PRESSURE: 134 MMHG | BODY MASS INDEX: 26.25 KG/M2 | DIASTOLIC BLOOD PRESSURE: 71 MMHG | TEMPERATURE: 98.9 F

## 2024-08-19 DIAGNOSIS — M81.8 OTHER OSTEOPOROSIS WITHOUT CURRENT PATHOLOGICAL FRACTURE: Primary | ICD-10-CM

## 2024-08-19 LAB — CREAT BLDA-MCNC: 0.9 MG/DL (ref 0.6–1.3)

## 2024-08-19 PROCEDURE — 25810000003 SODIUM CHLORIDE 0.9 % SOLUTION: Performed by: INTERNAL MEDICINE

## 2024-08-19 PROCEDURE — 96374 THER/PROPH/DIAG INJ IV PUSH: CPT

## 2024-08-19 PROCEDURE — 82565 ASSAY OF CREATININE: CPT | Performed by: INTERNAL MEDICINE

## 2024-08-19 PROCEDURE — 25010000002 ZOLEDRONIC ACID 5 MG/100ML SOLUTION: Performed by: INTERNAL MEDICINE

## 2024-08-19 PROCEDURE — 96365 THER/PROPH/DIAG IV INF INIT: CPT

## 2024-08-19 RX ORDER — ZOLEDRONIC ACID 5 MG/100ML
5 INJECTION, SOLUTION INTRAVENOUS ONCE
Status: CANCELLED | OUTPATIENT
Start: 2024-08-19

## 2024-08-19 RX ORDER — SODIUM CHLORIDE 9 MG/ML
20 INJECTION, SOLUTION INTRAVENOUS ONCE
Status: CANCELLED | OUTPATIENT
Start: 2024-08-19

## 2024-08-19 RX ORDER — ZOLEDRONIC ACID 5 MG/100ML
5 INJECTION, SOLUTION INTRAVENOUS ONCE
Status: COMPLETED | OUTPATIENT
Start: 2024-08-19 | End: 2024-08-19

## 2024-08-19 RX ORDER — SODIUM CHLORIDE 9 MG/ML
20 INJECTION, SOLUTION INTRAVENOUS ONCE
Status: COMPLETED | OUTPATIENT
Start: 2024-08-19 | End: 2024-08-19

## 2024-08-19 RX ADMIN — SODIUM CHLORIDE 20 ML/HR: 9 INJECTION, SOLUTION INTRAVENOUS at 11:18

## 2024-08-19 RX ADMIN — ZOLEDRONIC ACID 5 MG: 0.05 INJECTION, SOLUTION INTRAVENOUS at 11:18

## 2024-08-22 ENCOUNTER — TRANSCRIBE ORDERS (OUTPATIENT)
Dept: ADMINISTRATIVE | Facility: HOSPITAL | Age: 84
End: 2024-08-22
Payer: MEDICARE

## 2024-08-22 DIAGNOSIS — Z12.31 VISIT FOR SCREENING MAMMOGRAM: Primary | ICD-10-CM

## 2024-09-16 ENCOUNTER — OFFICE VISIT (OUTPATIENT)
Dept: INTERNAL MEDICINE | Facility: CLINIC | Age: 84
End: 2024-09-16
Payer: MEDICARE

## 2024-09-16 VITALS
WEIGHT: 135 LBS | DIASTOLIC BLOOD PRESSURE: 80 MMHG | SYSTOLIC BLOOD PRESSURE: 126 MMHG | BODY MASS INDEX: 26.5 KG/M2 | HEART RATE: 80 BPM | RESPIRATION RATE: 20 BRPM | OXYGEN SATURATION: 96 % | TEMPERATURE: 97.1 F | HEIGHT: 60 IN

## 2024-09-16 DIAGNOSIS — K21.9 GASTROESOPHAGEAL REFLUX DISEASE WITHOUT ESOPHAGITIS: ICD-10-CM

## 2024-09-16 DIAGNOSIS — E55.9 VITAMIN D DEFICIENCY: ICD-10-CM

## 2024-09-16 DIAGNOSIS — Z00.00 ANNUAL PHYSICAL EXAM: ICD-10-CM

## 2024-09-16 DIAGNOSIS — R21 RASH: ICD-10-CM

## 2024-09-16 DIAGNOSIS — R73.03 PREDIABETES: ICD-10-CM

## 2024-09-16 DIAGNOSIS — R60.0 LOWER EXTREMITY EDEMA: ICD-10-CM

## 2024-09-16 DIAGNOSIS — E78.2 MIXED HYPERLIPIDEMIA: ICD-10-CM

## 2024-09-16 DIAGNOSIS — I25.10 CORONARY ARTERY DISEASE INVOLVING NATIVE CORONARY ARTERY OF NATIVE HEART WITHOUT ANGINA PECTORIS: ICD-10-CM

## 2024-09-16 DIAGNOSIS — T78.40XD ALLERGY, SUBSEQUENT ENCOUNTER: ICD-10-CM

## 2024-09-16 DIAGNOSIS — I10 ESSENTIAL HYPERTENSION: ICD-10-CM

## 2024-09-16 DIAGNOSIS — Z13.220 LIPID SCREENING: ICD-10-CM

## 2024-09-16 DIAGNOSIS — M81.6 LOCALIZED OSTEOPOROSIS WITHOUT CURRENT PATHOLOGICAL FRACTURE: ICD-10-CM

## 2024-09-16 DIAGNOSIS — Z00.00 MEDICARE ANNUAL WELLNESS VISIT, SUBSEQUENT: Primary | ICD-10-CM

## 2024-09-16 LAB
BASOPHILS # BLD AUTO: 0.09 10*3/MM3 (ref 0–0.2)
BASOPHILS NFR BLD AUTO: 1.4 % (ref 0–1.5)
BILIRUB BLD-MCNC: NEGATIVE MG/DL
CHOLEST SERPL-MCNC: 151 MG/DL (ref 0–200)
CLARITY, POC: CLEAR
COLOR UR: YELLOW
DEPRECATED RDW RBC AUTO: 39.8 FL (ref 37–54)
EOSINOPHIL # BLD AUTO: 0.15 10*3/MM3 (ref 0–0.4)
EOSINOPHIL NFR BLD AUTO: 2.3 % (ref 0.3–6.2)
ERYTHROCYTE [DISTWIDTH] IN BLOOD BY AUTOMATED COUNT: 12.3 % (ref 12.3–15.4)
EXPIRATION DATE: NORMAL
GLUCOSE UR STRIP-MCNC: NEGATIVE MG/DL
HCT VFR BLD AUTO: 39.1 % (ref 34–46.6)
HDLC SERPL-MCNC: 59 MG/DL (ref 40–60)
HGB BLD-MCNC: 13 G/DL (ref 12–15.9)
IMM GRANULOCYTES # BLD AUTO: 0.02 10*3/MM3 (ref 0–0.05)
IMM GRANULOCYTES NFR BLD AUTO: 0.3 % (ref 0–0.5)
KETONES UR QL: NEGATIVE
LDLC SERPL CALC-MCNC: 78 MG/DL (ref 0–100)
LDLC/HDLC SERPL: 1.32 {RATIO}
LEUKOCYTE EST, POC: NEGATIVE
LYMPHOCYTES # BLD AUTO: 0.9 10*3/MM3 (ref 0.7–3.1)
LYMPHOCYTES NFR BLD AUTO: 13.6 % (ref 19.6–45.3)
Lab: NORMAL
MCH RBC QN AUTO: 30 PG (ref 26.6–33)
MCHC RBC AUTO-ENTMCNC: 33.2 G/DL (ref 31.5–35.7)
MCV RBC AUTO: 90.1 FL (ref 79–97)
MONOCYTES # BLD AUTO: 0.58 10*3/MM3 (ref 0.1–0.9)
MONOCYTES NFR BLD AUTO: 8.7 % (ref 5–12)
NEUTROPHILS NFR BLD AUTO: 4.9 10*3/MM3 (ref 1.7–7)
NEUTROPHILS NFR BLD AUTO: 73.7 % (ref 42.7–76)
NITRITE UR-MCNC: NEGATIVE MG/ML
NRBC BLD AUTO-RTO: 0 /100 WBC (ref 0–0.2)
PH UR: 6 [PH] (ref 5–8)
PLATELET # BLD AUTO: 303 10*3/MM3 (ref 140–450)
PMV BLD AUTO: 11.1 FL (ref 6–12)
PROT UR STRIP-MCNC: NEGATIVE MG/DL
RBC # BLD AUTO: 4.34 10*6/MM3 (ref 3.77–5.28)
RBC # UR STRIP: NEGATIVE /UL
SP GR UR: 1 (ref 1–1.03)
TRIGL SERPL-MCNC: 72 MG/DL (ref 0–150)
UROBILINOGEN UR QL: NORMAL
VLDLC SERPL-MCNC: 14 MG/DL (ref 5–40)
WBC NRBC COR # BLD AUTO: 6.64 10*3/MM3 (ref 3.4–10.8)

## 2024-09-16 PROCEDURE — G0439 PPPS, SUBSEQ VISIT: HCPCS | Performed by: NURSE PRACTITIONER

## 2024-09-16 PROCEDURE — 81003 URINALYSIS AUTO W/O SCOPE: CPT | Performed by: NURSE PRACTITIONER

## 2024-09-16 PROCEDURE — 3079F DIAST BP 80-89 MM HG: CPT | Performed by: NURSE PRACTITIONER

## 2024-09-16 PROCEDURE — 1126F AMNT PAIN NOTED NONE PRSNT: CPT | Performed by: NURSE PRACTITIONER

## 2024-09-16 PROCEDURE — 36415 COLL VENOUS BLD VENIPUNCTURE: CPT | Performed by: NURSE PRACTITIONER

## 2024-09-16 PROCEDURE — 99397 PER PM REEVAL EST PAT 65+ YR: CPT | Performed by: NURSE PRACTITIONER

## 2024-09-16 PROCEDURE — 85025 COMPLETE CBC W/AUTO DIFF WBC: CPT | Performed by: NURSE PRACTITIONER

## 2024-09-16 PROCEDURE — 1159F MED LIST DOCD IN RCRD: CPT | Performed by: NURSE PRACTITIONER

## 2024-09-16 PROCEDURE — 3074F SYST BP LT 130 MM HG: CPT | Performed by: NURSE PRACTITIONER

## 2024-09-16 PROCEDURE — 1160F RVW MEDS BY RX/DR IN RCRD: CPT | Performed by: NURSE PRACTITIONER

## 2024-09-16 PROCEDURE — 80061 LIPID PANEL: CPT | Performed by: NURSE PRACTITIONER

## 2024-09-16 RX ORDER — FEXOFENADINE HCL 60 MG/1
60 TABLET, FILM COATED ORAL DAILY
Qty: 90 TABLET | Refills: 3 | Status: SHIPPED | OUTPATIENT
Start: 2024-09-16

## 2024-09-16 RX ORDER — CLOTRIMAZOLE AND BETAMETHASONE DIPROPIONATE 10; .64 MG/G; MG/G
1 CREAM TOPICAL 2 TIMES DAILY
Qty: 15 G | Refills: 0 | Status: SHIPPED | OUTPATIENT
Start: 2024-09-16

## 2024-09-18 ENCOUNTER — HOSPITAL ENCOUNTER (OUTPATIENT)
Dept: MAMMOGRAPHY | Facility: HOSPITAL | Age: 84
Discharge: HOME | End: 2024-09-18
Admitting: NURSE PRACTITIONER
Payer: MEDICARE

## 2024-09-18 DIAGNOSIS — Z12.31 VISIT FOR SCREENING MAMMOGRAM: ICD-10-CM

## 2024-09-18 PROCEDURE — 77063 BREAST TOMOSYNTHESIS BI: CPT

## 2024-09-18 PROCEDURE — 77067 SCR MAMMO BI INCL CAD: CPT

## 2024-09-19 DIAGNOSIS — K21.9 GASTROESOPHAGEAL REFLUX DISEASE WITHOUT ESOPHAGITIS: ICD-10-CM

## 2024-09-20 RX ORDER — PANTOPRAZOLE SODIUM 40 MG/1
TABLET, DELAYED RELEASE ORAL
Qty: 90 TABLET | Refills: 0 | Status: SHIPPED | OUTPATIENT
Start: 2024-09-20

## 2024-10-21 ENCOUNTER — TELEPHONE (OUTPATIENT)
Dept: INTERNAL MEDICINE | Facility: CLINIC | Age: 84
End: 2024-10-21
Payer: MEDICARE

## 2024-10-21 RX ORDER — VALACYCLOVIR HYDROCHLORIDE 1 G/1
2000 TABLET, FILM COATED ORAL 2 TIMES DAILY
Qty: 4 TABLET | Refills: 5 | Status: SHIPPED | OUTPATIENT
Start: 2024-10-21 | End: 2024-10-22

## 2024-10-21 NOTE — TELEPHONE ENCOUNTER
Caller: Leslee Wu    Relationship: Self    Best call back number: [phone]     What medication are you requesting: ACYCLOVIR    What are your current symptoms: FEVER BLISTER    How long have you been experiencing symptoms: ON AND OFF FOR YEARS    Have you had these symptoms before:    [x] Yes  [] No    Have you been treated for these symptoms before:   [x] Yes  [] No    If a prescription is needed, what is your preferred pharmacy and phone number:  [address] [phone] [facility] [phone] [facility]     Additional notes:

## 2024-10-22 RX ORDER — MUPIROCIN 20 MG/G
1 OINTMENT TOPICAL 3 TIMES DAILY
Qty: 30 G | Refills: 0 | Status: SHIPPED | OUTPATIENT
Start: 2024-10-22

## 2024-10-22 NOTE — TELEPHONE ENCOUNTER
I sent valacyclovir to her pharmacy.  However if her symptoms have been going on for days he is likely best to treat with topical Bactroban and I will send a prescription to your pharmacy.

## 2024-11-11 RX ORDER — BENAZEPRIL HYDROCHLORIDE 20 MG/1
TABLET ORAL
Qty: 90 TABLET | Refills: 0 | Status: SHIPPED | OUTPATIENT
Start: 2024-11-11

## 2024-12-18 DIAGNOSIS — K21.9 GASTROESOPHAGEAL REFLUX DISEASE WITHOUT ESOPHAGITIS: ICD-10-CM

## 2024-12-18 RX ORDER — PANTOPRAZOLE SODIUM 40 MG/1
TABLET, DELAYED RELEASE ORAL
Qty: 90 TABLET | Refills: 0 | Status: SHIPPED | OUTPATIENT
Start: 2024-12-18

## 2025-01-22 RX ORDER — NYSTATIN 100000 [USP'U]/ML
SUSPENSION ORAL
Qty: 150 ML | Refills: 0 | Status: SHIPPED | OUTPATIENT
Start: 2025-01-22

## 2025-01-22 NOTE — TELEPHONE ENCOUNTER
Caller: Leslee Wu    Relationship: Self    Best call back number: [phone]     Requested Prescriptions:   Requested Prescriptions     Pending Prescriptions Disp Refills    nystatin (MYCOSTATIN) 100,000 unit/mL suspension          Pharmacy where request should be sent: [address] - [phone] [facility] [phone] [facility]     Last office visit with prescribing clinician: 9/16/2024   Last telemedicine visit with prescribing clinician: Visit date not found   Next office visit with prescribing clinician: 3/17/2025     Additional details provided by patient: PATIENT STATES SHE IS EXPERIENCING THRUSH SYMPTOMS AGAIN AND WOULD LIKE TO SEE IF DR. RUSSELL WILL CALL THIS MEDICATION IN FOR HER AGAIN.     Does the patient have less than a 3 day supply:  [x] Yes  [] No    Would you like a call back once the refill request has been completed: [] Yes [x] No    If the office needs to give you a call back, can they leave a voicemail: [] Yes [x] No    Joann Naranjo Rep   01/22/25 11:05 EST

## 2025-02-06 RX ORDER — BENAZEPRIL HYDROCHLORIDE 20 MG/1
TABLET ORAL
Qty: 90 TABLET | Refills: 0 | Status: SHIPPED | OUTPATIENT
Start: 2025-02-06

## 2025-02-14 ENCOUNTER — OFFICE VISIT (OUTPATIENT)
Dept: CARDIOLOGY | Facility: CLINIC | Age: 85
End: 2025-02-14
Payer: MEDICARE

## 2025-02-14 VITALS
BODY MASS INDEX: 27.21 KG/M2 | HEART RATE: 80 BPM | OXYGEN SATURATION: 100 % | DIASTOLIC BLOOD PRESSURE: 80 MMHG | WEIGHT: 138.6 LBS | SYSTOLIC BLOOD PRESSURE: 122 MMHG | HEIGHT: 60 IN

## 2025-02-14 DIAGNOSIS — R07.1 CHEST PAIN ON BREATHING: ICD-10-CM

## 2025-02-14 DIAGNOSIS — I10 PRIMARY HYPERTENSION: Primary | ICD-10-CM

## 2025-02-14 DIAGNOSIS — R01.1 CARDIAC MURMUR: ICD-10-CM

## 2025-02-14 PROCEDURE — 3079F DIAST BP 80-89 MM HG: CPT | Performed by: INTERNAL MEDICINE

## 2025-02-14 PROCEDURE — 3074F SYST BP LT 130 MM HG: CPT | Performed by: INTERNAL MEDICINE

## 2025-02-14 PROCEDURE — 99214 OFFICE O/P EST MOD 30 MIN: CPT | Performed by: INTERNAL MEDICINE

## 2025-02-14 RX ORDER — LANOLIN ALCOHOL/MO/W.PET/CERES
1000 CREAM (GRAM) TOPICAL DAILY
COMMUNITY

## 2025-02-14 NOTE — PROGRESS NOTES
"[facility]  Office Progress Note  Leslee POSADAS Bryce  1940  [address] [facility] [address]       Visit Date: 02/14/25    PCP: Josephine Johnson, APRN  [address] [facility] [address]  [facility] [address]    IDENTIFICATION: A 84 y.o. female former Will patient     PROBLEM LIST:   1.   History of atypical chest pain  A. Echocardiogram, 11/23 : LVEF 70% LVOT turbulence no gradient              B. 11/23 MPS wnl EF 77% diffuse CAC  2.   Hypertension  3.   HL     Lipid Status 9/23 163/88/55/92  4.   GERD  5.   History of brief tobacco use, in patient's 20s:  A. Essentially \"normal\" CT of the chest, 08/06/2014.  6.  Hyperparathyroidism followed per Dr. Sukhwinder GO S/p parathyroidectomy 10/23 at   7.   Iron deficiency anemia.  8.   Spondylolisthesis/degenerative disc disease.  9.   Mild chronic anxiety.  10. History of diverticulosis  11. History of gout.  12. Left lower extremity ?DVT October 2019    A. LE venous duplex 10/17/19: discordant data superficial vs DVT   B. LE venous duplex - normal, 11/15/2019  12. Surgical History:                        A. Back surgery                        B. Right TKR.                        C. T&A.                        D. Right hip replacement.                        E. Left TKR.             F.  Parathyroidectomy October 2023      CC:   Chief Complaint   Patient presents with    Essential hypertension       Allergies  Allergies   Allergen Reactions    Macrobid [Nitrofurantoin] Other (See Comments)     Tongue and mouth felt funny     Codeine Nausea And Vomiting    Euthyrox [Levothyroxine] Paresthesia     euthyrox brand    Hydrochlorothiazide Other (See Comments)     Tongue swelling    Lipitor [Atorvastatin] Myalgia    Lortab [Hydrocodone-Acetaminophen] Nausea And Vomiting       Current Medications  Current Outpatient Medications   Medication Instructions    acetaminophen (TYLENOL) 1,000 mg, Every 6 Hours PRN    amLODIPine (NORVASC) 5 mg, Oral, Daily    " "aspirin 81 mg, Daily    Bacillus Coagulans-Inulin (Probiotic) 1-250 BILLION-MG capsule Take  by mouth.    benazepril (LOTENSIN) 20 MG tablet Take 1 tablet by mouth once daily    levothyroxine (SYNTHROID, LEVOTHROID) 75 mcg, Oral, Daily    Misc Natural Products (OSTEO BI-FLEX JOINT SHIELD PO) Daily    pantoprazole (PROTONIX) 40 MG EC tablet Take 1 tablet by mouth once daily    vitamin B-12 (CYANOCOBALAMIN) 1,000 mcg, Daily    Vitamin D3 2,000 Units, Daily        History of Present Illness   Leslee Wu is a 84 y.o. year old female here for follow up   No new cardiac concerns.  Arthritis is coming worsening.  She did have carpal tunnel release on 1 side and is considering on the other    OBJECTIVE:  Vitals:    02/14/25 1457   BP: 122/80   BP Location: Left arm   Patient Position: Sitting   Cuff Size: Adult   Pulse: 80   SpO2: 100%   Weight: 62.9 kg (138 lb 9.6 oz)   Height: 152.4 cm (60\")       Body mass index is 27.07 kg/m².    Constitutional:       Appearance: Not in distress.   Cardiovascular:      Normal rate. Regular rhythm.      Murmurs: There is a systolic murmur.      No gallop.  No rub.   Edema:     Ankle: bilateral trace edema of the ankle.     Feet: bilateral trace edema of the feet.  Neurological:      Mental Status: Alert.   Psychiatric:         Behavior: Behavior is cooperative.       Diagnostic Data: pending  Procedures        ASSESSMENT:   Diagnosis Plan   1. Primary hypertension        2. Cardiac murmur        3. Chest pain on breathing              PLAN:  HTN: Controlled.  Continue benazepril and amlodipine.  LVOT turbulence no gradient  Atypical chest pain low risk MPS         Tanner Saavedra MD by Tanner Saavedra MD. 2/14/2025  15:16 EST       Tanner Saavedra MD, [facility]  "

## 2025-02-24 ENCOUNTER — OFFICE VISIT (OUTPATIENT)
Dept: ENDOCRINOLOGY | Facility: CLINIC | Age: 85
End: 2025-02-24
Payer: MEDICARE

## 2025-02-24 VITALS
HEIGHT: 60 IN | HEART RATE: 74 BPM | WEIGHT: 136 LBS | DIASTOLIC BLOOD PRESSURE: 72 MMHG | SYSTOLIC BLOOD PRESSURE: 122 MMHG | BODY MASS INDEX: 26.7 KG/M2

## 2025-02-24 DIAGNOSIS — E55.9 VITAMIN D DEFICIENCY: ICD-10-CM

## 2025-02-24 DIAGNOSIS — E03.9 ACQUIRED HYPOTHYROIDISM: ICD-10-CM

## 2025-02-24 DIAGNOSIS — E21.3 HYPERPARATHYROIDISM: Primary | ICD-10-CM

## 2025-02-24 DIAGNOSIS — E04.2 MULTIPLE THYROID NODULES: ICD-10-CM

## 2025-02-24 DIAGNOSIS — M81.8 OTHER OSTEOPOROSIS WITHOUT CURRENT PATHOLOGICAL FRACTURE: ICD-10-CM

## 2025-02-24 PROCEDURE — 84443 ASSAY THYROID STIM HORMONE: CPT | Performed by: PHYSICIAN ASSISTANT

## 2025-02-24 PROCEDURE — 36415 COLL VENOUS BLD VENIPUNCTURE: CPT | Performed by: PHYSICIAN ASSISTANT

## 2025-02-24 PROCEDURE — 82306 VITAMIN D 25 HYDROXY: CPT | Performed by: PHYSICIAN ASSISTANT

## 2025-02-24 PROCEDURE — 1159F MED LIST DOCD IN RCRD: CPT | Performed by: PHYSICIAN ASSISTANT

## 2025-02-24 PROCEDURE — 1160F RVW MEDS BY RX/DR IN RCRD: CPT | Performed by: PHYSICIAN ASSISTANT

## 2025-02-24 PROCEDURE — 99214 OFFICE O/P EST MOD 30 MIN: CPT | Performed by: PHYSICIAN ASSISTANT

## 2025-02-24 PROCEDURE — 80053 COMPREHEN METABOLIC PANEL: CPT | Performed by: PHYSICIAN ASSISTANT

## 2025-02-24 PROCEDURE — 3078F DIAST BP <80 MM HG: CPT | Performed by: PHYSICIAN ASSISTANT

## 2025-02-24 PROCEDURE — 3074F SYST BP LT 130 MM HG: CPT | Performed by: PHYSICIAN ASSISTANT

## 2025-02-24 NOTE — ASSESSMENT & PLAN NOTE
Thyroid ultrasound from 10/25/2022 with 1.8 cm left nodule.  Bilateral nodules noted on exam.  Advised repeat ultrasound with Dr. Pretty next office visit

## 2025-02-24 NOTE — PROGRESS NOTES
Chief Complaint   Patient presents with    Hyperparathyroidism      HPI  Leslee Wu is a 84 y.o. female presents today for evaluation of hypercalcemia, hyperparathyroidism.  Last seen for this 7/9/2024 by another provider in clinic.  Advised starting Prolia and referred to infusion clinic.     She tolerated treatment well and is scheduled for repeat 8/2025.    Her blood pressure was noted to be elevated and was advised decreasing amlodipine 5 mg and starting hydrochlorothiazide 12.5 mg?  She reports is not taking hydrochlorothiazide due to feeling poorly few days after taking.  She is taking amlodipine and benazepril.  She has noticed improvement in swelling, but this does occur on occasions.    She reports sister also diagnosed with thyroid cancer.    Has noticed worsening fatigue since last visit.  Without additional concerns.  Denies neck pain, swelling, difficulty swallowing, change in voice.  Denies numbness/tingling, bone/muscle aches, changes in bowels, changes in mood.    Hyperparathyroidism:   S/p parathyroidectomy 3-1/2 glands 11/2023, her postoperative period was complicated by tingling in the face, dizziness, elevated blood pressure and headache.     -Current regimen includes: Calcium supplements - 1500 mg daily in divided doses. Vitamin D 2000 IU daily    -Denies history of kidney stones or fractures    Osteoporosis  BMD om 11/2023 showed T-score of -3.9 at the hip    -Current regimen includes: Prolia    Hypothyroidism:  Diagnosed 1995    Labs from  7/9/2024 TSH: 1.2    Thyroid ultrasound 10/25/2022  The right lobe measured 4.16 cm L x 1.17 cm AP x 1.4 cm in TV dimension.    The isthmus measured 0.22 cm in thickness.    The left thyroid lobe measured 3.97 cm L x 1.17 cm AP x 1.62 cm in TV dimension.     Thyroid gland is homogeneous and contains single nodule in the left lobe and small nodule  On the right .     Nodule 1   is located in the left mid lobe and measures 1.81 x 1.14 x 1.24 cm (L X AP X TV).   This nodule is cystic, homogeneous, hypoechoic with well-defined borders and egg-shell calcifications, and Grade I vascularity on Color Flow Doppler.  It has artifacts:  posterior acoustic enhancement     Nodule 2  is located in the right upper lobe and measures 0.54 x 0.44 x 0.35 cm (L X AP X TV).  This nodule is solid, homogeneous, hypoechoic with well-defined margins, and Grade II vascularity on Color Flow Doppler.  It has no artifacts     Posterior to the right thyroid lobe hypoechoic extrathyroidal structure is located inferiorly, representing parathyroid gland and measures 1 x 0.57 x and 0.65 L x AP x TV cm.  Doppler flow representing polar artery is seen.     -Current regimen includes: levothyroxine 75 mcg.  Taking first thing in morning 1 hr before meals and hot beverages.   Delays ppi and tums at meal.           Past Medical History:   Diagnosis Date    Anemia     Anxiety     Atypical chest pain     Normal exercise Cardiolite stress test and echocardiogram in .    Atypical chest pain     Atypical chest pain     History of atypical chest pain: Echocardiogram, ; LVEF 90%. Abnormal calcium score, 2013. MPS, 2013: No inducible ischemia, LVEF 70%.    Diverticulosis     Diverticulosis     History of diverticulosis.    Diverticulosis of large intestine without hemorrhage 10/21/2016    Dyslipidemia     Former smoker 2016    GERD (gastroesophageal reflux disease)     GERD without esophagitis     Gout     Gout     History of gout.     4 para 4     High risk medication use 2020    Hypertension     Hypothyroidism     On chronic replacement therapy.    Hypothyroidism     Hypothyroidism/chronic replacement.    Iron deficiency anemia     Iron deficiency anemia.    Joint pain, knee     Menopause 10/09/2019    Mild anxiety     Mild chronic anxiety.    Nontoxic single thyroid nodule     Osteoarthritis     Pain, lower leg     Parathyroid adenoma     Pelvic organ prolapse quantification  stage 1 cystocele 10/20/2019    Follows with Dr. Felipe GYN evaluation 10/9 no specific treatment at this time.    Pharyngitis     Pulmonary embolism     Pulmonary nodule     CT scan of the chest of 08/14/2013 reports new noncalcified nodule in the right lung base measuring 8 x 5 mm and a stable noncalcified nodule in the left mid lung field measuring 4-5 mm, follow-up CT scan of 08/06/2014 reports interval resolution of the right basilar lung nodule and stable calcified nodule in the left upper lobe.    Rectocele 01/23/2017    Spondylolisthesis     Spondylolisthesis/degenerative disc disease.    Tobacco use     History of brief tobacco use, in the patient’s 20s:     Tobacco use     History of brief tobacco use, in the patient’s 20s: Noncalcified lung nodule found on chest CT, June 2013, at the left lung base and the mid left lung field.  Evaluation by Dr. Ivan Mota, August 2013 with serial CT scans. Chest CT, November 2013: Noncalcified nodules smaller than prior studies. Essentially “normal” CT of the chest, 08/06/2014.     Urinary tract infection     Vaginal candidiasis      Past Surgical History:   Procedure Laterality Date    BACK SURGERY      CARPAL TUNNEL RELEASE Right 04/2024    BGO    CATARACT EXTRACTION Left     PARATHYROIDECTOMY  10/16/2023    TONSILLECTOMY AND ADENOIDECTOMY      TONSILLECTOMY AND ADENOIDECTOMY      TOTAL HIP ARTHROPLASTY      TOTAL HIP ARTHROPLASTY Right     Right hip replacement.    TOTAL KNEE ARTHROPLASTY Right     TOTAL KNEE ARTHROPLASTY Left     TOTAL KNEE ARTHROPLASTY REVISION Left       Family History   Problem Relation Age of Onset    Aortic aneurysm Father     Other Father         ruptured AAA    Coronary artery disease Mother     Heart attack Mother     Heart disease Brother         CABG    Other Brother     Heart disease Brother     Coronary artery disease Brother     Multiple myeloma Brother     Heart attack Brother     Coronary artery disease Brother     Coronary artery  disease Brother     Heart disease Sister         Stent placement    Heart disease Sister         Stent placement    Pulmonary fibrosis Sister     Other Sister         2 sisters who have had cardiac stent placement    Breast cancer Maternal Aunt 35    Breast cancer Paternal Aunt 70    Ovarian cancer Neg Hx       Social History     Socioeconomic History    Marital status:    Tobacco Use    Smoking status: Former     Current packs/day: 0.00     Average packs/day: 0.5 packs/day for 10.0 years (5.0 ttl pk-yrs)     Types: Cigarettes     Start date:      Quit date:      Years since quittin.1     Passive exposure: Never    Smokeless tobacco: Never    Tobacco comments:     Smoked briefly in her 20's   Vaping Use    Vaping status: Never Used   Substance and Sexual Activity    Alcohol use: No    Drug use: No    Sexual activity: Defer     Birth control/protection: Post-menopausal      Allergies   Allergen Reactions    Macrobid [Nitrofurantoin] Other (See Comments)     Tongue and mouth felt funny     Codeine Nausea And Vomiting    Euthyrox [Levothyroxine] Paresthesia     euthyrox brand    Hydrochlorothiazide Other (See Comments)     Tongue swelling    Lipitor [Atorvastatin] Myalgia    Lortab [Hydrocodone-Acetaminophen] Nausea And Vomiting      Current Outpatient Medications on File Prior to Visit   Medication Sig Dispense Refill    acetaminophen (TYLENOL) 500 MG tablet Take 2 tablets by mouth Every 6 (Six) Hours As Needed for Mild Pain (for arthritis).      amLODIPine (NORVASC) 5 MG tablet Take 1 tablet by mouth Daily. 90 tablet 3    aspirin 81 MG EC tablet Take 1 tablet by mouth Daily.      Bacillus Coagulans-Inulin (Probiotic) 1-250 BILLION-MG capsule Take  by mouth.      benazepril (LOTENSIN) 20 MG tablet Take 1 tablet by mouth once daily 90 tablet 0    Cholecalciferol (VITAMIN D3) 50 MCG (2000 UT) capsule Take 1 capsule by mouth Daily.      levothyroxine (SYNTHROID, LEVOTHROID) 75 MCG tablet Take 1 tablet  "by mouth Daily. 90 tablet 3    [facility] Natural Products (OSTEO BI-FLEX JOINT SHIELD PO) Take  by mouth Daily.      pantoprazole (PROTONIX) 40 MG EC tablet Take 1 tablet by mouth once daily 90 tablet 0    vitamin B-12 (CYANOCOBALAMIN) 1000 MCG tablet Take 1 tablet by mouth Daily.       No current facility-administered medications on file prior to visit.        The following portions of the patient's history were reviewed and updated as appropriate: allergies, current medications, past family history, past medical history, past social history, past surgical history and problem list.    Review of Systems   Constitutional:  Positive for fatigue. Negative for activity change and appetite change.   HENT:  Negative for trouble swallowing and voice change.    Respiratory:  Negative for shortness of breath.    Cardiovascular:  Positive for leg swelling. Negative for chest pain and palpitations.   Gastrointestinal:  Negative for constipation and diarrhea.   Endocrine: Positive for cold intolerance. Negative for heat intolerance.   Musculoskeletal:  Negative for myalgias.   Skin:  Negative for dry skin.   Neurological:  Negative for dizziness and numbness.   Psychiatric/Behavioral:  Negative for depressed mood. The patient is not nervous/anxious.         /72   Pulse 74   Ht 152.4 cm (60\")   Wt 61.7 kg (136 lb)   LMP  (LMP Unknown)   BMI 26.56 kg/m²      Physical Exam  Constitutional:       Appearance: Normal appearance.   Neck:      Thyroid: Thyroid mass (small bilateral thyroid nodules about 1 cm) present.   Cardiovascular:      Rate and Rhythm: Normal rate.   Pulmonary:      Effort: Pulmonary effort is normal.   Musculoskeletal:         General: Normal range of motion.   Skin:     General: Skin is warm and dry.   Neurological:      General: No focal deficit present.      Mental Status: She is alert and oriented to person, place, and time.   Psychiatric:         Mood and Affect: Mood normal.         Behavior: Behavior " normal.         LABS AND IMAGING  CMP:  Lab Results   Component Value Date    Glucose 76 07/09/2024    Glucose, UA Negative 09/16/2024    Glucose, UA Negative 03/22/2023    BUN 14 07/09/2024    BUN/Creatinine Ratio 16.7 07/09/2024    Creatinine 0.90 08/19/2024    Creatinine, Urine 50 09/11/2023    eGFR 68.6 07/09/2024    eGFR Non  Amer 73 02/09/2022    Ketones, UA Negative 09/16/2024    Ketones, UA Negative 03/22/2023    CO2 22.8 07/09/2024    Calcium 9.7 07/09/2024    Calcium 9.4 10/24/2023    Albumin 4.3 07/09/2024    Albumin/Creatinine Ratio, Urine <30 09/11/2023    AST (SGOT) 18 10/27/2023    ALT (SGPT) 11 10/27/2023       TSH:  Lab Results   Component Value Date    TSH 1.210 07/09/2024       Assessment and Plan    Diagnoses and all orders for this visit:    1. Hyperparathyroidism (Primary)  Assessment & Plan:  S/p parathyroidectomy  Repeat calcium, vitamin D.    Orders:  -     Vitamin D,25-Hydroxy  -     Comprehensive Metabolic Panel    2. Acquired hypothyroidism  Assessment & Plan:  Concerns for worsening fatigue.  Repeat TFT today  Rx: Continue current dose levothyroxine 75 mcg    Orders:  -     TSH    3. Other osteoporosis without current pathological fracture  Assessment & Plan:  Tolerated Prolia well.  Continue infusion clinic.   Cautioned discontinuation of treatment.         4. Multiple thyroid nodules  Assessment & Plan:  Thyroid ultrasound from 10/25/2022 with 1.8 cm left nodule.  Bilateral nodules noted on exam.  Advised repeat ultrasound with Dr. Pretty next office visit      5. Vitamin D deficiency  -     Vitamin D,25-Hydroxy         Return in about 6 months (around 8/24/2025) for follow-up thyroid disease, osteoporosis. The patient was instructed to contact the clinic with any interval questions or concerns.    Electronically signed by: Ema Rubio PA-C   Endocrinology    Please note that portions of this note were completed with a voice recognition program.

## 2025-02-25 LAB
25(OH)D3 SERPL-MCNC: 70.9 NG/ML (ref 30–100)
ALBUMIN SERPL-MCNC: 3.6 G/DL (ref 3.5–5.2)
ALBUMIN/GLOB SERPL: 1.2 G/DL
ALP SERPL-CCNC: 73 U/L (ref 39–117)
ALT SERPL W P-5'-P-CCNC: 8 U/L (ref 1–33)
ANION GAP SERPL CALCULATED.3IONS-SCNC: 11.7 MMOL/L (ref 5–15)
AST SERPL-CCNC: 20 U/L (ref 1–32)
BILIRUB SERPL-MCNC: 0.3 MG/DL (ref 0–1.2)
BUN SERPL-MCNC: 16 MG/DL (ref 8–23)
BUN/CREAT SERPL: 19.5 (ref 7–25)
CALCIUM SPEC-SCNC: 10.2 MG/DL (ref 8.6–10.5)
CHLORIDE SERPL-SCNC: 100 MMOL/L (ref 98–107)
CO2 SERPL-SCNC: 25.3 MMOL/L (ref 22–29)
CREAT SERPL-MCNC: 0.82 MG/DL (ref 0.57–1)
EGFRCR SERPLBLD CKD-EPI 2021: 70.2 ML/MIN/1.73
GLOBULIN UR ELPH-MCNC: 3.1 GM/DL
GLUCOSE SERPL-MCNC: 66 MG/DL (ref 65–99)
POTASSIUM SERPL-SCNC: 4.5 MMOL/L (ref 3.5–5.2)
PROT SERPL-MCNC: 6.7 G/DL (ref 6–8.5)
SODIUM SERPL-SCNC: 137 MMOL/L (ref 136–145)
TSH SERPL DL<=0.05 MIU/L-ACNC: 0.91 UIU/ML (ref 0.27–4.2)

## 2025-02-25 RX ORDER — LEVOTHYROXINE SODIUM 75 UG/1
75 TABLET ORAL DAILY
Qty: 90 TABLET | Refills: 3 | Status: SHIPPED | OUTPATIENT
Start: 2025-02-25

## 2025-03-02 NOTE — PROGRESS NOTES
"Subjective   Chief Complaint   Patient presents with    Gynecologic Exam     Annual.     Leslee Wu is a 84 y.o. year old  menopausal female presenting to be seen for her annual exam.  She is doing well and has no complaints. She reports she has to go to the bathroom frequently, but does drink a lot of water. Denies hematuria or dysuria, along with constipation. She has loose stools every now and then, but not often.     This past year she has not been on hormone replacement therapy.  She has not had any vaginal bleeding in the last 12 months.  Menopausal symptoms are not present.    Up to date on DEXA and mammogram  Aged out of colonoscopy     SEXUAL Hx:  She is not currently sexually active.  In the past year there there has been NO new sexual partners.    Condoms are not needed because she is not sexually active.  She would not like to be screened for STD's at today's exam.  [place] is painful: n/a  HEALTH Hx:  She exercises regularly: yes. She will be restating at the [facility] this summer and walks!   She wears her seat belt: yes.  She has concerns about domestic violence: no.    The following portions of the patient's history were reviewed and updated as appropriate:problem list, current medications, allergies, past family history, past medical history, past social history, and past surgical history.    Social History    Tobacco Use      Smoking status: Former        Packs/day: 0.00        Years: 0.5 packs/day for 10.0 years (5.0 ttl pk-yrs)        Types: Cigarettes        Start date:         Quit date:         Years since quittin.2        Passive exposure: Never      Smokeless tobacco: Never      Tobacco comments: Smoked briefly in her 20's         Objective   /70 (BP Location: Left arm, Patient Position: Sitting, Cuff Size: Adult)   Ht 152.4 cm (60\")   Wt 62.8 kg (138 lb 6.4 oz)   LMP  (LMP Unknown)   Breastfeeding No   BMI 27.03 kg/m²     General:  well developed; well " nourished  no acute distress  mentation appropriate   Breasts:  Examined in supine position  Symmetric without masses or skin dimpling  Nipples normal without inversion, lesions or discharge  There are no palpable axillary nodes   Pelvis: Clinical staff was present for exam  External genitalia:  normal appearance of the external genitalia including Barth[place]'s and [place]'s glands.  :  urethral meatus normal; urethra normal:  Vaginal:  atrophic mucosal changes are present;  Cervix:  normal appearance.  Uterus:  not palpable.  Adnexa:  non palpable bilaterally.  Rectal:  digital rectal exam not performed; anus visually normal appearing.  Anterior vaginal prolapse STAGE 2        Assessment   Annual GYN exam   Osteoporosis   STAGE 2 anterior vaginal prolapse, non bothersome   Menopausal female currently not on HRT - without significant symptoms affecting activities of daily living  She is up to date on all relevant gynecologic and colorectal screenings     Plan   Pap was not done today.  I explained to Leslee that the Pap smears are no longer recommended in patient's after 65 years of age.   I stressed to Leslee that she still should be seen to be seen yearly for a full physical including breast and pelvic exam.   She was encouraged to get yearly mammograms.  She should report any palpable breast lump(s) or skin changes regardless of mammographic findings.  I explained to Leslee that notification regarding her mammogram results will come from the center performing the study.  Our office will not be routinely calling with mammogram results.  It is her responsibility to make sure that the results from the mammogram are communicated to her by the breast center.  If she has any questions about the results, she is welcome to call our office anytime.  Continue PCP management of osteoporosis.  Discussed pelvic floor physical therapy or pessary in the future should her anterior vaginal prolapse ever become bothersome.  Patient  voiced understanding.  The importance of keeping all planned follow-up and taking all medications as prescribed was emphasized.  Follow up for annual exam in 1 year or sooner PRN      No orders of the defined types were placed in this encounter.         This note was electronically signed.    Laurel Peña MD  March 3, 2025

## 2025-03-03 ENCOUNTER — OFFICE VISIT (OUTPATIENT)
Dept: OBSTETRICS AND GYNECOLOGY | Facility: CLINIC | Age: 85
End: 2025-03-03
Payer: MEDICARE

## 2025-03-03 VITALS
BODY MASS INDEX: 27.17 KG/M2 | DIASTOLIC BLOOD PRESSURE: 70 MMHG | WEIGHT: 138.4 LBS | HEIGHT: 60 IN | SYSTOLIC BLOOD PRESSURE: 114 MMHG

## 2025-03-03 DIAGNOSIS — Z01.419 WOMEN'S ANNUAL ROUTINE GYNECOLOGICAL EXAMINATION: Primary | ICD-10-CM

## 2025-03-03 DIAGNOSIS — N95.2 VAGINAL ATROPHY: ICD-10-CM

## 2025-03-03 DIAGNOSIS — N81.10 INCOMPLETE PROLAPSE OF ANTERIOR WALL OF VAGINA: ICD-10-CM

## 2025-03-17 ENCOUNTER — HOSPITAL ENCOUNTER (OUTPATIENT)
Dept: GENERAL RADIOLOGY | Facility: HOSPITAL | Age: 85
Discharge: HOME | End: 2025-03-17
Admitting: NURSE PRACTITIONER
Payer: MEDICARE

## 2025-03-17 ENCOUNTER — OFFICE VISIT (OUTPATIENT)
Dept: INTERNAL MEDICINE | Facility: CLINIC | Age: 85
End: 2025-03-17
Payer: MEDICARE

## 2025-03-17 VITALS
SYSTOLIC BLOOD PRESSURE: 118 MMHG | TEMPERATURE: 97.1 F | DIASTOLIC BLOOD PRESSURE: 62 MMHG | HEIGHT: 60 IN | RESPIRATION RATE: 18 BRPM | HEART RATE: 80 BPM | BODY MASS INDEX: 26.7 KG/M2 | WEIGHT: 136 LBS

## 2025-03-17 DIAGNOSIS — R10.12 LUQ ABDOMINAL PAIN: ICD-10-CM

## 2025-03-17 DIAGNOSIS — Z12.31 SCREENING MAMMOGRAM FOR BREAST CANCER: ICD-10-CM

## 2025-03-17 DIAGNOSIS — E78.2 MIXED HYPERLIPIDEMIA: ICD-10-CM

## 2025-03-17 DIAGNOSIS — K21.9 GASTROESOPHAGEAL REFLUX DISEASE WITHOUT ESOPHAGITIS: ICD-10-CM

## 2025-03-17 DIAGNOSIS — I10 ESSENTIAL HYPERTENSION: Primary | ICD-10-CM

## 2025-03-17 DIAGNOSIS — R73.03 PREDIABETES: ICD-10-CM

## 2025-03-17 DIAGNOSIS — E55.9 VITAMIN D DEFICIENCY: ICD-10-CM

## 2025-03-17 DIAGNOSIS — E04.2 MULTIPLE THYROID NODULES: ICD-10-CM

## 2025-03-17 DIAGNOSIS — R82.998 LEUKOCYTES IN URINE: ICD-10-CM

## 2025-03-17 DIAGNOSIS — I25.10 CORONARY ARTERY DISEASE INVOLVING NATIVE CORONARY ARTERY OF NATIVE HEART WITHOUT ANGINA PECTORIS: ICD-10-CM

## 2025-03-17 DIAGNOSIS — E03.9 ACQUIRED HYPOTHYROIDISM: ICD-10-CM

## 2025-03-17 DIAGNOSIS — E21.3 HYPERPARATHYROIDISM: ICD-10-CM

## 2025-03-17 LAB
BASOPHILS # BLD AUTO: 0.08 10*3/MM3 (ref 0–0.2)
BASOPHILS NFR BLD AUTO: 1.6 % (ref 0–1.5)
BILIRUB BLD-MCNC: NEGATIVE MG/DL
CLARITY, POC: CLEAR
COLOR UR: YELLOW
DEPRECATED RDW RBC AUTO: 41.5 FL (ref 37–54)
EOSINOPHIL # BLD AUTO: 0.11 10*3/MM3 (ref 0–0.4)
EOSINOPHIL NFR BLD AUTO: 2.2 % (ref 0.3–6.2)
ERYTHROCYTE [DISTWIDTH] IN BLOOD BY AUTOMATED COUNT: 12.2 % (ref 12.3–15.4)
EXPIRATION DATE: ABNORMAL
GLUCOSE UR STRIP-MCNC: NEGATIVE MG/DL
HCT VFR BLD AUTO: 41.7 % (ref 34–46.6)
HGB BLD-MCNC: 13.2 G/DL (ref 12–15.9)
IMM GRANULOCYTES # BLD AUTO: 0.02 10*3/MM3 (ref 0–0.05)
IMM GRANULOCYTES NFR BLD AUTO: 0.4 % (ref 0–0.5)
KETONES UR QL: NEGATIVE
LEUKOCYTE EST, POC: ABNORMAL
LYMPHOCYTES # BLD AUTO: 0.77 10*3/MM3 (ref 0.7–3.1)
LYMPHOCYTES NFR BLD AUTO: 15.1 % (ref 19.6–45.3)
Lab: ABNORMAL
MCH RBC QN AUTO: 29.1 PG (ref 26.6–33)
MCHC RBC AUTO-ENTMCNC: 31.7 G/DL (ref 31.5–35.7)
MCV RBC AUTO: 92.1 FL (ref 79–97)
MONOCYTES # BLD AUTO: 0.51 10*3/MM3 (ref 0.1–0.9)
MONOCYTES NFR BLD AUTO: 10 % (ref 5–12)
NEUTROPHILS NFR BLD AUTO: 3.62 10*3/MM3 (ref 1.7–7)
NEUTROPHILS NFR BLD AUTO: 70.7 % (ref 42.7–76)
NITRITE UR-MCNC: NEGATIVE MG/ML
NRBC BLD AUTO-RTO: 0 /100 WBC (ref 0–0.2)
PH UR: 6 [PH] (ref 5–8)
PLATELET # BLD AUTO: 329 10*3/MM3 (ref 140–450)
PMV BLD AUTO: 11.3 FL (ref 6–12)
PROT UR STRIP-MCNC: NEGATIVE MG/DL
RBC # BLD AUTO: 4.53 10*6/MM3 (ref 3.77–5.28)
RBC # UR STRIP: NEGATIVE /UL
SP GR UR: 1 (ref 1–1.03)
UROBILINOGEN UR QL: NORMAL
WBC NRBC COR # BLD AUTO: 5.11 10*3/MM3 (ref 3.4–10.8)

## 2025-03-17 PROCEDURE — 87086 URINE CULTURE/COLONY COUNT: CPT | Performed by: NURSE PRACTITIONER

## 2025-03-17 PROCEDURE — 80053 COMPREHEN METABOLIC PANEL: CPT | Performed by: NURSE PRACTITIONER

## 2025-03-17 PROCEDURE — 85025 COMPLETE CBC W/AUTO DIFF WBC: CPT | Performed by: NURSE PRACTITIONER

## 2025-03-17 PROCEDURE — 87077 CULTURE AEROBIC IDENTIFY: CPT | Performed by: NURSE PRACTITIONER

## 2025-03-17 PROCEDURE — 80061 LIPID PANEL: CPT | Performed by: NURSE PRACTITIONER

## 2025-03-17 PROCEDURE — 74018 RADEX ABDOMEN 1 VIEW: CPT

## 2025-03-17 NOTE — PROGRESS NOTES
Chief Complaint  Hypertension (6 month follow up. )    Subjective          Leslee Wu presents to [facility]  History of Present Illness  History of Present Illness  The patient is a 74-year-old female who presents for evaluation of left upper abdominal discomfort, coronary artery disease, hypertension, vitamin D deficiency, osteoporosis, hypothyroidism, hyperparathyroidism, prediabetes, heartburn, reflux, GERD symptoms, and leg swelling.    She reports a sensation of movement in her left upper abdomen, specifically under her breast, when she lies down or changes position. This discomfort necessitates frequent adjustments. She experiences daily bowel movements and does not experience any pain upon palpation of the rib bones. However, she notes occasional soreness around the base of the left rib. She does not report any new symptoms of depression, headaches, dizziness, syncope, shortness of breath, or abdominal pain other than the previously mentioned left-sided discomfort. She also does not report any new lumps, bumps, skin changes, bleeding from the lips or gums, or new bruising.    She has coronary artery disease and is on amlodipine 5 mg daily, benazepril 20 mg daily, and a baby aspirin coated tablet daily. She follows up with cardiology. The last cardiology note from February indicated some atypical chest pain but a low risk for cardiac events.    She has hypertension and is on amlodipine 5 mg daily and benazepril 20 mg daily.    She has vitamin D deficiency.    She has osteoporosis and is on Prolia.    She has hypothyroidism and takes levothyroxine 75 mcg daily. She follows up with an endocrinologist, whom she saw in February and will see again in August. They noted a thyroid nodule in 2022 measuring 1.8 cm, which is being monitored. An ultrasound recheck is planned with Dr. Pretty in August.    She has a history of hyperparathyroidism and a  "parathyroidectomy.    She was prescribed hydrochlorothiazide for leg swelling, but she did not tolerate it well and stopped taking it. The swelling in her legs has been stable without any new issues. She notes that her legs swell mostly at the end of the day, but they look good currently.    She has prediabetes, but her last number was completely normal.    She has heartburn, reflux, and GERD symptoms and takes pantoprazole. She reports no new symptoms from her pantoprazole but still experiences heartburn at times.    Supplemental Information  She saw her gynecologist in March, and no Pap smear was needed. It was just a checkup for her postmenopause.    MEDICATIONS  Current: Amlodipine, benazepril, baby aspirin, Prolia, levothyroxine, pantoprazole  Discontinued: Hydrochlorothiazide    Objective   Vital Signs:   /62 (BP Location: Right arm, Patient Position: Sitting, Cuff Size: Adult)   Pulse 80   Temp 97.1 °F (36.2 °C) (Infrared)   Resp 18   Ht 152.4 cm (60\")   Wt 61.7 kg (136 lb)   BMI 26.56 kg/m²     Physical Exam  Vitals and nursing note reviewed.   Constitutional:       General: She is not in acute distress.     Appearance: Normal appearance. She is well-developed. She is not ill-appearing.   HENT:      Head: Normocephalic and atraumatic.   Eyes:      General: No scleral icterus.  Neck:      Thyroid: No thyromegaly.   Cardiovascular:      Rate and Rhythm: Normal rate and regular rhythm.   Pulmonary:      Effort: Pulmonary effort is normal.      Breath sounds: Normal breath sounds.   Abdominal:      General: Bowel sounds are normal. There is no distension.      Palpations: Abdomen is soft.      Tenderness: There is no abdominal tenderness.   Musculoskeletal:      Comments: Tender at the lower left rib border   Lymphadenopathy:      Cervical: No cervical adenopathy.   Skin:     Capillary Refill: Capillary refill takes 2 to 3 seconds.      Coloration: Skin is not pale.   Neurological:      Mental " Status: She is alert and oriented to person, place, and time.   Psychiatric:         Mood and Affect: Mood normal.         Behavior: Behavior normal.        Result Review :                 Assessment and Plan    Diagnoses and all orders for this visit:    1. Essential hypertension (Primary)  -     Comprehensive Metabolic Panel; Future  -     POC Urinalysis Dipstick, Automated; Future  -     POC Urinalysis Dipstick, Automated  -     Comprehensive Metabolic Panel    2. Hyperparathyroidism    3. Acquired hypothyroidism    4. Vitamin D deficiency    5. Gastroesophageal reflux disease without esophagitis  -     CBC & Differential; Future  -     Comprehensive Metabolic Panel; Future  -     POC Urinalysis Dipstick, Automated; Future  -     POC Urinalysis Dipstick, Automated  -     CBC & Differential  -     Comprehensive Metabolic Panel    6. Prediabetes    7. Multiple thyroid nodules    8. Mixed hyperlipidemia  -     Comprehensive Metabolic Panel; Future  -     Lipid Panel; Future  -     Comprehensive Metabolic Panel  -     Lipid Panel    9. Coronary artery disease involving native coronary artery of native heart without angina pectoris    10. Screening mammogram for breast cancer  -     Mammo Screening Digital Tomosynthesis Bilateral With CAD; Future    11. LUQ abdominal pain  -     XR Abdomen KUB; Future    12. Leukocytes in urine  -     Urine Culture - Urine, Urine, Clean Catch; Future  -     Urine Culture - Urine, Urine, Clean Catch    KUB notes the patient will await formal reading prior to calling patient report.  Uncertain if constipation was related to left lower rib tenderness.  Would like to treat and see if symptoms improve.  Assessment & Plan  1. Left upper abdominal discomfort.  The discomfort may be due to the presence of stool in the colon, which can sometimes be felt mechanically on the left side. An x-ray of the abdomen, kidneys, and lower ribs will be ordered to further investigate the cause of the  discomfort.    2. Coronary artery disease.  She is currently taking amlodipine 5 mg daily, benazepril 20 mg daily, and a baby aspirin coated tablet daily. She follows up with cardiology, and the last note from February indicated some atypical chest pain but a low risk for cardiac events.    3. Hypertension.  She is on amlodipine 5 mg daily and benazepril 20 mg daily.    4. Vitamin D deficiency.    5. Osteoporosis.  She is on Prolia.    6. Hypothyroidism.  She is taking levothyroxine 75 mcg daily and follows up with an endocrinologist. A thyroid nodule was noted in 2022, measuring 1.8 cm, and is being monitored with an ultrasound scheduled for August with Dr. Pretty.    7. Hyperparathyroidism.  She has a history of hyperparathyroidism and a parathyroidectomy.    8. Leg swelling.  Hydrochlorothiazide was added for leg swelling, but she did not tolerate it well and stopped taking it. The swelling in her legs has been stable without any new issues. Her legs appear normal today. Compression socks were recommended as a potential aid for leg swelling.    9. Prediabetes.  Her last number was completely normal. Her blood glucose levels will be checked at the next visit.    10. Heartburn, reflux, and GERD symptoms.  She is taking pantoprazole and reports no new symptoms.    11. Health maintenance.  A mammogram has been ordered for August 2025. She was advised to bring her living will to the clinic for inclusion in her medical records.    PROCEDURE  The patient has a history of parathyroidectomy.        BMI is >= 25 and <30. (Overweight) The following options were offered after discussion;: weight loss educational material (shared in after visit summary) and exercise counseling/recommendations      Leslee Wu  reports that she quit smoking about 63 years ago. Her smoking use included cigarettes. She started smoking about 65 years ago. She has a 5 pack-year smoking history. She has never been exposed to tobacco smoke. She has  never used smokeless tobacco.     Depression: PHQ-2/9 Depression Screening  Little interest or pleasure in doing things?  0   Feeling down, depressed, or hopeless?  0   PHQ-2 Total Score  0   PHQ-9 Total Score          AWV: utd  A1C:   Lab Results   Component Value Date    HGBA1C 5.4 09/11/2023      ACP: Advance Care Planning   ACP discussion was held with the patient during this visit. Patient has an advance directive (not in EMR), copy requested.       Follow Up   Return in about 6 months (around 9/17/2025) for fasting, Medicare Wellness, Annual.  Patient was given instructions and counseling regarding her condition or for health maintenance advice. Please see specific information pulled into the AVS if appropriate.     RTC/call  If symptoms worsen  Meds MOA and SE's reviewed and pt v/u    03/17/25   10:52 EDT    Patient or patient representative verbalized consent to the visit recording.  I have personally performed the services described in this document as transcribed by the above individual, and it is both accurate and complete.

## 2025-03-17 NOTE — PATIENT INSTRUCTIONS
Advance Care Planning and Advance Directives     You make decisions on a daily basis - decisions about where you want to live, your career, your home, your life. Perhaps one of the most important decisions you face is your choice for future medical care. Take time to talk with your family and your healthcare team and start planning today.  Advance Care Planning is a process that can help you:  Understand possible future healthcare decisions in light of your own experiences  Reflect on those decision in light of your goals and values  Discuss your decisions with those closest to you and the healthcare professionals that care for you  Make a plan by creating a document that reflects your wishes    Surrogate Decision Maker  In the event of a medical emergency, which has left you unable to communicate or to make your own decisions, you would need someone to make decisions for you.  It is important to discuss your preferences for medical treatment with this person while you are in good health.     Qualities of a surrogate decision maker:  Willing to take on this role and responsibility  Knows what you want for future medical care  Willing to follow your wishes even if they don't agree with them  Able to make difficult medical decisions under stressful circumstances    Advance Directives  These are legal documents you can create that will guide your healthcare team and decision maker(s) when needed. These documents can be stored in the electronic medical record.    Living Will - a legal document to guide your care if you have a terminal condition or a serious illness and are unable to communicate. States vary by statute in document names/types, but most forms may include one or more of the following:        -  Directions regarding life-prolonging treatments        -  Directions regarding artificially provided nutrition/hydration        -  Choosing a healthcare decision maker        -  Direction regarding organ/tissue  donation    Durable Power of  for Healthcare - this document names an -in-fact to make medical decisions for you, but it may also allow this person to make personal and financial decisions for you. Please seek the advice of an  if you need this type of document.    **Advance Directives are not required and no one may discriminate against you if you do not sign one.    Medical Orders  Many states allow specific forms/orders signed by your physician to record your wishes for medical treatment in your current state of health. This form, signed in personal communication with your physician, addresses resuscitation and other medical interventions that you may or may not want.      For more information or to schedule a time with a [facility] Advance Care Planning Facilitator contact: [facility] or call [phone] and someone will contact you directly.MyPlate from USDA    MyPlate is an outline of a general healthy diet based on the Dietary Guidelines for Americans, 9631-2357, from the U.[place]. Department of Agriculture (USDA). It sets guidelines for how much food you should eat from each food group based on your age, sex, and level of physical activity.  What are tips for following MyPlate?  To follow MyPlate recommendations:  Eat a wide variety of fruits and vegetables, grains, and protein foods.  Serve smaller portions and eat less food throughout the day.  Limit portion sizes to avoid overeating.  Enjoy your food.  Get at least 150 minutes of exercise every week. This is about 30 minutes each day, 5 or more days per week.  It can be difficult to have every meal look like MyPlate. Think about MyPlate as eating guidelines for an entire day, rather than each individual meal.  Fruits and vegetables  Make one half of your plate fruits and vegetables.  Eat many different colors of fruits and vegetables each day.  For a 2,000-calorie daily food plan, eat:  2½ cups of vegetables every  day.  2 cups of fruit every day.  1 cup is equal to:  1 cup raw or cooked vegetables.  1 cup raw fruit.  1 medium-sized orange, apple, or banana.  1 cup 100% fruit or vegetable juice.  2 cups raw leafy greens, such as lettuce, spinach, or kale.  ½ cup dried fruit.  Grains  One fourth of your plate should be grains.  Make at least half of the grains you eat each day whole grains.  For a 2,000-calorie daily food plan, eat 6 oz of grains every day.  1 oz is equal to:  1 slice bread.  1 cup cereal.  ½ cup cooked rice, cereal, or pasta.  Protein  One fourth of your plate should be protein.  Eat a wide variety of protein foods, including meat, poultry, fish, eggs, beans, nuts, and tofu.  For a 2,000-calorie daily food plan, eat 5½ oz of protein every day.  1 oz is equal to:  1 oz meat, poultry, or fish.  ¼ cup cooked beans.  1 egg.  ½ oz nuts or seeds.  1 Tbsp peanut butter.  Dairy  Drink fat-free or low-fat (1%) milk.  Eat or drink dairy as a side to meals.  For a 2,000-calorie daily food plan, eat or drink 3 cups of dairy every day.  1 cup is equal to:  1 cup milk, yogurt, cottage cheese, or soy milk (soy beverage).  2 oz processed cheese.  1½ oz natural cheese.  Fats, oils, salt, and sugars  Only small amounts of oils are recommended.  Avoid foods that are high in calories and low in nutritional value (empty calories), like foods high in fat or added sugars.  Choose foods that are low in salt (sodium). Choose foods that have less than 140 milligrams (mg) of sodium per serving.  Drink water instead of sugary drinks. Drink enough fluid to keep your urine pale yellow.  Where to find support  Work with your health care provider or a dietitian to develop a customized eating plan that is right for you.  Download an eduardo (mobile application) to help you track your daily food intake.  Where to find more information  USDA: ChooseMyPlate.gov  Summary  MyPlate is a general guideline for healthy eating from the USDA. It is based on  the Dietary Guidelines for Americans, 3787-4135.  In general, fruits and vegetables should take up one half of your plate, grains should take up one fourth of your plate, and protein should take up one fourth of your plate.  This information is not intended to replace advice given to you by your health care provider. Make sure you discuss any questions you have with your health care provider.  Document Revised: 11/08/2021 Document Reviewed: 11/08/2021  Elsevier Patient Education © 2024 [employer].ACP information pamphlet given to the patient.  ACP information provided on the AVS.

## 2025-03-18 LAB
ALBUMIN SERPL-MCNC: 3.6 G/DL (ref 3.5–5.2)
ALBUMIN/GLOB SERPL: 1.1 G/DL
ALP SERPL-CCNC: 70 U/L (ref 39–117)
ALT SERPL W P-5'-P-CCNC: 7 U/L (ref 1–33)
ANION GAP SERPL CALCULATED.3IONS-SCNC: 9.8 MMOL/L (ref 5–15)
AST SERPL-CCNC: 21 U/L (ref 1–32)
BILIRUB SERPL-MCNC: 0.3 MG/DL (ref 0–1.2)
BUN SERPL-MCNC: 16 MG/DL (ref 8–23)
BUN/CREAT SERPL: 18.2 (ref 7–25)
CALCIUM SPEC-SCNC: 10 MG/DL (ref 8.6–10.5)
CHLORIDE SERPL-SCNC: 105 MMOL/L (ref 98–107)
CHOLEST SERPL-MCNC: 160 MG/DL (ref 0–200)
CO2 SERPL-SCNC: 23.2 MMOL/L (ref 22–29)
CREAT SERPL-MCNC: 0.88 MG/DL (ref 0.57–1)
EGFRCR SERPLBLD CKD-EPI 2021: 64.5 ML/MIN/1.73
GLOBULIN UR ELPH-MCNC: 3.3 GM/DL
GLUCOSE SERPL-MCNC: 87 MG/DL (ref 65–99)
HDLC SERPL-MCNC: 51 MG/DL (ref 40–60)
LDLC SERPL CALC-MCNC: 90 MG/DL (ref 0–100)
LDLC/HDLC SERPL: 1.72 {RATIO}
POTASSIUM SERPL-SCNC: 4.6 MMOL/L (ref 3.5–5.2)
PROT SERPL-MCNC: 6.9 G/DL (ref 6–8.5)
SODIUM SERPL-SCNC: 138 MMOL/L (ref 136–145)
TRIGL SERPL-MCNC: 106 MG/DL (ref 0–150)
VLDLC SERPL-MCNC: 19 MG/DL (ref 5–40)

## 2025-03-19 ENCOUNTER — RESULTS FOLLOW-UP (OUTPATIENT)
Dept: INTERNAL MEDICINE | Facility: CLINIC | Age: 85
End: 2025-03-19
Payer: MEDICARE

## 2025-03-19 DIAGNOSIS — K21.9 GASTROESOPHAGEAL REFLUX DISEASE WITHOUT ESOPHAGITIS: ICD-10-CM

## 2025-03-19 RX ORDER — PANTOPRAZOLE SODIUM 40 MG/1
40 TABLET, DELAYED RELEASE ORAL DAILY
Qty: 90 TABLET | Refills: 0 | Status: SHIPPED | OUTPATIENT
Start: 2025-03-19

## 2025-03-20 ENCOUNTER — RESULTS FOLLOW-UP (OUTPATIENT)
Dept: GENERAL RADIOLOGY | Facility: HOSPITAL | Age: 85
End: 2025-03-20
Payer: MEDICARE

## 2025-03-20 RX ORDER — CEPHALEXIN 500 MG/1
500 CAPSULE ORAL 2 TIMES DAILY
Qty: 20 CAPSULE | Refills: 0 | Status: SHIPPED | OUTPATIENT
Start: 2025-03-20

## 2025-03-20 NOTE — TELEPHONE ENCOUNTER
Name: Leslee Wu    Relationship: Self      HUB PROVIDED THE RELAY MESSAGE FROM THE OFFICE   PATIENT VOICED UNDERSTANDING AND HAS NO FURTHER QUESTIONS AT THIS TIME

## 2025-03-20 NOTE — TELEPHONE ENCOUNTER
I left a message on the patients voicemail to call our office back, office number provided.     HUB relay:    I will send the antibiotic to her pharmacy. Please let her know to complete the antibiotic and if she has persisting symptoms she should let me know. Thank you

## 2025-03-21 LAB — BACTERIA SPEC AEROBE CULT: ABNORMAL

## 2025-05-02 RX ORDER — BENAZEPRIL HYDROCHLORIDE 20 MG/1
20 TABLET ORAL DAILY
Qty: 90 TABLET | Refills: 0 | Status: SHIPPED | OUTPATIENT
Start: 2025-05-02

## 2025-06-08 NOTE — TELEPHONE ENCOUNTER
Rx Refill Note  Requested Prescriptions     Pending Prescriptions Disp Refills    levothyroxine (SYNTHROID, LEVOTHROID) 75 MCG tablet [Pharmacy Med Name: Levothyroxine Sodium 75 MCG Oral Tablet] 90 tablet 0     Sig: Take 1 tablet by mouth once daily      Last office visit with prescribing clinician: Visit date not found      Next office visit with prescribing clinician: Visit date not found                  Barron Paz MA  03/28/24, 08:30 EDT  
No

## 2025-06-13 NOTE — TELEPHONE ENCOUNTER
Rx Refill Note  Requested Prescriptions     Pending Prescriptions Disp Refills    amLODIPine (NORVASC) 5 MG tablet [Pharmacy Med Name: amLODIPine Besylate 5 MG Oral Tablet] 90 tablet 0     Sig: Take 1 tablet by mouth once daily      Last office visit with prescribing clinician: 7/9/2024   Last telemedicine visit with prescribing clinician: Visit date not found   Next office visit with prescribing clinician: 9/3/2025       Rosaura Pineda MA  06/13/25, 10:59 EDT

## 2025-06-15 RX ORDER — AMLODIPINE BESYLATE 5 MG/1
5 TABLET ORAL DAILY
Qty: 90 TABLET | Refills: 0 | Status: SHIPPED | OUTPATIENT
Start: 2025-06-15

## 2025-07-07 DIAGNOSIS — K21.9 GASTROESOPHAGEAL REFLUX DISEASE WITHOUT ESOPHAGITIS: ICD-10-CM

## 2025-07-08 RX ORDER — PANTOPRAZOLE SODIUM 40 MG/1
40 TABLET, DELAYED RELEASE ORAL DAILY
Qty: 90 TABLET | Refills: 0 | Status: SHIPPED | OUTPATIENT
Start: 2025-07-08

## 2025-08-01 DIAGNOSIS — M81.8 OTHER OSTEOPOROSIS WITHOUT CURRENT PATHOLOGICAL FRACTURE: Primary | ICD-10-CM

## 2025-08-01 RX ORDER — SODIUM CHLORIDE 9 MG/ML
20 INJECTION, SOLUTION INTRAVENOUS ONCE
OUTPATIENT
Start: 2025-08-01

## 2025-08-01 RX ORDER — BENAZEPRIL HYDROCHLORIDE 20 MG/1
20 TABLET ORAL DAILY
Qty: 90 TABLET | Refills: 0 | Status: SHIPPED | OUTPATIENT
Start: 2025-08-01

## 2025-08-01 RX ORDER — ZOLEDRONIC ACID 0.05 MG/ML
5 INJECTION, SOLUTION INTRAVENOUS ONCE
OUTPATIENT
Start: 2025-08-01

## 2025-08-12 ENCOUNTER — TELEPHONE (OUTPATIENT)
Dept: INTERNAL MEDICINE | Facility: CLINIC | Age: 85
End: 2025-08-12
Payer: MEDICARE

## 2025-08-12 DIAGNOSIS — E78.2 MIXED HYPERLIPIDEMIA: ICD-10-CM

## 2025-08-12 DIAGNOSIS — R73.03 PREDIABETES: ICD-10-CM

## 2025-08-12 DIAGNOSIS — K21.9 GASTROESOPHAGEAL REFLUX DISEASE WITHOUT ESOPHAGITIS: ICD-10-CM

## 2025-08-12 DIAGNOSIS — I10 ESSENTIAL HYPERTENSION: ICD-10-CM

## 2025-08-12 DIAGNOSIS — E55.9 VITAMIN D DEFICIENCY: ICD-10-CM

## 2025-08-12 DIAGNOSIS — Z79.899 MEDICATION MANAGEMENT: ICD-10-CM

## 2025-08-12 DIAGNOSIS — M81.8 OTHER OSTEOPOROSIS WITHOUT CURRENT PATHOLOGICAL FRACTURE: Primary | ICD-10-CM

## 2025-08-15 ENCOUNTER — LAB (OUTPATIENT)
Dept: LAB | Facility: HOSPITAL | Age: 85
End: 2025-08-15
Payer: MEDICARE

## 2025-08-15 DIAGNOSIS — Z79.899 MEDICATION MANAGEMENT: ICD-10-CM

## 2025-08-15 DIAGNOSIS — E55.9 VITAMIN D DEFICIENCY: ICD-10-CM

## 2025-08-15 DIAGNOSIS — K21.9 GASTROESOPHAGEAL REFLUX DISEASE WITHOUT ESOPHAGITIS: ICD-10-CM

## 2025-08-15 DIAGNOSIS — R73.03 PREDIABETES: ICD-10-CM

## 2025-08-15 DIAGNOSIS — I10 ESSENTIAL HYPERTENSION: ICD-10-CM

## 2025-08-15 DIAGNOSIS — E78.2 MIXED HYPERLIPIDEMIA: ICD-10-CM

## 2025-08-15 DIAGNOSIS — M81.8 OTHER OSTEOPOROSIS WITHOUT CURRENT PATHOLOGICAL FRACTURE: ICD-10-CM

## 2025-08-15 LAB
25(OH)D3 SERPL-MCNC: 69.7 NG/ML (ref 30–100)
ALBUMIN SERPL-MCNC: 3.8 G/DL (ref 3.5–5.2)
ALBUMIN/GLOB SERPL: 1.3 G/DL
ALP SERPL-CCNC: 77 U/L (ref 39–117)
ALT SERPL W P-5'-P-CCNC: 16 U/L (ref 1–33)
ANION GAP SERPL CALCULATED.3IONS-SCNC: 10.8 MMOL/L (ref 5–15)
AST SERPL-CCNC: 29 U/L (ref 1–32)
BILIRUB SERPL-MCNC: 0.2 MG/DL (ref 0–1.2)
BUN SERPL-MCNC: 16 MG/DL (ref 8–23)
BUN/CREAT SERPL: 16.7 (ref 7–25)
CALCIUM SPEC-SCNC: 9.9 MG/DL (ref 8.6–10.5)
CHLORIDE SERPL-SCNC: 103 MMOL/L (ref 98–107)
CHOLEST SERPL-MCNC: 140 MG/DL (ref 0–200)
CO2 SERPL-SCNC: 23.2 MMOL/L (ref 22–29)
CREAT SERPL-MCNC: 0.96 MG/DL (ref 0.57–1)
DEPRECATED RDW RBC AUTO: 42.1 FL (ref 37–54)
EGFRCR SERPLBLD CKD-EPI 2021: 58.1 ML/MIN/1.73
ERYTHROCYTE [DISTWIDTH] IN BLOOD BY AUTOMATED COUNT: 12.8 % (ref 12.3–15.4)
GLOBULIN UR ELPH-MCNC: 3 GM/DL
GLUCOSE SERPL-MCNC: 100 MG/DL (ref 65–99)
HBA1C MFR BLD: 5.6 % (ref 4.8–5.6)
HCT VFR BLD AUTO: 38.2 % (ref 34–46.6)
HDLC SERPL-MCNC: 46 MG/DL (ref 40–60)
HGB BLD-MCNC: 12.2 G/DL (ref 12–15.9)
LDLC SERPL CALC-MCNC: 78 MG/DL (ref 0–100)
LDLC/HDLC SERPL: 1.68 {RATIO}
MAGNESIUM SERPL-MCNC: 2 MG/DL (ref 1.6–2.4)
MCH RBC QN AUTO: 29.1 PG (ref 26.6–33)
MCHC RBC AUTO-ENTMCNC: 31.9 G/DL (ref 31.5–35.7)
MCV RBC AUTO: 91.2 FL (ref 79–97)
PHOSPHATE SERPL-MCNC: 2.9 MG/DL (ref 2.5–4.5)
PLATELET # BLD AUTO: 331 10*3/MM3 (ref 140–450)
PMV BLD AUTO: 11.1 FL (ref 6–12)
POTASSIUM SERPL-SCNC: 4.9 MMOL/L (ref 3.5–5.2)
PROT SERPL-MCNC: 6.8 G/DL (ref 6–8.5)
RBC # BLD AUTO: 4.19 10*6/MM3 (ref 3.77–5.28)
SODIUM SERPL-SCNC: 137 MMOL/L (ref 136–145)
TRIGL SERPL-MCNC: 84 MG/DL (ref 0–150)
VLDLC SERPL-MCNC: 16 MG/DL (ref 5–40)
WBC NRBC COR # BLD AUTO: 7.73 10*3/MM3 (ref 3.4–10.8)

## 2025-08-15 PROCEDURE — 80053 COMPREHEN METABOLIC PANEL: CPT

## 2025-08-15 PROCEDURE — 84100 ASSAY OF PHOSPHORUS: CPT

## 2025-08-15 PROCEDURE — 83036 HEMOGLOBIN GLYCOSYLATED A1C: CPT

## 2025-08-15 PROCEDURE — 80061 LIPID PANEL: CPT

## 2025-08-15 PROCEDURE — 82306 VITAMIN D 25 HYDROXY: CPT

## 2025-08-15 PROCEDURE — 83735 ASSAY OF MAGNESIUM: CPT

## 2025-08-15 PROCEDURE — 85027 COMPLETE CBC AUTOMATED: CPT

## 2025-08-19 ENCOUNTER — RESULTS FOLLOW-UP (OUTPATIENT)
Dept: LAB | Facility: HOSPITAL | Age: 85
End: 2025-08-19
Payer: MEDICARE

## 2025-08-19 ENCOUNTER — HOSPITAL ENCOUNTER (OUTPATIENT)
Facility: HOSPITAL | Age: 85
Discharge: HOME | End: 2025-08-19
Admitting: INTERNAL MEDICINE
Payer: MEDICARE

## 2025-08-19 VITALS
RESPIRATION RATE: 18 BRPM | SYSTOLIC BLOOD PRESSURE: 124 MMHG | HEART RATE: 84 BPM | DIASTOLIC BLOOD PRESSURE: 75 MMHG | TEMPERATURE: 98.7 F | BODY MASS INDEX: 25.97 KG/M2 | WEIGHT: 133 LBS

## 2025-08-19 DIAGNOSIS — M81.8 OTHER OSTEOPOROSIS WITHOUT CURRENT PATHOLOGICAL FRACTURE: Primary | ICD-10-CM

## 2025-08-19 PROCEDURE — 25010000002 ZOLEDRONIC ACID 5 MG/100ML SOLUTION: Performed by: INTERNAL MEDICINE

## 2025-08-19 PROCEDURE — 96374 THER/PROPH/DIAG INJ IV PUSH: CPT

## 2025-08-19 RX ORDER — SODIUM CHLORIDE 9 MG/ML
20 INJECTION, SOLUTION INTRAVENOUS ONCE
Status: CANCELLED | OUTPATIENT
Start: 2026-08-01

## 2025-08-19 RX ORDER — SODIUM CHLORIDE 9 MG/ML
20 INJECTION, SOLUTION INTRAVENOUS ONCE
Status: DISCONTINUED | OUTPATIENT
Start: 2025-08-19 | End: 2025-08-20 | Stop reason: HOSPADM

## 2025-08-19 RX ORDER — ZOLEDRONIC ACID 0.05 MG/ML
5 INJECTION, SOLUTION INTRAVENOUS ONCE
Status: COMPLETED | OUTPATIENT
Start: 2025-08-19 | End: 2025-08-19

## 2025-08-19 RX ORDER — ZOLEDRONIC ACID 0.05 MG/ML
5 INJECTION, SOLUTION INTRAVENOUS ONCE
Status: CANCELLED | OUTPATIENT
Start: 2026-08-01

## 2025-08-19 RX ADMIN — ZOLEDRONIC ACID 5 MG: 0.05 INJECTION, SOLUTION INTRAVENOUS at 13:32
